# Patient Record
Sex: MALE | Race: BLACK OR AFRICAN AMERICAN | NOT HISPANIC OR LATINO | URBAN - METROPOLITAN AREA
[De-identification: names, ages, dates, MRNs, and addresses within clinical notes are randomized per-mention and may not be internally consistent; named-entity substitution may affect disease eponyms.]

---

## 2023-02-12 ENCOUNTER — EMERGENCY (EMERGENCY)
Facility: HOSPITAL | Age: 43
LOS: 1 days | Discharge: HOME | End: 2023-02-12
Admitting: EMERGENCY MEDICINE
Payer: MEDICAID

## 2023-02-12 VITALS
DIASTOLIC BLOOD PRESSURE: 82 MMHG | HEART RATE: 92 BPM | OXYGEN SATURATION: 95 % | RESPIRATION RATE: 18 BRPM | SYSTOLIC BLOOD PRESSURE: 136 MMHG | TEMPERATURE: 98 F

## 2023-02-12 PROCEDURE — 99284 EMERGENCY DEPT VISIT MOD MDM: CPT

## 2023-02-12 RX ORDER — HALOPERIDOL DECANOATE 100 MG/ML
5 INJECTION INTRAMUSCULAR ONCE
Refills: 0 | Status: COMPLETED | OUTPATIENT
Start: 2023-02-12 | End: 2023-02-12

## 2023-02-12 RX ADMIN — HALOPERIDOL DECANOATE 5 MILLIGRAM(S): 100 INJECTION INTRAMUSCULAR at 18:02

## 2023-02-12 RX ADMIN — Medication 2 MILLIGRAM(S): at 18:02

## 2023-02-12 NOTE — ED PROVIDER NOTE - OBJECTIVE STATEMENT
44 y/o F hx  Schizophrenia BIBA  secondary to hearing voices. Admits that the voices are louder over the past few days.   Appears  internally occupied.  Denies SI/HI/VH.  Denies falling, punching or kicking any objects. Denies pain, SOB, fever, chills, chest/abdominal discomfort.  Denies use of alcohol or illicit drugs. No evidence of physical injuries, broken skin or deformities.

## 2023-02-12 NOTE — ED BEHAVIORAL HEALTH NOTE - BEHAVIORAL HEALTH NOTE
As per request of provider gisella contacted pt residence [phone] and spoke with Radha who stated that came down and mentioned his voices are getting worse and she asked what is going on and the pt stated his voices were  getting worst and getting more serious and staff asked if he wanted to take a moment to sit down and he said not its getting worst. Radha then asked pt is he wanted to go to the hospital and pt stated yes he did want to go to the hospital. Radha stated that EMS showed up 20 mins later and took him to the hospital. Radha stated that pt is med compliant. Radha mentioned that she has limited information due to her being the  and she can only report on when she sees pt. Radha provided on call phone number [phone]. Gisella contacted on call phone number and call when to  stating that staff will return Monday.

## 2023-02-12 NOTE — ED PROVIDER NOTE - PROGRESS NOTE DETAILS
Handy NP- Restraints removed . Patient calm,  engaged in activity on floor. SW consulted. Handy NP-  Patient not responsive to directions refusing to change and repeatedly threatens  to run through doors.  Restraint applied for safety.  Constant observation initiated. Will continue to monitor.

## 2023-02-12 NOTE — ED ADULT NURSE NOTE - CHIEF COMPLAINT QUOTE
Pt from [facility], with history of schizophrenia, compliant with medications, reports voices telling him to hurt himself and others. States he always hears them but they are getting stronger. Denies current SI plan, reports an attempt in the past.

## 2023-02-12 NOTE — ED BEHAVIORAL HEALTH NOTE - BEHAVIORAL HEALTH NOTE
The provider informed the  that the patient is ready for discharge and requires a taxi home. The  confirmed with the patient the address on the chart: [place] at [facility]. The  confirmed with the patient the address on the chart: [place]ha at [facility]. SW confirmed that pt mode of transportation was taxi and the pt travel independent .  The  contacted [facility] at [phone], and they mentioned that Pt was not eligible for transportation. The trip is arranged with [employer] [employer], [employer] [address], at [phone], to  the patient. The  notifies the provider who agreed to the plan. No further SW intervention is required at this time.

## 2023-02-12 NOTE — ED ADULT NURSE NOTE - OBJECTIVE STATEMENT
Pt from [facility], with history of schizophrenia, compliant with medications, reports voices telling him to hurt himself and others. States he always hears them but they are getting stronger.   Patient to behavior health area. Patient uncooperative and refusing to change and comply with staff, threatened to run to door, agitated and combative. Patient medicated as ordered and placed for evaluation. Will continue to monitor closely.   KIN White

## 2023-02-12 NOTE — ED PROVIDER NOTE - CLINICAL SUMMARY MEDICAL DECISION MAKING FREE TEXT BOX
44 y/o F hx  Schizophrenia   Medical evaluation performed. There is no clinical evidence of intoxication or any acute medical problem requiring immediate intervention. Patient is awaiting psychiatric consultation. Final disposition will be determined by psychiatrist. 44 y/o F hx  Schizophrenia   Medical evaluation performed. There is no clinical evidence of intoxication or any acute medical problem requiring immediate intervention. SW consulted .

## 2023-02-12 NOTE — ED PROVIDER NOTE - PATIENT PORTAL LINK FT
You can access the FollowMyHealth Patient Portal offered by [facility] by registering at the following website: [email or web address]. By joining [employer]’s FollowMyHealth portal, you will also be able to view your health information using other applications (apps) compatible with our system.

## 2023-03-28 ENCOUNTER — EMERGENCY (EMERGENCY)
Facility: HOSPITAL | Age: 43
LOS: 1 days | Discharge: HOME | End: 2023-03-28
Attending: EMERGENCY MEDICINE | Admitting: STUDENT IN AN ORGANIZED HEALTH CARE EDUCATION/TRAINING PROGRAM
Payer: MEDICAID

## 2023-03-28 VITALS
HEART RATE: 83 BPM | RESPIRATION RATE: 16 BRPM | OXYGEN SATURATION: 99 % | TEMPERATURE: 98 F | SYSTOLIC BLOOD PRESSURE: 112 MMHG | DIASTOLIC BLOOD PRESSURE: 67 MMHG

## 2023-03-28 PROBLEM — F20.9 SCHIZOPHRENIA, UNSPECIFIED: Chronic | Status: ACTIVE | Noted: 2023-02-12

## 2023-03-28 PROCEDURE — 99284 EMERGENCY DEPT VISIT MOD MDM: CPT

## 2023-03-28 NOTE — ED BEHAVIORAL HEALTH NOTE - BEHAVIORAL HEALTH NOTE
Per provider, MD Verma, patient is cleared and is able to return to their previous residence, [address] [place] [address].  has spoken to anette  at [facility],  confirmed that patients mode of transportation is [employer]AXI and that patient travels INDEPENDENTLY. Clinical provider is in agreement with TAXI back to group home. Verbal huddle regarding coordination of care completed with interdisciplinary team.   Transportation coordinated via [facility] TRANSPORTATION DOCUMENTATION. Per provider, MD Verma, patient is cleared and is able to return to their previous residence, [address] [place] [address].  has spoken to anette  at [facility],  confirmed that patients mode of transportation is TAXI and that patient travels INDEPENDENTLY. Clinical provider is in agreement with TAXI back to group home. Verbal huddle regarding coordination of care completed with interdisciplinary team.   Transportation coordinated via [facility] Inv# [id number] [employer] [phone]. Ride scheduled for 5pm.

## 2023-03-28 NOTE — ED PROVIDER NOTE - PATIENT PORTAL LINK FT
You can access the FollowMyHealth Patient Portal offered by [facility] by registering at the following website: [email or web address]. By joining [employer]’s FollowMyHealth portal, you will also be able to view your health information using other applications (apps) compatible with our system.

## 2023-03-28 NOTE — ED ADULT TRIAGE NOTE - CHIEF COMPLAINT QUOTE
Pt presents from [facility], h/o schizophrenia, compliant with medications, endorses visual and auditory hallucinations that started today. Pt denies SI/HI. Calm and cooperative.

## 2023-03-28 NOTE — ED PROVIDER NOTE - NSFOLLOWUPINSTRUCTIONS_ED_ALL_ED_FT
- Follow up with your psychiatrist within 1-3 days for reassessment     - Return to the ED for any new, worsening, or concerning symptoms to you    - Continue all prescribed medications    - Rest and keep yourself hydrated with fluids

## 2023-03-28 NOTE — ED BEHAVIORAL HEALTH NOTE - BEHAVIORAL HEALTH NOTE
Writer called patient’s residence [address] [place] ([phone]) for collateral information. All information is as per  covering casey Chavez:     Patient has been medication compliant. Patient reported to his [facility] worker that he was hearing voices to hurt someone. Pt was not violent or aggressive. Patient is linked to [facility]. 73- Dr. Metz ([phone]) she states that she is the patient’s covering  and his  is on vacation. Pt has a DX of schizophrenia. She is unsure how long the patient has been hearing voices. She states that she is unsure which worker came today to see patient and she will call back. Writer called patient’s residence [address] [place] ([phone]) for collateral information. All information is as per  covering casey Byrne:     Patient has been medication compliant. Patient reported to his [facility] worker that he was hearing voices to hurt someone. Pt was not violent or aggressive. Patient is linked to [facility]. 73- Dr. Metz ([phone]) she states that she is the patient’s covering  and his  is on vacation. Pt has a DX of schizophrenia. She is unsure how long the patient has been hearing voices. She states that she is unsure which worker came today to see patient and she will call back.     Worker called Pt’s therapist [facility] ([phone]). She states that she did not send the patient to the ed. She states that the patient chronically hears voices. She states that the patient is compliant with treatment and received his injection last Friday.  Sw will follow.

## 2023-03-28 NOTE — ED ADULT NURSE NOTE - OBJECTIVE STATEMENT
pt. seen, evaluated and discharged by MD Palm prior to writer assessing patient. No nursing interventions needed at this time. NAD noted. respirations even and unlabored on RA. Denies SI/HI, ETOH/Drug Use, Endorses Chronic AH/VH.

## 2023-03-28 NOTE — ED PROVIDER NOTE - CLINICAL SUMMARY MEDICAL DECISION MAKING FREE TEXT BOX
44 y/o male h/o schizophrenia from [facility] with worsening of ah/vh.  Compliant with meds.  Otherwise no si/hi, illegal drug or etoh use.  Spoke with SANGEETA Hutchins, will obtain collateral.

## 2023-03-28 NOTE — ED PROVIDER NOTE - OBJECTIVE STATEMENT
42 y/o male h/o schizophrenia from [facility] with c/o worsening of a/v halluc.  Denies si/hi.  Denies illegal drug or etoh use.  States he is compliant with his psych meds.  Per ems, pt calm cooperative en route.

## 2023-05-17 ENCOUNTER — EMERGENCY (EMERGENCY)
Facility: HOSPITAL | Age: 43
LOS: 1 days | Discharge: HOME | End: 2023-05-17
Attending: STUDENT IN AN ORGANIZED HEALTH CARE EDUCATION/TRAINING PROGRAM | Admitting: STUDENT IN AN ORGANIZED HEALTH CARE EDUCATION/TRAINING PROGRAM
Payer: MEDICAID

## 2023-05-17 VITALS
OXYGEN SATURATION: 100 % | TEMPERATURE: 98 F | DIASTOLIC BLOOD PRESSURE: 78 MMHG | HEART RATE: 98 BPM | SYSTOLIC BLOOD PRESSURE: 125 MMHG | RESPIRATION RATE: 17 BRPM

## 2023-05-17 DIAGNOSIS — F20.9 SCHIZOPHRENIA, UNSPECIFIED: ICD-10-CM

## 2023-05-17 PROCEDURE — 99283 EMERGENCY DEPT VISIT LOW MDM: CPT

## 2023-05-17 NOTE — ED BEHAVIORAL HEALTH ASSESSMENT NOTE - DESCRIPTION
none known lives in [facility] housing in single room calm and cooperative, no agitation.     Vital Signs Last 24 Hrs  T(C): 36.7 (17 May 2023 18:15), Max: 36.7 (17 May 2023 18:15)  T(F): 98 (17 May 2023 18:15), Max: 98 (17 May 2023 18:15)  HR: 98 (17 May 2023 18:15) (98 - 98)  BP: 125/78 (17 May 2023 18:15) (125/78 - 125/78)  BP(mean): --  RR: 17 (17 May 2023 18:15) (17 - 17)  SpO2: 100% (17 May 2023 18:15) (100% - 100%)    Parameters below as of 17 May 2023 18:15  Patient On (Oxygen Delivery Method): room air

## 2023-05-17 NOTE — ED PROVIDER NOTE - PHYSICAL EXAMINATION
Gen: Well appearing in NAD  Head: NC/AT  Neck: trachea midline  Resp:  No distress  Ext: no deformities  Neuro:  A&O appears non focal  Skin:  Warm and dry as visualized  Psych:  calm and cooperative, internally preoccupied

## 2023-05-17 NOTE — ED ADULT NURSE NOTE - CHIEF COMPLAINT QUOTE
Pt from [facility] 68 c/o hearing voices. Pt states voices are "antagonizing him, telling him to hurt himself." Pt states he has no plan to harm himself, Pt denies hi/vh. Compliant with psych medication, Phx schizophrenia

## 2023-05-17 NOTE — ED BEHAVIORAL HEALTH ASSESSMENT NOTE - NSBHATTESTCOMMENTATTENDFT_PSY_A_CORE
43-year-old male with a history of schizophrenia, no active medical issues, lives on [facility], h/o multiple prior presentations to ED for AH, who presents brought in by EMS from his residence for worsening auditory hallucinations.    Mr. Bishop presents with active psychosis including AH and some bizarre thought content that appear to be at his chronic baseline. He reports intermittent CAH to hurt himself and others that he is able to ignore, and that is also a chronic presentation of his schizophrenia without acute worsening. He has no SI/HI, denies history of self injury (though per chart review it is stated that he has a past SA). He expresses a desire for hospitalization initially, but after a mutual discussion of treatment options, he is agreeable to discharge home with continued outpatient follow up.

## 2023-05-17 NOTE — ED BEHAVIORAL HEALTH NOTE - BEHAVIORAL HEALTH NOTE
Writer called patient’s residence [facility] ([phone]) for collateral information. All information is as per  Jm:    Patient resides at [facility] and is linked to [facility].73- Dr. Metz [phone]. Patient has been medication compliant. Patient has DX of schizophrenia. Patient went to see his  today and then returned back to the residence and reported hearing voices telling him to injure himself. Patient did not act on this. Patient at times curses out loud to the voices. Patient did not report plan or intent to hurt himself. Patient does not like the voices. Patient hears the voices twice or more in a month. This is not the first episode where patient had heard voices. Patient tends to be violent towards females. No violence or aggression reported. Patient is not using any drugs or alcohol. Patient has been ordering food and eating well. No HI. Patient can return via taxi if discharge. Writer called patient’s residence [facility] ([phone]) for collateral information. All information is as per  [place]:    Patient resides at [facility] and is linked to [facility].73- Dr. Metz [phone]. Patient has been medication compliant. Patient has DX of schizophrenia. Patient went to see his  today and then returned back to the residence and reported hearing voices telling him to injure himself. Patient did not act on this. Patient at times curses out loud to the voices. Patient did not report plan or intent to hurt himself. Patient does not like the voices. Patient hears the voices twice or more in a month. This is not the first episode where patient had heard voices. Patient tends to be violent towards females. No violence or aggression reported. Patient is not using any drugs or alcohol. Patient has been ordering food and eating well. No HI. Patient can return via taxi if discharge.    Per provider, MD Scott, patient is cleared and is able to return to their previous residence, [facility] [address].  has spoken to anette  at  [facility],  confirmed that patients mode of transportation is TAXI and that patient travels INDEPENDENTLY. Clinical provider is in agreement with TAXI back to group home. Verbal huddle regarding coordination of care completed with interdisciplinary team.   Transportation coordinated via mas Writer called patient’s residence [facility] ([phone]) for collateral information. All information is as per  [place]:    Patient resides at [facility] and is linked to [facility].73- Dr. Metz [phone]. Patient has been medication compliant. Patient has DX of schizophrenia. Patient went to see his  today and then returned back to the residence and reported hearing voices telling him to injure himself. Patient did not act on this. Patient at times curses out loud to the voices. Patient did not report plan or intent to hurt himself. Patient does not like the voices. Patient hears the voices twice or more in a month. This is not the first episode where patient had heard voices. Patient tends to be violent towards females. No violence or aggression reported. Patient is not using any drugs or alcohol. Patient has been ordering food and eating well. No HI. Patient can return via taxi if discharge.    Per provider, MD Scott, patient is cleared and is able to return to their previous residence, [facility] [address].  has spoken to anette  at  [facility],  confirmed that patients mode of transportation is TAXI and that patient travels INDEPENDENTLY. Clinical provider is in agreement with TAXI back to group home. Verbal huddle regarding coordination of care completed with interdisciplinary team.

## 2023-05-17 NOTE — ED PROVIDER NOTE - CLINICAL SUMMARY MEDICAL DECISION MAKING FREE TEXT BOX
43-year-old male with a history of schizophrenia lives on [employer] grounds, presents for worsening auditory hallucinations.  Patient states he always hallucinations but lately has been worse and more persistent and states he feels like he is being harassed by the hallucinations.  Patient states the voices are telling him to hurt himself and punch himself.  He has not yet acted on these but feels like he cannot handle laces anymore.  Patient states he is adherent to his medications he is on Depakote Haldol Abilify and Cogentin.  Denies any medical complaints at this time.  Patient is well-appearing in no acute distress currently creatinine by agitated prior to any medications.  Patient has been seen for similar in the past we will get psych clearance consider dispo Home

## 2023-05-17 NOTE — ED BEHAVIORAL HEALTH ASSESSMENT NOTE - DETAILS
at baseline, able to not act on them as per HPI patient declined nope staff at [facility] notified

## 2023-05-17 NOTE — ED BEHAVIORAL HEALTH ASSESSMENT NOTE - HPI (INCLUDE ILLNESS QUALITY, SEVERITY, DURATION, TIMING, CONTEXT, MODIFYING FACTORS, ASSOCIATED SIGNS AND SYMPTOMS)
43-year-old male with a history of schizophrenia, no active medical issues, lives on [facility], h/o multiple prior presentations to ED for AH, who presents brought in by EMS from his residence for worsening auditory hallucinations.    Mr Bishop is calm and cooperative in interview. At times he laughs to himself and glances around the room, needing the question he was asked to be repeated. He reports that he has been hearing voices for 21 years, and that today the voices have been "harassing me" and "following me." He reports that this is a chronic occurrence for him, but that today he felt that this experience became unbearable, prompting him to present to the ED. He reports that the voices tell him to harm others and to harm himself, and states that this is also a chronic occurrence that he is and has always been able to ignore. He says that he wishes the voices would not be able to read his mind and his thoughts on such a deep level. He is unable to further elaborate on describing the nature and character of the voices. He denies paranoid ideation and denies IOR. He denies SIIP/HIIP. He reports that his mood has been "" when asked open ended. When asked if has been depressed, he endorses having been depressed as well at times due to his experiences with the voices. He reports 9 hours of sleep per night recently. He reports good appetite. He says that he has a goal to lose weight and to lift weights to get stronger. He is unable to name his current medication regimen, but reports that he is happy with his current medications. He reports receiving regular psychiatric follow up at [facility].    See  note for collateral from patient's residence. In short, worker from patient's residence informs us that hearing voices is a baseline for Mr. Bishop, but due to Mr. Bishop's concern for worsening AH, that they decided to active EMS to bring him into the ED for evaluation. They are comfortable taking him back if we find that discharge is appropriate.

## 2023-05-17 NOTE — ED ADULT TRIAGE NOTE - CHIEF COMPLAINT QUOTE
Pt from [facility] 68 c/o hearing voices. Pt states voices are "antagonizing him, telling him to hurt himself." Pt states he has no plan to harm himself, Pt denies hi/vh. Compliant with psych medication, Phx schizophrenia

## 2023-05-17 NOTE — ED BEHAVIORAL HEALTH ASSESSMENT NOTE - RISK ASSESSMENT
Modifiable RF: active sx of psychosis including CAH  Unmodifiable RF: h/o schizophrenia, reported h/o a past suicide attempt   PF: no SI/HI, able to not act on CAH, sx of psychosis at baseline, no substance use, help seeking, engaged in outpatient care, maintaining good self care, future oriented.  Given the above, the patient has some elevated CHRONIC risk of harm to self/others, but acute risk is currently low, with modifiable risk factors mitigated by current outpatient care. As such, he is appropriate for outpatient level of care and for discharge from the ED.

## 2023-05-17 NOTE — ED BEHAVIORAL HEALTH NOTE - BEHAVIORAL HEALTH NOTE
worker arranged for taxi through [facility] site with [employer] Time: call Inv# [id number] [phone] [facility].

## 2023-05-17 NOTE — ED PROVIDER NOTE - PATIENT PORTAL LINK FT
You can access the FollowMyHealth Patient Portal offered by [facility] by registering at the following website: [email or web address]. By joining [employer]’s FollowMyHealth portal, you will also be able to view your health information using other applications (apps) compatible with our system.

## 2023-05-17 NOTE — ED BEHAVIORAL HEALTH ASSESSMENT NOTE - NSSUICPROTFACT_PSY_ALL_CORE
Identifies reasons for living/Supportive social network of family or friends/Positive therapeutic relationships/Ability to cope with stress/Frustration tolerance

## 2023-05-17 NOTE — ED ADULT NURSE NOTE - NSFALLUNIVINTERV_ED_ALL_ED
Bed/Stretcher in lowest position, wheels locked, appropriate side rails in place/Call bell, personal items and telephone in reach/Instruct patient to call for assistance before getting out of bed/chair/stretcher/Non-slip footwear applied when patient is off stretcher/Hill to call system/Physically safe environment - no spills, clutter or unnecessary equipment/Purposeful proactive rounding/Room/bathroom lighting operational, light cord in reach

## 2023-05-17 NOTE — ED PROVIDER NOTE - OBJECTIVE STATEMENT
43-year-old male with a history of schizophrenia lives on [employer] grounds, presents for worsening auditory hallucinations.  Patient states he always hallucinations but lately has been worse and more persistent and states he feels like he is being harassed by the hallucinations.  Patient states the voices are telling him to hurt himself and punch himself.  He has not yet acted on these but feels like he cannot handle laces anymore.  Patient states he is adherent to his medications he is on Depakote Haldol Abilify and Cogentin.  Denies any medical complaints at this time.  Patient is well-appearing in no acute distress currently creatinine by agitated prior to any medications.  Patient has been seen for similar in the past we will get psych clearance consider dispo Home

## 2023-05-17 NOTE — ED ADULT NURSE NOTE - OBJECTIVE STATEMENT
Pt from [facility] 68 c/o hearing voices. Pt states voices are "antagonizing him, telling him to hurt himself." Pt states he has no plan to harm himself, Pt denies hi/vh. Compliant with psych medication, Phx schizophrenia

## 2023-05-17 NOTE — ED PROVIDER NOTE - NSFOLLOWUPINSTRUCTIONS_ED_ALL_ED_FT
Follow up with your doctor and therapist       HALLUCINATIONS - General Information    Hallucinations    WHAT YOU NEED TO KNOW:    What are hallucinations? Hallucinations are things you see, hear, feel, taste, or smell that seem real but are not. Some hallucinations are temporary. Hallucinations that continue, interfere with daily activities, or worsen may be a sign of a serious medical or mental condition that needs treatment.    What are the types of hallucinations?    Auditory means you hear things, such as music, buzzing, or ringing. You may hear voices even though no one else is in the room. The voices may say negative things about you or tell you to harm yourself or others. You may hear the voice of a loved one who recently passed away.    Visual means you see things, such as a person or object that is not real. Flashes of light or shapes are other examples. Another example is an object that is real but looks different to you than it does to others.    Tactile means you feel things, such as an object that is not real. You may feel like something is touching you or is crawling on or in your skin. You may also feel that your body is being cut or torn. You may feel like something is in a body part, such as your stomach, even though tests show nothing is there.    Olfactory means you smell something that is not real. The smell may make you gag or choke if it is not pleasant. You may smell something good, such as food or flowers. Olfactory hallucinations may be a sign of a serious medical condition that needs treatment, such as a brain tumor.    Gustatory means you taste things that are not real. You may taste something even when your mouth is empty. Your food may taste rotten or sour even though others eating the same food think it tastes fine.  What increases the risk for hallucinations?    A mental condition, such as dementia or schizophrenia    Drug or alcohol abuse or withdrawal, or a reaction to a medication    A fever, infection, or heatstroke    A medical condition, such as thyroid problems or a brain tumor    A neurological condition, such as migraines or seizures    An eye condition, such as glaucoma or macular degeneration    An inner ear condition or infection    Low blood sugar or sodium levels    Emotional problems, such as from the recent loss of a loved one, PTSD, or abuse    Not enough sleep, or being between asleep and awake but still dreaming  How is the cause of hallucinations diagnosed? Your healthcare provider will ask when the hallucinations started. Tell your provider about any recent stress in your life, such as the death of a loved one. Include any trouble sleeping or recent illness. Your provider will also ask about medicines you take and if you drink alcohol or use drugs.    Blood or urine tests may be used to check for infection, or for alcohol or drugs. The tests may also be used to check thyroid or liver function.    A CT or MRI may be used to check for an injury, tumor, or infection.  How are hallucinations treated?    Medicines may be given to stop the hallucinations, reduce anxiety, or relax your muscles.    A behavior therapist may help you recognize and manage hallucinations. The therapist may teach methods such as the talk-through method. You will learn to tell yourself that the hallucination is not real and what to do when it ends.  Call 911 if you or someone else notices any of the following:    You want to harm yourself or someone else.    You hear voices telling you to harm yourself or someone else.    You have a seizure.    You are confused, do not know where you are, or are not making sense when you speak.  When should I seek immediate care?    Your hallucinations worsen or return after treatment.    You vomit several times in a row.    Your heartbeat or breathing is faster or slower than usual.    You have trouble breathing or shortness of breath.  When should I contact my healthcare provider?    You have new hallucinations.    You have questions or concerns about your condition or care.  CARE AGREEMENT:    You have the right to help plan your care. Learn about your health condition and how it may be treated. Discuss treatment options with your healthcare providers to decide what care you want to receive. You always have the right to refuse treatment.    © [employer] 1973, 2023    	  back to top            © [employer] 1973, 2023

## 2023-07-17 ENCOUNTER — EMERGENCY (EMERGENCY)
Facility: HOSPITAL | Age: 43
LOS: 1 days | Discharge: HOME | End: 2023-07-17
Attending: EMERGENCY MEDICINE | Admitting: EMERGENCY MEDICINE
Payer: MEDICAID

## 2023-07-17 VITALS
RESPIRATION RATE: 18 BRPM | DIASTOLIC BLOOD PRESSURE: 68 MMHG | TEMPERATURE: 98 F | SYSTOLIC BLOOD PRESSURE: 101 MMHG | HEART RATE: 77 BPM | OXYGEN SATURATION: 100 %

## 2023-07-17 LAB
ALBUMIN SERPL ELPH-MCNC: 3.9 G/DL — SIGNIFICANT CHANGE UP (ref 3.3–5)
ALP SERPL-CCNC: 47 U/L — SIGNIFICANT CHANGE UP (ref 40–120)
ALT FLD-CCNC: 15 U/L — SIGNIFICANT CHANGE UP (ref 4–41)
ANION GAP SERPL CALC-SCNC: 13 MMOL/L — SIGNIFICANT CHANGE UP (ref 7–14)
APAP SERPL-MCNC: <10 UG/ML — LOW (ref 15–25)
AST SERPL-CCNC: 23 U/L — SIGNIFICANT CHANGE UP (ref 4–40)
BASOPHILS # BLD AUTO: 0.03 K/UL — SIGNIFICANT CHANGE UP (ref 0–0.2)
BASOPHILS NFR BLD AUTO: 0.6 % — SIGNIFICANT CHANGE UP (ref 0–2)
BILIRUB SERPL-MCNC: 0.2 MG/DL — SIGNIFICANT CHANGE UP (ref 0.2–1.2)
BUN SERPL-MCNC: 9 MG/DL — SIGNIFICANT CHANGE UP (ref 7–23)
CALCIUM SERPL-MCNC: 9.4 MG/DL — SIGNIFICANT CHANGE UP (ref 8.4–10.5)
CHLORIDE SERPL-SCNC: 101 MMOL/L — SIGNIFICANT CHANGE UP (ref 98–107)
CO2 SERPL-SCNC: 28 MMOL/L — SIGNIFICANT CHANGE UP (ref 22–31)
CREAT SERPL-MCNC: 1.14 MG/DL — SIGNIFICANT CHANGE UP (ref 0.5–1.3)
EGFR: 83 ML/MIN/1.73M2 — SIGNIFICANT CHANGE UP
EOSINOPHIL # BLD AUTO: 0.3 K/UL — SIGNIFICANT CHANGE UP (ref 0–0.5)
EOSINOPHIL NFR BLD AUTO: 5.7 % — SIGNIFICANT CHANGE UP (ref 0–6)
ETHANOL SERPL-MCNC: <10 MG/DL — SIGNIFICANT CHANGE UP
GLUCOSE SERPL-MCNC: 112 MG/DL — HIGH (ref 70–99)
HCT VFR BLD CALC: 44.4 % — SIGNIFICANT CHANGE UP (ref 39–50)
HGB BLD-MCNC: 14.4 G/DL — SIGNIFICANT CHANGE UP (ref 13–17)
IANC: 2.16 K/UL — SIGNIFICANT CHANGE UP (ref 1.8–7.4)
IMM GRANULOCYTES NFR BLD AUTO: 0.2 % — SIGNIFICANT CHANGE UP (ref 0–0.9)
LYMPHOCYTES # BLD AUTO: 2.43 K/UL — SIGNIFICANT CHANGE UP (ref 1–3.3)
LYMPHOCYTES # BLD AUTO: 46.3 % — HIGH (ref 13–44)
MCHC RBC-ENTMCNC: 29.4 PG — SIGNIFICANT CHANGE UP (ref 27–34)
MCHC RBC-ENTMCNC: 32.4 GM/DL — SIGNIFICANT CHANGE UP (ref 32–36)
MCV RBC AUTO: 90.8 FL — SIGNIFICANT CHANGE UP (ref 80–100)
MONOCYTES # BLD AUTO: 0.32 K/UL — SIGNIFICANT CHANGE UP (ref 0–0.9)
MONOCYTES NFR BLD AUTO: 6.1 % — SIGNIFICANT CHANGE UP (ref 2–14)
NEUTROPHILS # BLD AUTO: 2.16 K/UL — SIGNIFICANT CHANGE UP (ref 1.8–7.4)
NEUTROPHILS NFR BLD AUTO: 41.1 % — LOW (ref 43–77)
NRBC # BLD: 0 /100 WBCS — SIGNIFICANT CHANGE UP (ref 0–0)
NRBC # FLD: 0 K/UL — SIGNIFICANT CHANGE UP (ref 0–0)
PLATELET # BLD AUTO: 221 K/UL — SIGNIFICANT CHANGE UP (ref 150–400)
POTASSIUM SERPL-MCNC: 4.2 MMOL/L — SIGNIFICANT CHANGE UP (ref 3.5–5.3)
POTASSIUM SERPL-SCNC: 4.2 MMOL/L — SIGNIFICANT CHANGE UP (ref 3.5–5.3)
PROT SERPL-MCNC: 7 G/DL — SIGNIFICANT CHANGE UP (ref 6–8.3)
RBC # BLD: 4.89 M/UL — SIGNIFICANT CHANGE UP (ref 4.2–5.8)
RBC # FLD: 13.1 % — SIGNIFICANT CHANGE UP (ref 10.3–14.5)
SALICYLATES SERPL-MCNC: <0.3 MG/DL — LOW (ref 15–30)
SODIUM SERPL-SCNC: 142 MMOL/L — SIGNIFICANT CHANGE UP (ref 135–145)
TOXICOLOGY SCREEN, DRUGS OF ABUSE, SERUM RESULT: SIGNIFICANT CHANGE UP
TSH SERPL-MCNC: 5.04 UIU/ML — HIGH (ref 0.27–4.2)
WBC # BLD: 5.25 K/UL — SIGNIFICANT CHANGE UP (ref 3.8–10.5)
WBC # FLD AUTO: 5.25 K/UL — SIGNIFICANT CHANGE UP (ref 3.8–10.5)

## 2023-07-17 PROCEDURE — 90792 PSYCH DIAG EVAL W/MED SRVCS: CPT

## 2023-07-17 PROCEDURE — 99285 EMERGENCY DEPT VISIT HI MDM: CPT

## 2023-07-17 NOTE — ED BEHAVIORAL HEALTH ASSESSMENT NOTE - HPI (INCLUDE ILLNESS QUALITY, SEVERITY, DURATION, TIMING, CONTEXT, MODIFYING FACTORS, ASSOCIATED SIGNS AND SYMPTOMS)
43-year-old male with a history of schizophrenia, one prior suicide attempt per chart review but pt denies, no history of SIB, no known history of violence, no active medical issues, [place], h/o multiple prior presentations to ED for AH, recently seen at [facility] ED in May 2023 for similar concerns, who presents brought in by EMS from his residence for worsening auditory hallucinations.    Patient states that he has been hearing voices for "21 years" but today the voices have become "more powerful" and that they were "harassing" him. They voices have been saying "hurt others and hurt yourself" but does not feel the need to listen to them and has never listened to CAH. He states that he always hears voices and never listens to them and when they are more powerful, they "subside" with time. Reports that currently, the voices "have already subsided." He reports that he would never harm himself or others because of his "morals." denies feeling the need to listen to the voices recently or currently. He does feel fearful and paranoid of the voices and wants them to get better. He denies feeling depressed, reports regular and restful sleep, denies any change in energy levels, denies appetite,  denies loss of interest in all daily activites, denies problems with memory or concentrating, denies panic, denies feelings of hopelessness and guilt, denies suicidal or homicidal ideation, intent, or plan, denies history of suicidal behavior or self harm, denies history of violent behavior and denies access to weapons. Denies elevated or irritable mood, denies racing thoughts, denies visual hallucinations, and denies somatic symptoms. He states mood is "worried". 43-year-old male with a history of schizophrenia, one prior suicide attempt per chart review but pt denies, no history of SIB, no known history of violence, no active medical issues, [place], h/o multiple prior presentations to ED for AH, recently seen at [facility] ED in May 2023 for similar concerns, who presents brought in by EMS from his residence for worsening auditory hallucinations.    Patient states that he has been hearing voices for "21 years" but today the voices have become "more powerful" and that they were "harassing" him. They voices have been saying "hurt others and hurt yourself" but does not feel the need to listen to them and has never listened to CAH. He states that he always hears voices and never listens to them and when they are more powerful, they "subside" with time. Reports that currently, the voices "have already subsided." He reports that he would never harm himself or others because of his "morals." denies feeling the need to listen to the voices recently or currently. He does feel fearful and paranoid of the voices and wants them to get better. He denies feeling depressed, reports regular and restful sleep, denies any change in energy levels, denies appetite,  denies loss of interest in all daily activites, denies problems with memory or concentrating, denies panic, denies feelings of hopelessness and guilt, denies suicidal or homicidal ideation, intent, or plan, denies history of suicidal behavior or self harm, denies history of violent behavior and denies access to weapons. Denies elevated or irritable mood, denies racing thoughts, denies visual hallucinations, and denies somatic symptoms. He states mood is "worried".    Attempted to call pt's psychiatrist, Dr. Metz, left  requesting callback.    See  note for collateral from pt's  43-year-old male with a history of schizophrenia, one prior suicide attempt per chart review but pt denies, no history of SIB, no known history of violence, no active medical issues, [place], h/o multiple prior presentations to ED for AH, recently seen at [facility] ED in May 2023 for similar concerns, who presents brought in by EMS from his residence for worsening auditory hallucinations.    Patient states that he has been hearing voices for "21 years" but today the voices have become "more powerful" and that they were "harassing" him. They voices have been saying "hurt others and hurt yourself" but does not feel the need to listen to them and has never listened to CAH. He states that he always hears voices and never listens to them and when they are more powerful, they "subside" with time. Reports that currently, the voices "have already subsided." He reports that he would never harm himself or others because of his "morals." denies feeling the need to listen to the voices recently or currently. He does feel fearful and paranoid of the voices and wants them to get better. He denies feeling depressed, reports regular and restful sleep, denies any change in energy levels, denies appetite,  denies loss of interest in all daily activites, denies problems with memory or concentrating, denies panic, denies feelings of hopelessness and guilt, denies suicidal or homicidal ideation, intent, or plan, denies history of suicidal behavior or self harm, denies history of violent behavior and denies access to weapons. Denies elevated or irritable mood, denies racing thoughts, denies visual hallucinations, and denies somatic symptoms. He states mood is "worried".    Attempted to call pt's psychiatrist, Dr. Metz, left  requesting callback - see update in assessment below     See  note for collateral from pt's

## 2023-07-17 NOTE — ED BEHAVIORAL HEALTH ASSESSMENT NOTE - NS ED BHA PLAN TR BH CONTACTED FT
called Dr. Metz (number in collateral note), left VM requesting call back called Dr. Metz (number in collateral note), left  requesting call back - no concerns about pt returning home

## 2023-07-17 NOTE — ED ADULT NURSE NOTE - NSFALLUNIVINTERV_ED_ALL_ED
Bed/Stretcher in lowest position, wheels locked, appropriate side rails in place/Call bell, personal items and telephone in reach/Instruct patient to call for assistance before getting out of bed/chair/stretcher/Non-slip footwear applied when patient is off stretcher/[place] to call system/Physically safe environment - no spills, clutter or unnecessary equipment/Purposeful proactive rounding/Room/bathroom lighting operational, light cord in reach

## 2023-07-17 NOTE — ED PROVIDER NOTE - OBJECTIVE STATEMENT
43-year-old male presenting to the emergency department with auditory hallucinations, history of schizophrenia reporting adherence to medications.  Patient reporting at baseline he does have auditory hallucinations however today they have been more frequent and invasive, telling him to hurt himself and others.  He reports he has no desire to hurt anyone else or himself.  He has no SI or HI.  Does not have any medical complaints at this time.  No recent drug or alcohol use.  Reports adherence to his med list which includes Depakote, Abilify, Seroquel, benztropine, and Haldol injections.Comes from the [place].

## 2023-07-17 NOTE — ED PROVIDER NOTE - PHYSICAL EXAMINATION
GEN - NAD; well appearing; A+O x3   HEAD - NC/AT   EYES- PERRL, EOMI  ENT: Airway patent, mmm, Oral cavity and pharynx normal.   NECK: Neck supple  PULMONARY - CTA b/l, symmetric breath sounds.   CARDIAC -s1s2, RRR, no M,G,R  ABDOMEN - +BS, ND, NT, soft, no guarding, no rebound, no masses   BACK - no CVA tenderness, Normal  spine   EXTREMITIES - FROM, no deformity  SKIN - no rash or bruising   NEUROLOGIC - alert, speech clear, no focal deficits  PSYCH -calm, cooperative, linear, endorsing AH, denies si/hi

## 2023-07-17 NOTE — ED BEHAVIORAL HEALTH ASSESSMENT NOTE - SUMMARY
43-year-old male with a history of schizophrenia, one prior suicide attempt per chart review but pt denies, no history of SIB, no known history of violence, no active medical issues, lives [place] grounds, h/o multiple prior presentations to ED for AH, recently seen at [facility] ED in May 2023 for similar concerns, who presents brought in by EMS from his residence for worsening auditory hallucinations.      Mr. Bishop presents with active psychosis including AH and some bizarre thought content that appear to be at his chronic baseline. He reports intermittent CAH to hurt himself and others that he is able to ignore, and that is also a chronic presentation of his schizophrenia without acute worsening. He has no SI/HI, denies history of self injury (though per chart review it is stated that he has a past SA). Discussed various options to patients including medication changes, voluntary hospitalization and following up with his psychiatrist. Pt is agreeable to discharge home with continued outpatient follow up, especially since the CAH have already subsided. I offered him the opportunity to safety plan and review coping strategies, but he declined, stating that he would never harm himself or harm others because of his morals. While pt has chronically elevated risk given his CAH, given lack of suicidality, homicidality, ability to care for himself, lack of need to listen to voices,  and ability seek help; pt is appropriate for discharge to his outpatient providers.  He confirms that he feels safe going back to his residence, and confirms an understanding that he can return to the ED if his condition worsens or he begins to feel unsafe.    RECOMMENDATIONS:  - discharge home to his psychiatric residence  - follow up with outpatient team  - continue home meds 43-year-old male with a history of schizophrenia, one prior suicide attempt per chart review but pt denies, no history of SIB, no known history of violence, no active medical issues, [place], h/o multiple prior presentations to ED for AH, recently seen at [facility] ED in May 2023 for similar concerns, who presents brought in by EMS from his residence for worsening auditory hallucinations.      Mr. Bishop presents with active psychosis including AH and some bizarre thought content that appear to be at his chronic baseline. He reports intermittent CAH to hurt himself and others that he is able to ignore, and that is also a chronic presentation of his schizophrenia without acute worsening. He has no SI/HI, denies history of self injury (though per chart review it is stated that he has a past SA). Discussed various options to patients including medication changes, voluntary hospitalization and following up with his psychiatrist. Pt is agreeable to discharge home with continued outpatient follow up, especially since the CAH have already subsided. I offered him the opportunity to safety plan and review coping strategies, but he declined, stating that he would never harm himself or harm others because of his morals. While pt has chronically elevated risk given his CAH, given lack of suicidality, homicidality, ability to care for himself, lack of need to listen to voices,  and ability seek help; pt is appropriate for discharge to his outpatient providers.  He confirms that he feels safe going back to his residence, and confirms an understanding that he can return to the ED if his condition worsens or he begins to feel unsafe.    RECOMMENDATIONS:  - discharge home to his psychiatric residence  - follow up with outpatient team  - continue home meds      Update: pt's psychiatrist returned phone call. States that pt's AH were less bothersome last week and he's in the process of adjusting pt's doses. Did not have any concerns about pt being discharged back to his residence.

## 2023-07-17 NOTE — ED ADULT TRIAGE NOTE - CHIEF COMPLAINT QUOTE
from [place] bld #66 c/o  voices become more powerful and giving commands to hurt self and  others, pt is calm and cooperative in  triage, hx of Schizophrenia

## 2023-07-17 NOTE — ED BEHAVIORAL HEALTH NOTE - BEHAVIORAL HEALTH NOTE
As per provider, Worker called patient’s residence [place] ([phone]) and spoke to Ming  for collateral information.  All information is as per Ming:  Ming states that today the patient reported that he has been hearing voices – stating killing, death, suicide, and rape. Patient chronically hears voices. Patient did say he was hurting himself and other but did not elaborate who.  states that usually the voices come and go. Patient is not presenting with any violence or aggression. Patient is medication compliant. Patient has a dx of schizophrenia and hypertension. Patient is compliant with Dr. Metz ([phone]). Patient recently received his injection of Haldol 150 mg every four weeks on 7/13. Patient is prescribed medications –   abilify 20 mg once a day in the am  benztropine 1 mg take one in evening  Depakote 500 mg take two tab in the pm  Haldol 20 mg take once in the pm   Depakote 500 mg take once in the am  Quetiapine 100 mg in the night  Isoniazid  300 mg once in the am  Patient has been Functioning at his baseline and has been sleeping and  eating.  Patient  has a history of every now and again hearing voices.   No documentation of intellectual disability  Per provider, patient is cleared and is able to return to their previous residence, [place].  has spoken to  Ming ,  confirmed that patients mode of transportation is [employer]AX[employer] and that patient travels INDEPENDENTLY. Clinical provider is in agreement with [facility]AXI back to group home. Verbal huddle regarding coordination of care completed with interdisciplinary team. Worker arranged for taxi through [facility] site with chad vogt back to [place] [email or web address]. call Inv# [phone] p/u 1:30pm

## 2023-07-17 NOTE — ED BEHAVIORAL HEALTH ASSESSMENT NOTE - SAFETY PLAN ADDT'L DETAILS
Education provided regarding environmental safety / lethal means restriction/Provision of National Suicide Prevention Lifeline 2-052-632-TALK (7098)

## 2023-07-17 NOTE — ED PROVIDER NOTE - CLINICAL SUMMARY MEDICAL DECISION MAKING FREE TEXT BOX
43-year-old male presenting to the emergency department with auditory hallucinations which are worse than usual and more pervasive which started today, adherent to his regular medications that he takes for schizophrenia, coming from [facility] with EMS.  Patient currently in good behavioral control, calm cooperative, denying any active SI HI despite what the voices are telling him, has no medical complaints, no evidence of intoxication.  Discussed case with psychiatrist who will consult and evaluate, labs to be sent to evaluate for electrolyte disturbance, organ dysfunction, will also check Depakote level.  Patient informed and agreeable.

## 2023-07-17 NOTE — ED BEHAVIORAL HEALTH ASSESSMENT NOTE - DESCRIPTION
Vital Signs Last 24 Hrs  T(C): 36.8 (17 Jul 2023 11:08), Max: 36.8 (17 Jul 2023 11:08)  T(F): 98.3 (17 Jul 2023 11:08), Max: 98.3 (17 Jul 2023 11:08)  HR: 77 (17 Jul 2023 11:08) (77 - 77)  BP: 101/68 (17 Jul 2023 11:08) (101/68 - 101/68)  BP(mean): --  RR: 18 (17 Jul 2023 11:08) (18 - 18)  SpO2: 100% (17 Jul 2023 11:08) (100% - 100%)    Parameters below as of 17 Jul 2023 11:08  Patient On (Oxygen Delivery Method): room air    Pt is calm, cooperative, occasionally glancing at other patients who pass by his room. lives in [facility] housing in single room none known latent TB

## 2023-07-17 NOTE — ED PROVIDER NOTE - PATIENT PORTAL LINK FT
You can access the FollowMyHealth Patient Portal offered by [facility] by registering at the following website: [email or web address]. By joining [employer]’s FollowMyHealth portal, you will also be able to view your health information using other applications (apps) compatible with our system.

## 2023-07-17 NOTE — ED PROVIDER NOTE - NSFOLLOWUPINSTRUCTIONS_ED_ALL_ED_FT
Follow up with your primary care physician and psychiatrist in 48-72 hours.  You may also see the psychiatrist at [facility]:    [address] [place] [address]  Phone: [phone]    [facility] on [facility] [place] Information:    -Walk-in hours: Monday to Friday, 9am to 3pm   -Almost all walk-in patients will be able to see a psych prescriber the same day   -Scheduled, non-urgent, evening remote/virtual appointments are available on a limited basis. Call our  to inquire about these: [phone]. A crisis center clinician screens these requests in the late afternoon and if appropriate it takes a few days to set-up.   -Visits take about 2 to 4 hours total   -Mornings are the best time for patients to arrive    For Telehealth options try:  [employer]: [email or web address] [email or web address] (to access psychiatrist or therapist)  [facility]: [facility] [facility] (to access psychiatrist or therapist)  Better Help: [email or web address] (Largest online therapy group)      SEEK IMMEDIATE MEDICAL CARE IF YOU HAVE ANY OF THE FOLLOWING SYMPTOMS: thoughts about hurting or killing yourself, thoughts about hurting or killing somebody else, hallucinations or any other worsening or persistent symptoms OR ANY NEW OR CONCERNING SYMPTOMS.

## 2023-07-17 NOTE — ED BEHAVIORAL HEALTH ASSESSMENT NOTE - CURRENT MEDICATION
per ED provider - Haldol, Abilify, Cogentin, Depakote (pt was unable to name medications) abilify 20 mg once a day in the am  benztropine 1 mg take one in evening  Depakote 500 mg take two tab in the pm  Haldol 20 mg take once in the pm   Depakote 500 mg take once in the am  Quetiapine 100 mg in the night  Isoniazid  300 mg once in the am

## 2023-07-17 NOTE — ED BEHAVIORAL HEALTH ASSESSMENT NOTE - DETAILS
subsided from earlier, able to not act on them as per HPI nope not clinically indicated discussed w/ pt's

## 2023-07-17 NOTE — ED BEHAVIORAL HEALTH ASSESSMENT NOTE - RISK ASSESSMENT
Modifiable RF: active sx of psychosis including CAH  Unmodifiable RF: h/o schizophrenia, reported h/o a past suicide attempt   PF: no SI/HI, able to not act on CAH, sx of psychosis at baseline, no substance use, help seeking, engaged in outpatient care, maintaining good self care, future oriented.  Given the above, the patient has some elevated CHRONIC risk of harm to self/others, but acute risk is currently low, with modifiable risk factors mitigated by current outpatient care. As such, he is appropriate for outpatient level of care and for discharge from the ED

## 2023-07-17 NOTE — ED ADULT NURSE NOTE - OBJECTIVE STATEMENT
Pt presents to , alert&orientedx4, ambulatory, hx Schizophrenia (compliant with Abilify) coming to ED for auditory hallucinations. As per pt, "I have had it for 21 years". Pt is very calm and cooperative, denies s/i, h/i, a/h and v/h at this time. Safety measures maintained. Fairly groomed, speech is normal. Awaiting further plan of care

## 2023-07-17 NOTE — ED PROVIDER NOTE - NS ED ATTENDING STATEMENT MOD
This was a shared visit with the CHAN. I reviewed and verified the documentation and independently performed the documented:

## 2023-08-07 NOTE — ED PROVIDER NOTE - EYES, MLM
Pt was called in regards to [facility] no longer being in Network for [address] as of 8/1/23. Pt can call [address] [facility] at [phone] to see if  they qualify for [employer] transition so he can transition to a new Managed Medicaid plan or he can call the number on the back of his card to get it approved through his insurance. Clear bilaterally, pupils equal, round and reactive to light.

## 2023-09-15 ENCOUNTER — EMERGENCY (EMERGENCY)
Facility: HOSPITAL | Age: 43
LOS: 1 days | Discharge: HOME | End: 2023-09-15
Attending: STUDENT IN AN ORGANIZED HEALTH CARE EDUCATION/TRAINING PROGRAM | Admitting: STUDENT IN AN ORGANIZED HEALTH CARE EDUCATION/TRAINING PROGRAM
Payer: MEDICAID

## 2023-09-15 VITALS
HEART RATE: 100 BPM | SYSTOLIC BLOOD PRESSURE: 109 MMHG | RESPIRATION RATE: 18 BRPM | OXYGEN SATURATION: 99 % | TEMPERATURE: 99 F | DIASTOLIC BLOOD PRESSURE: 80 MMHG

## 2023-09-15 VITALS
OXYGEN SATURATION: 98 % | TEMPERATURE: 98 F | HEART RATE: 93 BPM | RESPIRATION RATE: 17 BRPM | SYSTOLIC BLOOD PRESSURE: 118 MMHG | DIASTOLIC BLOOD PRESSURE: 75 MMHG

## 2023-09-15 PROCEDURE — 99284 EMERGENCY DEPT VISIT MOD MDM: CPT

## 2023-09-15 RX ORDER — ACETAMINOPHEN 500 MG
650 TABLET ORAL ONCE
Refills: 0 | Status: COMPLETED | OUTPATIENT
Start: 2023-09-15 | End: 2023-09-15

## 2023-09-15 RX ORDER — IBUPROFEN 200 MG
400 TABLET ORAL ONCE
Refills: 0 | Status: COMPLETED | OUTPATIENT
Start: 2023-09-15 | End: 2023-09-15

## 2023-09-15 RX ADMIN — Medication 400 MILLIGRAM(S): at 15:18

## 2023-09-15 RX ADMIN — Medication 1 TABLET(S): at 15:18

## 2023-09-15 RX ADMIN — Medication 650 MILLIGRAM(S): at 15:18

## 2023-09-15 NOTE — ED PROVIDER NOTE - PROGRESS NOTE DETAILS
NI Briseno: Patient signed out to me pending social work for transportation home as well as pharmacy information.  Spoke with social work they will arrange taxi and antibiotic to be sent to [facility] pharmacy.  Patient does have an appointment with his dentist in a few days and will return to the ER with any worsening or concerning symptoms.

## 2023-09-15 NOTE — ED PROVIDER NOTE - PATIENT PORTAL LINK FT
You can access the FollowMyHealth Patient Portal offered by [facility] by registering at the following website: [email or web address]. By joining [facility]’s FollowMyHealth portal, you will also be able to view your health information using other applications (apps) compatible with our system.

## 2023-09-15 NOTE — ED ADULT NURSE NOTE - NSFALLUNIVINTERV_ED_ALL_ED
Bed/Stretcher in lowest position, wheels locked, appropriate side rails in place/Call bell, personal items and telephone in reach/Instruct patient to call for assistance before getting out of bed/chair/stretcher/Non-slip footwear applied when patient is off stretcher/[place] to call system/Physically safe environment - no spills, clutter or unnecessary equipment/Purposeful proactive rounding/Room/bathroom lighting operational, light cord in reach

## 2023-09-15 NOTE — ED ADULT NURSE NOTE - CHIEF COMPLAINT QUOTE
c/o left sided facial swelling and tooth pain onset 2 days ago sent in form [facility] bld# [id number] for further eval. phx schizophrenia

## 2023-09-15 NOTE — ED PROVIDER NOTE - NSFOLLOWUPINSTRUCTIONS_ED_ALL_ED_FT
Mouth Care    WHAT YOU NEED TO KNOW:    Mouth care prevents infection, plaque, bleeding gums, mouth sores, and cavities. It also freshens breath and improves appetite. Do mouth care in the morning, after each meal, and before bed each night. You may need more frequent mouth care if your mouth is in poor condition.    DISCHARGE INSTRUCTIONS:    Items used for mouth care:    An electric or manual toothbrush    Toothpaste, dental sticks, and floss    A cup of water for rinsing    Mouthwash    Water-based lip balm or moisturizer  Brush your teeth:    Wet the toothbrush and place a small amount of toothpaste on it.    Gently place the brush on each tooth, and move it in a [place]. Do not press too hard. This may injure your gums.    Clean the inner, outer, and top surfaces of your teeth. Brush your gums and the top of your tongue.    Swish the water in your mouth and spit it out. Repeat this step with mouthwash.    Dry around your mouth. Apply water-based lip balm or moisturizer to your lips to prevent cracking and dryness.  Floss your teeth: Thread the floss between each tooth, but do not push down on the gums too hard. This can cause gum damage. Make sure to floss on each side of every tooth. You may need to use waxed floss for easier movement between your teeth.  Floss every day    Clean your dentures: Remove the dentures from your mouth before you clean them. Moist dentures come out more easily. Drink a sip of water before you remove your dentures. Gently rock the dentures from side to side to loosen them. Then pull them out.    Brush the dentures with clean water and denture  or toothpaste.    Brush the dentures on all surfaces with cool water. Do not use hot water. Hot water could damage the dentures. Be careful not to bend any clasps on the dentures as you brush them. Rinse them. Place a thin layer of denture adhesive on the dentures and put them back in your mouth. Ask your healthcare provider which adhesive to use.    Soak the dentures in a denture solution each night after you brush them. Rinse them in cold water before you place them back in your mouth.  Follow up with your healthcare provider or dentist every 6 months or as directed: Write down your questions so you remember to ask them during your visits.    Contact your healthcare provider or dentist if:    You develop mouth sores.    Your dentures do not fit well.    You have pain with brushing.    You have questions or concerns about your condition or care. Take Augmentin 875mg ( 1 tablet) twice a day for 7 days  Follow up with your dentist within 2-3 days  Return to the ER with any worsening or concerning symptoms, facial swelling, fever/chills, difficulty breathing or any other concerns.     Mouth Care    WHAT YOU NEED TO KNOW:    Mouth care prevents infection, plaque, bleeding gums, mouth sores, and cavities. It also freshens breath and improves appetite. Do mouth care in the morning, after each meal, and before bed each night. You may need more frequent mouth care if your mouth is in poor condition.    DISCHARGE INSTRUCTIONS:    Items used for mouth care:    An electric or manual toothbrush    Toothpaste, dental sticks, and floss    A cup of water for rinsing    Mouthwash    Water-based lip balm or moisturizer  Brush your teeth:    Wet the toothbrush and place a small amount of toothpaste on it.    Gently place the brush on each tooth, and move it in a [place]. Do not press too hard. This may injure your gums.    Clean the inner, outer, and top surfaces of your teeth. Brush your gums and the top of your tongue.    Swish the water in your mouth and spit it out. Repeat this step with mouthwash.    Dry around your mouth. Apply water-based lip balm or moisturizer to your lips to prevent cracking and dryness.  Floss your teeth: Thread the floss between each tooth, but do not push down on the gums too hard. This can cause gum damage. Make sure to floss on each side of every tooth. You may need to use waxed floss for easier movement between your teeth.  Floss every day    Clean your dentures: Remove the dentures from your mouth before you clean them. Moist dentures come out more easily. Drink a sip of water before you remove your dentures. Gently rock the dentures from side to side to loosen them. Then pull them out.    Brush the dentures with clean water and denture  or toothpaste.    Brush the dentures on all surfaces with cool water. Do not use hot water. Hot water could damage the dentures. Be careful not to bend any clasps on the dentures as you brush them. Rinse them. Place a thin layer of denture adhesive on the dentures and put them back in your mouth. Ask your healthcare provider which adhesive to use.    Soak the dentures in a denture solution each night after you brush them. Rinse them in cold water before you place them back in your mouth.  Follow up with your healthcare provider or dentist every 6 months or as directed: Write down your questions so you remember to ask them during your visits.    Contact your healthcare provider or dentist if:    You develop mouth sores.    Your dentures do not fit well.    You have pain with brushing.    You have questions or concerns about your condition or care. Take Augmentin 875mg ( 1 tablet) twice a day for 10 days  Follow up with your dentist within 2-3 days  Return to the ER with any worsening or concerning symptoms, facial swelling, fever/chills, difficulty breathing or any other concerns.     Mouth Care    WHAT YOU NEED TO KNOW:    Mouth care prevents infection, plaque, bleeding gums, mouth sores, and cavities. It also freshens breath and improves appetite. Do mouth care in the morning, after each meal, and before bed each night. You may need more frequent mouth care if your mouth is in poor condition.    DISCHARGE INSTRUCTIONS:    Items used for mouth care:    An electric or manual toothbrush    Toothpaste, dental sticks, and floss    A cup of water for rinsing    Mouthwash    Water-based lip balm or moisturizer  Brush your teeth:    Wet the toothbrush and place a small amount of toothpaste on it.    Gently place the brush on each tooth, and move it in a [place]. Do not press too hard. This may injure your gums.    Clean the inner, outer, and top surfaces of your teeth. Brush your gums and the top of your tongue.    Swish the water in your mouth and spit it out. Repeat this step with mouthwash.    Dry around your mouth. Apply water-based lip balm or moisturizer to your lips to prevent cracking and dryness.  Floss your teeth: Thread the floss between each tooth, but do not push down on the gums too hard. This can cause gum damage. Make sure to floss on each side of every tooth. You may need to use waxed floss for easier movement between your teeth.  Floss every day    Clean your dentures: Remove the dentures from your mouth before you clean them. Moist dentures come out more easily. Drink a sip of water before you remove your dentures. Gently rock the dentures from side to side to loosen them. Then pull them out.    Brush the dentures with clean water and denture  or toothpaste.    Brush the dentures on all surfaces with cool water. Do not use hot water. Hot water could damage the dentures. Be careful not to bend any clasps on the dentures as you brush them. Rinse them. Place a thin layer of denture adhesive on the dentures and put them back in your mouth. Ask your healthcare provider which adhesive to use.    Soak the dentures in a denture solution each night after you brush them. Rinse them in cold water before you place them back in your mouth.  Follow up with your healthcare provider or dentist every 6 months or as directed: Write down your questions so you remember to ask them during your visits.    Contact your healthcare provider or dentist if:    You develop mouth sores.    Your dentures do not fit well.    You have pain with brushing.    You have questions or concerns about your condition or care.

## 2023-09-15 NOTE — CHART NOTE - NSCHARTNOTEFT_GEN_A_CORE
Per provider, patient is cleared and is able to return to his previous residence, [facility] [address]. Provider also asked that  obtain patient’s pharmacy information.   spoke to Danielle MASON ([phone]), who stated they can accept patient back today. She provided pharmacy information: [id number] [facility][phone]). Danielle confirmed that patient travels independently via taxi. Information provided to clinical provider who is in agreement with taxi back to group home. Verbal huddle regarding coordination of care completed with interdisciplinary team.       scheduled taxi through [facility] online portal and the assigned vendor is [employer] ([phone]); pick-up at 4:15pm and invoice number [id number]. No further social work intervention needed.

## 2023-09-15 NOTE — ED PROVIDER NOTE - WET READ LAUNCH FT
Detail Level: Generalized Detail Level: Zone There are no Wet Read(s) to document. Detail Level: Detailed

## 2023-09-15 NOTE — ED ADULT TRIAGE NOTE - CHIEF COMPLAINT QUOTE
c/o left sided facial swelling and tooth pain onset 2 days ago sent in form [facility] bld# 66 for further eval. phx schizophrenia

## 2023-09-15 NOTE — CONSULT NOTE ADULT - SUBJECTIVE AND OBJECTIVE BOX
Patient is a 43y old  Male who presents with a chief complaint of pain and swelling on lower left. Dental consulted to determine if odontogenic in origin.    HPI: Patient presents with pain and swelling extending from left cheek to left submandibular area.       PAST MEDICAL & SURGICAL HISTORY:  Schizophrenia      No significant past surgical history      Allergies    No Known Allergies        FAMILY HISTORY:  No pertinent family history in first degree relatives        Vital Signs Last 24 Hrs  T(C): 36.9 (15 Sep 2023 15:49), Max: 37.4 (15 Sep 2023 14:03)  T(F): 98.4 (15 Sep 2023 15:49), Max: 99.3 (15 Sep 2023 14:03)  HR: 93 (15 Sep 2023 15:49) (93 - 100)  BP: 118/75 (15 Sep 2023 15:49) (109/80 - 118/75)  BP(mean): --  RR: 17 (15 Sep 2023 15:49) (17 - 18)  SpO2: 98% (15 Sep 2023 15:49) (98% - 99%)    Parameters below as of 15 Sep 2023 15:49  Patient On (Oxygen Delivery Method): room air        EOE:      Mandible FROM             Facial bones and MOM grossly intact             (-) trismus             (-) LAD             (+) swelling left cheek, left submandibular              (+) asymmetry noted left facial and submandibular area             (+) palpation left cheek and left submandibular area             (-) SOB             (-) dysphagia             (-) LOC    IOE:  permanent dentition: grossly intact, multiple carious teeth           tongue/FOM WNL           buccal mucosa swelling on lower left proximal to tooth #19        Radiographs: Patient refused having radiographs taken at this time      ASSESSMENT: 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 Patient informed that I and D is indicated. Patient stated that he rather see his outside dentist. Mental capacity was confirmed with ED team and patient is able to make decisions for himself. Patient informed of the risks of not completing the I and D procedure which include: increased swelling, spread of infection, possible future difficulties with breathing, swallowing or speaking. Patient states that he understand and would like to not have procedure preformed at this time. Patient informed to return to [facility] ED if he experiences fever, increased swelling, trismus, diffulty breathing, swallowing or talking. Patient states that he understands and will come back to [facility] ED if any of the aformentioned symtoms arise.       PROCEDURE:  Verbal consent given to perform limited bedside exam. Area of swelling examined clinically. Could not determine tooth associated with odontogenic infection due to patients refusal of radiographs. I and D procedure discussed with patient. Patient refused to have any treatment done and would rather go to outside dentist.    RECOMMENDATIONS:   1) Dental F/U with outpatient dentist for comprehensive dental care.   2) If any difficulty swallowing/breathing, fever occur, return to ED.      Deann Kitchen DDS #[id number] and James Kwan DDS #[id number]

## 2023-09-15 NOTE — ED ADULT NURSE NOTE - OBJECTIVE STATEMENT
pt aox4, ambulatory comes in for right side dental pain with mild swelling. airway patent and able to swallow secretions. denies fevers. pt medicated per provider orders. dental evaluated patient. SW assisting with taxi for d/c.

## 2023-09-15 NOTE — ED PROVIDER NOTE - CLINICAL SUMMARY MEDICAL DECISION MAKING FREE TEXT BOX
43-year-old past medical history of schizophrenia coming in with 2 days of left facial pain.  Has a carry in tooth 32 with poor dentition generally, possible area of fluctuance near that tooth.  Dental was consulted and patient refused all procedures.  Patient has capacity to refuse his procedurePatient is not septic well-appearing, airway intact gave ibuprofen Tylenol.  Social work contacted to coordinate care and guarantee patient has antibiotics.  Patient signed out to oncoming attending pending social work

## 2023-09-15 NOTE — ED PROVIDER NOTE - NS_EDPROVIDERDISPOUSERTYPE_ED_A_ED
Attending Attestation (For Attendings USE Only)... OR DELBERT Horn ([id number])/ OR DELBERT Horn ([id number])/ RECEIVED FROM CHANEL BRITO RN

## 2023-10-12 NOTE — ED ADULT NURSE NOTE - NS_BH TRG QUESTION8_ED_ALL_ED
Patient alert and oriented. VSS. RA. NSS infusing at 125ml/hr. On Clear Liquid diet. Independent in oom, calls appropriately. Denies pain, N/V. Enteric precautions maintained.   No

## 2024-02-02 ENCOUNTER — EMERGENCY (EMERGENCY)
Facility: HOSPITAL | Age: 44
LOS: 1 days | Discharge: HOME | End: 2024-02-02
Admitting: EMERGENCY MEDICINE
Payer: MEDICAID

## 2024-02-02 VITALS
DIASTOLIC BLOOD PRESSURE: 61 MMHG | TEMPERATURE: 99 F | HEART RATE: 88 BPM | RESPIRATION RATE: 16 BRPM | OXYGEN SATURATION: 96 % | SYSTOLIC BLOOD PRESSURE: 121 MMHG

## 2024-02-02 PROCEDURE — 99284 EMERGENCY DEPT VISIT MOD MDM: CPT

## 2024-02-02 RX ORDER — HALOPERIDOL DECANOATE 100 MG/ML
5 INJECTION INTRAMUSCULAR ONCE
Refills: 0 | Status: COMPLETED | OUTPATIENT
Start: 2024-02-02 | End: 2024-02-02

## 2024-02-02 RX ADMIN — Medication 2 MILLIGRAM(S): at 22:33

## 2024-02-02 RX ADMIN — HALOPERIDOL DECANOATE 5 MILLIGRAM(S): 100 INJECTION INTRAMUSCULAR at 22:33

## 2024-02-02 NOTE — ED ADULT TRIAGE NOTE - BP NONINVASIVE SYSTOLIC (MM HG)
QI outreach to schedule an office visit for:Diabetes Management  A1C control and a1C testing. Patient Left message to return call to clinic. and Letter sent.   Melissa Avila [facility]        
[phone]

## 2024-02-02 NOTE — ED ADULT TRIAGE NOTE - CHIEF COMPLAINT QUOTE
pt c/o worsening auditory hallucinations affecting ability to complete ADLs. denies S/I, H/I, alcohol or drug use. endorses generalized body pain x a few days. Phx schizoaffective d/o, HTN, compliant with meds.

## 2024-02-02 NOTE — ED BEHAVIORAL HEALTH NOTE - BEHAVIORAL HEALTH NOTE
As per NP Handy, pt pending discharge. Pt arrives from [facility] (Bldg [id number]) at [phone]. Writer contacted pt's residence and spoke with , José Antonio, who was informed of pt's discharge. He reports additional staff arrives in 1 hour and that he will inform them of pt's pending return. Pt to travel via AMBULANCE for safety. Nursing to arrange. Verbal huddle occurred with interdisciplinary team.

## 2024-02-02 NOTE — ED ADULT NURSE NOTE - NS_BH TRG QUESTION2_ED_ALL_ED
[place] EMERGENCY DEPARTMENT ([facility])  April 19, 2023     History     Chief Complaint   Patient presents with     Leg Swelling     HPI  Sakshi Jaeger is a 79 year old female with a past medical history which includes s/p right AKA, right femoral endarterectomy, and right common iliac artery stent on 1/13/2023, PVD, rheumatoid arthritis, protein-calorie malnutrition, CAD who presents to the Emergency Department for evaluation of DVT in [facility]. The patient was seen in clinic today, where an US of her left leg revealed a nonocclusive deep venous thrombus in the left common femoral vein. She presents with swelling to her left ankle. States she is not currently on anticoagulation.       US Lower Extremity Venous Duplex Left 4/19/23 5:34 PM  IMPRESSION:  Nonocclusive deep venous thrombus in the left common femoral vein.    Past Medical History  Past Medical History:   Diagnosis Date     Anemia 8/24/2022     BENIGN HYPERTENSION 3/1/2005     CAD (coronary artery disease) 1/26/2011     Coronary artery disease involving native coronary artery of native heart without angina pectoris 9/27/2016     Employs prosthetic leg 12/26/2012     Essential hypertension with goal blood pressure less than 140/90 8/25/2016     Hyperlipidemia LDL goal <100 11/12/2010     Hypertension goal BP (blood pressure) < 130/80 1/26/2011     Infection of right below knee amputation (H) 10/1/2019     IRON DEFIC ANEMIA NOS 9/15/2005     Lower limb amputation, below knee 12/26/2012     MI (myocardial infarction) (H)     3/2010     Non-healing surgical wound, subsequent encounter 9/24/2020    Added automatically from request for surgery [id number]     Osteoporosis 2/16/2005     OSTEOPOROSIS NOS 2/16/2005     Protein-calorie malnutrition (H) 5/18/2022     PVD (peripheral vascular disease) (H) 5/18/2022     Rheumatoid arthritis (H) 2/16/2005          Rheumatoid arthritis(714.0) 2/16/2005     Status post below-knee amputation (H) 12/26/2012           Past Surgical History:   Procedure Laterality Date     ---------INCISION AND DRAINAGE  03/05/2014     AMPUTATE LEG ABOVE KNEE Right 1/13/2023    Procedure: AMPUTATION, ABOVE KNEE RIGHT;  Surgeon: Emma Oconnor MD;  [facility]: [facility] OR     AMPUTATE LEG BELOW KNEE Right 9/28/2022    Procedure: Right through knee amputation;  Surgeon: Doron Mott MD;  Location: [facility] OR     AMPUTATION BELOW KNEE RT/LT  01/01/2005    right lowwer leg.      ANGIOGRAM Right 1/13/2023    Procedure: right iliac arteriogram aned right common iliac artery stent;  Surgeon: Emma Oconnor MD;  [facility] OR     APPENDECTOMY       ARTHROPLASTY KNEE  04/27/2011    Procedure:ARTHROPLASTY KNEE; Surgeon:JESSE HAWKINS; Location:[facility] OR     COMPLEX WOUND CLOSURE (UPDATE) Right 12/08/2021    Procedure: Right stump wound debridment and closure;  Surgeon: RAISA Perea MD;  Location: [facility] OR     CORONARY STENT PLACEMENT       ENDARTERECTOMY FEMORAL Right 1/13/2023    Procedure: ENDARTERECTOMY, FEMORAL RIGHT;  Surgeon: Emma Oconnor MD;  [facility] OR     INJECT NERVE BLOCK SPHENOPALATINE GANGLION N/A 9/29/2022    Procedure: Out of OR encounter for block to be done by ;  Surgeon: GENERIC ANESTHESIA PROVIDER;  Location: [facility] OR     IR OR ANGIOGRAM  1/13/2023     IRRIGATION AND DEBRIDEMENT LOWER EXTREMITY, COMBINED Right 10/09/2019    Procedure: Irrigation and debridement Right leg;  Surgeon: Doron Mott MD;  [facility] OR     NERVE BLOCK PERIPHERAL N/A 9/27/2022    Procedure: BLOCK, NERVE;  Surgeon: GENERIC ANESTHESIA PROVIDER;  [facility]: [facility] OR     OTHER SURGICAL HISTORY  03/05/2014    I AND D      OTHER SURGICAL HISTORY Right 10/09/2019    IRRIGATION AND DEBRIDEMENT LOWER EXTREMITY, COMBINED     PHACOEMULSIFICATION CLEAR CORNEA WITH STANDARD INTRAOCULAR LENS IMPLANT Left 9/8/2022    Procedure: PHACOEMULSIFICATION, LEFT CATARACT, WITH INTRAOCULAR LENS IMPLANT;  Surgeon: Flip Izaguirre MD;   Location: [facility] OR     PHACOEMULSIFICATION CLEAR CORNEA WITH STANDARD INTRAOCULAR LENS IMPLANT Right 10/13/2022    Procedure: PHACOEMULSIFICATION, RIGHT CATARACT, WITH INTRAOCULAR LENS IMPLANT;  Surgeon: Flip Izaguirre MD;  Location: [facility] OR     REVISE SCAR EXTREMITY Right 2020    Procedure: Right stump scar revision;  Surgeon: RAISA Perea MD;  Location: [facility] OR     Apixaban Starter Pack (ELIQUIS DVT/PE STARTER PACK) 5 MG TBPK  acetaminophen (TYLENOL) 325 MG tablet  amLODIPine (NORVASC) 5 MG tablet  aspirin (ASA) 81 MG tablet  calcium carbonate 600 mg-vitamin D 400 units (CALCIUM 600 + D) 600-400 MG-UNIT per tablet  clopidogrel (PLAVIX) 75 MG tablet  folic acid (FOLVITE) 1 MG tablet  gabapentin (NEURONTIN) 300 MG capsule  leflunomide (ARAVA) 20 MG tablet  lisinopril (ZESTRIL) 20 MG tablet  methotrexate 2.5 MG tablet  metoprolol succinate ER (TOPROL XL) 50 MG 24 hr tablet  multivitamin w/minerals (THERA-VIT-M) tablet  predniSONE (DELTASONE) 5 MG tablet  simvastatin (ZOCOR) 20 MG tablet      No Known Allergies  Family History  Family History   Problem Relation Age of Onset     Cardiovascular Mother      Cancer Brother      Diabetes No family hx of      Hypertension No family hx of      Cerebrovascular Disease No family hx of      Alzheimer Disease No family hx of      Thyroid Disease No family hx of      Respiratory No family hx of      Other - See Comments Mother         CARDIOVASCULAR     Cancer Brother      Social History   Social History     Tobacco Use     Smoking status: Former     Packs/day: 0.50     Years: 45.00     Pack years: 22.50     Types: Cigarettes     Quit date: 2010     Years since quittin.1     Smokeless tobacco: Never   Vaping Use     Vaping status: Never Used   Substance Use Topics     Alcohol use: Yes     Comment: occsionally-3 -4 drinks a week     Drug use: No      Past medical history, past surgical history, medications, allergies, family history, and social  history were reviewed with the patient. No additional pertinent items.      A medically appropriate review of systems was performed with pertinent positives and negatives noted in the HPI, and all other systems negative.    Physical Exam   BP: 118/55  Pulse: 69  Temp: 98.3  F (36.8  C)  Resp: 16  SpO2: 98 %  Physical Exam  Vitals and nursing note reviewed.   Constitutional:       General: She is not in acute distress.  Cardiovascular:      Rate and Rhythm: Normal rate and regular rhythm.   Pulmonary:      Effort: Pulmonary effort is normal.      Breath sounds: Normal breath sounds.   Musculoskeletal:        Legs:    Skin:     General: Skin is warm.   Neurological:      General: No focal deficit present.      Mental Status: She is alert and oriented to person, place, and time.   Psychiatric:         Mood and Affect: Mood normal.         ED Course, Procedures, & Data      Procedures                      Results for orders placed or performed during the hospital encounter of 04/19/23   Basic metabolic panel     Status: Abnormal   Result Value Ref Range    Sodium 131 (L) 136 - 145 mmol/L    Potassium 5.0 3.4 - 5.3 mmol/L    Chloride 99 98 - 107 mmol/L    Carbon Dioxide (CO2) 18 (L) 22 - 29 mmol/L    Anion Gap 14 7 - 15 mmol/L    Urea Nitrogen 11.8 8.0 - 23.0 mg/dL    Creatinine 0.54 0.51 - 0.95 mg/dL    Calcium 9.7 8.8 - 10.2 mg/dL    Glucose 98 70 - 99 mg/dL    GFR Estimate >90 >60 mL/min/1.73m2   CBC with platelets and differential     Status: Abnormal   Result Value Ref Range    WBC Count 3.7 (L) 4.0 - 11.0 10e3/uL    RBC Count 3.23 (L) 3.80 - 5.20 10e6/uL    Hemoglobin 11.0 (L) 11.7 - 15.7 g/dL    Hematocrit 34.6 (L) 35.0 - 47.0 %     (H) 78 - 100 fL    MCH 34.1 (H) 26.5 - 33.0 pg    MCHC 31.8 31.5 - 36.5 g/dL    RDW 18.8 (H) 10.0 - 15.0 %    Platelet Count 222 150 - 450 10e3/uL    % Neutrophils 86 %    % Lymphocytes 9 %    % Monocytes 3 %    % Eosinophils 0 %    % Basophils 1 %    % Immature Granulocytes 1 %     NRBCs per 100 WBC 0 <1 /100    Absolute Neutrophils 3.2 1.6 - 8.3 10e3/uL    Absolute Lymphocytes 0.3 (L) 0.8 - 5.3 10e3/uL    Absolute Monocytes 0.1 0.0 - 1.3 10e3/uL    Absolute Eosinophils 0.0 0.0 - 0.7 10e3/uL    Absolute Basophils 0.0 0.0 - 0.2 10e3/uL    Absolute Immature Granulocytes 0.0 <=0.4 10e3/uL    Absolute NRBCs 0.0 10e3/uL   CBC with platelets differential     Status: Abnormal    Narrative    The following orders were created for panel order CBC with platelets differential.  Procedure                               Abnormality         Status                     ---------                               -----------         ------                     CBC with platelets and d...[[id number]]  Abnormal            Final result                 Please view results for these tests on the individual orders.   Results for orders placed or performed in visit on 04/19/23   US Lower Extremity Venous Duplex Left     Status: Abnormal   Result Value Ref Range    Radiologist flags DVT (Urgent)     Narrative    EXAMINATION: US LOWER EXTREMITY VENOUS DUPLEX LEFT  4/19/2023 5:34 PM       CLINICAL HISTORY: Abnormal coagulation profile, assess for DVT    COMPARISON: None available        PROCEDURE COMMENTS: Ultrasound was performed of the deep venous system  of the left lower extremity using grayscale, color, and spectral  Doppler.    FINDINGS:  Nonocclusive, partially compressible thrombus in the left common  femoral vein. The left greater saphenous origin, femoral, popliteal,  and deep calf veins are visualized and are patent. Venous waveforms  are normal. There is normal response to compression.    The right common femoral vein is patent, fully compressible, and  demonstrates a normal venous waveform.      Impression    IMPRESSION:  Nonocclusive deep venous thrombus in the left common femoral vein.    [Urgent Result: DVT]    Finding was identified on 4/19/2023 5:35 PM.     Dr. Jeff Walker was contacted by Dr. Saucedo  Justin at 4/19/2023 5:42  PM and verbalized understanding of the urgent finding. Dr. Walker  requested that the patient go to the Emergency Department for further  evaluation.    I have personally reviewed the examination and initial interpretation  and I agree with the findings.    FRANKLIN FIELDS MD         SYSTEM ID:  [id number]     Medications - No data to display  Labs Ordered and Resulted from Time of ED Arrival to Time of ED Departure   BASIC METABOLIC PANEL - Abnormal       Result Value    Sodium 131 (*)     Potassium 5.0      Chloride 99      Carbon Dioxide (CO2) 18 (*)     Anion Gap 14      Urea Nitrogen 11.8      Creatinine 0.54      Calcium 9.7      Glucose 98      GFR Estimate >90     CBC WITH PLATELETS AND DIFFERENTIAL - Abnormal    WBC Count 3.7 (*)     RBC Count 3.23 (*)     Hemoglobin 11.0 (*)     Hematocrit 34.6 (*)      (*)     MCH 34.1 (*)     MCHC 31.8      RDW 18.8 (*)     Platelet Count 222      % Neutrophils 86      % Lymphocytes 9      % Monocytes 3      % Eosinophils 0      % Basophils 1      % Immature Granulocytes 1      NRBCs per 100 WBC 0      Absolute Neutrophils 3.2      Absolute Lymphocytes 0.3 (*)     Absolute Monocytes 0.1      Absolute Eosinophils 0.0      Absolute Basophils 0.0      Absolute Immature Granulocytes 0.0      Absolute NRBCs 0.0       No orders to display          Critical care was not performed.     Medical Decision Making  The patient's presentation was of moderate complexity (an acute illness with systemic symptoms).    The patient's evaluation involved:  ordering and/or review of 2 test(s) in this encounter (CBC, basic metabolic for renal function)    The patient's management necessitated moderate risk (prescription drug management including medications given in the ED).      Assessment & Plan    Patient with diagnosis earlier today of lower extremity DVT and referred to emergency department for decisions regarding anticoagulation and initiating  treatment.  I will start her on apixaban.  We had a discussion about risks of include increased bleeding and particularly about any head trauma.  I also informed her that she should contact her primary care provider regarding ongoing management of this including managing her medications and deciding when to obtain a repeat ultrasound and I went to discontinue anticoagulation.  This seems to be an unprovoked DVT.  The ultrasound was ordered by her rheumatologist but will unlikely provide ongoing management of this.  She has no symptoms of pulmonary embolism and for this reason this diagnosis was not pursued.    I have reviewed the nursing notes. I have reviewed the findings, diagnosis, plan and need for follow up with the patient.    Discharge Medication List as of 4/19/2023  8:54 PM      START taking these medications    Details   Apixaban Starter Pack (ELIQUIS DVT/PE STARTER PACK) 5 MG TBPK Take 10 mg by mouth 2 times daily for 7 days, THEN 5 mg 2 times daily for 23 days., Disp-74 each, R-0, E-Prescribe             Final diagnoses:   Acute deep vein thrombosis (DVT) of distal end of left lower extremity (H)       Rishabh Vyas MD  [facility] EMERGENCY DEPARTMENT  4/19/2023     Rishabh Vysa MD  04/25/23 [id number]     Cyclophosphamide Pregnancy And Lactation Text: This medication is Pregnancy Category D and it isn't considered safe during pregnancy. This medication is excreted in breast milk. No

## 2024-02-02 NOTE — ED ADULT TRIAGE NOTE - NS_BH TRG QUESTION5_ED_ALL_ED
Past 24 hrs [place]pe[place] Pregnancy And Lactation Text: This medication is Pregnancy Category X and contraindicated in pregnancy and in women who may become pregnant. It is unknown if this medication is excreted in breast milk.

## 2024-02-02 NOTE — ED PROVIDER NOTE - CLINICAL SUMMARY MEDICAL DECISION MAKING FREE TEXT BOX
Medical evaluation performed. There is no clinical evidence of intoxication or any acute medical problem requiring immediate intervention.  SW consulted.  D/C to [place] via ems 43 y/o M hx Schizophrenia   Medical evaluation performed. There is no clinical evidence of intoxication or any acute medical problem requiring immediate intervention.  SW consulted.  D/C to [place] via ems

## 2024-02-02 NOTE — ED ADULT NURSE NOTE - OBJECTIVE STATEMENT
Pt received to  accompanied by EMS from [place] [address]. Pt presents pleasant, clam and cooperative. Pt states he has been "hearing voices for over 20 years" but that tonight the voices intensified telling him that "people are watching him and are out to get him" so he came to the hospital. Pt endorses voices are non CAH but rather "just talking and warning him about others". Pt denies SI and HI and denies drug or alcohol use. Pts belongings secured for safety; pt presently being seen by NP.

## 2024-02-02 NOTE — ED PROVIDER NOTE - OBJECTIVE STATEMENT
43 y/o M hx Schizophrenia presents to ER secondary to hearing voices. Appears paranoid and internally preoccupied.  Denies SI/HI/VH.  Denies pain, SOB, fever, chills, chest/abdominal discomfort. Denies falling, punching or kicking any objects. No evidence of physical injuries, broken skin or deformities.   Denies use of alochol or illicit drugs. Admits to medication compliance.

## 2024-02-02 NOTE — ED PROVIDER NOTE - PATIENT PORTAL LINK FT
You can access the FollowMyHealth Patient Portal offered by [facility] by registering at the following website: [email or web address]. By joining [employer]’s FollowMyHealth portal, you will also be able to view your health information using other applications (apps) compatible with our system.

## 2024-02-02 NOTE — ED PROVIDER NOTE - CPE EDP CARDIAC NORM
----- Message from Florentino Gibson sent at 3/7/2017 12:06 PM CST -----  Contact: Pt at [phone]  Pt wants to know if her script oxycodone-acetaminophen (PERCOCET)  mg per tablet is ready for pickup. Please advise.    normal...

## 2024-04-17 ENCOUNTER — EMERGENCY (EMERGENCY)
Facility: HOSPITAL | Age: 44
LOS: 1 days | Discharge: HOME | End: 2024-04-17
Admitting: STUDENT IN AN ORGANIZED HEALTH CARE EDUCATION/TRAINING PROGRAM
Payer: MEDICAID

## 2024-04-17 VITALS
DIASTOLIC BLOOD PRESSURE: 75 MMHG | TEMPERATURE: 98 F | SYSTOLIC BLOOD PRESSURE: 114 MMHG | HEART RATE: 88 BPM | RESPIRATION RATE: 18 BRPM | OXYGEN SATURATION: 98 %

## 2024-04-17 VITALS
DIASTOLIC BLOOD PRESSURE: 87 MMHG | TEMPERATURE: 98 F | HEART RATE: 90 BPM | SYSTOLIC BLOOD PRESSURE: 124 MMHG | OXYGEN SATURATION: 97 % | RESPIRATION RATE: 17 BRPM

## 2024-04-17 PROCEDURE — 99284 EMERGENCY DEPT VISIT MOD MDM: CPT

## 2024-04-17 PROCEDURE — 90792 PSYCH DIAG EVAL W/MED SRVCS: CPT

## 2024-04-17 NOTE — ED ADULT NURSE NOTE - NSFALLUNIVINTERV_ED_ALL_ED
Bed/Stretcher in lowest position, wheels locked, appropriate side rails in place/Call bell, personal items and telephone in reach/Instruct patient to call for assistance before getting out of bed/chair/stretcher/Non-slip footwear applied when patient is off stretcher/[place] to call system/Physically safe environment - no spills, clutter or unnecessary equipment/Purposeful proactive rounding/Room/bathroom lighting operational, light cord in reach intact

## 2024-04-17 NOTE — ED BEHAVIORAL HEALTH ASSESSMENT NOTE - CURRENT MEDICATION
Abilify 20 mg once a day in the am   benztropine 1 mg take one in evening   Depakote 500 mg take two tab in the pm   Haldol 20 mg take once in the pm    Depakote 500 mg take once in the am   Quetiapine 100 mg in the night   Isoniazid  300 mg once in the am.

## 2024-04-17 NOTE — ED ADULT NURSE NOTE - CHIEF COMPLAINT QUOTE
pt with hx schizophrenia sent from [facility] for eval, hearing voices, pt c/o of SI and HI,  no plan, pt appears calm and cooperative 2 present

## 2024-04-17 NOTE — ED PROVIDER NOTE - PHYSICAL EXAMINATION
alert, oriented  internally occupied  otherwise well appearing  nsr no murmurs  lungs CTA   moving all exmiteies

## 2024-04-17 NOTE — ED PROVIDER NOTE - NSFOLLOWUPINSTRUCTIONS_ED_ALL_ED_FT
You were seen and evaluated by the [facility] Emergency Department staff; There is no clinical evidence of intoxication, or any acute medical problem requiring immediate intervention. At present time patient is not a harm to self or others and can be safely discharged.   Continue your home medications  Follow up with your psychiatrist or the [facility] psychiatric Crisis center walk in clinic between the hours of 9AM - 3PM or may make an appointment online. See attached paperwork.  Return to ED for for any new or worsening symptoms

## 2024-04-17 NOTE — ED BEHAVIORAL HEALTH NOTE - BEHAVIORAL HEALTH NOTE
As per KARISHMA Perry  pt pending discharge. Pt arrives from [facility] [place] ([address]) at [phone]. Writer contacted pt's residence and spoke with , José Antonio, who was informed of pt's discharge. He reports additional staff arrives in 1 hour and that he will inform them of pt's pending return. Pt to travel via AMBULANCE for safety. Nursing to arrange. Verbal huddle occurred with interdisciplinary team.    Per provider, NI Perry, patient is cleared and is able to return to their previous residence, [facility] [place] [address] ([phone]).  has spoken to  staff Social Anthony cheema confirmed that patients mode of transportation is [employer] and that patient travels INDEPENDENTLY . Clinical provider is in agreement with [employer] back to group home. Verbal huddle regarding coordination of care completed with interdisciplinary team.   Transportation coordinated via STANDARD TRANSPORTATION DOCUMENTATION. As per KARISHMA Perry  pt pending discharge. Pt arrives from [facility] [place] ([address]) at [phone]. Writer contacted pt's residence and spoke with , José Antonio, who was informed of pt's discharge. He reports additional staff arrives in 1 hour and that he will inform them of pt's pending return. Pt to travel via AMBULANCE for safety. Nursing to arrange. Verbal huddle occurred with interdisciplinary team.    Per provider, NI Perry, patient is cleared and is able to return to their previous residence, [facility] [place] [address] ([phone]).  has spoken to  staff anette  confirmed that patients mode of transportation is [employer] and that patient travels INDEPENDENTLY . Clinical provider is in agreement with [facility] back to group home. Verbal huddle regarding coordination of care completed with interdisciplinary team.   Transportation coordinated via mas Inv# [id number] chris/ anupam taxi [phone] Per provider, NI Perry, patient is cleared and is able to return to their previous residence, [facility] [place] [address] ([phone]).  has spoken to  staff Social anette Worker confirmed that patients mode of transportation is [employer]AX[employer] and that patient travels INDEPENDENTLY . Clinical provider is in agreement with [employer] back to group home. Verbal huddle regarding coordination of care completed with interdisciplinary team.   Transportation coordinated via mas Inv# [id number] chris/ anupam taxi [phone]

## 2024-04-17 NOTE — ED PROVIDER NOTE - OBJECTIVE STATEMENT
45 y/o M hx Schizophrenia presents to ER secondary to hearing voices. BIBEMS after stating that he is hearing voices that are telling him to kill himself. pt has a list of people that want to kill him. Pt states these people/voices tell him to kill himself. Pt admits to SI and HI. no plan. pt does appear internally occupied, delayed answers to questions at times, pauses.

## 2024-04-17 NOTE — ED ADULT TRIAGE NOTE - CHIEF COMPLAINT QUOTE
pt with hx schizophrenia sent from [facility] for eval, hearing voices, pt c/o of SI and HI,  no plan, pt appears calm and cooperative 2 present

## 2024-04-17 NOTE — ED PROVIDER NOTE - CLINICAL SUMMARY MEDICAL DECISION MAKING FREE TEXT BOX
43 y/o M hx Schizophrenia presents to ER secondary to hearing voices. BIBEMS after stating that he is hearing voices that are telling him to kill himself. pt has a list of people that want to kill him. Pt states these people/voices tell him to kill himself. Pt admits to SI and HI. no plan. pt does appear internally occupied, delayed answers to questions at times, pauses.   gina obtain collateral  reassess  pt calm at this time

## 2024-04-17 NOTE — ED BEHAVIORAL HEALTH ASSESSMENT NOTE - HPI (INCLUDE ILLNESS QUALITY, SEVERITY, DURATION, TIMING, CONTEXT, MODIFYING FACTORS, ASSOCIATED SIGNS AND SYMPTOMS)
44-year-old male with a history of schizophrenia, one prior suicide attempt per chart review but pt denies, no history of SIB, no known history of violence, no active medical issues, lives on [facility] [place], h/o multiple prior presentations to ED for AH, recently seen at [facility] ED in July 2023 for similar concerns, who presents brought in by EMS activated by self from his residence for worsening auditory hallucinations.    Pts evaluation is very similar to his presentation in 07/2023. Patient states that he has been hearing voices for "2 decades" but today the voices have become "more powerful" and that they were "pushing" him. The voices have been saying "hurt others and hurt yourself" but does not feel the need to listen to them and has never listened to CAH and has never hurt anyone in the community or in his residence. He states that he always hears voices and never listens to them and when they are more powerful, they "subside" with time. Reports that currently, the voices "have already subsided" and he feels much better. He attributes the improvement in his symptoms to talking with staff in the ED as well as EMS workers.  When asked who the voice wants him to hurt he reports "Kp Phillips, Mayito Rich and Juan Alberto" but patient adamantly denies having access to these people, does not know where to find them, has no intent to look for them and denies any access to any weapons.      He denies feeling depressed, reports regular and restful sleep, denies any change in energy levels, denies appetite,  denies loss of interest in all daily activities (enjoys going to different restaurants via [employer] bus), denies problems with memory or concentrating, denies panic, denies feelings of hopelessness and guilt,  denies history of suicidal behavior or self harm, denies history of violent behavior. Denies elevated or irritable mood, denies racing thoughts, denies visual hallucinations, and denies somatic symptoms.

## 2024-04-17 NOTE — ED PROVIDER NOTE - PATIENT PORTAL LINK FT
You can access the FollowMyHealth Patient Portal offered by [facility] by registering at the following website: [email or web address]. By joining [employer]’s FollowMyHealth portal, you will also be able to view your health information using other applications (apps) compatible with our system.

## 2024-04-17 NOTE — ED BEHAVIORAL HEALTH NOTE - BEHAVIORAL HEALTH NOTE
As per provider, Worker called patient’s residence [facility] ([phone]) and spoke to  for collateral information.  All information is as per Reyna Knott and reported that today the patient reported that he has been hearing voices (unknown)– which caused the patient to call EMS.  reports that patient is not presenting with any violence or aggression. Patient is medication compliant. Patient has a dx of schizophrenia and hypertension.  denied SI/HI, and when at baseline is verbal with others.  also reports that the patient doesn’t consume any drugs or alcohol. Patient is compliant with Dr. Metz ([phone]).     Medication:     Abilify 20 mg once a day in the am     benztropine 1 mg take one in evening     Depakote 500 mg take two tab in the pm     Haldol 20 mg take once in the pm      Depakote 500 mg take once in the am     Quetiapine 100 mg in the night     Isoniazid  300 mg once in the am

## 2024-04-17 NOTE — ED BEHAVIORAL HEALTH ASSESSMENT NOTE - SUMMARY
44-year-old male with a history of schizophrenia, one prior suicide attempt per chart review but pt denies, no history of SIB, no known history of violence, no active medical issues, [place], h/o multiple prior presentations to ED for AH, recently seen at [facility] ED in July 2023 for similar concerns, who presents brought in by EMS activated by self from his residence for worsening auditory hallucinations.    Mr. Bishop presents with active psychosis including AH and some bizarre thought content that appear to be at his chronic baseline. He reports intermittent CAH to hurt himself and others that he is able to ignore, and that is also a chronic presentation of his schizophrenia without acute worsening. He has no SI/HI, denies history of self injury (though per chart review it is stated that he has a past SA). Pt declining voluntary psych hospitalization and is requesting to be discharged with f/u with Dr. Metz. He was able to safety plan and review coping strategies (including reading sports magazines and taking the bus to different restaurants). While pt has chronically elevated risk given his CAH, given lack of suicidality, homicidality/violence towards others, ability to care for himself, lack of need to listen to voices,  and ability seek help; pt is appropriate for discharge to his outpatient providers.  He confirms that he feels safe going back to his residence, and confirms an understanding that he can return to the ED if his condition worsens or he begins to feel unsafe.    RECOMMENDATIONS:  - discharge home to his psychiatric residence  - follow up with outpatient team  - continue home meds

## 2024-04-17 NOTE — ED BEHAVIORAL HEALTH ASSESSMENT NOTE - RISK ASSESSMENT
Modifiable RF: hx of sx of psychosis including CAH  Unmodifiable RF: h/o schizophrenia, reported h/o a past suicide attempt   PF: no SI/HI, able to not act on CAH, sx of psychosis at baseline, no substance use, help seeking, engaged in outpatient care, maintaining good self care, future oriented.  Given the above, the patient has some elevated CHRONIC risk of harm to self/others, but acute risk is currently low, with modifiable risk factors mitigated by current outpatient care. As such, he is appropriate for outpatient level of care and for discharge from the ED

## 2024-04-17 NOTE — ED ADULT TRIAGE NOTE - BP NONINVASIVE DIASTOLIC (MM HG)
Pt seen and examined. No acute events overnigh. Remains sedated and on vent; oral cavity still packed with kerlix. Attempted to remove packing this morning, but replaced after tumor began to bleed.    AVSS  NAD, awake and alert  on mech vent, sedated  6LPC trach in place with sutures, cuff inflated  NC: mass, non bleeding  oc/op: kerlix in place, no pooling or active bleeding  Neck soft and flat, stoma intact     A/P: s/p awake tracheotomy 11/19 for airway protection. Patient needs to remain on vent and sedated while oral packing is in place. Oral packing cannot be removed, if patient continues to bleed inside his oral cavity. Family still wants everything to be done.   -NPO, PEG dependent  -pain control   -trach care   -appreciate palliative pain recs: important to revisit goc discussion in case of rebleed which is highly likely   -will remove oral packing tomorrow  -fu labs   -resume home meds through PEG   -ambulate ad denton   -dvt ppx: sqh  -appreciate nutrition consult for feeds   -will discuss with attending and update with recs 75

## 2024-04-17 NOTE — ED ADULT NURSE NOTE - OBJECTIVE STATEMENT
Pt arrives by ems from [facility], he told staff he is hearing voices and has both SI/HI with no plan. Pt states he hear voices of famous celebrities and athletes and worte down a list of there names. Pt is calm on arrival, denies any etoh, drug use.

## 2024-04-17 NOTE — ED BEHAVIORAL HEALTH ASSESSMENT NOTE - SAFETY PLAN ADDT'L DETAILS
Education provided regarding environmental safety / lethal means restriction/Provision of National Suicide Prevention Lifeline 9-938-613-TALK (8735)

## 2024-04-17 NOTE — ED BEHAVIORAL HEALTH ASSESSMENT NOTE - DESCRIPTION
lives in [facility] housing in single room latent TB calm, pleasant, follows directions, asks for food, no prns were needed  Vital Signs Last 24 Hrs  T(C): 36.9 (17 Apr 2024 16:49), Max: 36.9 (17 Apr 2024 16:49)  T(F): 98.4 (17 Apr 2024 16:49), Max: 98.4 (17 Apr 2024 16:49)  HR: 90 (17 Apr 2024 16:49) (88 - 90)  BP: 124/87 (17 Apr 2024 16:49) (114/75 - 124/87)  BP(mean): --  RR: 17 (17 Apr 2024 16:49) (17 - 18)  SpO2: 97% (17 Apr 2024 16:49) (97% - 98%)    Parameters below as of 17 Apr 2024 16:49  Patient On (Oxygen Delivery Method): room air

## 2024-04-18 ENCOUNTER — INPATIENT (INPATIENT)
Facility: HOSPITAL | Age: 44
LOS: 7 days | Discharge: HOME | End: 2024-04-26
Attending: PSYCHIATRY & NEUROLOGY | Admitting: PSYCHIATRY & NEUROLOGY
Payer: MEDICAID

## 2024-04-18 VITALS
RESPIRATION RATE: 18 BRPM | OXYGEN SATURATION: 95 % | DIASTOLIC BLOOD PRESSURE: 74 MMHG | SYSTOLIC BLOOD PRESSURE: 111 MMHG | TEMPERATURE: 98 F | HEART RATE: 89 BPM

## 2024-04-18 DIAGNOSIS — F33.9 MAJOR DEPRESSIVE DISORDER, RECURRENT, UNSPECIFIED: ICD-10-CM

## 2024-04-18 LAB
ADD ON TEST-SPECIMEN IN LAB: SIGNIFICANT CHANGE UP
ALBUMIN SERPL ELPH-MCNC: 3.6 G/DL — SIGNIFICANT CHANGE UP (ref 3.3–5)
ALP SERPL-CCNC: 51 U/L — SIGNIFICANT CHANGE UP (ref 40–120)
ALT FLD-CCNC: 15 U/L — SIGNIFICANT CHANGE UP (ref 4–41)
AMPHET UR-MCNC: NEGATIVE — SIGNIFICANT CHANGE UP
ANION GAP SERPL CALC-SCNC: 10 MMOL/L — SIGNIFICANT CHANGE UP (ref 7–14)
APAP SERPL-MCNC: <10 UG/ML — LOW (ref 15–25)
APPEARANCE UR: CLEAR — SIGNIFICANT CHANGE UP
AST SERPL-CCNC: 28 U/L — SIGNIFICANT CHANGE UP (ref 4–40)
BARBITURATES UR SCN-MCNC: NEGATIVE — SIGNIFICANT CHANGE UP
BASOPHILS # BLD AUTO: 0.03 K/UL — SIGNIFICANT CHANGE UP (ref 0–0.2)
BASOPHILS NFR BLD AUTO: 0.5 % — SIGNIFICANT CHANGE UP (ref 0–2)
BENZODIAZ UR-MCNC: NEGATIVE — SIGNIFICANT CHANGE UP
BILIRUB SERPL-MCNC: <0.2 MG/DL — SIGNIFICANT CHANGE UP (ref 0.2–1.2)
BILIRUB UR-MCNC: NEGATIVE — SIGNIFICANT CHANGE UP
BUN SERPL-MCNC: 9 MG/DL — SIGNIFICANT CHANGE UP (ref 7–23)
CALCIUM SERPL-MCNC: 8.5 MG/DL — SIGNIFICANT CHANGE UP (ref 8.4–10.5)
CHLORIDE SERPL-SCNC: 103 MMOL/L — SIGNIFICANT CHANGE UP (ref 98–107)
CO2 SERPL-SCNC: 23 MMOL/L — SIGNIFICANT CHANGE UP (ref 22–31)
COCAINE METAB.OTHER UR-MCNC: NEGATIVE — SIGNIFICANT CHANGE UP
COLOR SPEC: YELLOW — SIGNIFICANT CHANGE UP
CREAT SERPL-MCNC: 0.95 MG/DL — SIGNIFICANT CHANGE UP (ref 0.5–1.3)
CREATININE URINE RESULT, DAU: 40 MG/DL — SIGNIFICANT CHANGE UP
DIFF PNL FLD: NEGATIVE — SIGNIFICANT CHANGE UP
EGFR: 102 ML/MIN/1.73M2 — SIGNIFICANT CHANGE UP
EOSINOPHIL # BLD AUTO: 0.21 K/UL — SIGNIFICANT CHANGE UP (ref 0–0.5)
EOSINOPHIL NFR BLD AUTO: 3.6 % — SIGNIFICANT CHANGE UP (ref 0–6)
ETHANOL SERPL-MCNC: <10 MG/DL — SIGNIFICANT CHANGE UP
FLUAV AG NPH QL: SIGNIFICANT CHANGE UP
FLUBV AG NPH QL: SIGNIFICANT CHANGE UP
GLUCOSE SERPL-MCNC: 95 MG/DL — SIGNIFICANT CHANGE UP (ref 70–99)
GLUCOSE UR QL: NEGATIVE MG/DL — SIGNIFICANT CHANGE UP
HCT VFR BLD CALC: 40.3 % — SIGNIFICANT CHANGE UP (ref 39–50)
HGB BLD-MCNC: 13.3 G/DL — SIGNIFICANT CHANGE UP (ref 13–17)
IANC: 2.46 K/UL — SIGNIFICANT CHANGE UP (ref 1.8–7.4)
IMM GRANULOCYTES NFR BLD AUTO: 0 % — SIGNIFICANT CHANGE UP (ref 0–0.9)
KETONES UR-MCNC: NEGATIVE MG/DL — SIGNIFICANT CHANGE UP
LEUKOCYTE ESTERASE UR-ACNC: NEGATIVE — SIGNIFICANT CHANGE UP
LYMPHOCYTES # BLD AUTO: 2.63 K/UL — SIGNIFICANT CHANGE UP (ref 1–3.3)
LYMPHOCYTES # BLD AUTO: 45.4 % — HIGH (ref 13–44)
MCHC RBC-ENTMCNC: 29.2 PG — SIGNIFICANT CHANGE UP (ref 27–34)
MCHC RBC-ENTMCNC: 33 GM/DL — SIGNIFICANT CHANGE UP (ref 32–36)
MCV RBC AUTO: 88.6 FL — SIGNIFICANT CHANGE UP (ref 80–100)
METHADONE UR-MCNC: NEGATIVE — SIGNIFICANT CHANGE UP
MONOCYTES # BLD AUTO: 0.46 K/UL — SIGNIFICANT CHANGE UP (ref 0–0.9)
MONOCYTES NFR BLD AUTO: 7.9 % — SIGNIFICANT CHANGE UP (ref 2–14)
NEUTROPHILS # BLD AUTO: 2.46 K/UL — SIGNIFICANT CHANGE UP (ref 1.8–7.4)
NEUTROPHILS NFR BLD AUTO: 42.6 % — LOW (ref 43–77)
NITRITE UR-MCNC: NEGATIVE — SIGNIFICANT CHANGE UP
NRBC # BLD: 0 /100 WBCS — SIGNIFICANT CHANGE UP (ref 0–0)
NRBC # FLD: 0 K/UL — SIGNIFICANT CHANGE UP (ref 0–0)
OPIATES UR-MCNC: NEGATIVE — SIGNIFICANT CHANGE UP
OXYCODONE UR-MCNC: NEGATIVE — SIGNIFICANT CHANGE UP
PCP SPEC-MCNC: SIGNIFICANT CHANGE UP
PCP UR-MCNC: NEGATIVE — SIGNIFICANT CHANGE UP
PH UR: 7 — SIGNIFICANT CHANGE UP (ref 5–8)
PLATELET # BLD AUTO: 205 K/UL — SIGNIFICANT CHANGE UP (ref 150–400)
POTASSIUM SERPL-MCNC: 3.9 MMOL/L — SIGNIFICANT CHANGE UP (ref 3.5–5.3)
POTASSIUM SERPL-SCNC: 3.9 MMOL/L — SIGNIFICANT CHANGE UP (ref 3.5–5.3)
PROT SERPL-MCNC: 6 G/DL — SIGNIFICANT CHANGE UP (ref 6–8.3)
PROT UR-MCNC: NEGATIVE MG/DL — SIGNIFICANT CHANGE UP
RBC # BLD: 4.55 M/UL — SIGNIFICANT CHANGE UP (ref 4.2–5.8)
RBC # FLD: 13.1 % — SIGNIFICANT CHANGE UP (ref 10.3–14.5)
RSV RNA NPH QL NAA+NON-PROBE: SIGNIFICANT CHANGE UP
SALICYLATES SERPL-MCNC: <0.3 MG/DL — LOW (ref 15–30)
SARS-COV-2 RNA SPEC QL NAA+PROBE: SIGNIFICANT CHANGE UP
SODIUM SERPL-SCNC: 136 MMOL/L — SIGNIFICANT CHANGE UP (ref 135–145)
SP GR SPEC: 1.01 — SIGNIFICANT CHANGE UP (ref 1–1.03)
THC UR QL: NEGATIVE — SIGNIFICANT CHANGE UP
TOXICOLOGY SCREEN, DRUGS OF ABUSE, SERUM RESULT: SIGNIFICANT CHANGE UP
TSH SERPL-MCNC: 1.84 UIU/ML — SIGNIFICANT CHANGE UP (ref 0.27–4.2)
UROBILINOGEN FLD QL: 1 MG/DL — SIGNIFICANT CHANGE UP (ref 0.2–1)
WBC # BLD: 5.79 K/UL — SIGNIFICANT CHANGE UP (ref 3.8–10.5)
WBC # FLD AUTO: 5.79 K/UL — SIGNIFICANT CHANGE UP (ref 3.8–10.5)

## 2024-04-18 PROCEDURE — 99285 EMERGENCY DEPT VISIT HI MDM: CPT

## 2024-04-18 RX ORDER — DIVALPROEX SODIUM 500 MG/1
1000 TABLET, DELAYED RELEASE ORAL AT BEDTIME
Refills: 0 | Status: DISCONTINUED | OUTPATIENT
Start: 2024-04-18 | End: 2024-04-19

## 2024-04-18 RX ORDER — DIVALPROEX SODIUM 500 MG/1
500 TABLET, DELAYED RELEASE ORAL DAILY
Refills: 0 | Status: DISCONTINUED | OUTPATIENT
Start: 2024-04-18 | End: 2024-04-19

## 2024-04-18 RX ORDER — DIPHENHYDRAMINE HCL 50 MG
50 CAPSULE ORAL EVERY 6 HOURS
Refills: 0 | Status: DISCONTINUED | OUTPATIENT
Start: 2024-04-18 | End: 2024-04-26

## 2024-04-18 RX ORDER — QUETIAPINE FUMARATE 200 MG/1
100 TABLET, FILM COATED ORAL AT BEDTIME
Refills: 0 | Status: DISCONTINUED | OUTPATIENT
Start: 2024-04-18 | End: 2024-04-21

## 2024-04-18 RX ORDER — DIPHENHYDRAMINE HCL 50 MG
50 CAPSULE ORAL ONCE
Refills: 0 | Status: DISCONTINUED | OUTPATIENT
Start: 2024-04-18 | End: 2024-04-26

## 2024-04-18 RX ORDER — BENZTROPINE MESYLATE 1 MG
1 TABLET ORAL AT BEDTIME
Refills: 0 | Status: DISCONTINUED | OUTPATIENT
Start: 2024-04-18 | End: 2024-04-24

## 2024-04-18 RX ORDER — HALOPERIDOL DECANOATE 100 MG/ML
5 INJECTION INTRAMUSCULAR EVERY 6 HOURS
Refills: 0 | Status: DISCONTINUED | OUTPATIENT
Start: 2024-04-18 | End: 2024-04-26

## 2024-04-18 RX ORDER — ARIPIPRAZOLE 15 MG/1
20 TABLET ORAL DAILY
Refills: 0 | Status: DISCONTINUED | OUTPATIENT
Start: 2024-04-18 | End: 2024-04-19

## 2024-04-18 RX ORDER — HEXAVITAMINS
300 TABLET ORAL DAILY
Refills: 0 | Status: DISCONTINUED | OUTPATIENT
Start: 2024-04-18 | End: 2024-04-19

## 2024-04-18 RX ORDER — DOXAZOSIN MESYLATE 4 MG
1 TABLET ORAL AT BEDTIME
Refills: 0 | Status: DISCONTINUED | OUTPATIENT
Start: 2024-04-18 | End: 2024-04-24

## 2024-04-18 RX ORDER — HALOPERIDOL DECANOATE 100 MG/ML
20 INJECTION INTRAMUSCULAR AT BEDTIME
Refills: 0 | Status: DISCONTINUED | OUTPATIENT
Start: 2024-04-18 | End: 2024-04-26

## 2024-04-18 RX ORDER — HALOPERIDOL DECANOATE 100 MG/ML
5 INJECTION INTRAMUSCULAR ONCE
Refills: 0 | Status: DISCONTINUED | OUTPATIENT
Start: 2024-04-18 | End: 2024-04-26

## 2024-04-18 RX ADMIN — Medication 1 MILLIGRAM(S): at 21:59

## 2024-04-18 RX ADMIN — DIVALPROEX SODIUM 1000 MILLIGRAM(S): 500 TABLET, DELAYED RELEASE ORAL at 21:43

## 2024-04-18 RX ADMIN — Medication 1 MILLIGRAM(S): at 20:14

## 2024-04-18 RX ADMIN — QUETIAPINE FUMARATE 100 MILLIGRAM(S): 200 TABLET, FILM COATED ORAL at 20:15

## 2024-04-18 RX ADMIN — HALOPERIDOL DECANOATE 20 MILLIGRAM(S): 100 INJECTION INTRAMUSCULAR at 20:15

## 2024-04-18 NOTE — ED ADULT NURSE NOTE - CHIEF COMPLAINT QUOTE
Patient brought in by EMS from [place] for hearing voices telling him to hurt himself and others starting this morning. Patient does not report a plan but also is not answering all questions, calm and cooperative but with tangential speech. Phx schizophrenia, HTN

## 2024-04-18 NOTE — ED BEHAVIORAL HEALTH ASSESSMENT NOTE - CURRENT MEDICATION
Abilify 20 mg QAM, Cogentin 1mg QHS, Depakote 1000mg QHS, Haldol 20mg QHS, Seroquel 100mg QHS, Isoniazid  300 mg QAM Haldol Deconate 150mg IM Q4 weeks, Abilify 20 mg QAM, Cogentin 1mg QHS, Depakote ER 1000mg, Depakote 500mg QAM, , Haldol 20mg QHS, Seroquel 100mg QHS, Isoniazid  300 mg QAM, Vitamin B-6 100mg daily, Doxasin Mesylate 1mg QHS

## 2024-04-18 NOTE — BH PATIENT PROFILE - FUNCTIONAL ASSESSMENT - BASIC MOBILITY 6.
----- Message from Fiona Sam sent at 8/5/2022 12:07 PM CDT -----  Contact: Mom - [phone]  Caller: Mom - [phone]    Reason:  checking on school modification form to see if it can be faxed over to school / school: [employer]  fax: [phone]    Call mom to confirm fax has been sent please       
----- Message from Minerva Maynard sent at 8/5/2022  9:32 AM CDT -----  Contact: [facility] [phone]  Would like to receive medical advice.    Would they like a call back or a response via [facility]:  call back    Additional information:  Calling to speak with the nurse regarding if pt meal modification form will be available for pickup today.             
Spoke with mom, informed that paperwork has been faxed over to number provided. Mom verbalized understanding.   
Spoke with mom, informed that paperwork is ready for .   
4 = No assist / stand by assistance

## 2024-04-18 NOTE — ED PROVIDER NOTE - CLINICAL SUMMARY MEDICAL DECISION MAKING FREE TEXT BOX
45 y/o male with PMHx of HTN and Schizophrenia who presented to the ED for auditory hallucinations.   Concern for Schizophrenia  Psych eval

## 2024-04-18 NOTE — ED BEHAVIORAL HEALTH ASSESSMENT NOTE - RISK ASSESSMENT
Modifiable RF: worsening of CAH impacting ability to function   Unmodifiable RF: h/o schizophrenia, reported h/o a past suicide attempt   PF: no SI/HI, no substance use, help seeking, engaged in outpatient care, maintaining good self care, future oriented.  Given the above, the patient has some elevated CHRONIC risk of harm to self/others, but acute risk is currently low, with modifiable risk factors mitigated by current outpatient care. As such, he is appropriate for outpatient level of care and for discharge from the ED

## 2024-04-18 NOTE — ED BEHAVIORAL HEALTH ASSESSMENT NOTE - NSBHATTESTCOMMENTATTENDFT_PSY_A_CORE
44/M with hx of SCZ, with multiple past psych admissions, has hx of one prior suicide attempt and denied any illicit substance use.  today, presented to the ED BIB EMS activated by self due to worsening psychosis    at this time, endorses worsening AH with voices telling him to kill himself.  whilst cAH has been chronic, current AH episode has been increasingly distressing to Pt.  Pt is depressed and anxious.. affective component congruent with ongoing psychotic symptoms.  Pt is not actively suicidal or homicidal. is not acutely manic.  he is not delirious.  ongoing symptoms causing severe functional impairment.  Pt is help seeking and requests for a 9.13 admission.  he will benefit from psych admission aimed at stabilization and ensuring safety     RECOMMENDATIONS:  1. CONTINUE ALL MEDS PRESCRIBED  2. PRNs: Haldol 5mg PO/IM + Ativan 2mg PO/IM + Benadryl 50mg PO/IM q6hrs for agitation   3. Consider Clozaril vs ECT for residual symptoms

## 2024-04-18 NOTE — ED ADULT NURSE NOTE - OBJECTIVE STATEMENT
Patient brought to  area after c/o hearing voices. Patient was here yesterday for same and discharged. Patient evaluated by medical provider and is being evaluated by psychiatry. Patient is calm and cooperative at this time. Waiting for further orders and disposition.  KIN White

## 2024-04-18 NOTE — ED ADULT TRIAGE NOTE - CHIEF COMPLAINT QUOTE
Patient brought in by EMS from [place] for hearing voices telling him to hurt himself and others starting this morning. Patient does not report a plan but also is not answering all questions, calm and cooperative but with tangential speech. Phx schizophrenia, HTN

## 2024-04-18 NOTE — ED PROVIDER NOTE - NSFOLLOWUPINSTRUCTIONS_ED_ALL_ED_FT
Follow up with your PMD within 48-72 hrs. Show copies of your reports given to you.   Worsening, continued or new concerning symptoms return to the emergency department.    You have been given information necessary to follow up with the  [facility] ([facility]) Crisis center & other outpatient  psychiatric clinics within your community    • [facility] walk in Crisis centre  [address] [place] [address]  [phone] [email or web address]  Hours of operation:  Day	                                        Hours  Sunday                                  Closed  Monday                                9am - 3pm  Tuesday                                9am - 3pm  Wednesday                          9am - 3pm  Thursday                               9am - 3pm  Friday                                    9am - 3pm  Saturday                                Closed

## 2024-04-18 NOTE — ED BEHAVIORAL HEALTH ASSESSMENT NOTE - NSBHMSEMUSCLE_PSY_A_CORE
25 y/o female whose real name is Theresa Tsang. brought from [facility] for evaluation. Patient was apparently in her normal state of health at a [employer] event and shortly after eating rice/beans and meat started c/o abdominal pain and cramping and then vomited. Friends who witnessed it said they called EMS due to patient being very agitated. She has a hx of "kidney problems" and used to be on medications for it but self discontinued " a while ago."Labs with Hbg of 8.6 and Cr. of 3.95 with reported baseline of 2.5. No reported mental health history. Patient now at mental baseline however hypotension in setting of possible AI? Sinus bradycardia and awaiting MR abdomen for renal lesions due to poor outpatient follow up.     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 Pain related?   -CTH neg  -Utox positive for benzo likely given en route to ER  -Back to baseline now   -Monitor     Anemia  -Likely on basis of CKD  -Low iron stores, s/p Venofor  -FOBT negative     COVID-19/Leukocytosis (resolved)  -No hypoxia  -Supportive Care  -DC zosyn, s/p 3 days  -Bcx negative to date  -Increased wbc likely due to steroids     Hypocalcemia  Hypokalemia  -Replete and monitor      DVT Prophylaxis -- Venodyne  Dispo: Home once stable.  Pending improvement in renal function and MR abdomen   27 y/o female whose real name is Theresa Tsang. brought from [facility] for evaluation. Patient was apparently in her normal state of health at a [employer] event and shortly after eating rice/beans and meat started c/o abdominal pain and cramping and then vomited. Friends who witnessed it said they called EMS due to patient being very agitated. She has a hx of "kidney problems" and used to be on medications for it but self discontinued " a while ago."Labs with Hbg of 8.6 and Cr. of 3.95 with reported baseline of 2.5. No reported mental health history. Patient now at mental baseline however hypotension in setting of possible AI? Sinus bradycardia and awaiting MR abdomen for renal lesions due to poor outpatient follow up.     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 Pain related?   -CTH neg  -Utox positive for benzo likely given en route to ER  -Back to baseline now   -Monitor     Anemia  -Likely on basis of CKD  -Low iron stores, s/p Venofor  -FOBT negative     COVID-19/Leukocytosis (resolved)  -No hypoxia  -Supportive Care  -DC zosyn, s/p 3 days  -Bcx negative to date  -Increased wbc likely due to steroids     Hypocalcemia  Hypokalemia  -Replete and monitor      DVT Prophylaxis -- Venodyne  Dispo: Home once stable.  Pending improvement in renal function and MR abdomen   Normal muscle tone/strength

## 2024-04-18 NOTE — ED BEHAVIORAL HEALTH ASSESSMENT NOTE - NSBHATTESTAPPAMEND_PSY_A_CORE
I have personally seen and examined this patient. I fully participated in the care of this patient. I have made amendments to the documentation where appropriate and otherwise agree with the history, physical exam, and plan as documented by the CHAN

## 2024-04-18 NOTE — ED PROVIDER NOTE - CONSTITUTIONAL NEGATIVE STATEMENT, MLM
----- Message from Ginette Schroeder sent at 4/16/2018  9:26 AM CDT -----  Contact: Pt  Please give pt a call at .[phone] (home) [phone] (work)regarding some back pains he's having and wants to see if something can be called in.   no fever and no chills.

## 2024-04-18 NOTE — ED BEHAVIORAL HEALTH ASSESSMENT NOTE - SUMMARY
44-year-old male with a history of schizophrenia, one prior suicide attempt per chart review but pt denies, no history of SIB, no known history of violence, no active medical issues, lives [place], h/o multiple prior presentations to ED for AH, recently seen at [facility] ED in July 2023 for similar concerns, who presents brought in by EMS activated by self from his residence for worsening auditory hallucinations    Mr. Bishop presents with active psychosis including CAH to kill himself. He has chronic CAH to harm self and others and although this appears to be a chronic presentation he has represented to ED 2x in less than 24 hours.  He reports the voices are more "ferocious" and bothersome. Collateral verify patient symptoms have been worse over the last 2 days.  Patient is not at baseline and symptoms are impacting his ability to function. He is requesting inpatient admission for medication stabilization.     Plan:  1. Admit 9.13  2.   3. , Isoniazid  300 mg QAM  4. PRNs Haldol 5mg PO/IM PRN, Ativan 2mg PO/IM PRN, Benadryl 50mg PO/IM PRN- For agitation please attempt verbal de-escalation and behavioral intervention first. If patient does not respond to above, may give recommended PRNs if no contraindications. If patient is refusing PO, remains an imminent danger to self or others and If Patient's  qtc <500, can escalate to IM formulation. If IM antipsychotic is administered, please perform follow-up ECG for QTc monitoring.  4. Consider Clozaril or ECT for residual symptoms 44-year-old male with a history of schizophrenia, one prior suicide attempt per chart review but pt denies, no history of SIB, no known history of violence, no active medical issues, lives [place], h/o multiple prior presentations to ED for AH, recently seen at [facility] ED in July 2023 for similar concerns, who presents brought in by EMS activated by self from his residence for worsening auditory hallucinations    Mr. Bishop presents with active psychosis including CAH to kill himself. He has chronic CAH to harm self and others and although this appears to be a chronic presentation he has represented to ED 2x in less than 24 hours.  He reports the voices are more "ferocious" and bothersome. Collateral verify patient symptoms have been worse over the last 2 days.  Patient is not at baseline and symptoms are impacting his ability to function. He is requesting inpatient admission for medication stabilization.     Plan:  1. Admit 9.13  2. Abilify 20 mg QAM, Cogentin 1mg QHS, Depakote ER 1000mg, Depakote 500mg QAM, , Haldol 20mg QHS, Seroquel 100mg QHS  3. , Isoniazid  300 mg QAM, Vitamin B-6 10mg QAM, Doxazosin Mesylate 1mg QHS  4. PRNs Haldol 5mg PO/IM PRN, Ativan 2mg PO/IM PRN, Benadryl 50mg PO/IM PRN- For agitation please attempt verbal de-escalation and behavioral intervention first. If patient does not respond to above, may give recommended PRNs if no contraindications. If patient is refusing PO, remains an imminent danger to self or others and If Patient's  qtc <500, can escalate to IM formulation. If IM antipsychotic is administered, please perform follow-up ECG for QTc monitoring.  4. Consider Clozaril or ECT for residual symptoms 44-year-old male with a history of schizophrenia, one prior suicide attempt per chart review but pt denies, no history of SIB, no known history of violence, no active medical issues, lives [place], h/o multiple prior presentations to ED for AH, recently seen at [facility] ED in July 2023; 4.18.2024 for similar concerns, who presents brought in by EMS activated by self from his residence for worsening auditory hallucinations    Mr. Bishop presents with active psychosis including CAH to kill himself. He has chronic CAH to harm self and others and although this appears to be a chronic presentation he has represented to ED 2x in less than 24 hours.  He reports the voices are more "ferocious" and bothersome. Collateral verify patient symptoms have been worse over the last 2 days.  Patient is not at baseline and symptoms are impacting his ability to function. He is requesting inpatient admission for medication stabilization.     Plan:  1. Admit 9.13  2. Abilify 20 mg QAM, Cogentin 1mg QHS, Depakote ER 1000mg, Depakote 500mg QAM, , Haldol 20mg QHS, Seroquel 100mg QHS  3. , Isoniazid  300 mg QAM, Vitamin B-6 10mg QAM, Doxazosin Mesylate 1mg QHS  4. PRNs Haldol 5mg PO/IM PRN, Ativan 2mg PO/IM PRN, Benadryl 50mg PO/IM PRN- For agitation please attempt verbal de-escalation and behavioral intervention first. If patient does not respond to above, may give recommended PRNs if no contraindications. If patient is refusing PO, remains an imminent danger to self or others and If Patient's  qtc <500, can escalate to IM formulation. If IM antipsychotic is administered, please perform follow-up ECG for QTc monitoring.  4. Consider Clozaril or ECT for residual symptoms

## 2024-04-18 NOTE — BH PATIENT PROFILE - HOME MEDICATIONS
doxazosin 1 mg oral tablet , 1 tab(s) orally once a day (at bedtime)  isoniazid 300 mg oral tablet , 1 tab(s) orally once a day  Seroquel 100 mg oral tablet , 1 tab(s) orally once a day (at bedtime)  Depakote 500 mg oral delayed release tablet , 1 tab(s) orally once a day  Haldol 20 mg oral tablet , 1 tab(s) orally once a day (at bedtime)  Depakote  mg oral tablet, extended release , 2 tab(s) orally once a day (at bedtime)  Cogentin 1 mg oral tablet , 1 tab(s) orally once a day  Abilify 20 mg oral tablet , 1 tab(s) orally once a day

## 2024-04-18 NOTE — ED PROVIDER NOTE - OBJECTIVE STATEMENT
Anesthesia Pre Eval Note    Anesthesia ROS/Med Hx        Anesthetic Complication History:  Patient does not have a history of anesthetic complications      Pulmonary Review:    The patient is a current smoker.     Neuro/Psych Review:    Positive for psychiatric history - Depression    Cardiovascular Review:    Positive for hypertension  Positive for hyperlipidemia    GI/HEPATIC/RENAL Review:    Positive for GERD    End/Other Review:    Positive for anemia  Additional Results:     ALLERGIES:  No Known Allergies       Last Labs        Component                Value               Date/Time                  WBC                      6.9                 01/23/2023 1229            RBC                      4.77                01/23/2023 1229            HGB                      13.5                01/23/2023 1229            HCT                      42.0                01/23/2023 1229            MCV                      88.1                01/23/2023 1229            MCH                      28.3                01/23/2023 1229            MCHC                     32.1                01/23/2023 1229            RDW-CV                   14.1                01/23/2023 1229            Sodium                   138                 01/23/2023 1229            Potassium                4.3                 01/23/2023 1229            Chloride                 102                 01/23/2023 1229            Carbon Dioxide           26                  01/23/2023 1229            Glucose                  100 (H)             01/23/2023 1229            BUN                      23 (H)              01/23/2023 1229            Creatinine               0.83                01/23/2023 1229            Glomerular Filtrati*     81                  01/23/2023 1229            Calcium                  9.0                 01/23/2023 1229            PLT                      270                 01/23/2023 1229        Past Medical History:  No date: Anemia  No  date: GERD (gastroesophageal reflux disease)  No date: HTN (hypertension)  No date: Hyperlipidemia  2018: IUD (intrauterine device) in place      Comment:  Mirena  No date: Osteoarthritis, knee  No date: Urinary tract infection    Past Surgical History:  03/23/2006: Knee arthroscopy w/ synovectomy  No date: Tonsillectomy and adenoidectomy       Prior to Admission medications :  Medication VITAMIN E PO, Sig , Start Date , End Date , Taking? Yes, Authorizing Provider Outside Provider    Medication metoPROLOL succinate (TOPROL-XL) 50 MG 24 hr tablet, Sig Take 1 tablet by mouth daily., Start Date 9/29/22, End Date , Taking? Yes, Authorizing Provider Carson Sharma, DO    Medication venlafaxine (EFFEXOR) 37.5 MG tablet, Sig Take 1 tablet by mouth daily (with breakfast)., Start Date 9/29/22, End Date , Taking? Yes, Authorizing Provider Carson Sharma, DO    Medication pantoprazole (PROTONIX) 40 MG tablet, Sig TAKE 1 TABLET BY MOUTH DAILY, Start Date 9/14/22, End Date , Taking? Yes, Authorizing Provider Carson Sharma, DO    Medication atorvastatin (LIPITOR) 20 MG tablet, Sig TAKE 1 TABLET BY MOUTH DAILY, Start Date 9/14/22, End Date , Taking? Yes, Authorizing Provider Carson Sharma, DO    Medication aspirin (ECOTRIN) 81 MG EC tablet, Sig Take 81 mg by mouth daily., Start Date , End Date , Taking? Yes, Authorizing Provider Outside Provider    Medication POTASSIUM PO, Sig Take by mouth daily., Start Date , End Date , Taking? Yes, Authorizing Provider Outside Provider    Medication magnesium chloride 64 MG Tablet Enteric Coated deleayed release tablet, Sig Take 64 mg by mouth daily., Start Date , End Date , Taking? Yes, Authorizing Provider Outside Provider    Medication Turmeric 500 MG Tab, Sig Take 1 tablet by mouth daily., Start Date , End Date , Taking? Yes, Authorizing Provider Outside Provider    Medication cholecalciferol (VITAMIN D3) 1000 UNITS tablet, Sig Take 1,000 Units by mouth daily., Start Date , End Date , Taking? Yes,  Authorizing Provider Outside Provider    Medication Misc Natural Products (OSTEO BI-FLEX ADV JOINT SHIELD) Tab, Sig Take 1 tablet by mouth daily., Start Date , End Date , Taking? Yes, Authorizing Provider Outside Provider    Medication Ferrous Sulfate (IRON) 28 MG TABS, Sig Take  by mouth., Start Date , End Date , Taking? Yes, Authorizing Provider Outside Provider    Medication PEG 3350-KCl-NaBcb-NaCl-NaSulf (PEG-3350 and electrolytes) 236 g powder for oral solution, Sig , Start Date 10/26/22, End Date , Taking? , Authorizing Provider Outside Provider    Medication ondansetron (ZOFRAN) 4 MG tablet, Sig Take 4 mg by mouth 1 time., Start Date 10/26/22, End Date , Taking? , Authorizing Provider Outside Provider         Patient Vitals in the past 24 hrs:      Relevant Problems   No relevant active problems       Physical Exam     Airway   Mallampati: II  TM Distance: >3 FB  Neck ROM: Full    Cardiovascular  Cardiovascular exam normal    Head Assessment  Head assessment: Normocephalic    General Assessment  General Assessment: Alert and oriented and No acute distress    Dental Exam  Dental exam normal    Pulmonary Exam  Pulmonary exam normal    Abdominal Exam  Abdominal exam normal      Anesthesia Plan:    ASA Status: 2  Anesthesia Type: General    Induction: Intravenous  Preferred Airway Type: Mask  Maintenance: TIVA  Premedication: None    Checklist  Reviewed: Lab Results, Problem list, Care Everywhere, Medications, Allergies, NPO Status, Anesthesia Record and Past Med History  Consent/Risks Discussed Statement:  The proposed anesthetic plan, including its risks and benefits, have been discussed with the Patient along with the risks and benefits of alternatives. Questions were encouraged and answered and the patient and/or representative understands and agrees to proceed.    I have discussed elements of the patient's history or examination, as noted above and/or as follows, that place the patient at higher risk of  complications; BMI> 30 (obesity).    I discussed with the patient (and/or patient's legal representative) the risks and benefits of the proposed anesthesia plan, General, which may include services performed by other anesthesia providers.    Alternative anesthesia plans, if available, were reviewed with the patient (and/or patient's legal representative). Discussion has been held with the patient (and/or patient's legal representative) regarding risks of anesthesia, which include intra-operative awareness and emergent situations that may require change in anesthesia plan.    The patient (and/or patient's legal representative) has indicated understanding, his/her questions have been answered, and he/she wishes to proceed with the planned anesthetic.    Blood Products: Not Anticipated     45 y/o male with PMHx of HTN and Schizophrenia who presented to the ED for auditory hallucinations. Pt states having auditory hallucinations of a Will Rich telling him he is worthless and to kill himself. Pt denies any headache, neck pain, back pain, fever, chills, nausea, vomiting, SOB, chest pain, or abd pain. Pt was seen here yesterday for similar but saying the voices are more frequent.

## 2024-04-18 NOTE — ED BEHAVIORAL HEALTH ASSESSMENT NOTE - DESCRIPTION
latent TB lives in [facility] housing in single room During course of ED visit patient was calm and cooperative. Patient was not aggressive or violent and did not require PRN medications.      ICU Vital Signs Last 24 Hrs  T(C): 36.6 (18 Apr 2024 09:46), Max: 36.9 (17 Apr 2024 16:49)  T(F): 97.9 (18 Apr 2024 09:46), Max: 98.4 (17 Apr 2024 16:49)  HR: 89 (18 Apr 2024 09:46) (88 - 90)  BP: 111/74 (18 Apr 2024 09:46) (111/74 - 124/87)  BP(mean): --  ABP: --  ABP(mean): --  RR: 18 (18 Apr 2024 09:46) (17 - 18)  SpO2: 95% (18 Apr 2024 09:46) (95% - 98%)    O2 Parameters below as of 18 Apr 2024 09:46  Patient On (Oxygen Delivery Method): room air

## 2024-04-18 NOTE — ED BEHAVIORAL HEALTH ASSESSMENT NOTE - HPI (INCLUDE ILLNESS QUALITY, SEVERITY, DURATION, TIMING, CONTEXT, MODIFYING FACTORS, ASSOCIATED SIGNS AND SYMPTOMS)
44-year-old male with a history of schizophrenia, one prior suicide attempt per chart review but pt denies, no history of SIB, no known history of violence, no active medical issues, lives [place], h/o multiple prior presentations to ED for AH, recently seen at [facility] ED in July 2023 for similar concerns, who presents brought in by EMS activated by self from his residence for worsening auditory hallucinations.    Pts evaluation is very similar to his presentation yesterday 4.17.24. Patient states that he has been hearing voices for "22 years 6 months and 28 days" but the last few days the voices are "more ferocious" and that they are bothering him. The voices have been saying "hurt others and hurt yourself" but does not feel the need to listen to them and has never listened to CAH and has never hurt anyone in the community or in his residence. He stated the CAH are telling him to jump off a building. This AM he heard Will Everminds voice telling him this and to get out of bed. He stated that the voices are more "perverted" and he believes they are "lying, cheating and stealing." He could not elaborate how the voices are doing this.      He denies feeling depressed, reports he had trouble sleeping last night related to CAH and stated "I can't think." He denies any change in energy levels, denies appetite,  denies loss of interest in all daily activities (enjoys going to different restaurants via [employer] bus),  denies panic, denies feelings of hopelessness and guilt,  denies history of suicidal behavior or self harm, denies history of violent behavior. Denies elevated or irritable mood, denies visual hallucinations, and denies somatic symptoms.    See  note for collateral information. 44-year-old male with a history of schizophrenia, one prior suicide attempt per chart review but pt denies, no history of SIB, no known history of violence, no active medical issues, lives [place] grounds, h/o multiple prior presentations to ED for AH, recently seen at [facility] ED in July 2023;April 2024- for similar concerns, who presents brought in by EMS activated by self from his residence for worsening auditory hallucinations.    Pts evaluation is very similar to his presentation yesterday 4.17.24. Patient states that he has been hearing voices for "22 years 6 months and 28 days" but the last few days the voices are "more ferocious" and that they are bothering him. The voices have been saying "hurt others and hurt yourself" but does not feel the need to listen to them and has never listened to CAH and has never hurt anyone in the community or in his residence. He stated the CAH are telling him to jump off a building. This AM he heard Will Smiths voice telling him this and to get out of bed. He stated that the voices are more "perverted" and he believes they are "lying, cheating and stealing." He could not elaborate how the voices are doing this.      He denies feeling depressed, reports he had trouble sleeping last night related to CAH and stated "I can't think." He denies any change in energy levels, denies appetite,  denies loss of interest in all daily activities (enjoys going to different restaurants via [employer] bus),  denies panic, denies feelings of hopelessness and guilt,  denies history of suicidal behavior or self harm, denies history of violent behavior. Denies elevated or irritable mood, denies visual hallucinations, and denies somatic symptoms.    See  note for collateral information.

## 2024-04-18 NOTE — ED BEHAVIORAL HEALTH NOTE - BEHAVIORAL HEALTH NOTE
WRITER CALLED: Ms. Perez  [phone] states patient was hearing voices to kill himself.  She states it's ongoing symptom for patient, he always hears voices.  She states it has become unbearable for him the past two days.  Patient is taking medication.  Patient is sleeping at night, states this is ongoing and never stops.  Patient receives treatment at [facility] on grounds of [place].  She states patient's psychiatrist spoke to her and told her this will not stop, but he will have episodes where symptoms worsen.  She states his psychiatrist has changed medications and started something for patient to sleep which she states helps.  Patient is not known to use drugs or alcohol.  Writer informed her patient will be admitted to [facility].

## 2024-04-19 LAB
CHOLEST SERPL-MCNC: 193 MG/DL — SIGNIFICANT CHANGE UP
HDLC SERPL-MCNC: 43 MG/DL — SIGNIFICANT CHANGE UP
LIPID PNL WITH DIRECT LDL SERPL: 127 MG/DL — HIGH
NON HDL CHOLESTEROL: 150 MG/DL — HIGH
TRIGL SERPL-MCNC: 116 MG/DL — SIGNIFICANT CHANGE UP
VALPROATE SERPL-MCNC: 99.6 UG/ML — SIGNIFICANT CHANGE UP (ref 50–100)

## 2024-04-19 PROCEDURE — 99222 1ST HOSP IP/OBS MODERATE 55: CPT

## 2024-04-19 RX ORDER — ARIPIPRAZOLE 15 MG/1
20 TABLET ORAL AT BEDTIME
Refills: 0 | Status: DISCONTINUED | OUTPATIENT
Start: 2024-04-20 | End: 2024-04-22

## 2024-04-19 RX ORDER — DIVALPROEX SODIUM 500 MG/1
1500 TABLET, DELAYED RELEASE ORAL AT BEDTIME
Refills: 0 | Status: DISCONTINUED | OUTPATIENT
Start: 2024-04-19 | End: 2024-04-26

## 2024-04-19 RX ORDER — HEXAVITAMINS
300 TABLET ORAL AT BEDTIME
Refills: 0 | Status: DISCONTINUED | OUTPATIENT
Start: 2024-04-20 | End: 2024-04-24

## 2024-04-19 RX ADMIN — Medication 1 MILLIGRAM(S): at 20:25

## 2024-04-19 RX ADMIN — Medication 1 MILLIGRAM(S): at 20:26

## 2024-04-19 RX ADMIN — ARIPIPRAZOLE 20 MILLIGRAM(S): 15 TABLET ORAL at 08:29

## 2024-04-19 RX ADMIN — DIVALPROEX SODIUM 1500 MILLIGRAM(S): 500 TABLET, DELAYED RELEASE ORAL at 20:26

## 2024-04-19 RX ADMIN — Medication 300 MILLIGRAM(S): at 09:14

## 2024-04-19 RX ADMIN — DIVALPROEX SODIUM 500 MILLIGRAM(S): 500 TABLET, DELAYED RELEASE ORAL at 08:29

## 2024-04-19 RX ADMIN — HALOPERIDOL DECANOATE 20 MILLIGRAM(S): 100 INJECTION INTRAMUSCULAR at 20:26

## 2024-04-19 RX ADMIN — QUETIAPINE FUMARATE 100 MILLIGRAM(S): 200 TABLET, FILM COATED ORAL at 20:26

## 2024-04-19 NOTE — BH INPATIENT PSYCHIATRY ASSESSMENT NOTE - NSBHMSERELATED_PSY_A_CORE
Fair

## 2024-04-19 NOTE — BH INPATIENT PSYCHIATRY ASSESSMENT NOTE - NSBHMETABOLIC_PSY_ALL_CORE_FT
BMI:   HbA1c:   Glucose:   BP: 111/74 (04-18-24 @ 09:46) (111/74 - 111/74)Vital Signs Last 24 Hrs  T(C): 37 (04-19-24 @ 07:59), Max: 37 (04-19-24 @ 07:59)  T(F): 98.6 (04-19-24 @ 07:59), Max: 98.6 (04-19-24 @ 07:59)  HR: --  BP: --  BP(mean): --  RR: --  SpO2: --    Orthostatic VS  04-19-24 @ 07:59  Lying BP: --/-- HR: --  Sitting BP: 117/68 HR: 94  Standing BP: 136/80 HR: 114  Site: --  Mode: --  Orthostatic VS  04-18-24 @ 20:15  Lying BP: --/-- HR: --  Sitting BP: 132/78 HR: 76  Standing BP: --/-- HR: --  Site: --  Mode: --  Orthostatic VS  04-18-24 @ 17:50  Lying BP: --/-- HR: --  Sitting BP: 130/75 HR: 77  Standing BP: 139/83 HR: 85  Site: --  Mode: --    Lipid Panel: Date/Time: 04-19-24 @ 08:58  Cholesterol, Serum: 193  LDL Cholesterol Calculated: 127  HDL Cholesterol, Serum: 43  Total Cholesterol/HDL Ration Measurement: --  Triglycerides, Serum: 116

## 2024-04-19 NOTE — BH SOCIAL WORK INITIAL PSYCHOSOCIAL EVALUATION - NSBHTREATHXREASONFT_PSY_ALL_CORE
ongoing AH  Hydroquinone Counseling:  Patient advised that medication may result in skin irritation, lightening (hypopigmentation), dryness, and burning.  In the event of skin irritation, the patient was advised to reduce the amount of the drug applied or use it less frequently.  Rarely, spots that are treated with hydroquinone can become darker (pseudoochronosis).  Should this occur, patient instructed to stop medication and call the office. The patient verbalized understanding of the proper use and possible adverse effects of hydroquinone.  All of the patient's questions and concerns were addressed.

## 2024-04-19 NOTE — BH INPATIENT PSYCHIATRY ASSESSMENT NOTE - NSBHCHARTREVIEWVS_PSY_A_CORE FT
Vital Signs Last 24 Hrs  T(C): 37 (04-19-24 @ 07:59), Max: 37 (04-19-24 @ 07:59)  T(F): 98.6 (04-19-24 @ 07:59), Max: 98.6 (04-19-24 @ 07:59)  HR: --  BP: --  BP(mean): --  RR: --  SpO2: --    Orthostatic VS  04-19-24 @ 07:59  Lying BP: --/-- HR: --  Sitting BP: 117/68 HR: 94  Standing BP: 136/80 HR: 114  Site: --  Mode: --  Orthostatic VS  04-18-24 @ 20:15  Lying BP: --/-- HR: --  Sitting BP: 132/78 HR: 76  Standing BP: --/-- HR: --  Site: --  Mode: --  Orthostatic VS  04-18-24 @ 17:50  Lying BP: --/-- HR: --  Sitting BP: 130/75 HR: 77  Standing BP: 139/83 HR: 85  Site: --  Mode: --

## 2024-04-19 NOTE — BH INPATIENT PSYCHIATRY ASSESSMENT NOTE - HPI (INCLUDE ILLNESS QUALITY, SEVERITY, DURATION, TIMING, CONTEXT, MODIFYING FACTORS, ASSOCIATED SIGNS AND SYMPTOMS)
44-year-old male with a history of schizophrenia, one prior suicide attempt per chart review but pt denies, no history of SIB, no known history of violence, no active medical issues, lives [place] grounds, h/o multiple prior presentations to ED for AH, recently seen at [facility] ED in July 2023;April 2024- for similar concerns, who presents brought in by EMS activated by self from his residence for worsening auditory hallucinations.    Pts evaluation is very similar to his presentation yesterday 4.17.24. Patient states that he has been hearing voices for "22 years 6 months and 28 days" but the last few days the voices are "more ferocious" and that they are bothering him. The voices have been saying "hurt others and hurt yourself" but does not feel the need to listen to them and has never listened to CAH and has never hurt anyone in the community or in his residence. He stated the CAH are telling him to jump off a building. This AM he heard Will Smiths voice telling him this and to get out of bed. He stated that the voices are more "perverted" and he believes they are "lying, cheating and stealing." He could not elaborate how the voices are doing this.      He denies feeling depressed, reports he had trouble sleeping last night related to CAH and stated "I can't think." He denies any change in energy levels, denies appetite,  denies loss of interest in all daily activities (enjoys going to different restaurants via [employer] bus),  denies panic, denies feelings of hopelessness and guilt,  denies history of suicidal behavior or self harm, denies history of violent behavior. Denies elevated or irritable mood, denies visual hallucinations, and denies somatic symptoms. See  note for collateral information.    ON ML6, corroborated much of above ED note, duly noted and appreciated. Pt seen lying in bed, appears childlike, concrete, with paucity of ideas and affect, but engaged with MD. Denies wish to harm self on unit. No CO warranted at present. Will consider ELIZONDO options.

## 2024-04-19 NOTE — BH INPATIENT PSYCHIATRY ASSESSMENT NOTE - NSBHASSESSSUMMFT_PSY_ALL_CORE
44-year-old male with a history of schizophrenia, one prior suicide attempt per chart review but pt denies, no history of SIB, no known history of violence, no active medical issues, lives [place] [facility] grounds, h/o multiple prior presentations to ED for AH, recently seen at [facility] ED in July 2023;April 2024- for similar concerns, who presents brought in by EMS activated by self from his residence for worsening auditory hallucinations. Pts evaluation is very similar to his presentation yesterday 4.17.24. Patient states that he has been hearing voices for "22 years 6 months and 28 days" but the last few days the voices are "more ferocious" and that they are bothering him. The voices have been saying "hurt others and hurt yourself" but does not feel the need to listen to them and has never listened to CAH and has never hurt anyone in the community or in his residence. He stated the CAH are telling him to jump off a building. This AM he heard Will Hilarios voice telling him this and to get out of bed. He stated that the voices are more "perverted" and he believes they are "lying, cheating and stealing." He could not elaborate how the voices are doing this.     PLAN  9.13 admit but 2PC if needed  Q15 adequate  Psych meds:  abilify  Haldol  Seroquel  Monitor and simplify regimen in [place]  G&M therapy  Psych collateral   Supportive therapy  DISPO tbd, consider DTP

## 2024-04-19 NOTE — BH SOCIAL WORK INITIAL PSYCHOSOCIAL EVALUATION - OTHER PAST PSYCHIATRIC HISTORY (INCLUDE DETAILS REGARDING ONSET, COURSE OF ILLNESS, INPATIENT/OUTPATIENT TREATMENT)
As per EMR: Pt is "44-year-old male with a history of schizophrenia, one prior suicide attempt per chart review but pt denies, no history of SIB, no known history of violence, no active medical issues, lives on [place] grounds, h/o multiple prior presentations to ED for AH, recently seen at [facility] ED in July 2023;April 2024- for similar concerns, who presents brought in by EMS activated by self from his residence for worsening auditory hallucinations. Pts evaluation is very similar to his presentation yesterday 4.17.24. Patient states that he has been hearing voices for "22 years 6 months and 28 days" but the last few days the voices are "more ferocious" and that they are bothering him. The voices have been saying "hurt others and hurt yourself" but does not feel the need to listen to them and has never listened to CAH and has never hurt anyone in the community or in his residence. He stated the CAH are telling him to jump off a building. This AM he heard Mayito Wolfe voice telling him this and to get out of bed. He stated that the voices are more "perverted" and he believes they are "lying, cheating and stealing." He could not elaborate how the voices are doing this.He denies feeling depressed, reports he had trouble sleeping last night related to CAH and stated "I can't think." He denies any change in energy levels, denies appetite,  denies loss of interest in all daily activities (enjoys going to different restaurants via [employer] bus),  denies panic, denies feelings of hopelessness and guilt,  denies history of suicidal behavior or self harm, denies history of violent behavior. Denies elevated or irritable mood, denies visual hallucinations, and denies somatic symptoms."    Upon assessment, pt reports he has been hearing harassing voices telling him to hurt himself. Pt states he does not want to harm himself and that this has been happening for over 2 decades. Pt reports VH "sometimes", when asked who he sees, he stated Mayito Bates Reports AH constantly, VH not as often. Pt reports living on [place] grounds. Since being hospitalized, he feels the voices have lowered. He is currently not working, states he does not receive SSI/SSD. Pt reports his support person is "Aunt Uzma" and provided consent to speak with her. States he is receiving outpatient care on [facility]. As per EMR, pt has a , MsEmile Ana ([phone]), and pt has no hx of substance use. Pt unable to provide further information, wanted to go to sleep.

## 2024-04-19 NOTE — BH INPATIENT PSYCHIATRY ASSESSMENT NOTE - CURRENT MEDICATION
MEDICATIONS  (STANDING):  benztropine 1 milliGRAM(s) Oral at bedtime  divalproex ER 1500 milliGRAM(s) Oral at bedtime  doxazosin 1 milliGRAM(s) Oral at bedtime  haloperidol     Tablet 20 milliGRAM(s) Oral at bedtime  QUEtiapine 100 milliGRAM(s) Oral at bedtime    MEDICATIONS  (PRN):  diphenhydrAMINE 50 milliGRAM(s) Oral every 6 hours PRN eps  diphenhydrAMINE Injectable 50 milliGRAM(s) IntraMuscular once PRN extreme eps  haloperidol     Tablet 5 milliGRAM(s) Oral every 6 hours PRN agitation secondary to psychosis  haloperidol    Injectable 5 milliGRAM(s) IntraMuscular Once PRN extreme agitation secondary to psychosis  LORazepam     Tablet 2 milliGRAM(s) Oral every 6 hours PRN Anxiety  LORazepam   Injectable 2 milliGRAM(s) IntraMuscular once PRN extreme anxiety

## 2024-04-19 NOTE — PSYCHIATRIC REHAB INITIAL EVALUATION - NSBHPRRECOMMEND_PSY_ALL_CORE
Writer attempted to met with patient in order to introduce patient to Psychiatric Rehabilitation Staff, department functions, orient patient to unit programming and to establish a Psychiatric Rehabilitation goal, however note will be done based on chart. Pt was admitted to [facility] in the context of worsening symptoms of schizophrenia. Patient has maintained partial behavioral control since arrival on the unit (no physical aggression). Patient came [facility] Ed because they were hearing voices for the past 22 years. Patient has been to the ED for auditory hallucinations numerous amounts of times. Per chart the voices are telling the patient to harm himself, others and to jump of off a building, however the patient does not act on them. Patient denied depressive symptoms. Patient is also having trouble sleeping due to the AH. Patient does not have violence or legal history. Per chart patient denies all symptoms including SI/HI/VH, Patient was unable to identify a Psychiatric Rehabilitation goal. Therefore, a goal will be assigned for him and reassessed later if needed. Psychiatric Rehabilitation Staff will continue to engage patient daily in order to develop rapport, provide support and to assist patient in demonstrating progress towards Psychiatric Rehabilitation.

## 2024-04-20 PROCEDURE — 99231 SBSQ HOSP IP/OBS SF/LOW 25: CPT

## 2024-04-20 RX ADMIN — DIVALPROEX SODIUM 1500 MILLIGRAM(S): 500 TABLET, DELAYED RELEASE ORAL at 20:35

## 2024-04-20 RX ADMIN — Medication 300 MILLIGRAM(S): at 20:35

## 2024-04-20 RX ADMIN — Medication 1 MILLIGRAM(S): at 20:36

## 2024-04-20 RX ADMIN — Medication 1 MILLIGRAM(S): at 20:35

## 2024-04-20 RX ADMIN — ARIPIPRAZOLE 20 MILLIGRAM(S): 15 TABLET ORAL at 20:36

## 2024-04-20 RX ADMIN — HALOPERIDOL DECANOATE 20 MILLIGRAM(S): 100 INJECTION INTRAMUSCULAR at 20:35

## 2024-04-20 RX ADMIN — QUETIAPINE FUMARATE 100 MILLIGRAM(S): 200 TABLET, FILM COATED ORAL at 20:35

## 2024-04-21 PROCEDURE — 99231 SBSQ HOSP IP/OBS SF/LOW 25: CPT

## 2024-04-21 RX ORDER — QUETIAPINE FUMARATE 200 MG/1
150 TABLET, FILM COATED ORAL AT BEDTIME
Refills: 0 | Status: DISCONTINUED | OUTPATIENT
Start: 2024-04-21 | End: 2024-04-24

## 2024-04-21 RX ADMIN — Medication 300 MILLIGRAM(S): at 20:49

## 2024-04-21 RX ADMIN — Medication 1 MILLIGRAM(S): at 20:49

## 2024-04-21 RX ADMIN — QUETIAPINE FUMARATE 150 MILLIGRAM(S): 200 TABLET, FILM COATED ORAL at 20:49

## 2024-04-21 RX ADMIN — HALOPERIDOL DECANOATE 20 MILLIGRAM(S): 100 INJECTION INTRAMUSCULAR at 21:37

## 2024-04-21 RX ADMIN — DIVALPROEX SODIUM 1500 MILLIGRAM(S): 500 TABLET, DELAYED RELEASE ORAL at 20:49

## 2024-04-21 RX ADMIN — ARIPIPRAZOLE 20 MILLIGRAM(S): 15 TABLET ORAL at 20:50

## 2024-04-21 RX ADMIN — Medication 1 MILLIGRAM(S): at 20:50

## 2024-04-22 RX ORDER — ARIPIPRAZOLE 15 MG/1
25 TABLET ORAL AT BEDTIME
Refills: 0 | Status: DISCONTINUED | OUTPATIENT
Start: 2024-04-22 | End: 2024-04-24

## 2024-04-22 RX ADMIN — DIVALPROEX SODIUM 1500 MILLIGRAM(S): 500 TABLET, DELAYED RELEASE ORAL at 20:24

## 2024-04-22 RX ADMIN — Medication 1 MILLIGRAM(S): at 20:23

## 2024-04-22 RX ADMIN — QUETIAPINE FUMARATE 150 MILLIGRAM(S): 200 TABLET, FILM COATED ORAL at 20:24

## 2024-04-22 RX ADMIN — Medication 300 MILLIGRAM(S): at 20:24

## 2024-04-22 RX ADMIN — HALOPERIDOL DECANOATE 5 MILLIGRAM(S): 100 INJECTION INTRAMUSCULAR at 13:34

## 2024-04-22 RX ADMIN — ARIPIPRAZOLE 25 MILLIGRAM(S): 15 TABLET ORAL at 20:23

## 2024-04-22 RX ADMIN — Medication 2 MILLIGRAM(S): at 13:34

## 2024-04-22 RX ADMIN — HALOPERIDOL DECANOATE 20 MILLIGRAM(S): 100 INJECTION INTRAMUSCULAR at 20:49

## 2024-04-22 RX ADMIN — Medication 1 MILLIGRAM(S): at 20:24

## 2024-04-22 NOTE — BH TREATMENT PLAN - NSTXSLFCREINTERMD_PSY_ALL_CORE
Pscyhopharm with optimization of medication and possible ELIZONDO vs dual ELIZONDO regimen with supportive therapy and aftercare/return to providers

## 2024-04-22 NOTE — BH TREATMENT PLAN - NSTXNEGATINTERMD_PSY_ALL_CORE
Pscyhopharm with optimization of medication and possible ELIZONOD vs dual ELIZONDO regimen with supportive therapy and aftercare/return to providers

## 2024-04-22 NOTE — BH TREATMENT PLAN - NSTXMEDICINTERPR_PSY_ALL_CORE
Psychiatric Rehabilitation staff met with patient to help them establish a goal. Patient’s goal is to be able to describe the benefit of medication/treatment.

## 2024-04-22 NOTE — BH INPATIENT PSYCHIATRY PROGRESS NOTE - OTHER
concrete with paucity of ideas childlike, docile distracted at times endorses, not re self harming pt narrative

## 2024-04-22 NOTE — BH TREATMENT PLAN - NSTXDCOTHRINTERSW_PSY_ALL_CORE
will provide support, insight, psychoeducation, discharge planning, and maintain contact with identified supports.

## 2024-04-22 NOTE — BH TREATMENT PLAN - NSTXPLANTHERAPYSESSIONSFT_PSY_ALL_CORE
04-20-24  Type of therapy: Medication management, Psychoeducation, Relapse prevention  --  --  --  --  --    04-21-24  Type of therapy: Medication management, Psychoeducation, Relapse prevention  --  --  --  --  --  
normal (ped)...

## 2024-04-23 RX ADMIN — ARIPIPRAZOLE 25 MILLIGRAM(S): 15 TABLET ORAL at 20:18

## 2024-04-23 RX ADMIN — Medication 1 MILLIGRAM(S): at 20:18

## 2024-04-23 RX ADMIN — Medication 300 MILLIGRAM(S): at 20:18

## 2024-04-23 RX ADMIN — QUETIAPINE FUMARATE 150 MILLIGRAM(S): 200 TABLET, FILM COATED ORAL at 20:18

## 2024-04-23 RX ADMIN — DIVALPROEX SODIUM 1500 MILLIGRAM(S): 500 TABLET, DELAYED RELEASE ORAL at 20:18

## 2024-04-23 NOTE — BH INPATIENT PSYCHIATRY PROGRESS NOTE - OTHER
AH present but attenuating, self deprecating voices but no instructions to harm self distracted childlike, docile pt narrative concrete with paucity of ideas

## 2024-04-24 RX ORDER — TUBERCULIN PURIFIED PROTEIN DERIVATIVE 5 [IU]/.1ML
5 INJECTION, SOLUTION INTRADERMAL ONCE
Refills: 0 | Status: COMPLETED | OUTPATIENT
Start: 2024-04-24 | End: 2024-04-24

## 2024-04-24 RX ORDER — ARIPIPRAZOLE 15 MG/1
25 TABLET ORAL AT BEDTIME
Refills: 0 | Status: DISCONTINUED | OUTPATIENT
Start: 2024-04-24 | End: 2024-04-25

## 2024-04-24 RX ORDER — HEXAVITAMINS
300 TABLET ORAL AT BEDTIME
Refills: 0 | Status: DISCONTINUED | OUTPATIENT
Start: 2024-04-24 | End: 2024-04-26

## 2024-04-24 RX ORDER — QUETIAPINE FUMARATE 200 MG/1
200 TABLET, FILM COATED ORAL AT BEDTIME
Refills: 0 | Status: DISCONTINUED | OUTPATIENT
Start: 2024-04-24 | End: 2024-04-26

## 2024-04-24 RX ADMIN — ARIPIPRAZOLE 25 MILLIGRAM(S): 15 TABLET ORAL at 20:11

## 2024-04-24 RX ADMIN — Medication 300 MILLIGRAM(S): at 20:11

## 2024-04-24 RX ADMIN — QUETIAPINE FUMARATE 200 MILLIGRAM(S): 200 TABLET, FILM COATED ORAL at 20:11

## 2024-04-24 RX ADMIN — HALOPERIDOL DECANOATE 20 MILLIGRAM(S): 100 INJECTION INTRAMUSCULAR at 20:11

## 2024-04-24 RX ADMIN — TUBERCULIN PURIFIED PROTEIN DERIVATIVE 5 UNIT(S): 5 INJECTION, SOLUTION INTRADERMAL at 12:12

## 2024-04-24 RX ADMIN — DIVALPROEX SODIUM 1500 MILLIGRAM(S): 500 TABLET, DELAYED RELEASE ORAL at 20:13

## 2024-04-24 NOTE — BH INPATIENT PSYCHIATRY PROGRESS NOTE - OTHER
AH present but attenuating, self deprecating voices but no instructions to harm self distracted pt narrative concrete with paucity of ideas rob pendleton, agrees to ppd placment, compliant with meds and tx plan

## 2024-04-25 PROCEDURE — 99232 SBSQ HOSP IP/OBS MODERATE 35: CPT

## 2024-04-25 RX ORDER — ARIPIPRAZOLE 15 MG/1
882 TABLET ORAL ONCE
Refills: 0 | Status: COMPLETED | OUTPATIENT
Start: 2024-04-25 | End: 2024-04-25

## 2024-04-25 RX ORDER — ARIPIPRAZOLE 15 MG/1
675 TABLET ORAL ONCE
Refills: 0 | Status: COMPLETED | OUTPATIENT
Start: 2024-04-25 | End: 2024-04-25

## 2024-04-25 RX ORDER — ARIPIPRAZOLE 15 MG/1
30 TABLET ORAL ONCE
Refills: 0 | Status: COMPLETED | OUTPATIENT
Start: 2024-04-25 | End: 2024-04-25

## 2024-04-25 RX ADMIN — ARIPIPRAZOLE 30 MILLIGRAM(S): 15 TABLET ORAL at 17:12

## 2024-04-25 RX ADMIN — QUETIAPINE FUMARATE 200 MILLIGRAM(S): 200 TABLET, FILM COATED ORAL at 20:41

## 2024-04-25 RX ADMIN — DIVALPROEX SODIUM 1500 MILLIGRAM(S): 500 TABLET, DELAYED RELEASE ORAL at 20:41

## 2024-04-25 RX ADMIN — ARIPIPRAZOLE 882 MILLIGRAM(S): 15 TABLET ORAL at 18:31

## 2024-04-25 RX ADMIN — HALOPERIDOL DECANOATE 20 MILLIGRAM(S): 100 INJECTION INTRAMUSCULAR at 20:41

## 2024-04-25 RX ADMIN — Medication 300 MILLIGRAM(S): at 20:42

## 2024-04-25 RX ADMIN — ARIPIPRAZOLE 675 MILLIGRAM(S): 15 TABLET ORAL at 15:19

## 2024-04-25 NOTE — BH INPATIENT PSYCHIATRY PROGRESS NOTE - NSTXDCOTHRGOAL_PSY_ALL_CORE
Pt continues to have worsening symptoms despite continuing his medications and counseling.

## 2024-04-25 NOTE — BH INPATIENT PSYCHIATRY PROGRESS NOTE - NSTXSUPORTGOAL_PSY_ALL_CORE
Patient will engage in unit milieu

## 2024-04-25 NOTE — BH INPATIENT PSYCHIATRY PROGRESS NOTE - NSBHMSEKNOWHOW_PSY_ALL_CORE
Current Events/Educational attainment/Vocabulary/Other...
Current Events
Current Events
Current Events/Educational attainment/Vocabulary/Other...

## 2024-04-25 NOTE — BH INPATIENT PSYCHIATRY PROGRESS NOTE - ADDITIONAL DETAILS / COMMENTS
both fair on some topics, compliant with meds

## 2024-04-25 NOTE — BH INPATIENT PSYCHIATRY PROGRESS NOTE - NSBHFUPINTERVALCCFT_PSY_A_CORE
Patient seen for follow up for SCZ+AH
Patient seen for follow up for SCZ+AH
Patient seen for follow up for psychosis.
Patient seen for follow up for SCZ+AH
Patient seen for follow up for psychosis.
Patient seen for follow up for psychosis.

## 2024-04-25 NOTE — BH INPATIENT PSYCHIATRY PROGRESS NOTE - NSBHATTESTBILLING_PSY_A_CORE
99745-Ikjqwkbkhm OBS or IP - moderate complexity OR 35-49 mins
51123-Xbbiopgnjh OBS or IP - moderate complexity OR 35-49 mins
23193-Yhmhgqssog OBS or IP - low complexity OR 25-34 mins
48974-Wzqospncay OBS or IP - low complexity OR 25-34 mins
52966-Psazelrrdy OBS or IP - moderate complexity OR 35-49 mins
64690-Pkpqgjcuiq OBS or IP - moderate complexity OR 35-49 mins

## 2024-04-25 NOTE — BH INPATIENT PSYCHIATRY PROGRESS NOTE - NSBHATTESTTYPEVISIT_PSY_A_CORE
CHAN without on-site Attending supervision
CHAN without on-site Attending supervision
Attending Only

## 2024-04-25 NOTE — BH INPATIENT PSYCHIATRY PROGRESS NOTE - NSBHMETABOLIC_PSY_ALL_CORE_FT
BMI:   HbA1c:   Glucose:   BP: 111/74 (04-18-24 @ 09:46) (111/74 - 111/74)Vital Signs Last 24 Hrs  T(C): --  T(F): --  HR: --  BP: --  BP(mean): --  RR: --  SpO2: --    Orthostatic VS  04-20-24 @ 20:47  Lying BP: --/-- HR: --  Sitting BP: 121/77 HR: 87  Standing BP: 121/69 HR: 101  Site: upper right arm  Mode: --  Orthostatic VS  04-19-24 @ 07:59  Lying BP: --/-- HR: --  Sitting BP: 117/68 HR: 94  Standing BP: 136/80 HR: 114  Site: --  Mode: --    Lipid Panel: Date/Time: 04-19-24 @ 08:58  Cholesterol, Serum: 193  LDL Cholesterol Calculated: 127  HDL Cholesterol, Serum: 43  Total Cholesterol/HDL Ration Measurement: --  Triglycerides, Serum: 116  
BMI:   HbA1c:   Glucose:   BP: 103/65 (04-23-24 @ 08:02) (103/65 - 103/65)Vital Signs Last 24 Hrs  T(C): 36.4 (04-23-24 @ 08:02), Max: 36.4 (04-23-24 @ 08:02)  T(F): 97.6 (04-23-24 @ 08:02), Max: 97.6 (04-23-24 @ 08:02)  HR: 78 (04-23-24 @ 08:02) (78 - 78)  BP: 103/65 (04-23-24 @ 08:02) (103/65 - 103/65)  BP(mean): --  RR: --  SpO2: --    Orthostatic VS  04-22-24 @ 20:34  Lying BP: --/-- HR: --  Sitting BP: 123/83 HR: 81  Standing BP: 125/68 HR: 96  Site: --  Mode: --  Orthostatic VS  04-22-24 @ 08:02  Lying BP: --/-- HR: --  Sitting BP: 106/76 HR: 83  Standing BP: 110/77 HR: 81  Site: --  Mode: --  Orthostatic VS  04-22-24 @ 00:08  Lying BP: --/-- HR: --  Sitting BP: 136/90 HR: 85  Standing BP: 132/87 HR: 87  Site: --  Mode: --    Lipid Panel: Date/Time: 04-19-24 @ 08:58  Cholesterol, Serum: 193  LDL Cholesterol Calculated: 127  HDL Cholesterol, Serum: 43  Total Cholesterol/HDL Ration Measurement: --  Triglycerides, Serum: 116  
BMI:   HbA1c:   Glucose:   BP: --Vital Signs Last 24 Hrs  T(C): 36.4 (04-21-24 @ 07:52), Max: 36.4 (04-21-24 @ 07:52)  T(F): 97.6 (04-21-24 @ 07:52), Max: 97.6 (04-21-24 @ 07:52)  HR: --  BP: --  BP(mean): --  RR: --  SpO2: --    Orthostatic VS  04-21-24 @ 07:52  Lying BP: --/-- HR: --  Sitting BP: 116/75 HR: 92  Standing BP: 102/65 HR: 99  Site: --  Mode: --  Orthostatic VS  04-20-24 @ 20:47  Lying BP: --/-- HR: --  Sitting BP: 121/77 HR: 87  Standing BP: 121/69 HR: 101  Site: upper right arm  Mode: --    Lipid Panel: Date/Time: 04-19-24 @ 08:58  Cholesterol, Serum: 193  LDL Cholesterol Calculated: 127  HDL Cholesterol, Serum: 43  Total Cholesterol/HDL Ration Measurement: --  Triglycerides, Serum: 116  
BMI:   HbA1c:   Glucose:   BP: 103/65 (04-23-24 @ 08:02) (103/65 - 103/65)Vital Signs Last 24 Hrs  T(C): 36.6 (04-25-24 @ 07:54), Max: 36.6 (04-25-24 @ 07:54)  T(F): 97.9 (04-25-24 @ 07:54), Max: 97.9 (04-25-24 @ 07:54)  HR: --  BP: --  BP(mean): --  RR: --  SpO2: --    Orthostatic VS  04-25-24 @ 07:54  Lying BP: --/-- HR: --  Sitting BP: 129/70 HR: 87  Standing BP: 120/73 HR: 97  Site: --  Mode: --  Orthostatic VS  04-24-24 @ 07:41  Lying BP: --/-- HR: --  Sitting BP: 120/73 HR: 86  Standing BP: 113/76 HR: 99  Site: upper left arm  Mode: electronic    Lipid Panel: Date/Time: 04-19-24 @ 08:58  Cholesterol, Serum: 193  LDL Cholesterol Calculated: 127  HDL Cholesterol, Serum: 43  Total Cholesterol/HDL Ration Measurement: --  Triglycerides, Serum: 116  
BMI:   HbA1c:   Glucose:   BP: 103/65 (04-23-24 @ 08:02) (103/65 - 103/65)Vital Signs Last 24 Hrs  T(C): 36.6 (04-24-24 @ 07:41), Max: 36.6 (04-24-24 @ 07:41)  T(F): 97.9 (04-24-24 @ 07:41), Max: 97.9 (04-24-24 @ 07:41)  HR: --  BP: --  BP(mean): --  RR: --  SpO2: --    Orthostatic VS  04-24-24 @ 07:41  Lying BP: --/-- HR: --  Sitting BP: 120/73 HR: 86  Standing BP: 113/76 HR: 99  Site: upper left arm  Mode: electronic  Orthostatic VS  04-22-24 @ 20:34  Lying BP: --/-- HR: --  Sitting BP: 123/83 HR: 81  Standing BP: 125/68 HR: 96  Site: --  Mode: --    Lipid Panel: Date/Time: 04-19-24 @ 08:58  Cholesterol, Serum: 193  LDL Cholesterol Calculated: 127  HDL Cholesterol, Serum: 43  Total Cholesterol/HDL Ration Measurement: --  Triglycerides, Serum: 116  
BMI:   HbA1c:   Glucose:   BP: 103/65 (04-23-24 @ 08:02) (103/65 - 103/65)Vital Signs Last 24 Hrs  T(C): 36.6 (04-25-24 @ 07:54), Max: 36.6 (04-25-24 @ 07:54)  T(F): 97.9 (04-25-24 @ 07:54), Max: 97.9 (04-25-24 @ 07:54)  HR: --  BP: --  BP(mean): --  RR: --  SpO2: --    Orthostatic VS  04-25-24 @ 07:54  Lying BP: --/-- HR: --  Sitting BP: 129/70 HR: 87  Standing BP: 120/73 HR: 97  Site: --  Mode: --  Orthostatic VS  04-24-24 @ 07:41  Lying BP: --/-- HR: --  Sitting BP: 120/73 HR: 86  Standing BP: 113/76 HR: 99  Site: upper left arm  Mode: electronic    Lipid Panel: Date/Time: 04-19-24 @ 08:58  Cholesterol, Serum: 193  LDL Cholesterol Calculated: 127  HDL Cholesterol, Serum: 43  Total Cholesterol/HDL Ration Measurement: --  Triglycerides, Serum: 116

## 2024-04-25 NOTE — BH INPATIENT PSYCHIATRY PROGRESS NOTE - NSTXMEDICGOAL_PSY_ALL_CORE
Be able to describe the benefit of medication/treatment

## 2024-04-25 NOTE — BH INPATIENT PSYCHIATRY PROGRESS NOTE - NSTXNEGATGOAL_PSY_ALL_CORE
Will be able to identify and utilize affirmations to create positive self-talk

## 2024-04-25 NOTE — BH INPATIENT PSYCHIATRY PROGRESS NOTE - NSBHCHARTREVIEWVS_PSY_A_CORE FT
Vital Signs Last 24 Hrs  T(C): 36.6 (04-25-24 @ 07:54), Max: 36.6 (04-25-24 @ 07:54)  T(F): 97.9 (04-25-24 @ 07:54), Max: 97.9 (04-25-24 @ 07:54)  HR: --  BP: --  BP(mean): --  RR: --  SpO2: --    Orthostatic VS  04-25-24 @ 07:54  Lying BP: --/-- HR: --  Sitting BP: 129/70 HR: 87  Standing BP: 120/73 HR: 97  Site: --  Mode: --  Orthostatic VS  04-24-24 @ 07:41  Lying BP: --/-- HR: --  Sitting BP: 120/73 HR: 86  Standing BP: 113/76 HR: 99  Site: upper left arm  Mode: electronic  
Vital Signs Last 24 Hrs  T(C): --  T(F): --  HR: --  BP: --  BP(mean): --  RR: --  SpO2: --    Orthostatic VS  04-20-24 @ 20:47  Lying BP: --/-- HR: --  Sitting BP: 121/77 HR: 87  Standing BP: 121/69 HR: 101  Site: upper right arm  Mode: --  Orthostatic VS  04-19-24 @ 07:59  Lying BP: --/-- HR: --  Sitting BP: 117/68 HR: 94  Standing BP: 136/80 HR: 114  Site: --  Mode: --  
Vital Signs Last 24 Hrs  T(C): 36.4 (04-21-24 @ 07:52), Max: 36.4 (04-21-24 @ 07:52)  T(F): 97.6 (04-21-24 @ 07:52), Max: 97.6 (04-21-24 @ 07:52)  HR: --  BP: --  BP(mean): --  RR: --  SpO2: --    Orthostatic VS  04-21-24 @ 07:52  Lying BP: --/-- HR: --  Sitting BP: 116/75 HR: 92  Standing BP: 102/65 HR: 99  Site: --  Mode: --  Orthostatic VS  04-20-24 @ 20:47  Lying BP: --/-- HR: --  Sitting BP: 121/77 HR: 87  Standing BP: 121/69 HR: 101  Site: upper right arm  Mode: --  
Vital Signs Last 24 Hrs  T(C): 36.6 (04-24-24 @ 07:41), Max: 36.6 (04-24-24 @ 07:41)  T(F): 97.9 (04-24-24 @ 07:41), Max: 97.9 (04-24-24 @ 07:41)  HR: --  BP: --  BP(mean): --  RR: --  SpO2: --    Orthostatic VS  04-24-24 @ 07:41  Lying BP: --/-- HR: --  Sitting BP: 120/73 HR: 86  Standing BP: 113/76 HR: 99  Site: upper left arm  Mode: electronic  Orthostatic VS  04-22-24 @ 20:34  Lying BP: --/-- HR: --  Sitting BP: 123/83 HR: 81  Standing BP: 125/68 HR: 96  Site: --  Mode: --  
Vital Signs Last 24 Hrs  T(C): 36.6 (04-25-24 @ 07:54), Max: 36.6 (04-25-24 @ 07:54)  T(F): 97.9 (04-25-24 @ 07:54), Max: 97.9 (04-25-24 @ 07:54)  HR: --  BP: --  BP(mean): --  RR: --  SpO2: --    Orthostatic VS  04-25-24 @ 07:54  Lying BP: --/-- HR: --  Sitting BP: 129/70 HR: 87  Standing BP: 120/73 HR: 97  Site: --  Mode: --  Orthostatic VS  04-24-24 @ 07:41  Lying BP: --/-- HR: --  Sitting BP: 120/73 HR: 86  Standing BP: 113/76 HR: 99  Site: upper left arm  Mode: electronic  
Vital Signs Last 24 Hrs  T(C): 36.4 (04-23-24 @ 08:02), Max: 36.4 (04-23-24 @ 08:02)  T(F): 97.6 (04-23-24 @ 08:02), Max: 97.6 (04-23-24 @ 08:02)  HR: 78 (04-23-24 @ 08:02) (78 - 78)  BP: 103/65 (04-23-24 @ 08:02) (103/65 - 103/65)  BP(mean): --  RR: --  SpO2: --    Orthostatic VS  04-22-24 @ 20:34  Lying BP: --/-- HR: --  Sitting BP: 123/83 HR: 81  Standing BP: 125/68 HR: 96  Site: --  Mode: --  Orthostatic VS  04-22-24 @ 08:02  Lying BP: --/-- HR: --  Sitting BP: 106/76 HR: 83  Standing BP: 110/77 HR: 81  Site: --  Mode: --  Orthostatic VS  04-22-24 @ 00:08  Lying BP: --/-- HR: --  Sitting BP: 136/90 HR: 85  Standing BP: 132/87 HR: 87  Site: --  Mode: --

## 2024-04-25 NOTE — BH INPATIENT PSYCHIATRY PROGRESS NOTE - NSBHFUPINTERVALHXFT_PSY_A_CORE
4/25--patient seen, chart reviewed, case discussed with trevon.  Patient seen for follow up of avt hallucinations which he now reports have attenuated.  He slept overnight, no inappropriate behaviors, no distress, denies si/hi itnents or plans.  He is noted to be less visible today.  He was compliant with medications no prns.  Dr. Metz with no info re INH.  However, requested that patient be given aristada and initio as patient on abilify PO (also on quetiaoine, haldol, depakote).  I wonder about this polypharmacy but patient is to be discharged tomorrow and outpatinet provider asking for paul version of med he already receives so will give.  Also compliant with INH.  His pcp sent him home from an apopintment with INH script, PPD placed, to be read tomorrow.  We discuss [employer] draft which is tonight and he talks coherently.
Chart reviewed and case discussed with treatment team. No events reported overnight. Sleep and appetite is fair. Patient keeping to himself on the unit and continues to endorse +CAH to harm himself but now much attenuated. Denies any SI. Patient in agreement to increase Seroquel further to help with sleep and psychosis and so continued. More patient, not insisting on discharge, engaged in further discussion re INH but does not remember reason or when started. We made plan to increase abilify for symptoms and to monitor further, as pt was not sure if abilfy was a good medicine for him, and feels it did help reduce AH a bit. Appears calmer today, does not endorse smoking hx. Remains childlike with IQ impairments and acute on chronic SCZ but adequate behavioral control. Patient is compliant with medications, no adverse effects reported or observed and possible goal of DC by [facility].  F/u with o/p psychiatrist re INH rx.  
Chart reviewed and case discussed with treatment team. No events reported overnight. Sleep and appetite is fair. Patient keeping to himself on the unit and continues to endorse +CAH to harm himself. Denies any SI. Patient in agreement to increase Seroquel to 150mg to help with sleep and psychosis.  Patient is compliant with medications, no adverse effects reported.  
Chart reviewed and case discussed with treatment team. No events reported overnight. Sleep and appetite is fair. Patient continues to endorse +CAH telling him to hurt himself, denies any SI/HI.  Patient is compliant with medications, no adverse effects reported.  
Chart reviewed and case discussed with treatment team. No events reported overnight. Sleep and appetite is fair. Patient keeping to himself on the unit and continues to endorse +CAH to harm himself. Denies any SI. Patient in agreement to increase Seroquel to 150mg to help with sleep and psychosis and so continued. In AM stated he did not like unit, and wished to leave. In PM, engaged in further discussion re INH but does not remember reason or when started. We made plan to increase abilify for symptoms and to monitor further, as pt was not sure if abilfy was a good medicine for him. Appeared calmer in PM. Remains childlike with IQ impairments and acute on chronic SCZ but adequate behavioral control. Patient is compliant with medications, no adverse effects reported or observed.  
Chart reviewed and case discussed with treatment team. No events reported overnight.  Sleep and appetite is fair. Patient keeping to himself on the unit and continues to endorse +CAH to harm himself but now much attenuated. Denies any SI. Patient in agreement to increase Seroquel further to help with sleep and psychosis and shows improvements. More patient, not insisting on discharge, engaged in further discussion re INH but does not remember reason or when started, psych provider making inquiries. Agrees to PPD placement to further clarify. We made plan to increase abilify for symptoms and to monitor further, as pt was not sure if abilify was a good medicine for him, and feels it did help reduce AH further. Appears calmer today, does not endorse smoking hx. Remains childlike, concrete, with IQ impairments and acute on chronic SCZ but goood behavioral control. Patient is compliant with medications, no adverse effects reported or observed and possible goal of DC by [facility].  F/u with o/p psychiatrist re INH rx.

## 2024-04-25 NOTE — BH INPATIENT PSYCHIATRY PROGRESS NOTE - OTHER
distracted pt narrative blutned, childlike, bright aprosody, slow rate concrete with paucity of ideas "good" childlike, docile reprots ah attenuating

## 2024-04-25 NOTE — BH INPATIENT PSYCHIATRY PROGRESS NOTE - NSBHMSEAFFRANGE_PSY_A_CORE
Constricted
Blunted/Constricted
Blunted/Constricted
Constricted
Blunted/Constricted
Blunted/Constricted

## 2024-04-25 NOTE — BH INPATIENT PSYCHIATRY PROGRESS NOTE - NSBHMSEPERCEPT_PSY_A_CORE
Auditory hallucinations/Other
Auditory hallucinations/Other
Auditory hallucinations
Auditory hallucinations/Other

## 2024-04-25 NOTE — BH INPATIENT PSYCHIATRY PROGRESS NOTE - NSBHMSESPEECH_PSY_A_CORE
Normal volume, rate, productivity, spontaneity and articulation
Abnormal as indicated, otherwise normal...
Normal volume, rate, productivity, spontaneity and articulation

## 2024-04-25 NOTE — BH INPATIENT PSYCHIATRY PROGRESS NOTE - NSICDXBHPRIMARYDX_PSY_ALL_CORE
Acute exacerbation of chronic schizophrenia   F20.9  

## 2024-04-25 NOTE — BH INPATIENT PSYCHIATRY PROGRESS NOTE - PRN MEDS
MEDICATIONS  (PRN):  diphenhydrAMINE 50 milliGRAM(s) Oral every 6 hours PRN eps  diphenhydrAMINE Injectable 50 milliGRAM(s) IntraMuscular once PRN extreme eps  haloperidol     Tablet 5 milliGRAM(s) Oral every 6 hours PRN agitation secondary to psychosis  haloperidol    Injectable 5 milliGRAM(s) IntraMuscular Once PRN extreme agitation secondary to psychosis  LORazepam     Tablet 2 milliGRAM(s) Oral every 6 hours PRN Anxiety  LORazepam   Injectable 2 milliGRAM(s) IntraMuscular once PRN extreme anxiety  

## 2024-04-25 NOTE — BH INPATIENT PSYCHIATRY PROGRESS NOTE - NSDCCRITERIA_PSY_ALL_CORE
cgi<=2  Goal of ELIZONDO or dual ELIZONDO regimen

## 2024-04-25 NOTE — BH INPATIENT PSYCHIATRY PROGRESS NOTE - NSBHMSETHTPROC_PSY_A_CORE
concrete/Linear
concrete/Linear/Other
concrete/Linear/Other
concrete/Linear
concrete/Linear/Other
concrete/Linear/Other

## 2024-04-25 NOTE — BH INPATIENT PSYCHIATRY PROGRESS NOTE - NSTXSLFCREGOAL_PSY_ALL_CORE
Be able to demonstrate the ability to care for self in 2 areas such as feeding oneself and hygiene

## 2024-04-25 NOTE — BH INPATIENT PSYCHIATRY PROGRESS NOTE - NSBHASSESSSUMMFT_PSY_ALL_CORE
44-year-old male with a history of schizophrenia, one prior suicide attempt per chart review but pt denies, no history of SIB, no known history of violence, no active medical issues, [place], h/o multiple prior presentations to ED for AH, recently seen at [facility] ED in July 2023;April 2024- for similar concerns, who presents brought in by EMS activated by self from his residence for worsening auditory hallucinations. Pts evaluation is very similar to his presentation yesterday 4.17.24. Patient states that he has been hearing voices for "22 years 6 months and 28 days" but the last few days the voices are "more ferocious" and that they are bothering him. The voices have been saying "hurt others and hurt yourself" but does not feel the need to listen to them and has never listened to CAH and has never hurt anyone in the community or in his residence. He stated the CAH are telling him to jump off a building. This AM he heard Will Hilarios voice telling him this and to get out of bed. He stated that the voices are more "perverted" and he believes they are "lying, cheating and stealing." He could not elaborate how the voices are doing this.   4/25--patient did well overnight, compliant with meds, reports voices improving, no longer bothering.  Per outpatient (and primary inpatient provider) to give aristada and aristad initio so gave both today, continue haldol and quetiapine as directed.  Also on depakote.  COntinues INH.  PPD to be read tomorrow.       PLAN  9.13 admit  Q15 adequate  Psych meds:  abilifyaristada and initio as above  Haldol-- continue at 20 mg qhs  Seroquel 150 qhs  Monitor and simplify regimen in [place] now as QHS only  G&M therapy  Psych collateral   Supportive therapy  DISPO tbd, consider DTP  
44-year-old male with a history of schizophrenia, one prior suicide attempt per chart review but pt denies, no history of SIB, no known history of violence, no active medical issues, [place] [facility] [place], h/o multiple prior presentations to ED for AH, recently seen at [facility] ED in July 2023;April 2024- for similar concerns, who presents brought in by EMS activated by self from his residence for worsening auditory hallucinations. Pts evaluation is very similar to his presentation yesterday 4.17.24. Patient states that he has been hearing voices for "22 years 6 months and 28 days" but the last few days the voices are "more ferocious" and that they are bothering him. The voices have been saying "hurt others and hurt yourself" but does not feel the need to listen to them and has never listened to CAH and has never hurt anyone in the community or in his residence. He stated the CAH are telling him to jump off a building. This AM he heard Will Hilarios voice telling him this and to get out of bed. He stated that the voices are more "perverted" and he believes they are "lying, cheating and stealing." He could not elaborate how the voices are doing this.     PLAN  9.13 admit but 2PC if needed  Q15 adequate  Psych meds:  abilify  Haldol  Seroquel  Monitor and simplify regimen in [place]  G&M therapy  Psych collateral   Supportive therapy  DISPO tbd, consider DTP  
44-year-old male with a history of schizophrenia, one prior suicide attempt per chart review but pt denies, no history of SIB, no known history of violence, no active medical issues, [place] [facility] [place], h/o multiple prior presentations to ED for AH, recently seen at [facility] ED in July 2023;April 2024- for similar concerns, who presents brought in by EMS activated by self from his residence for worsening auditory hallucinations. Pts evaluation is very similar to his presentation yesterday 4.17.24. Patient states that he has been hearing voices for "22 years 6 months and 28 days" but the last few days the voices are "more ferocious" and that they are bothering him. The voices have been saying "hurt others and hurt yourself" but does not feel the need to listen to them and has never listened to CAH and has never hurt anyone in the community or in his residence. He stated the CAH are telling him to jump off a building. This AM he heard Will Smiths voice telling him this and to get out of bed. He stated that the voices are more "perverted" and he believes they are "lying, cheating and stealing." He could not elaborate how the voices are doing this.     PLAN  9.13 admit but 2PC if needed  Q15 adequate  Psych meds:  abilify-- increase 20 to 25 mg qhs  Haldol-- continue at 20 mg qhs  Seroquel  Monitor and simplify regimen in [place]  G&M therapy  Psych collateral   Supportive therapy  DISPO tbd, consider DTP  
44-year-old male with a history of schizophrenia, one prior suicide attempt per chart review but pt denies, no history of SIB, no known history of violence, no active medical issues, [place] [facility] [place], h/o multiple prior presentations to ED for AH, recently seen at [facility] ED in July 2023;April 2024- for similar concerns, who presents brought in by EMS activated by self from his residence for worsening auditory hallucinations. Pts evaluation is very similar to his presentation yesterday 4.17.24. Patient states that he has been hearing voices for "22 years 6 months and 28 days" but the last few days the voices are "more ferocious" and that they are bothering him. The voices have been saying "hurt others and hurt yourself" but does not feel the need to listen to them and has never listened to CAH and has never hurt anyone in the community or in his residence. He stated the CAH are telling him to jump off a building. This AM he heard Will Hilarios voice telling him this and to get out of bed. He stated that the voices are more "perverted" and he believes they are "lying, cheating and stealing." He could not elaborate how the voices are doing this.     PLAN  9.13 admit but 2PC if needed  Q15 adequate  Psych meds:  abilify  Haldol  Seroquel  Monitor and simplify regimen in [place]  G&M therapy  Psych collateral   Supportive therapy  DISPO tbd, consider DTP  
44-year-old male with a history of schizophrenia, one prior suicide attempt per chart review but pt denies, no history of SIB, no known history of violence, no active medical issues, [place] [employer] [place], h/o multiple prior presentations to ED for AH, recently seen at [facility] ED in July 2023;April 2024- for similar concerns, who presents brought in by EMS activated by self from his residence for worsening auditory hallucinations. Pts evaluation is very similar to his presentation yesterday 4.17.24. Patient states that he has been hearing voices for "22 years 6 months and 28 days" but the last few days the voices are "more ferocious" and that they are bothering him. The voices have been saying "hurt others and hurt yourself" but does not feel the need to listen to them and has never listened to CAH and has never hurt anyone in the community or in his residence. He stated the CAH are telling him to jump off a building. This AM he heard Will Hilarios voice telling him this and to get out of bed. He stated that the voices are more "perverted" and he believes they are "lying, cheating and stealing." He could not elaborate how the voices are doing this.     PLAN  9.13 admit  Q15 adequate  Psych meds:  abilify-- increase 20 to 25 mg qhs  Haldol-- continue at 20 mg qhs  Seroquel 150 qhs  Monitor and simplify regimen in [place] now as QHS only  G&M therapy  Psych collateral   Supportive therapy  DISPO tbd, consider DTP  
44-year-old male with a history of schizophrenia, one prior suicide attempt per chart review but pt denies, no history of SIB, no known history of violence, no active medical issues, [place], h/o multiple prior presentations to ED for AH, recently seen at [facility] ED in July 2023;April 2024- for similar concerns, who presents brought in by EMS activated by self from his residence for worsening auditory hallucinations. Pts evaluation is very similar to his presentation yesterday 4.17.24. Patient states that he has been hearing voices for "22 years 6 months and 28 days" but the last few days the voices are "more ferocious" and that they are bothering him. The voices have been saying "hurt others and hurt yourself" but does not feel the need to listen to them and has never listened to CAH and has never hurt anyone in the community or in his residence. He stated the CAH are telling him to jump off a building. This AM he heard Will Hilarios voice telling him this and to get out of bed. He stated that the voices are more "perverted" and he believes they are "lying, cheating and stealing." He could not elaborate how the voices are doing this.     PLAN  9.13 admit  Q15 adequate  Psych meds:  abilify-- increase 20 to 25 mg qhs  Consider ELIZONDO options for safety and compliance  Haldol-- continue at 20 mg qhs  Seroquel 150 qhs increased to 200 qhs  PRNs  Monitor and simplify regimen in [place] now as QHS only  G&M therapy  Psych collateral   Supportive therapy  DISPO return to providers, consider DTP

## 2024-04-25 NOTE — BH INPATIENT PSYCHIATRY PROGRESS NOTE - CURRENT MEDICATION
MEDICATIONS  (STANDING):  ARIPiprazole 25 milliGRAM(s) Oral at bedtime  divalproex ER 1500 milliGRAM(s) Oral at bedtime  haloperidol     Tablet 20 milliGRAM(s) Oral at bedtime  isoniazid 300 milliGRAM(s) Oral at bedtime  QUEtiapine 200 milliGRAM(s) Oral at bedtime    MEDICATIONS  (PRN):  diphenhydrAMINE 50 milliGRAM(s) Oral every 6 hours PRN eps  diphenhydrAMINE Injectable 50 milliGRAM(s) IntraMuscular once PRN extreme eps  haloperidol     Tablet 5 milliGRAM(s) Oral every 6 hours PRN agitation secondary to psychosis  haloperidol    Injectable 5 milliGRAM(s) IntraMuscular Once PRN extreme agitation secondary to psychosis  LORazepam     Tablet 2 milliGRAM(s) Oral every 6 hours PRN Anxiety  LORazepam   Injectable 2 milliGRAM(s) IntraMuscular once PRN extreme anxiety  
MEDICATIONS  (STANDING):  divalproex ER 1500 milliGRAM(s) Oral at bedtime  haloperidol     Tablet 20 milliGRAM(s) Oral at bedtime  isoniazid 300 milliGRAM(s) Oral at bedtime  QUEtiapine 200 milliGRAM(s) Oral at bedtime    MEDICATIONS  (PRN):  diphenhydrAMINE 50 milliGRAM(s) Oral every 6 hours PRN eps  diphenhydrAMINE Injectable 50 milliGRAM(s) IntraMuscular once PRN extreme eps  haloperidol     Tablet 5 milliGRAM(s) Oral every 6 hours PRN agitation secondary to psychosis  haloperidol    Injectable 5 milliGRAM(s) IntraMuscular Once PRN extreme agitation secondary to psychosis  LORazepam     Tablet 2 milliGRAM(s) Oral every 6 hours PRN Anxiety  LORazepam   Injectable 2 milliGRAM(s) IntraMuscular once PRN extreme anxiety  
MEDICATIONS  (STANDING):  ARIPiprazole 20 milliGRAM(s) Oral at bedtime  benztropine 1 milliGRAM(s) Oral at bedtime  divalproex ER 1500 milliGRAM(s) Oral at bedtime  doxazosin 1 milliGRAM(s) Oral at bedtime  haloperidol     Tablet 20 milliGRAM(s) Oral at bedtime  isoniazid 300 milliGRAM(s) Oral at bedtime  QUEtiapine 100 milliGRAM(s) Oral at bedtime    MEDICATIONS  (PRN):  diphenhydrAMINE 50 milliGRAM(s) Oral every 6 hours PRN eps  diphenhydrAMINE Injectable 50 milliGRAM(s) IntraMuscular once PRN extreme eps  haloperidol     Tablet 5 milliGRAM(s) Oral every 6 hours PRN agitation secondary to psychosis  haloperidol    Injectable 5 milliGRAM(s) IntraMuscular Once PRN extreme agitation secondary to psychosis  LORazepam     Tablet 2 milliGRAM(s) Oral every 6 hours PRN Anxiety  LORazepam   Injectable 2 milliGRAM(s) IntraMuscular once PRN extreme anxiety  
MEDICATIONS  (STANDING):  ARIPiprazole 20 milliGRAM(s) Oral at bedtime  benztropine 1 milliGRAM(s) Oral at bedtime  divalproex ER 1500 milliGRAM(s) Oral at bedtime  doxazosin 1 milliGRAM(s) Oral at bedtime  haloperidol     Tablet 20 milliGRAM(s) Oral at bedtime  isoniazid 300 milliGRAM(s) Oral at bedtime  QUEtiapine 150 milliGRAM(s) Oral at bedtime    MEDICATIONS  (PRN):  diphenhydrAMINE 50 milliGRAM(s) Oral every 6 hours PRN eps  diphenhydrAMINE Injectable 50 milliGRAM(s) IntraMuscular once PRN extreme eps  haloperidol     Tablet 5 milliGRAM(s) Oral every 6 hours PRN agitation secondary to psychosis  haloperidol    Injectable 5 milliGRAM(s) IntraMuscular Once PRN extreme agitation secondary to psychosis  LORazepam     Tablet 2 milliGRAM(s) Oral every 6 hours PRN Anxiety  LORazepam   Injectable 2 milliGRAM(s) IntraMuscular once PRN extreme anxiety  
MEDICATIONS  (STANDING):  ARIPiprazole 25 milliGRAM(s) Oral at bedtime  benztropine 1 milliGRAM(s) Oral at bedtime  divalproex ER 1500 milliGRAM(s) Oral at bedtime  doxazosin 1 milliGRAM(s) Oral at bedtime  haloperidol     Tablet 20 milliGRAM(s) Oral at bedtime  isoniazid 300 milliGRAM(s) Oral at bedtime  QUEtiapine 150 milliGRAM(s) Oral at bedtime    MEDICATIONS  (PRN):  diphenhydrAMINE 50 milliGRAM(s) Oral every 6 hours PRN eps  diphenhydrAMINE Injectable 50 milliGRAM(s) IntraMuscular once PRN extreme eps  haloperidol     Tablet 5 milliGRAM(s) Oral every 6 hours PRN agitation secondary to psychosis  haloperidol    Injectable 5 milliGRAM(s) IntraMuscular Once PRN extreme agitation secondary to psychosis  LORazepam     Tablet 2 milliGRAM(s) Oral every 6 hours PRN Anxiety  LORazepam   Injectable 2 milliGRAM(s) IntraMuscular once PRN extreme anxiety  
MEDICATIONS  (STANDING):  ARIPiprazole 30 milliGRAM(s) Oral once  ARIPiprazole lauroxil Injectable, Long Acting (ARISTADA) 882 milliGRAM(s) IntraMuscular once  divalproex ER 1500 milliGRAM(s) Oral at bedtime  haloperidol     Tablet 20 milliGRAM(s) Oral at bedtime  isoniazid 300 milliGRAM(s) Oral at bedtime  QUEtiapine 200 milliGRAM(s) Oral at bedtime    MEDICATIONS  (PRN):  diphenhydrAMINE 50 milliGRAM(s) Oral every 6 hours PRN eps  diphenhydrAMINE Injectable 50 milliGRAM(s) IntraMuscular once PRN extreme eps  haloperidol     Tablet 5 milliGRAM(s) Oral every 6 hours PRN agitation secondary to psychosis  haloperidol    Injectable 5 milliGRAM(s) IntraMuscular Once PRN extreme agitation secondary to psychosis  LORazepam     Tablet 2 milliGRAM(s) Oral every 6 hours PRN Anxiety  LORazepam   Injectable 2 milliGRAM(s) IntraMuscular once PRN extreme anxiety

## 2024-04-26 VITALS — TEMPERATURE: 97 F

## 2024-04-26 PROCEDURE — 90853 GROUP PSYCHOTHERAPY: CPT

## 2024-04-26 PROCEDURE — 99239 HOSP IP/OBS DSCHRG MGMT >30: CPT

## 2024-04-26 RX ORDER — HEXAVITAMINS
1 TABLET ORAL
Qty: 30 | Refills: 0
Start: 2024-04-26 | End: 2024-05-25

## 2024-04-26 RX ORDER — ARIPIPRAZOLE 15 MG/1
882 TABLET ORAL
Qty: 0 | Refills: 0 | DISCHARGE

## 2024-04-26 RX ORDER — HALOPERIDOL DECANOATE 100 MG/ML
1 INJECTION INTRAMUSCULAR
Qty: 30 | Refills: 0
Start: 2024-04-26 | End: 2024-05-25

## 2024-04-26 RX ORDER — BENZTROPINE MESYLATE 1 MG
1 TABLET ORAL
Refills: 0 | DISCHARGE

## 2024-04-26 RX ORDER — DIVALPROEX SODIUM 500 MG/1
3 TABLET, DELAYED RELEASE ORAL
Qty: 90 | Refills: 0
Start: 2024-04-26 | End: 2024-05-25

## 2024-04-26 RX ORDER — HALOPERIDOL DECANOATE 100 MG/ML
1 INJECTION INTRAMUSCULAR
Refills: 0 | DISCHARGE

## 2024-04-26 RX ORDER — DIVALPROEX SODIUM 500 MG/1
2 TABLET, DELAYED RELEASE ORAL
Refills: 0 | DISCHARGE

## 2024-04-26 RX ORDER — HEXAVITAMINS
1 TABLET ORAL
Refills: 0 | DISCHARGE

## 2024-04-26 RX ORDER — ARIPIPRAZOLE 15 MG/1
1 TABLET ORAL
Refills: 0 | DISCHARGE

## 2024-04-26 RX ORDER — QUETIAPINE FUMARATE 200 MG/1
1 TABLET, FILM COATED ORAL
Refills: 0 | DISCHARGE

## 2024-04-26 RX ORDER — QUETIAPINE FUMARATE 200 MG/1
1 TABLET, FILM COATED ORAL
Qty: 30 | Refills: 0
Start: 2024-04-26 | End: 2024-05-25

## 2024-04-26 RX ORDER — DOXAZOSIN MESYLATE 4 MG
1 TABLET ORAL
Refills: 0 | DISCHARGE

## 2024-04-26 RX ORDER — DIVALPROEX SODIUM 500 MG/1
1 TABLET, DELAYED RELEASE ORAL
Refills: 0 | DISCHARGE

## 2024-04-26 NOTE — BH INPATIENT PSYCHIATRY DISCHARGE NOTE - OTHER
reprots ah attenuating pt narrative "good" aprosody, slow rate childlike, docile concrete with paucity of ideas blutned, childlike, bright AH attenuated, no longer derogatory or distressing, no longer command nature

## 2024-04-26 NOTE — BH INPATIENT PSYCHIATRY DISCHARGE NOTE - HPI (INCLUDE ILLNESS QUALITY, SEVERITY, DURATION, TIMING, CONTEXT, MODIFYING FACTORS, ASSOCIATED SIGNS AND SYMPTOMS)
44-year-old male with a history of schizophrenia, one prior suicide attempt per chart review but pt denies, no history of SIB, no known history of violence, no active medical issues, [place], h/o multiple prior presentations to ED for AH, recently seen at [facility] ED in July 2023;April 2024- for similar concerns, who presents brought in by EMS activated by self from his residence for worsening auditory hallucinations.    Pts evaluation is very similar to his presentation yesterday 4.17.24. Patient states that he has been hearing voices for "22 years 6 months and 28 days" but the last few days the voices are "more ferocious" and that they are bothering him. The voices have been saying "hurt others and hurt yourself" but does not feel the need to listen to them and has never listened to CAH and has never hurt anyone in the community or in his residence. He stated the CAH are telling him to jump off a building. This AM he heard Will Smiths voice telling him this and to get out of bed. He stated that the voices are more "perverted" and he believes they are "lying, cheating and stealing." He could not elaborate how the voices are doing this.      He denies feeling depressed, reports he had trouble sleeping last night related to CAH and stated "I can't think." He denies any change in energy levels, denies appetite,  denies loss of interest in all daily activities (enjoys going to different restaurants via [employer] bus),  denies panic, denies feelings of hopelessness and guilt,  denies history of suicidal behavior or self harm, denies history of violent behavior. Denies elevated or irritable mood, denies visual hallucinations, and denies somatic symptoms. See  note for collateral information.    ON ML6, corroborated much of above ED note, duly noted and appreciated. Pt seen lying in bed, appears childlike, concrete, with paucity of ideas and affect, but engaged with MD. Denies wish to harm self on unit. No CO warranted at present. Will consider ELIZONDO options.

## 2024-04-26 NOTE — BH INPATIENT PSYCHIATRY DISCHARGE NOTE - NSBHASSESSSUMMFT_PSY_ALL_CORE
44-year-old male with a history of schizophrenia, one prior suicide attempt per chart review but pt denies, no history of SIB, no known history of violence, no active medical issues, [place] [employer] [place], h/o multiple prior presentations to ED for AH, recently seen at [facility] ED in July 2023;April 2024- for similar concerns, who presents brought in by EMS activated by self from his residence for worsening auditory hallucinations. Pts evaluation is very similar to his presentation yesterday 4.17.24. Patient states that he has been hearing voices for "22 years 6 months and 28 days" but the last few days the voices are "more ferocious" and that they are bothering him. The voices have been saying "hurt others and hurt yourself" but does not feel the need to listen to them and has never listened to CAH and has never hurt anyone in the community or in his residence. He stated the CAH are telling him to jump off a building. This AM he heard Will Hilarios voice telling him this and to get out of bed. He stated that the voices are more "perverted" and he believes they are "lying, cheating and stealing." He could not elaborate how the voices are doing this.   4/25--patient did well overnight, compliant with meds, reports voices improving, no longer bothering.  Per outpatient (and primary inpatient provider) to give aristada and aristad initio so gave both today, continue haldol and quetiapine as directed.  Also on depakote.  COntinues INH.  PPD to be read tomorrow.       PLAN  9.13 admit  Q15 adequate  Psych meds:  abilifyaristada and initio as above  Haldol-- continue at 20 mg qhs  Seroquel 150 qhs  Monitor and simplify regimen in [place] now as QHS only  G&M therapy  Psych collateral   Supportive therapy  DISPO tbd, consider DTP   44-year-old male with a history of schizophrenia, one prior suicide attempt per chart review but pt denies, no history of SIB, no known history of violence, no active medical issues, lives [place], h/o multiple prior presentations to ED for AH, recently seen at [facility] ED in July 2023;April 2024- for similar concerns, who presents brought in by EMS activated by self from his residence for worsening auditory hallucinations. Pts evaluation is very similar to his presentation yesterday 4.17.24. Patient states that he has been hearing voices for "22 years 6 months and 28 days" but the last few days the voices are "more ferocious" and that they are bothering him. The voices have been saying "hurt others and hurt yourself" but does not feel the need to listen to them and has never listened to CAH and has never hurt anyone in the community or in his residence. He stated the CAH are telling him to jump off a building. This AM he heard Will Smiths voice telling him this and to get out of bed. He stated that the voices are more "perverted" and he believes they are "lying, cheating and stealing." He could not elaborate how the voices are doing this.     His symptoms improved, his halluciantions attenuated and he reported feelign less distressed (see mental status above).  He was calm and in control throughout the hosptialization.  He is sleeping, there is no psychic anxiety, no command AH, no distressing AH, no delusions, no si/hi intents or plans, no psychic anxiety, and he is pleasant and future oriented.     He toelrated invega initio 675 and aristada 882 IM yesterday.  I spoke with Dr. Nidia Vazquez ( [phone]) who reports patient on INH for prophylaxis, had positive PPD but negative CXR)).  He is no longer an acute danger to himself or others and can care for himself with the help of his support sytem.  He is being discharged with a thirty day supply of INH 300mg daily; quetiapine 200mg daily at bedtime, haldol 20mg at bedtime depakote er 1500mg at bedtime  and aristada 882mg IM last dose 4/26 according to the plan of his inpatient primary team and his outpatient psychiatrist Dr. Metz.

## 2024-04-26 NOTE — BH INPATIENT PSYCHIATRY DISCHARGE NOTE - NSBHDCMEDICALFT_PSY_A_CORE
tb ppd inh continued inh 300mg at bedtime for tb  prophylaxis (see above for contact for PCP dr. beard)

## 2024-04-26 NOTE — BH INPATIENT PSYCHIATRY DISCHARGE NOTE - NSBHMETABOLIC_PSY_ALL_CORE_FT
BMI:   HbA1c:   Glucose:   BP: --Vital Signs Last 24 Hrs  T(C): 35.9 (04-26-24 @ 08:16), Max: 35.9 (04-26-24 @ 08:16)  T(F): 96.7 (04-26-24 @ 08:16), Max: 96.7 (04-26-24 @ 08:16)  HR: --  BP: --  BP(mean): --  RR: --  SpO2: --    Orthostatic VS  04-26-24 @ 08:16  Lying BP: --/-- HR: --  Sitting BP: 119/77 HR: 83  Standing BP: --/-- HR: --  Site: --  Mode: --  Orthostatic VS  04-25-24 @ 07:54  Lying BP: --/-- HR: --  Sitting BP: 129/70 HR: 87  Standing BP: 120/73 HR: 97  Site: --  Mode: --    Lipid Panel: Date/Time: 04-19-24 @ 08:58  Cholesterol, Serum: 193  LDL Cholesterol Calculated: 127  HDL Cholesterol, Serum: 43  Total Cholesterol/HDL Ration Measurement: --  Triglycerides, Serum: 116

## 2024-04-26 NOTE — BH PSYCHOLOGY - GROUP THERAPY NOTE - NSBHPSYCHOLADHERE_PSY_A_CORE
Subjective:       Patient ID: Casper Osborne is a 66 y.o. male.    Chief Complaint: Cough (Flu like sx)    HPI  Pt with 3 days of HA, coryza, nasal congestion, min prod cough, malaise, chills.  Exposed to the flu prior to onset of illness.  Got his fluvax.  Review of Systems   All other systems reviewed and are negative.      Objective:      Physical Exam   Constitutional: He appears well-developed.   HENT:   Head: Normocephalic.   Mouth/Throat: Oropharynx is clear and moist.   Sinuses nontender  TMs OK   Eyes: EOM are normal.   Neck: Normal range of motion.   Cardiovascular: Normal rate, regular rhythm, normal heart sounds and intact distal pulses.   Pulmonary/Chest: Effort normal and breath sounds normal.   Abdominal: Bowel sounds are normal. He exhibits no distension.   Musculoskeletal: Normal range of motion.   Neurological: He is alert.   Skin: Skin is warm and dry.   Psychiatric: He has a normal mood and affect.   Vitals reviewed.      Assessment:       1. Upper respiratory tract infection, unspecified type        Plan:       Casper was seen today for cough.    Diagnoses and all orders for this visit:    Upper respiratory tract infection, unspecified type  -     methylPREDNISolone (MEDROL DOSEPACK) 4 mg tablet; use as directed  -     promethazine-codeine 6.25-10 mg/5 ml (PHENERGAN WITH CODEINE) 6.25-10 mg/5 mL syrup; Take 5 mLs by mouth every 4 (four) hours as needed for Cough.      Follow up if symptoms worsen or fail to improve.      
Fair
None known

## 2024-04-26 NOTE — BH INPATIENT PSYCHIATRY DISCHARGE NOTE - NSBHFUPINTERVALHXFT_PSY_A_CORE
4/25--patient seen, chart reviewed, case discussed with trevon.  Patient seen for follow up of avt hallucinations which he now reports have attenuated.  He slept overnight, no inappropriate behaviors, no distress, denies si/hi itnents or plans.  He is noted to be less visible today.  He was compliant with medications no prns.  Dr. Metz with no info re INH.  However, requested that patient be given aristada and initio as patient on abilify PO (also on quetiaoine, haldol, depakote).  I wonder about this polypharmacy but patient is to be discharged tomorrow and outpatinet provider asking for paul version of med he already receives so will give.  Also compliant with INH.  His pcp sent him home from an apopintment with INH script, PPD placed, to be read tomorrow.  We discuss NFL draft which is tonight and he talks coherently. He slept from around midnight until about 0500.  He was compliant with medications.  No prns.  He is described as quiet but pleasant with staff.  He is pleasant with me today, reports that his AH are greatly improved and are no longer derogatory or disterssing.  He toelrated invega initio 675 and aristada 882 IM yesterday.  I spoke with Dr. Nidia Vazquez ( [phone]) who reports patient on INH for prophylaxis, had positive PPD but negative CXR)).  He is no longer an acute danger to himself or others and can care for himself with the help of his support sytem.  He is being discharged with a thirty day supply of INH 300mg daily; quetiapine 200mg daily at bedtime, haldol 20mg at bedtime depakote er 1500mg at bedtime  and aristada 882mg IM last dose 4/26 according to the plan of his inpatient primary team and his outpatient psychiatrist Dr. Metz.

## 2024-04-26 NOTE — BH DISCHARGE NOTE NURSING/SOCIAL WORK/PSYCH REHAB - PATIENT PORTAL LINK FT
You can access the FollowMyHealth Patient Portal offered by [facility] by registering at the following website: [email or web address]. By joining [email or web address]’s FollowMyHealth portal, you will also be able to view your health information using other applications (apps) compatible with our system.

## 2024-04-26 NOTE — BH PSYCHOLOGY - GROUP THERAPY NOTE - NSPSYCHOLGRPCOGPT_PSY_A_CORE FT
Patient attended recovery oriented/acceptance & commitment therapy group. Group started with check in prompt - what would you say to a friend having a hard time. Pts shared supportive and encouraging words as well as actions they would to help a friend. Group then summarized concept of acceptance and transitioned to showing self-compassion in the practice of acceptance. Members completed “Treat Yourself Like a Friend” worksheet and compared/contrasted the way they would treat a friend v themselves. Members reflected on the harsher judgement and more critical tone they would apply to themselves. We discussed barriers to using self-compassion (worthiness, past traumas) and members showed openness to practicing compassion in subsequent group sessions.  facilitated discussion of concepts, encouraged active participation, and supported members providing feedback to peers.  The group concluded with selection of the value of the day as "self-compassion".

## 2024-04-26 NOTE — BH DISCHARGE NOTE NURSING/SOCIAL WORK/PSYCH REHAB - NSCDUDCCRISIS_PSY_A_CORE
.  [facility]  [phone] [place] [id number] [phone]   Hours:  Monday through Friday from 9 AM to 3 PM

## 2024-04-26 NOTE — BH INPATIENT PSYCHIATRY DISCHARGE NOTE - NSBHDCHANDOFFFT_PSY_ALL_CORE
I emailed Dr. Santizo and Dr. Metz to discuss med regimen and solciit questions.  Also phoned dr. beard at  [phone]

## 2024-04-26 NOTE — BH INPATIENT PSYCHIATRY DISCHARGE NOTE - NSDCMRMEDTOKEN_GEN_ALL_CORE_FT
Aristada 882 mg/3.2 mL intramuscular suspension, extended release: 882 milligram(s) intramuscularly received initio and aristada 882mg IM on 4/25  divalproex sodium 500 mg oral tablet, extended release: 3 tab(s) orally once a day (at bedtime)  haloperidol 20 mg oral tablet: 1 tab(s) orally once a day (at bedtime)  isoniazid 300 mg oral tablet: 1 tab(s) orally once a day (at bedtime)  QUEtiapine 200 mg oral tablet: 1 tab(s) orally once a day (at bedtime)

## 2024-04-26 NOTE — BH DISCHARGE NOTE NURSING/SOCIAL WORK/PSYCH REHAB - NSDCPRGOAL_PSY_ALL_CORE
Writer met with patient to discuss final progress towards their goal. Patient was calm and attentive throughout the meeting. Patient was admitted to 6 for worsening symptoms of Auditory Hallucinations. Patient reported he is doing a lot better than when he first arrived on the unit. Patient reported he is no longer is hearing voices. Patient reported his appetite and sleep improved over time. Patient reported he was complaint with his medications and mentioned how the medications helped with his AH. Patient ADLs need more improvements. Patient was mainly isolative on the unit, however towards the end of stay he became more visible on the unit. Patient also improved with his attendance for Psychiatric Rehabilitation Groups. Patient needs more assistance with describing benefits of medications.  Patient denies SI/HI/VH/AH. Patient was giving safety plan before discharge.

## 2024-04-26 NOTE — BH INPATIENT PSYCHIATRY DISCHARGE NOTE - HOSPITAL COURSE
to be done 44-year-old male with a history of schizophrenia, one prior suicide attempt per chart review but pt denies, no history of SIB, no known history of violence, no active medical issues, lives on [place] grounds, h/o multiple prior presentations to ED for AH, recently seen at [facility] ED in July 2023;April 2024- for similar concerns, who presents brought in by EMS activated by self from his residence for worsening auditory hallucinations. Pts evaluation is very similar to his presentation yesterday 4.17.24. Patient states that he has been hearing voices for "22 years 6 months and 28 days" but the last few days the voices are "more ferocious" and that they are bothering him. The voices have been saying "hurt others and hurt yourself" but does not feel the need to listen to them and has never listened to CAH and has never hurt anyone in the community or in his residence. He stated the CAH are telling him to jump off a building. This AM he heard [place] voice telling him this and to get out of bed. He stated that the voices are more "perverted" and he believes they are "lying, cheating and stealing." He could not elaborate how the voices are doing this.     His symptoms improved, his halluciantions attenuated and he reported feelign less distressed (see mental status above).  He was calm and in control throughout the hosptialization.  He is sleeping, there is no psychic anxiety, no command AH, no distressing AH, no delusions, no si/hi intents or plans, no psychic anxiety, and he is pleasant and future oriented.     He toelrated invega initio 675 and aristada 882 IM yesterday.  I spoke with Dr. Nidia Vazquez ( [phone]) who reports patient on INH for prophylaxis, had positive PPD but negative CXR)).  He is no longer an acute danger to himself or others and can care for himself with the help of his support sytem.  He is being discharged with a thirty day supply of INH 300mg daily; quetiapine 200mg daily at bedtime, haldol 20mg at bedtime depakote er 1500mg at bedtime  and aristada 882mg IM last dose 4/26 according to the plan of his inpatient primary team and his outpatient psychiatrist Dr. Metz.

## 2024-04-26 NOTE — BH INPATIENT PSYCHIATRY DISCHARGE NOTE - NSBHFUPINTERVALCCFT_PSY_A_CORE
Patient seen for follow up for SCZ+AH Patient seen, chart reviewed, case discussed with team.  Patient seen for discharge daymanagement of shcizophrenia and auditory hallucinations.

## 2024-04-26 NOTE — BH DISCHARGE NOTE NURSING/SOCIAL WORK/PSYCH REHAB - MODE OF TRANSPORTATION
[employer]i #[phone] [facility], Pt asked writer to arrange transportation on his behalf./Taxi

## 2024-04-26 NOTE — BH PSYCHOLOGY - GROUP THERAPY NOTE - NSBHPSYCHOLPARTICIPCOMMENT_PSY_A_CORE FT
Pt participated to the best of his ability. He reported he would tell a friend in pain that they should "keep praying." Pt read from self-compassion handout when prompted and was observed writing responses on the handout.

## 2024-04-26 NOTE — BH DISCHARGE NOTE NURSING/SOCIAL WORK/PSYCH REHAB - DISCHARGE INSTRUCTIONS AFTERCARE APPOINTMENTS
In order to check the location, date, or time of your aftercare appointment, please refer to your Discharge Instructions Document given to you upon leaving the hospital.  If you have lost the instructions please call [phone]

## 2024-06-30 ENCOUNTER — EMERGENCY (EMERGENCY)
Facility: HOSPITAL | Age: 44
LOS: 1 days | Discharge: HOME | End: 2024-06-30
Admitting: EMERGENCY MEDICINE
Payer: MEDICAID

## 2024-06-30 VITALS
RESPIRATION RATE: 17 BRPM | HEART RATE: 90 BPM | OXYGEN SATURATION: 100 % | DIASTOLIC BLOOD PRESSURE: 88 MMHG | TEMPERATURE: 98 F | SYSTOLIC BLOOD PRESSURE: 140 MMHG

## 2024-06-30 PROCEDURE — 99284 EMERGENCY DEPT VISIT MOD MDM: CPT

## 2024-07-15 ENCOUNTER — EMERGENCY (EMERGENCY)
Facility: HOSPITAL | Age: 44
LOS: 1 days | End: 2024-07-15
Attending: EMERGENCY MEDICINE
Payer: MEDICAID

## 2024-07-15 VITALS
RESPIRATION RATE: 18 BRPM | TEMPERATURE: 98 F | WEIGHT: 250 LBS | DIASTOLIC BLOOD PRESSURE: 77 MMHG | HEART RATE: 93 BPM | HEIGHT: 73 IN | SYSTOLIC BLOOD PRESSURE: 108 MMHG | OXYGEN SATURATION: 95 %

## 2024-07-15 LAB
AMPHET UR-MCNC: NEGATIVE — SIGNIFICANT CHANGE UP
ANION GAP SERPL CALC-SCNC: 13 MMOL/L — SIGNIFICANT CHANGE UP (ref 5–17)
APAP SERPL-MCNC: <15 UG/ML — SIGNIFICANT CHANGE UP (ref 10–30)
APPEARANCE UR: CLEAR — SIGNIFICANT CHANGE UP
BARBITURATES UR SCN-MCNC: NEGATIVE — SIGNIFICANT CHANGE UP
BASOPHILS # BLD AUTO: 0.04 K/UL — SIGNIFICANT CHANGE UP (ref 0–0.2)
BASOPHILS NFR BLD AUTO: 0.7 % — SIGNIFICANT CHANGE UP (ref 0–2)
BENZODIAZ UR-MCNC: NEGATIVE — SIGNIFICANT CHANGE UP
BILIRUB UR-MCNC: NEGATIVE — SIGNIFICANT CHANGE UP
BUN SERPL-MCNC: 8 MG/DL — SIGNIFICANT CHANGE UP (ref 7–23)
CALCIUM SERPL-MCNC: 9.7 MG/DL — SIGNIFICANT CHANGE UP (ref 8.4–10.5)
CHLORIDE SERPL-SCNC: 105 MMOL/L — SIGNIFICANT CHANGE UP (ref 96–108)
CO2 SERPL-SCNC: 22 MMOL/L — SIGNIFICANT CHANGE UP (ref 22–31)
COCAINE METAB.OTHER UR-MCNC: NEGATIVE — SIGNIFICANT CHANGE UP
COLOR SPEC: YELLOW — SIGNIFICANT CHANGE UP
CREAT SERPL-MCNC: 1.17 MG/DL — SIGNIFICANT CHANGE UP (ref 0.5–1.3)
DIFF PNL FLD: NEGATIVE — SIGNIFICANT CHANGE UP
EGFR: 80 ML/MIN/1.73M2 — SIGNIFICANT CHANGE UP
EOSINOPHIL # BLD AUTO: 0.3 K/UL — SIGNIFICANT CHANGE UP (ref 0–0.5)
EOSINOPHIL NFR BLD AUTO: 5.3 % — SIGNIFICANT CHANGE UP (ref 0–6)
ETHANOL SERPL-MCNC: <10 MG/DL — SIGNIFICANT CHANGE UP (ref 0–10)
GLUCOSE SERPL-MCNC: 89 MG/DL — SIGNIFICANT CHANGE UP (ref 70–99)
GLUCOSE UR QL: NEGATIVE MG/DL — SIGNIFICANT CHANGE UP
HCT VFR BLD CALC: 44.7 % — SIGNIFICANT CHANGE UP (ref 39–50)
HGB BLD-MCNC: 14.5 G/DL — SIGNIFICANT CHANGE UP (ref 13–17)
IMM GRANULOCYTES NFR BLD AUTO: 0.2 % — SIGNIFICANT CHANGE UP (ref 0–0.9)
KETONES UR-MCNC: ABNORMAL MG/DL
LEUKOCYTE ESTERASE UR-ACNC: NEGATIVE — SIGNIFICANT CHANGE UP
LYMPHOCYTES # BLD AUTO: 2.67 K/UL — SIGNIFICANT CHANGE UP (ref 1–3.3)
LYMPHOCYTES # BLD AUTO: 47 % — HIGH (ref 13–44)
MCHC RBC-ENTMCNC: 29.6 PG — SIGNIFICANT CHANGE UP (ref 27–34)
MCHC RBC-ENTMCNC: 32.4 GM/DL — SIGNIFICANT CHANGE UP (ref 32–36)
MCV RBC AUTO: 91.2 FL — SIGNIFICANT CHANGE UP (ref 80–100)
METHADONE UR-MCNC: NEGATIVE — SIGNIFICANT CHANGE UP
MONOCYTES # BLD AUTO: 0.46 K/UL — SIGNIFICANT CHANGE UP (ref 0–0.9)
MONOCYTES NFR BLD AUTO: 8.1 % — SIGNIFICANT CHANGE UP (ref 2–14)
NEUTROPHILS # BLD AUTO: 2.2 K/UL — SIGNIFICANT CHANGE UP (ref 1.8–7.4)
NEUTROPHILS NFR BLD AUTO: 38.7 % — LOW (ref 43–77)
NITRITE UR-MCNC: NEGATIVE — SIGNIFICANT CHANGE UP
NRBC # BLD: 0 /100 WBCS — SIGNIFICANT CHANGE UP (ref 0–0)
OPIATES UR-MCNC: NEGATIVE — SIGNIFICANT CHANGE UP
OXYCODONE UR-MCNC: NEGATIVE — SIGNIFICANT CHANGE UP
PCP SPEC-MCNC: SIGNIFICANT CHANGE UP
PCP UR-MCNC: NEGATIVE — SIGNIFICANT CHANGE UP
PH UR: 5.5 — SIGNIFICANT CHANGE UP (ref 5–8)
PLATELET # BLD AUTO: 217 K/UL — SIGNIFICANT CHANGE UP (ref 150–400)
POTASSIUM SERPL-MCNC: 4.3 MMOL/L — SIGNIFICANT CHANGE UP (ref 3.5–5.3)
POTASSIUM SERPL-SCNC: 4.3 MMOL/L — SIGNIFICANT CHANGE UP (ref 3.5–5.3)
PROT UR-MCNC: NEGATIVE MG/DL — SIGNIFICANT CHANGE UP
RBC # BLD: 4.9 M/UL — SIGNIFICANT CHANGE UP (ref 4.2–5.8)
RBC # FLD: 13.2 % — SIGNIFICANT CHANGE UP (ref 10.3–14.5)
SALICYLATES SERPL-MCNC: <2 MG/DL — LOW (ref 15–30)
SODIUM SERPL-SCNC: 140 MMOL/L — SIGNIFICANT CHANGE UP (ref 135–145)
SP GR SPEC: 1.02 — SIGNIFICANT CHANGE UP (ref 1–1.03)
THC UR QL: NEGATIVE — SIGNIFICANT CHANGE UP
UROBILINOGEN FLD QL: 1 MG/DL — SIGNIFICANT CHANGE UP (ref 0.2–1)
VALPROATE SERPL-MCNC: 62 UG/ML — SIGNIFICANT CHANGE UP (ref 50–100)
WBC # BLD: 5.68 K/UL — SIGNIFICANT CHANGE UP (ref 3.8–10.5)
WBC # FLD AUTO: 5.68 K/UL — SIGNIFICANT CHANGE UP (ref 3.8–10.5)

## 2024-07-15 PROCEDURE — 80307 DRUG TEST PRSMV CHEM ANLYZR: CPT

## 2024-07-15 PROCEDURE — 80164 ASSAY DIPROPYLACETIC ACD TOT: CPT

## 2024-07-15 PROCEDURE — 99285 EMERGENCY DEPT VISIT HI MDM: CPT

## 2024-07-15 PROCEDURE — 93005 ELECTROCARDIOGRAM TRACING: CPT

## 2024-07-15 PROCEDURE — 87635 SARS-COV-2 COVID-19 AMP PRB: CPT

## 2024-07-15 PROCEDURE — 81003 URINALYSIS AUTO W/O SCOPE: CPT

## 2024-07-15 PROCEDURE — 85025 COMPLETE CBC W/AUTO DIFF WBC: CPT

## 2024-07-15 PROCEDURE — 99285 EMERGENCY DEPT VISIT HI MDM: CPT | Mod: 25

## 2024-07-15 PROCEDURE — 80048 BASIC METABOLIC PNL TOTAL CA: CPT

## 2024-07-16 VITALS
SYSTOLIC BLOOD PRESSURE: 113 MMHG | OXYGEN SATURATION: 94 % | RESPIRATION RATE: 20 BRPM | TEMPERATURE: 98 F | DIASTOLIC BLOOD PRESSURE: 76 MMHG | HEART RATE: 79 BPM

## 2024-07-16 DIAGNOSIS — F20.9 SCHIZOPHRENIA, UNSPECIFIED: ICD-10-CM

## 2024-07-16 LAB — SARS-COV-2 RNA SPEC QL NAA+PROBE: SIGNIFICANT CHANGE UP

## 2024-08-11 NOTE — ED PROVIDER NOTE - ENMT, MLM
DISCHARGE SUMMARY from Nurse    PATIENT INSTRUCTIONS:    After general anesthesia or intravenous sedation, for 24 hours or while taking prescription Narcotics:  Limit your activities  Do not drive and operate hazardous machinery  Do not make important personal or business decisions  Do  not drink alcoholic beverages  If you have not urinated within 8 hours after discharge, please contact your surgeon on call.    Report the following to your surgeon:  Excessive pain, swelling, redness or odor of or around the surgical area  Temperature over 100.5  Nausea and vomiting lasting longer than 4 hours or if unable to take medications  Any signs of decreased circulation or nerve impairment to extremity: change in color, persistent  numbness, tingling, coldness or increase pain  Any questions    What to do at Home:  Recommended activity: activity as tolerated     If you experience any of the following symptoms chest pain, shortness of breath, pain/swelling, redness at surgical site,  please follow up with your primary care provider, surgeon and/or emergency room.    *  Please give a list of your current medications to your Primary Care Provider.    *  Please update this list whenever your medications are discontinued, doses are      changed, or new medications (including over-the-counter products) are added.    *  Please carry medication information at all times in case of emergency situations.    These are general instructions for a healthy lifestyle:    No smoking/ No tobacco products/ Avoid exposure to second hand smoke  Surgeon General's Warning:  Quitting smoking now greatly reduces serious risk to your health.    Obesity, smoking, and sedentary lifestyle greatly increases your risk for illness    A healthy diet, regular physical exercise & weight monitoring are important for maintaining a healthy lifestyle    You may be retaining fluid if you have a history of heart failure or if you experience any of the following  Airway patent, Nasal mucosa clear. Mouth with normal mucosa. Throat has no vesicles, no oropharyngeal exudates and uvula is midline.

## 2024-08-26 ENCOUNTER — INPATIENT (INPATIENT)
Facility: HOSPITAL | Age: 44
LOS: 28 days | Discharge: HOME | End: 2024-09-24
Attending: STUDENT IN AN ORGANIZED HEALTH CARE EDUCATION/TRAINING PROGRAM | Admitting: PSYCHIATRY & NEUROLOGY
Payer: MEDICAID

## 2024-08-26 VITALS
OXYGEN SATURATION: 97 % | RESPIRATION RATE: 18 BRPM | TEMPERATURE: 97 F | WEIGHT: 250 LBS | SYSTOLIC BLOOD PRESSURE: 126 MMHG | HEIGHT: 73 IN | HEART RATE: 92 BPM | DIASTOLIC BLOOD PRESSURE: 78 MMHG

## 2024-08-26 DIAGNOSIS — F29 UNSPECIFIED PSYCHOSIS NOT DUE TO A SUBSTANCE OR KNOWN PHYSIOLOGICAL CONDITION: ICD-10-CM

## 2024-08-26 LAB
ADD ON TEST-SPECIMEN IN LAB: SIGNIFICANT CHANGE UP
ALBUMIN SERPL ELPH-MCNC: 3.9 G/DL — SIGNIFICANT CHANGE UP (ref 3.3–5)
ALP SERPL-CCNC: 50 U/L — SIGNIFICANT CHANGE UP (ref 40–120)
ALT FLD-CCNC: 21 U/L — SIGNIFICANT CHANGE UP (ref 4–41)
AMPHET UR-MCNC: NEGATIVE — SIGNIFICANT CHANGE UP
ANION GAP SERPL CALC-SCNC: 12 MMOL/L — SIGNIFICANT CHANGE UP (ref 7–14)
APAP SERPL-MCNC: <10 UG/ML — LOW (ref 15–25)
APPEARANCE UR: CLEAR — SIGNIFICANT CHANGE UP
AST SERPL-CCNC: 37 U/L — SIGNIFICANT CHANGE UP (ref 4–40)
BACTERIA # UR AUTO: NEGATIVE /HPF — SIGNIFICANT CHANGE UP
BARBITURATES UR SCN-MCNC: NEGATIVE — SIGNIFICANT CHANGE UP
BASOPHILS # BLD AUTO: 0.05 K/UL — SIGNIFICANT CHANGE UP (ref 0–0.2)
BASOPHILS NFR BLD AUTO: 0.7 % — SIGNIFICANT CHANGE UP (ref 0–2)
BENZODIAZ UR-MCNC: NEGATIVE — SIGNIFICANT CHANGE UP
BILIRUB SERPL-MCNC: 0.3 MG/DL — SIGNIFICANT CHANGE UP (ref 0.2–1.2)
BILIRUB UR-MCNC: NEGATIVE — SIGNIFICANT CHANGE UP
BUN SERPL-MCNC: 11 MG/DL — SIGNIFICANT CHANGE UP (ref 7–23)
CALCIUM SERPL-MCNC: 9.5 MG/DL — SIGNIFICANT CHANGE UP (ref 8.4–10.5)
CAST: 0 /LPF — SIGNIFICANT CHANGE UP (ref 0–4)
CHLORIDE SERPL-SCNC: 102 MMOL/L — SIGNIFICANT CHANGE UP (ref 98–107)
CO2 SERPL-SCNC: 23 MMOL/L — SIGNIFICANT CHANGE UP (ref 22–31)
COCAINE METAB.OTHER UR-MCNC: NEGATIVE — SIGNIFICANT CHANGE UP
COLOR SPEC: SIGNIFICANT CHANGE UP
CREAT SERPL-MCNC: 1.05 MG/DL — SIGNIFICANT CHANGE UP (ref 0.5–1.3)
CREATININE URINE RESULT, DAU: 493 MG/DL — SIGNIFICANT CHANGE UP
DIFF PNL FLD: NEGATIVE — SIGNIFICANT CHANGE UP
EGFR: 91 ML/MIN/1.73M2 — SIGNIFICANT CHANGE UP
EOSINOPHIL # BLD AUTO: 0.23 K/UL — SIGNIFICANT CHANGE UP (ref 0–0.5)
EOSINOPHIL NFR BLD AUTO: 3.1 % — SIGNIFICANT CHANGE UP (ref 0–6)
ETHANOL SERPL-MCNC: <10 MG/DL — SIGNIFICANT CHANGE UP
FENTANYL UR QL SCN: NEGATIVE — SIGNIFICANT CHANGE UP
GLUCOSE SERPL-MCNC: 85 MG/DL — SIGNIFICANT CHANGE UP (ref 70–99)
GLUCOSE UR QL: NEGATIVE MG/DL — SIGNIFICANT CHANGE UP
HCT VFR BLD CALC: 42.9 % — SIGNIFICANT CHANGE UP (ref 39–50)
HGB BLD-MCNC: 14.4 G/DL — SIGNIFICANT CHANGE UP (ref 13–17)
IANC: 3.72 K/UL — SIGNIFICANT CHANGE UP (ref 1.8–7.4)
IMM GRANULOCYTES NFR BLD AUTO: 0.1 % — SIGNIFICANT CHANGE UP (ref 0–0.9)
KETONES UR-MCNC: ABNORMAL MG/DL
LEUKOCYTE ESTERASE UR-ACNC: NEGATIVE — SIGNIFICANT CHANGE UP
LYMPHOCYTES # BLD AUTO: 2.62 K/UL — SIGNIFICANT CHANGE UP (ref 1–3.3)
LYMPHOCYTES # BLD AUTO: 35.3 % — SIGNIFICANT CHANGE UP (ref 13–44)
MCHC RBC-ENTMCNC: 29.8 PG — SIGNIFICANT CHANGE UP (ref 27–34)
MCHC RBC-ENTMCNC: 33.6 GM/DL — SIGNIFICANT CHANGE UP (ref 32–36)
MCV RBC AUTO: 88.6 FL — SIGNIFICANT CHANGE UP (ref 80–100)
METHADONE UR-MCNC: NEGATIVE — SIGNIFICANT CHANGE UP
MONOCYTES # BLD AUTO: 0.79 K/UL — SIGNIFICANT CHANGE UP (ref 0–0.9)
MONOCYTES NFR BLD AUTO: 10.6 % — SIGNIFICANT CHANGE UP (ref 2–14)
NEUTROPHILS # BLD AUTO: 3.72 K/UL — SIGNIFICANT CHANGE UP (ref 1.8–7.4)
NEUTROPHILS NFR BLD AUTO: 50.2 % — SIGNIFICANT CHANGE UP (ref 43–77)
NITRITE UR-MCNC: NEGATIVE — SIGNIFICANT CHANGE UP
NRBC # BLD: 0 /100 WBCS — SIGNIFICANT CHANGE UP (ref 0–0)
NRBC # FLD: 0 K/UL — SIGNIFICANT CHANGE UP (ref 0–0)
OPIATES UR-MCNC: NEGATIVE — SIGNIFICANT CHANGE UP
OXYCODONE UR-MCNC: NEGATIVE — SIGNIFICANT CHANGE UP
PCP SPEC-MCNC: SIGNIFICANT CHANGE UP
PCP UR-MCNC: NEGATIVE — SIGNIFICANT CHANGE UP
PH UR: 5.5 — SIGNIFICANT CHANGE UP (ref 5–8)
PLATELET # BLD AUTO: 287 K/UL — SIGNIFICANT CHANGE UP (ref 150–400)
POTASSIUM SERPL-MCNC: 4.4 MMOL/L — SIGNIFICANT CHANGE UP (ref 3.5–5.3)
POTASSIUM SERPL-SCNC: 4.4 MMOL/L — SIGNIFICANT CHANGE UP (ref 3.5–5.3)
PROT SERPL-MCNC: 6.8 G/DL — SIGNIFICANT CHANGE UP (ref 6–8.3)
PROT UR-MCNC: 30 MG/DL
RBC # BLD: 4.84 M/UL — SIGNIFICANT CHANGE UP (ref 4.2–5.8)
RBC # FLD: 12.7 % — SIGNIFICANT CHANGE UP (ref 10.3–14.5)
RBC CASTS # UR COMP ASSIST: 1 /HPF — SIGNIFICANT CHANGE UP (ref 0–4)
SALICYLATES SERPL-MCNC: <0.3 MG/DL — LOW (ref 15–30)
SARS-COV-2 RNA SPEC QL NAA+PROBE: SIGNIFICANT CHANGE UP
SODIUM SERPL-SCNC: 137 MMOL/L — SIGNIFICANT CHANGE UP (ref 135–145)
SP GR SPEC: 1.03 — HIGH (ref 1–1.03)
SQUAMOUS # UR AUTO: 1 /HPF — SIGNIFICANT CHANGE UP (ref 0–5)
THC UR QL: NEGATIVE — SIGNIFICANT CHANGE UP
TOXICOLOGY SCREEN, DRUGS OF ABUSE, SERUM RESULT: SIGNIFICANT CHANGE UP
TSH SERPL-MCNC: 2.42 UIU/ML — SIGNIFICANT CHANGE UP (ref 0.27–4.2)
UROBILINOGEN FLD QL: 1 MG/DL — SIGNIFICANT CHANGE UP (ref 0.2–1)
WBC # BLD: 7.42 K/UL — SIGNIFICANT CHANGE UP (ref 3.8–10.5)
WBC # FLD AUTO: 7.42 K/UL — SIGNIFICANT CHANGE UP (ref 3.8–10.5)
WBC UR QL: 1 /HPF — SIGNIFICANT CHANGE UP (ref 0–5)

## 2024-08-26 PROCEDURE — 99285 EMERGENCY DEPT VISIT HI MDM: CPT

## 2024-08-26 RX ORDER — HALOPERIDOL LACTATE 2 MG/ML
15 CONCENTRATE, ORAL ORAL ONCE
Refills: 0 | Status: COMPLETED | OUTPATIENT
Start: 2024-08-26 | End: 2024-08-26

## 2024-08-26 RX ORDER — HALOPERIDOL LACTATE 2 MG/ML
20 CONCENTRATE, ORAL ORAL AT BEDTIME
Refills: 0 | Status: DISCONTINUED | OUTPATIENT
Start: 2024-08-26 | End: 2024-09-16

## 2024-08-26 RX ORDER — QUETIAPINE FUMARATE 50 MG/1
200 TABLET, FILM COATED ORAL AT BEDTIME
Refills: 0 | Status: DISCONTINUED | OUTPATIENT
Start: 2024-08-26 | End: 2024-09-05

## 2024-08-26 RX ORDER — HALOPERIDOL LACTATE 2 MG/ML
5 CONCENTRATE, ORAL ORAL ONCE
Refills: 0 | Status: COMPLETED | OUTPATIENT
Start: 2024-08-26 | End: 2024-08-26

## 2024-08-26 RX ORDER — ASTEMIZOLE 10 MG
1500 TABLET ORAL AT BEDTIME
Refills: 0 | Status: DISCONTINUED | OUTPATIENT
Start: 2024-08-26 | End: 2024-09-09

## 2024-08-26 RX ORDER — HALOPERIDOL LACTATE 2 MG/ML
5 CONCENTRATE, ORAL ORAL ONCE
Refills: 0 | Status: DISCONTINUED | OUTPATIENT
Start: 2024-08-26 | End: 2024-09-24

## 2024-08-26 RX ORDER — HALOPERIDOL LACTATE 2 MG/ML
5 CONCENTRATE, ORAL ORAL EVERY 6 HOURS
Refills: 0 | Status: DISCONTINUED | OUTPATIENT
Start: 2024-08-26 | End: 2024-09-24

## 2024-08-26 RX ADMIN — Medication 5 MILLIGRAM(S): at 21:08

## 2024-08-26 RX ADMIN — Medication 2 MILLIGRAM(S): at 21:08

## 2024-08-26 NOTE — ED BEHAVIORAL HEALTH ASSESSMENT NOTE - NSBHMSEINTELL_PSY_A_CORE
Shannen Barber is a 14year old female who was brought in for this visit. History was provided by the mom. HPI:   Patient presents with:  Urinary Symptoms (urologic)      Patient with recent dysuria and frequency. Last UTI was in the last year.   Had n masses        ASSESSMENT/PLAN:   Dysuria  (primary encounter diagnosis)    nitrofurantoin pending urine cx results.   Last urine cx grew a resistant e. coli necessitating the prescribed abx    Patient/parent questions answered and states understanding of in Unable to assess

## 2024-08-26 NOTE — ED ADULT NURSE NOTE - CHIEF COMPLAINT QUOTE
pt coming from [facility].  pt had episode where he paniked in his room because he felt clautrophobic.  pt has been non-complaint with meds.  Hx: bipolar, schizophrenia

## 2024-08-26 NOTE — ED BEHAVIORAL HEALTH ASSESSMENT NOTE - DESCRIPTION
per macario HTN and anemia but not on treatment resides at [facility] cooperative but appears suspicious, responding to internal stimuli.  Vital Signs Last 24 Hrs  T(C): 36.8 (26 Aug 2024 12:20), Max: 36.8 (26 Aug 2024 12:20)  T(F): 98.2 (26 Aug 2024 12:20), Max: 98.2 (26 Aug 2024 12:20)  HR: 99 (26 Aug 2024 12:20) (92 - 99)  BP: 127/84 (26 Aug 2024 12:20) (126/78 - 127/84)  BP(mean): --  RR: 16 (26 Aug 2024 12:20) (16 - 18)  SpO2: 99% (26 Aug 2024 12:20) (97% - 99%)    Parameters below as of 26 Aug 2024 12:20  Patient On (Oxygen Delivery Method): room air

## 2024-08-26 NOTE — ED BEHAVIORAL HEALTH NOTE - BEHAVIORAL HEALTH NOTE
Writer was asked by provider to contact Azul Paiz Rena Lara - [phone] ext. [phone] for collateral information.  spoke to Ms Perez .  Pt resides in transitional housing, is responsible for his own meal plan, is responsible for his own medications.  She states he is able to microwave food for himself.      She reports patient hears voices daily.  Sometimes it gets too much.  Yesterday voices told him to throw clothes and medication outside.  Pt threw his clothes and medications in the garbage outside.  States patient is only left with the clothes on his back.    Patient is on  Depakote ER 1500mg HS, Haldol 20mg QHS, Seroquel 200mg QHS, Aristada IM 441mg QMonthly, Dr. Metz at [facility]. Next appt Sept 14. Patient has HTN not on meds.  Denies patient is violent.  Denies any suicidal or homicidal threats.  Denies patient uses drugs or K2.  States patient is relatively calm.  Denies any disturbance in sleep or appetite.     Writer called [facility]  and left a voicemail for Dr. Metz requesting a callback to social work phone.      Patient travels independently without supervision and can return via taxi.

## 2024-08-26 NOTE — ED BEHAVIORAL HEALTH ASSESSMENT NOTE - SUMMARY
Pt is a 45 yo M, single, non-caregiver, domiciled at [facility], psych hx of  Schizophrenia vs Schizoaffective Disorder, multiple prior admissions including state (last inpt  [facility] April 2024, [facility] 2009 and [id number]), per chart cannabis use disorder (residence staff denies recent use), no known hx suicide attempt or violence, follows at [facility] on [employer] grounds, no known legal issues, PMH HTN (not on meds) and anemia per psyckes, brought in by EMS called by residence staff after pt reportedly threw out all his clothes yesterday in response to CAH. Patient is actively responding to internal stimuli, reporting CAH and thought manipulation delusions. Staff report yesterday pt threw out all his clothes and medications in response to CAH. Patient is psychotic and unable to care for self needing inpatient stabilization. Admit 939.

## 2024-08-26 NOTE — ED BEHAVIORAL HEALTH ASSESSMENT NOTE - HPI (INCLUDE ILLNESS QUALITY, SEVERITY, DURATION, TIMING, CONTEXT, MODIFYING FACTORS, ASSOCIATED SIGNS AND SYMPTOMS)
Pt is a 43 yo M, single, non-caregiver, domiciled at [facility], psych hx of  Schizophrenia vs Schizoaffective Disorder, [facility] and [id number]), per chart cannabis use disorder (residence staff denies recent use), no known hx suicide attempt or violence, follows at [facility] on [place] grounds, no known legal issues, PMH HTN (not on meds) and anemia per psyckes, brought in by EMS called by residence staff after pt reportedly threw out all his clothes yesterday in response to CAH.   Patient initially appeared suspicious of staff, did not want to speak knowing staff was standing outside his room, then went to sit in the hallway and asked to be interviewed there. Patient appeared thought blocked and unable to answer most questions. Stated he felt he "couldn't breathe" and threw out his clothes. Patient then stopped answering questions, was offered a PRN for distress but declined. Writer briefly terminated interview to allow pt to have a meal.  Upon reassessment, pt was a bit more engaged, but observed to be actively responding to internal stimuli. Patient states he cannot answer questions because his "thoughts are blocked".  He states the other people in his residence take his thoughts because they want him to die. He states he is scared. He states he doesn't 'punch' to defend himself but couldn't elaborate. He states he has poor hearing in one ear, unable to explain further. He endorses CAH to kill himself, unable to state specific plan. He states "I don't want to pass away but I want to be happy with God". Unable to elicit further history due to active psychosis.    See  note for collateral.    Message left for Dr. Metz at [facility].    [facility] lists a "nonspecific reaction to tuberculin skin test" in May 2022, and a script for Isoniazid last picked up May 2024 - confirmed with [place] this medication was discontinued in May 2024.

## 2024-08-26 NOTE — ED BEHAVIORAL HEALTH ASSESSMENT NOTE - OTHER PAST PSYCHIATRIC HISTORY (INCLUDE DETAILS REGARDING ONSET, COURSE OF ILLNESS, INPATIENT/OUTPATIENT TREATMENT)
Schizophrenia vs Schizoaffective Disorder, multiple prior admissions including state (last inpt  [facility] April 2024, [facility] 2009 and [id number]), per chart cannabis use disorder (residence staff denies recent use), no known hx suicide attempt or violence, [facility] [place] [facility] with [facility]. Metz

## 2024-08-26 NOTE — ED PROVIDER NOTE - CLINICAL SUMMARY MEDICAL DECISION MAKING FREE TEXT BOX
Imp: Noncompliance with meds, possibly hearing voices per staff  Will consult psychiatry and follow recommendations

## 2024-08-26 NOTE — ED BEHAVIORAL HEALTH ASSESSMENT NOTE - CURRENT MEDICATION
Depakote ER 1500mg HS, Haldol 20mg QHS, Seroquel 200mg QHS, Aristada IM 441mg QMonthly (unable to verify last injection)

## 2024-08-26 NOTE — ED BEHAVIORAL HEALTH ASSESSMENT NOTE - PSYCHIATRIC ISSUES AND PLAN (INCLUDE STANDING AND PRN MEDICATION)
clarify when Aristada due, consider increasing. clarify when Aristada due, consider increasing. Continue Haldol 20mg QHS, Depakote ER 1500mg QHS, Seroquel 200mg QHS. PRN Haldol 5 Ativan 2 PO/IM agitation

## 2024-08-26 NOTE — ED ADULT NURSE NOTE - HPI (INCLUDE ILLNESS QUALITY, SEVERITY, DURATION, TIMING, CONTEXT, MODIFYING FACTORS, ASSOCIATED SIGNS AND SYMPTOMS)
Received patient to [facility] via EMS. Patient is here because the staff at [facility] states that he threw out his medications and paperwork and he stated that it was because he was having panic attacks and feeling claustrophobic. Patient is calm and cooperative at this time. Patient denies suicidal and homicidal ideations. Offered and accepted breakfast. Waiting for further evaluation by medical providers.  KIN White

## 2024-08-26 NOTE — ED PROVIDER NOTE - OBJECTIVE STATEMENT
44-year-old male, lives at [facility], called by staff due to patient "having anxiety, panic attack".  Per staff patient threw his medications away a few days ago.  Patient has a history of mood/psychotic disorders including schizophrenia.  Today patient states could not breathe or live.  Started throwing away items from his room.  This prompted staff to call EMS.  Here in the emergency room patient is calm, cooperative.  Denies any medical complaints.

## 2024-08-26 NOTE — ED BEHAVIORAL HEALTH ASSESSMENT NOTE - RISK ASSESSMENT
acute risks include CAH, poor adherence, delusions. chronic risks include serious and persistent psychotic illness, prior admissions including state, hx substance use. Protective factors include supportive housing, no hx suicide attempt, no hx violence, no access to weapons, no active substance use. LOW imminent risk for suicide/violence but overall risk elevated due to active psychosis warranting involuntary admission.

## 2024-08-26 NOTE — ED ADULT TRIAGE NOTE - CHIEF COMPLAINT QUOTE
pt coming from [facility].  pt had episode where he paniked in his room because he felt clautrophobic.  pt has been non-complaint with meds.  Hx: bipolar, schizophrenia

## 2024-08-26 NOTE — ED PROVIDER NOTE - PHYSICAL EXAMINATION
Physical exam  Well-appearing male in no respiratory distress  Vital signs stable  Clear to auscultation bilaterally  S1-S2 no murmurs rubs or gallops  Abdomen soft nontender nondistended  Extremities no edema  Psych denies HI SI, AH /CAH

## 2024-08-27 PROCEDURE — 99223 1ST HOSP IP/OBS HIGH 75: CPT

## 2024-08-27 RX ADMIN — Medication 1500 MILLIGRAM(S): at 20:31

## 2024-08-27 RX ADMIN — Medication 15 MILLIGRAM(S): at 00:01

## 2024-08-27 RX ADMIN — Medication 1500 MILLIGRAM(S): at 00:01

## 2024-08-27 RX ADMIN — QUETIAPINE FUMARATE 200 MILLIGRAM(S): 50 TABLET, FILM COATED ORAL at 20:32

## 2024-08-27 RX ADMIN — Medication 20 MILLIGRAM(S): at 20:31

## 2024-08-27 RX ADMIN — Medication 5 MILLIGRAM(S): at 21:29

## 2024-08-27 RX ADMIN — QUETIAPINE FUMARATE 200 MILLIGRAM(S): 50 TABLET, FILM COATED ORAL at 00:00

## 2024-08-27 RX ADMIN — Medication 2 MILLIGRAM(S): at 21:30

## 2024-08-27 NOTE — PSYCHIATRIC REHAB INITIAL EVALUATION - NSBHPRRECOMMEND_PSY_ALL_CORE
Patient is a 44 year old male, domiciled at [facility] on [place] grounds, with a long hx of inpatient hospitalizations including [facility] hospital, with no known hx of violence or legal issues, no known suicide attempts, with a hx of cannabis use disorder, with an admitting dx of Schizophrenia VS SAD. Patient was brought to the hospital by hospital staff due to worsening psychosis and CAH. Patient was reported to be thought blocked and internally preoccupied.   Writer attempted to meet with patient, however the patient was asleep. Patient and writer were unable to establish as collaborative rehabilitation goal, therefore an appropriate goal will be selected for the patient. Psych rehab staff will continue to provide ongoing support and encouragement.

## 2024-08-27 NOTE — BH INPATIENT PSYCHIATRY ASSESSMENT NOTE - OTHER PAST PSYCHIATRIC HISTORY (INCLUDE DETAILS REGARDING ONSET, COURSE OF ILLNESS, INPATIENT/OUTPATIENT TREATMENT)
Schizophrenia vs Schizoaffective Disorder, multiple prior admissions including state (last inpt  [facility] April 2024, [facility] 2009 and [id number]), per chart cannabis use disorder (residence staff denies recent use), no known hx suicide attempt or violence, [facility] [place] [facility] with [facility]. Metz

## 2024-08-27 NOTE — BH PATIENT PROFILE - HOME MEDICATIONS
Aristada 441 mg/1.6 mL intramuscular suspension, extended release , 441 milligram(s) intramuscularly every 4 weeks  QUEtiapine 200 mg oral tablet , 1 tab(s) orally once a day (at bedtime)  haloperidol 20 mg oral tablet , 1 tab(s) orally once a day (at bedtime)  divalproex sodium 500 mg oral tablet, extended release , 3 tab(s) orally once a day (at bedtime)

## 2024-08-27 NOTE — BH INPATIENT PSYCHIATRY ASSESSMENT NOTE - HPI (INCLUDE ILLNESS QUALITY, SEVERITY, DURATION, TIMING, CONTEXT, MODIFYING FACTORS, ASSOCIATED SIGNS AND SYMPTOMS)
He arrived on the unit uneventfully.  Apparently did not require IM prns in the ED.  Compliant with meds here, slept.  Today, not very cooperative, remained in bed, did not desire to talk.  Note VPA<3 suggesting noncompliance, note utox negative for cannabis in ed.  Phoned Dr. Metz at [phone] to figure out when last received aristasa 441 (reportedly next appoint 9/14 or 9/16; unclear when previous appointment was, if he received aristada).    Note patient has history of indeterminate rxn to ppd, clear cxr, had been given INH for prophylaxis but was not compliant.    AS PER THE [facility] ED BHA DATED 8/26/2024  Pt is a 43 yo M, single, non-caregiver, domiciled at [facility], psych hx of  Schizophrenia vs Schizoaffective Disorder, multiple prior admissions including state (last inpt  [facility] April 2024, [facility] 2009 and [phone]), per chart cannabis use disorder (residence staff denies recent use), no known hx suicide attempt or violence, follows at [facility] on [place] grounds, no known legal issues, PMH HTN (not on meds) and anemia per psyckes, brought in by EMS called by residence staff after pt reportedly threw out all his clothes yesterday in response to CAH.   Patient initially appeared suspicious of staff, did not want to speak knowing staff was standing outside his room, then went to sit in the hallway and asked to be interviewed there. Patient appeared thought blocked and unable to answer most questions. Stated he felt he "couldn't breathe" and threw out his clothes. Patient then stopped answering questions, was offered a PRN for distress but declined. Writer briefly terminated interview to allow pt to have a meal.  Upon reassessment, pt was a bit more engaged, but observed to be actively responding to internal stimuli. Patient states he cannot answer questions because his "thoughts are blocked".  He states the other people in his residence take his thoughts because they want him to die. He states he is scared. He states he doesn't 'punch' to defend himself but couldn't elaborate. He states he has poor hearing in one ear, unable to explain further. He endorses CAH to kill himself, unable to state specific plan. He states "I don't want to pass away but I want to be happy with God". Unable to elicit further history due to active psychosis.    See  note for collateral.    Message left for Dr. Metz at [facility].    PSYCKES lists a "nonspecific reaction to tuberculin skin test" in May 2022, and a script for Isoniazid last picked up May 2024 - confirmed with Demetria this medication was discontinued in May 2024.

## 2024-08-27 NOTE — BH PATIENT PROFILE - STATED REASON FOR ADMISSION
44 y.o. M Schizophrenia/Bipolar. Resides at [facility] having C/A/H to throw things out, paranoid, walked out of interview, thought blocked,guarded, suspicious; No S/A. A/H telling himself to kill himself. No plan. HTN, Anemia. Depakote is less then 3. NKA.

## 2024-08-27 NOTE — BH INPATIENT PSYCHIATRY ASSESSMENT NOTE - GENERAL APPEARANCE
Green (Altered Mental Status/Behavior)/Purple DH (Discharge Huddle; Vulnerable Patient) No deformities present

## 2024-08-27 NOTE — BH SOCIAL WORK INITIAL PSYCHOSOCIAL EVALUATION - OTHER PAST PSYCHIATRIC HISTORY (INCLUDE DETAILS REGARDING ONSET, COURSE OF ILLNESS, INPATIENT/OUTPATIENT TREATMENT)
Patient is a 44 year old male, single, non-caregiver, domiciled at [facility]. Patient has a psych history of  Schizophrenia vs Schizoaffective Disorder, with multiple prior admissions including state (last inpt at [facility] in April 2024, [facility] 2009 and [id number]). Per chart remote cannabis use disorder (residence staff denies recent use, current Utox Negative). There is no known history of suicide attempts or violence. He is followed at [facility] on [place] grounds, no known legal issues, PMH HTN (not on meds) and anemia per psyckes, brought in by EMS called by residence staff after pt reportedly threw out all his clothes yesterday in response to CAH.   Patient initially appeared suspicious of staff, did not want to speak knowing staff was standing outside his room, then went to sit in the hallway and asked to be interviewed there. Patient appeared thought blocked and unable to answer most questions. Stated he felt he "couldn't breathe" and threw out his clothes. Patient is a 44 year old male, single, non-caregiver, domiciled at [facility], can return. Patient has a psych history of  Schizophrenia vs Schizoaffective Disorder, with multiple prior admissions including state (last inpt at [facility] in April 2024, [facility] 2009 and [phone]). Per chart remote cannabis use disorder (residence staff denies recent use, current Utox Negative). There is no known history of suicide attempts or violence. He is followed at [facility] by Dr. Metz. There are no known legal issues. Patient has medical history of HTN (not on meds) and anemia per psyckes. He was brought in by EMS, called by residence staff after patient reportedly threw out all his clothes yesterday in response to CAH.

## 2024-08-27 NOTE — BH INPATIENT PSYCHIATRY ASSESSMENT NOTE - NSBHCHARTREVIEWVS_PSY_A_CORE FT
Vital Signs Last 24 Hrs  T(C): 36.4 (08-26-24 @ 23:58), Max: 36.7 (08-26-24 @ 23:08)  T(F): 97.6 (08-26-24 @ 23:58), Max: 98 (08-26-24 @ 23:08)  HR: 91 (08-26-24 @ 23:08) (91 - 91)  BP: 113/76 (08-26-24 @ 23:08) (113/76 - 113/76)  BP(mean): --  RR: 17 (08-26-24 @ 23:08) (17 - 17)  SpO2: 98% (08-26-24 @ 23:08) (98% - 98%)    Orthostatic VS  08-26-24 @ 23:58  Lying BP: --/-- HR: --  Sitting BP: 122/76 HR: 95  Standing BP: 133/77 HR: 105  Site: --  Mode: --

## 2024-08-27 NOTE — BH INPATIENT PSYCHIATRY ASSESSMENT NOTE - NSBHASSESSSUMMFT_PSY_ALL_CORE
Pt is a 45 yo M, single, non-caregiver, domiciled at [facility], psych hx of  Schizophrenia vs Schizoaffective Disorder, multiple prior admissions including state (last inpt  [facility] April 2024, [facility] 2009 and [phone]), per chart cannabis use disorder (residence staff denies recent use and utox negative for cannabis in ed), no known hx suicide attempt or violence, follows at [facility] on [place] grounds, no known legal issues, PMH HTN (not on meds) and anemia per psyckes, brought in by EMS called by residence staff after pt reportedly threw out all his clothes yesterday in response to CAH, reportedly CAH to harm himself.      Admitted on a 939 legal status, calm here but hallucinations, disorganized, not cooperative.  reported to be chronically psychotic, unclear if/why recent worsending.  1. Contineu 939 hospitalization for stabilization and safety.  2. haldol ativan prns, routine checks appropriate, reports he will tell staff if he feels unsafe, marcial active si/hi intents or plans  3. continue depakote 1500 at bedtime, quetiapine 200 at bedtime, haldol 20 at bedtime.  trying to confirm aristada with Dr. Metz (next outpatient apointment 9/16)  4. previous ppd indeterminate, cxr negative  5. reportedly with htn but refuses vitals, no antihypertensives ordered at this time.

## 2024-08-27 NOTE — BH SOCIAL WORK INITIAL PSYCHOSOCIAL EVALUATION - INSURANCE HELP
Pharmacy requesting haley on Vitamin D3 for the Dosage the instructions states 2,000 mg but the dosage is 1000 so they want to know if she is to take to 2 pills  
no

## 2024-08-27 NOTE — BH PATIENT PROFILE - FALL HARM RISK - UNIVERSAL INTERVENTIONS
Bed in lowest position, wheels locked, appropriate side rails in place/Call bell, personal items and telephone in reach/Instruct patient to call for assistance before getting out of bed or chair/Non-slip footwear when patient is out of bed/[place] to call system/Physically safe environment - no spills, clutter or unnecessary equipment/Purposeful Proactive Rounding/Room/bathroom lighting operational, light cord in reach

## 2024-08-27 NOTE — BH INPATIENT PSYCHIATRY ASSESSMENT NOTE - CURRENT MEDICATION
MEDICATIONS  (STANDING):  divalproex ER 1500 milliGRAM(s) Oral at bedtime  haloperidol     Tablet 20 milliGRAM(s) Oral at bedtime  QUEtiapine 200 milliGRAM(s) Oral at bedtime    MEDICATIONS  (PRN):  haloperidol     Tablet 5 milliGRAM(s) Oral every 6 hours PRN combativeness due to schizophrenia  haloperidol    Injectable 5 milliGRAM(s) IntraMuscular once PRN Agitation  LORazepam     Tablet 2 milliGRAM(s) Oral every 6 hours PRN anxiety due to schizophrenia  LORazepam   Injectable 2 milliGRAM(s) IntraMuscular once PRN anxiety due to schizophrenia

## 2024-08-28 PROCEDURE — 99232 SBSQ HOSP IP/OBS MODERATE 35: CPT

## 2024-08-28 RX ADMIN — Medication 20 MILLIGRAM(S): at 20:37

## 2024-08-28 RX ADMIN — Medication 1 MILLIGRAM(S): at 20:37

## 2024-08-28 RX ADMIN — Medication 5 MILLIGRAM(S): at 16:56

## 2024-08-28 RX ADMIN — Medication 1500 MILLIGRAM(S): at 20:37

## 2024-08-28 RX ADMIN — Medication 2 MILLIGRAM(S): at 16:53

## 2024-08-28 RX ADMIN — QUETIAPINE FUMARATE 200 MILLIGRAM(S): 50 TABLET, FILM COATED ORAL at 20:37

## 2024-08-28 NOTE — BH INPATIENT PSYCHIATRY PROGRESS NOTE - NSBHCHARTREVIEWVS_PSY_A_CORE FT
Vital Signs Last 24 Hrs  T(C): 36.7 (08-28-24 @ 07:50), Max: 36.7 (08-28-24 @ 07:50)  T(F): 98.1 (08-28-24 @ 07:50), Max: 98.1 (08-28-24 @ 07:50)      Orthostatic VS  08-28-24 @ 07:50  Lying BP: --/-- HR: --  Sitting BP: 124/78 HR: 112  Standing BP: 113/69 HR: 120  Site: --  Mode: --  Orthostatic VS  08-26-24 @ 23:58  Lying BP: --/-- HR: --  Sitting BP: 122/76 HR: 95  Standing BP: 133/77 HR: 105  Site: --  Mode: --

## 2024-08-28 NOTE — BH INPATIENT PSYCHIATRY PROGRESS NOTE - CURRENT MEDICATION
MEDICATIONS  (STANDING):  divalproex ER 1500 milliGRAM(s) Oral at bedtime  haloperidol     Tablet 20 milliGRAM(s) Oral at bedtime  LORazepam     Tablet 1 milliGRAM(s) Oral three times a day  QUEtiapine 200 milliGRAM(s) Oral at bedtime    MEDICATIONS  (PRN):  haloperidol     Tablet 5 milliGRAM(s) Oral every 6 hours PRN combativeness due to schizophrenia  haloperidol    Injectable 5 milliGRAM(s) IntraMuscular once PRN Agitation  LORazepam     Tablet 2 milliGRAM(s) Oral every 6 hours PRN anxiety due to schizophrenia  LORazepam   Injectable 2 milliGRAM(s) IntraMuscular once PRN anxiety due to schizophrenia

## 2024-08-28 NOTE — BH INPATIENT PSYCHIATRY PROGRESS NOTE - NSBHFUPINTERVALHXFT_PSY_A_CORE
Patient with some bizarre disruptive behaviors last night--running water fountain and allowign water to flood floor.  Going to garbage can and re-arranging garbage in it.  Some yelling and screaming, "This is how I have fun!".  Some HR 110s-120s, refused interview with me today, less disruptive, will add ativan one mg three times daily for possible withdrawal effects (per housing noknown substance use but per chart history of substance use.  Also possible agitated catatonia given water sign and arranging garbage in garbage cans.  Compliant with meds, po prns at 2130 for above mentioned behaviors.

## 2024-08-28 NOTE — BH INPATIENT PSYCHIATRY PROGRESS NOTE - NSBHASSESSSUMMFT_PSY_ALL_CORE
Pt is a 43 yo M, single, non-caregiver, domiciled at [facility], psych hx of  Schizophrenia vs Schizoaffective Disorder, multiple prior admissions including state (last inpt  [facility] April 2024, [facility] 2009 and [phone]), per chart cannabis use disorder (residence staff denies recent use and utox negative for cannabis in ed), no known hx suicide attempt or violence, follows at [facility] on [place] grounds, no known legal issues, PMH HTN (not on meds) and anemia per psyckes, brought in by EMS called by residence staff after pt reportedly threw out all his clothes yesterday in response to CAH, reportedly CAH to harm himself.   Admitted on a 939 legal status, calm here but hallucinations, disorganized, not cooperative.  reported to be chronically psychotic, unclear if/why recent worsening.    8/28--bizarre disruptive behaviors last night, calmer today but uncooperative  1. Contineu 939 hospitalization for stabilization and safety.  2. haldol ativan prns, routine checks appropriate, reports he will tell staff if he feels unsafe, marcial active si/hi intents or plans  3. added ativan one mg three times daily for anxiety/withdrawal/agitated catatonia? continue depakote 1500 at bedtime, quetiapine 200 at bedtime, haldol 20 at bedtime.  trying to confirm aristada with Dr. Metz (next outpatient apointment 9/16)  4. previous ppd indeterminate, cxr negative  5. reportedly with htn but refuses vitals, no antihypertensives ordered at this time.

## 2024-08-28 NOTE — BH INPATIENT PSYCHIATRY PROGRESS NOTE - NSBHMETABOLIC_PSY_ALL_CORE_FT
BMI: BMI (kg/m2): 33 (08-26-24 @ 23:58)  HbA1c:   Glucose:   BP: 113/76 (08-26-24 @ 23:08) (113/76 - 128/80)Vital Signs Last 24 Hrs  T(C): 36.7 (08-28-24 @ 07:50), Max: 36.7 (08-28-24 @ 07:50)  T(F): 98.1 (08-28-24 @ 07:50), Max: 98.1 (08-28-24 @ 07:50)  HR: --  BP: --  BP(mean): --  RR: --  SpO2: --    Orthostatic VS  08-28-24 @ 07:50  Lying BP: --/-- HR: --  Sitting BP: 124/78 HR: 112  Standing BP: 113/69 HR: 120  Site: --  Mode: --  Orthostatic VS  08-26-24 @ 23:58  Lying BP: --/-- HR: --  Sitting BP: 122/76 HR: 95  Standing BP: 133/77 HR: 105  Site: --  Mode: --    Lipid Panel: Date/Time: 04-19-24 @ 08:58  Cholesterol, Serum: 193  LDL Cholesterol Calculated: 127  HDL Cholesterol, Serum: 43  Total Cholesterol/HDL Ration Measurement: --  Triglycerides, Serum: 116

## 2024-08-28 NOTE — BH INPATIENT PSYCHIATRY PROGRESS NOTE - PRN MEDS
MEDICATIONS  (PRN):  haloperidol     Tablet 5 milliGRAM(s) Oral every 6 hours PRN combativeness due to schizophrenia  haloperidol    Injectable 5 milliGRAM(s) IntraMuscular once PRN Agitation  LORazepam     Tablet 2 milliGRAM(s) Oral every 6 hours PRN anxiety due to schizophrenia  LORazepam   Injectable 2 milliGRAM(s) IntraMuscular once PRN anxiety due to schizophrenia

## 2024-08-29 PROCEDURE — 99232 SBSQ HOSP IP/OBS MODERATE 35: CPT

## 2024-08-29 RX ADMIN — Medication 5 MILLIGRAM(S): at 08:11

## 2024-08-29 RX ADMIN — QUETIAPINE FUMARATE 200 MILLIGRAM(S): 50 TABLET, FILM COATED ORAL at 20:50

## 2024-08-29 RX ADMIN — Medication 2 MILLIGRAM(S): at 20:50

## 2024-08-29 RX ADMIN — Medication 1500 MILLIGRAM(S): at 20:50

## 2024-08-29 RX ADMIN — Medication 2 MILLIGRAM(S): at 13:19

## 2024-08-29 RX ADMIN — Medication 20 MILLIGRAM(S): at 20:50

## 2024-08-29 RX ADMIN — Medication 1 MILLIGRAM(S): at 08:11

## 2024-08-29 NOTE — BH INPATIENT PSYCHIATRY PROGRESS NOTE - NSBHASSESSSUMMFT_PSY_ALL_CORE
Pt is a 43 yo M, single, non-caregiver, domiciled at [facility], psych hx of  Schizophrenia vs Schizoaffective Disorder, multiple prior admissions including state (last inpt  [facility] April 2024, [facility] 2009 and [id number]), per chart cannabis use disorder (residence staff denies recent use and utox negative for cannabis in ed), no known hx suicide attempt or violence, follows at [facility] on [place] grounds, no known legal issues, PMH HTN (not on meds) and anemia per psyckes, brought in by EMS called by residence staff after pt reportedly threw out all his clothes yesterday in response to CAH, reportedly CAH to harm himself.   Admitted on a 939 legal status, calm here but hallucinations, disorganized, not cooperative.  reported to be chronically psychotic, unclear if/why recent worsening.    8/29--lesss bizarrre but irritable, reports cah when upset.  poor adls.  increasing ativan to two mg three tiems daily.  will retry contact with Dr. Metz.  1. Contineu 939 hospitalization for stabilization and safety.  2. haldol ativan prns, routine checks appropriate, reports he will tell staff if he feels unsafe, marcial active si/hi intents or plans  3. increased ativan to two mg three times daily for anxiety/withdrawal/agitated catatonia? continue depakote 1500 at bedtime, quetiapine 200 at bedtime, haldol 20 at bedtime.  added mouth checks 8/28.  trying to confirm aristada with Dr. Metz (next outpatient apointment 9/16)  4. previous ppd indeterminate, cxr negative  5. reportedly with htn but refuses vitals, no antihypertensives ordered at this time.

## 2024-08-29 NOTE — BH INPATIENT PSYCHIATRY PROGRESS NOTE - NSBHCHARTREVIEWVS_PSY_A_CORE FT
Vital Signs Last 24 Hrs  T(C): 36.9 (08-29-24 @ 08:07), Max: 36.9 (08-29-24 @ 08:07)  T(F): 98.4 (08-29-24 @ 08:07), Max: 98.4 (08-29-24 @ 08:07)  HR: --  BP: --  BP(mean): --  RR: --  SpO2: --    Orthostatic VS  08-29-24 @ 08:07  Lying BP: --/-- HR: --  Sitting BP: 110/62 HR: 93  Standing BP: 130/64 HR: 115  Site: --  Mode: --  Orthostatic VS  08-28-24 @ 07:50  Lying BP: --/-- HR: --  Sitting BP: 124/78 HR: 112  Standing BP: 113/69 HR: 120  Site: --  Mode: --

## 2024-08-29 NOTE — BH INPATIENT PSYCHIATRY PROGRESS NOTE - CURRENT MEDICATION
MEDICATIONS  (STANDING):  divalproex ER 1500 milliGRAM(s) Oral at bedtime  haloperidol     Tablet 20 milliGRAM(s) Oral at bedtime  LORazepam     Tablet 2 milliGRAM(s) Oral three times a day  QUEtiapine 200 milliGRAM(s) Oral at bedtime    MEDICATIONS  (PRN):  haloperidol     Tablet 5 milliGRAM(s) Oral every 6 hours PRN combativeness due to schizophrenia  haloperidol    Injectable 5 milliGRAM(s) IntraMuscular once PRN Agitation  LORazepam     Tablet 2 milliGRAM(s) Oral every 6 hours PRN anxiety due to schizophrenia  LORazepam   Injectable 2 milliGRAM(s) IntraMuscular once PRN anxiety due to schizophrenia

## 2024-08-29 NOTE — BH INPATIENT PSYCHIATRY PROGRESS NOTE - NSBHFUPINTERVALHXFT_PSY_A_CORE
PSlept.  PO prns last night at 1652 for yelling and screamin in room.  PO prns this am 0811 irritability around poor snoring.  He is irritable and yells at times easily angered and when he is angry reports hearing voices to kill people (such as peer who is snoring).  Placing him on CO will only serve to exacerbate his paranoia and make him a greater threat.  He seems to make these statements out of anger/internal monologue although we do believe he experiences hallucinations).  We increased his ativan standing to 2mg three times daily and are moving him to a single room.  We are also encouraging adls as he is malodorous with poor hygiene.  He reports he does not shower because the water is too cold.  Addressing with nursing staff.

## 2024-08-29 NOTE — BH INPATIENT PSYCHIATRY PROGRESS NOTE - NSBHMETABOLIC_PSY_ALL_CORE_FT
BMI: BMI (kg/m2): 33 (08-26-24 @ 23:58)  HbA1c:   Glucose:   BP: 113/76 (08-26-24 @ 23:08) (113/76 - 113/76)Vital Signs Last 24 Hrs  T(C): 36.9 (08-29-24 @ 08:07), Max: 36.9 (08-29-24 @ 08:07)  T(F): 98.4 (08-29-24 @ 08:07), Max: 98.4 (08-29-24 @ 08:07)  HR: --  BP: --  BP(mean): --  RR: --  SpO2: --    Orthostatic VS  08-29-24 @ 08:07  Lying BP: --/-- HR: --  Sitting BP: 110/62 HR: 93  Standing BP: 130/64 HR: 115  Site: --  Mode: --  Orthostatic VS  08-28-24 @ 07:50  Lying BP: --/-- HR: --  Sitting BP: 124/78 HR: 112  Standing BP: 113/69 HR: 120  Site: --  Mode: --    Lipid Panel: Date/Time: 04-19-24 @ 08:58  Cholesterol, Serum: 193  LDL Cholesterol Calculated: 127  HDL Cholesterol, Serum: 43  Total Cholesterol/HDL Ration Measurement: --  Triglycerides, Serum: 116

## 2024-08-30 PROCEDURE — 99232 SBSQ HOSP IP/OBS MODERATE 35: CPT

## 2024-08-30 RX ADMIN — Medication 2 MILLIGRAM(S): at 08:47

## 2024-08-30 RX ADMIN — Medication 2 MILLIGRAM(S): at 13:24

## 2024-08-30 RX ADMIN — Medication 2 MILLIGRAM(S): at 20:00

## 2024-08-30 RX ADMIN — QUETIAPINE FUMARATE 200 MILLIGRAM(S): 50 TABLET, FILM COATED ORAL at 20:00

## 2024-08-30 RX ADMIN — Medication 1500 MILLIGRAM(S): at 20:00

## 2024-08-30 RX ADMIN — Medication 20 MILLIGRAM(S): at 20:00

## 2024-08-30 NOTE — BH INPATIENT PSYCHIATRY PROGRESS NOTE - NSBHCHARTREVIEWVS_PSY_A_CORE FT
Vital Signs Last 24 Hrs  T(C): --  T(F): --  HR: --  BP: --  BP(mean): --  RR: --  SpO2: --    Orthostatic VS  08-29-24 @ 08:07  Lying BP: --/-- HR: --  Sitting BP: 110/62 HR: 93  Standing BP: 130/64 HR: 115  Site: --  Mode: --

## 2024-08-30 NOTE — BH INPATIENT PSYCHIATRY PROGRESS NOTE - NSBHASSESSSUMMFT_PSY_ALL_CORE
Pt is a 45 yo M, single, non-caregiver, domiciled at [facility], psych hx of  Schizophrenia vs Schizoaffective Disorder, multiple prior admissions including state (last inpt  [facility] April 2024, [facility] 2009 and [id number]), per chart cannabis use disorder (residence staff denies recent use and utox negative for cannabis in ed), no known hx suicide attempt or violence, follows at [facility] on [place] grounds, no known legal issues, PMH HTN (not on meds) and anemia per psyckes, brought in by EMS called by residence staff after pt reportedly threw out all his clothes yesterday in response to CAH, reportedly CAH to harm himself.   Admitted on a 939 legal status, calm here but hallucinations, disorganized, not cooperative.  reported to be chronically psychotic, unclear if/why recent worsening.    8/30--denies CAH, denies active si but is internally preoccupied, disorganized.  1. Contineu 939 hospitalization for stabilization and safety.  2. haldol ativan prns, routine checks appropriate, reports he will tell staff if he feels unsafe, marcial active si/hi intents or plans  3. increased ativan to two mg three times daily for anxiety/withdrawal/agitated catatonia? continue depakote 1500 at bedtime, quetiapine 200 at bedtime, haldol 20 at bedtime.  added mouth checks 8/28.  trying to confirm aristada with Dr. Metz (next outpatient apointment 9/16)  4. previous ppd indeterminate, cxr negative  5. reportedly with htn but refuses vitals, no antihypertensives ordered at this time.

## 2024-08-30 NOTE — BH INPATIENT PSYCHIATRY PROGRESS NOTE - NSBHMETABOLIC_PSY_ALL_CORE_FT
BMI: BMI (kg/m2): 33 (08-26-24 @ 23:58)  HbA1c:   Glucose:   BP: --Vital Signs Last 24 Hrs  T(C): --  T(F): --  HR: --  BP: --  BP(mean): --  RR: --  SpO2: --    Orthostatic VS  08-29-24 @ 08:07  Lying BP: --/-- HR: --  Sitting BP: 110/62 HR: 93  Standing BP: 130/64 HR: 115  Site: --  Mode: --    Lipid Panel: Date/Time: 04-19-24 @ 08:58  Cholesterol, Serum: 193  LDL Cholesterol Calculated: 127  HDL Cholesterol, Serum: 43  Total Cholesterol/HDL Ration Measurement: --  Triglycerides, Serum: 116

## 2024-08-30 NOTE — BH INPATIENT PSYCHIATRY PROGRESS NOTE - NSBHFUPINTERVALHXFT_PSY_A_CORE
He slept, compliant no prns.  He is somewhat unpleasant but less irritable, although he reports "Not today," when we initially approach for interview.  Nursing relates some passive SI but no active intents or plans.  He deneis cah and reports voices making "heinous" statements.

## 2024-08-31 PROCEDURE — 99232 SBSQ HOSP IP/OBS MODERATE 35: CPT

## 2024-08-31 RX ADMIN — Medication 20 MILLIGRAM(S): at 20:16

## 2024-08-31 RX ADMIN — Medication 2 MILLIGRAM(S): at 08:02

## 2024-08-31 RX ADMIN — QUETIAPINE FUMARATE 200 MILLIGRAM(S): 50 TABLET, FILM COATED ORAL at 20:16

## 2024-08-31 RX ADMIN — Medication 2 MILLIGRAM(S): at 12:15

## 2024-08-31 RX ADMIN — Medication 2 MILLIGRAM(S): at 20:16

## 2024-08-31 RX ADMIN — Medication 1500 MILLIGRAM(S): at 20:16

## 2024-08-31 NOTE — BH INPATIENT PSYCHIATRY PROGRESS NOTE - NSBHMETABOLIC_PSY_ALL_CORE_FT
BMI: BMI (kg/m2): 33 (08-26-24 @ 23:58)  HbA1c:   Glucose:   BP: --Vital Signs Last 24 Hrs  T(C): 36.6 (08-31-24 @ 08:22), Max: 36.6 (08-31-24 @ 08:22)  T(F): 97.8 (08-31-24 @ 08:22), Max: 97.8 (08-31-24 @ 08:22)  HR: --  BP: --  BP(mean): --  RR: --  SpO2: --    Orthostatic VS  08-31-24 @ 08:22  Lying BP: --/-- HR: --  Sitting BP: 130/78 HR: 79  Standing BP: 134/80 HR: 88  Site: upper left arm  Mode: electronic    Lipid Panel: Date/Time: 04-19-24 @ 08:58  Cholesterol, Serum: 193  LDL Cholesterol Calculated: 127  HDL Cholesterol, Serum: 43  Total Cholesterol/HDL Ration Measurement: --  Triglycerides, Serum: 116

## 2024-08-31 NOTE — BH INPATIENT PSYCHIATRY PROGRESS NOTE - NSBHMSESPABN_PSY_A_CORE
Soft volume/Slowed rate/Decreased productivity/Increased latency
Soft volume/Slowed rate/Decreased productivity
Soft volume/Slowed rate/Decreased productivity/Increased latency
Soft volume/Slowed rate/Decreased productivity/Increased latency

## 2024-08-31 NOTE — BH INPATIENT PSYCHIATRY PROGRESS NOTE - NSBHFUPINTERVALHXFT_PSY_A_CORE
f/up SAD, vitals stable. Patient reported fair sleep and appetite. Was illogical and disorganized in his thoughts, which made it difficult to carry out a conversation. taking meds, no SE reported.

## 2024-08-31 NOTE — BH INPATIENT PSYCHIATRY PROGRESS NOTE - NSBHMSETHTPROC_PSY_A_CORE
concrete/Disorganized/Thought blocking/Other

## 2024-08-31 NOTE — BH INPATIENT PSYCHIATRY PROGRESS NOTE - NSBHCHARTREVIEWVS_PSY_A_CORE FT
Vital Signs Last 24 Hrs  T(C): 36.6 (08-31-24 @ 08:22), Max: 36.6 (08-31-24 @ 08:22)  T(F): 97.8 (08-31-24 @ 08:22), Max: 97.8 (08-31-24 @ 08:22)  HR: --  BP: --  BP(mean): --  RR: --  SpO2: --    Orthostatic VS  08-31-24 @ 08:22  Lying BP: --/-- HR: --  Sitting BP: 130/78 HR: 79  Standing BP: 134/80 HR: 88  Site: upper left arm  Mode: electronic

## 2024-08-31 NOTE — BH INPATIENT PSYCHIATRY PROGRESS NOTE - NSBHASSESSSUMMFT_PSY_ALL_CORE
Pt is a 45 yo M, single, non-caregiver, domiciled at [facility], psych hx of  Schizophrenia vs Schizoaffective Disorder, multiple prior admissions including state (last inpt  [facility] April 2024, [facility] 2009 and [id number]), per chart cannabis use disorder (residence staff denies recent use and utox negative for cannabis in ed), no known hx suicide attempt or violence, follows at [facility] on [place] grounds, no known legal issues, PMH HTN (not on meds) and anemia per psyckes, brought in by EMS called by residence staff after pt reportedly threw out all his clothes yesterday in response to CAH, reportedly CAH to harm himself.   Admitted on a 939 legal status, calm here but hallucinations, disorganized, not cooperative.  reported to be chronically psychotic, unclear if/why recent worsening.    on assessment, remains disorganized, with illogical, concrete tp. taking meds.     1. Contineu 939 hospitalization for stabilization and safety.  2. haldol ativan prns, routine checks appropriate, reports he will tell staff if he feels unsafe, marcial active si/hi intents or plans  3. increased ativan to two mg three times daily for anxiety/withdrawal/agitated catatonia? continue depakote 1500 at bedtime, quetiapine 200 at bedtime, haldol 20 at bedtime.  added mouth checks 8/28.  trying to confirm aristada with Dr. Metz (next outpatient apointment 9/16)  4. previous ppd indeterminate, cxr negative  5. reportedly with htn but refuses vitals, no antihypertensives ordered at this time.

## 2024-09-01 RX ADMIN — Medication 2 MILLIGRAM(S): at 08:05

## 2024-09-01 RX ADMIN — Medication 1500 MILLIGRAM(S): at 20:32

## 2024-09-01 RX ADMIN — Medication 2 MILLIGRAM(S): at 12:34

## 2024-09-01 RX ADMIN — Medication 2 MILLIGRAM(S): at 20:31

## 2024-09-01 RX ADMIN — QUETIAPINE FUMARATE 200 MILLIGRAM(S): 50 TABLET, FILM COATED ORAL at 20:32

## 2024-09-01 RX ADMIN — Medication 20 MILLIGRAM(S): at 20:31

## 2024-09-02 RX ADMIN — Medication 2 MILLIGRAM(S): at 08:06

## 2024-09-02 RX ADMIN — Medication 1500 MILLIGRAM(S): at 20:21

## 2024-09-02 RX ADMIN — QUETIAPINE FUMARATE 200 MILLIGRAM(S): 50 TABLET, FILM COATED ORAL at 20:22

## 2024-09-02 RX ADMIN — Medication 20 MILLIGRAM(S): at 20:22

## 2024-09-02 RX ADMIN — Medication 2 MILLIGRAM(S): at 12:49

## 2024-09-02 RX ADMIN — Medication 2 MILLIGRAM(S): at 20:22

## 2024-09-03 RX ADMIN — Medication 2 MILLIGRAM(S): at 20:25

## 2024-09-03 RX ADMIN — Medication 20 MILLIGRAM(S): at 20:25

## 2024-09-03 RX ADMIN — Medication 2 MILLIGRAM(S): at 12:55

## 2024-09-03 RX ADMIN — Medication 2 MILLIGRAM(S): at 09:41

## 2024-09-03 RX ADMIN — Medication 1500 MILLIGRAM(S): at 20:25

## 2024-09-03 RX ADMIN — QUETIAPINE FUMARATE 200 MILLIGRAM(S): 50 TABLET, FILM COATED ORAL at 20:26

## 2024-09-04 RX ADMIN — Medication 2 MILLIGRAM(S): at 12:11

## 2024-09-04 RX ADMIN — Medication 2 MILLIGRAM(S): at 08:02

## 2024-09-04 RX ADMIN — QUETIAPINE FUMARATE 200 MILLIGRAM(S): 50 TABLET, FILM COATED ORAL at 20:31

## 2024-09-04 RX ADMIN — Medication 1500 MILLIGRAM(S): at 20:31

## 2024-09-04 RX ADMIN — Medication 2 MILLIGRAM(S): at 20:31

## 2024-09-04 RX ADMIN — Medication 20 MILLIGRAM(S): at 20:31

## 2024-09-04 NOTE — BH INPATIENT PSYCHIATRY PROGRESS NOTE - NSBHCHARTREVIEWVS_PSY_A_CORE FT
Vital Signs Last 24 Hrs  T(C): 36.3 (09-04-24 @ 08:21), Max: 36.3 (09-04-24 @ 08:21)  T(F): 97.4 (09-04-24 @ 08:21), Max: 97.4 (09-04-24 @ 08:21)  HR: --  BP: --  BP(mean): --  RR: 18 (09-04-24 @ 08:21) (18 - 18)  SpO2: --    Orthostatic VS  09-04-24 @ 08:21  Lying BP: --/-- HR: --  Sitting BP: 96/66 HR: 89  Standing BP: 99/66 HR: 99  Site: --  Mode: --  Orthostatic VS  09-03-24 @ 08:24  Lying BP: --/-- HR: --  Sitting BP: 114/60 HR: 92  Standing BP: 113/59 HR: 101  Site: --  Mode: --   Vital Signs Last 24 Hrs  T(C): 36.9 (09-05-24 @ 07:59), Max: 36.9 (09-05-24 @ 07:59)  T(F): 98.4 (09-05-24 @ 07:59), Max: 98.4 (09-05-24 @ 07:59)  HR: --  BP: --  BP(mean): --  RR: --  SpO2: --    Orthostatic VS  09-05-24 @ 07:59  Lying BP: --/-- HR: --  Sitting BP: --/-- HR: --  Standing BP: 106/81 HR: 97  Site: --  Mode: --  Orthostatic VS  09-04-24 @ 08:21  Lying BP: --/-- HR: --  Sitting BP: 96/66 HR: 89  Standing BP: 99/66 HR: 99  Site: --  Mode: --

## 2024-09-04 NOTE — BH INPATIENT PSYCHIATRY PROGRESS NOTE - NSBHMETABOLIC_PSY_ALL_CORE_FT
BMI: BMI (kg/m2): 33 (08-26-24 @ 23:58)  HbA1c: pending  Glucose:   BP: --Vital Signs Last 24 Hrs  T(C): 36.3 (09-04-24 @ 08:21), Max: 36.3 (09-04-24 @ 08:21)  T(F): 97.4 (09-04-24 @ 08:21), Max: 97.4 (09-04-24 @ 08:21)  HR: --  BP: --  BP(mean): --  RR: 18 (09-04-24 @ 08:21) (18 - 18)  SpO2: --    Orthostatic VS  09-04-24 @ 08:21  Lying BP: --/-- HR: --  Sitting BP: 96/66 HR: 89  Standing BP: 99/66 HR: 99  Site: --  Mode: --  Orthostatic VS  09-03-24 @ 08:24  Lying BP: --/-- HR: --  Sitting BP: 114/60 HR: 92  Standing BP: 113/59 HR: 101  Site: --  Mode: --    Lipid Panel: Date/Time: 04-19-24 @ 08:58  Cholesterol, Serum: 193  LDL Cholesterol Calculated: 127  HDL Cholesterol, Serum: 43  Total Cholesterol/HDL Ration Measurement: --  Triglycerides, Serum: 116   BMI: BMI (kg/m2): 33 (08-26-24 @ 23:58)  HbA1c:   Glucose:   BP: --Vital Signs Last 24 Hrs  T(C): 36.9 (09-05-24 @ 07:59), Max: 36.9 (09-05-24 @ 07:59)  T(F): 98.4 (09-05-24 @ 07:59), Max: 98.4 (09-05-24 @ 07:59)  HR: --  BP: --  BP(mean): --  RR: --  SpO2: --    Orthostatic VS  09-05-24 @ 07:59  Lying BP: --/-- HR: --  Sitting BP: --/-- HR: --  Standing BP: 106/81 HR: 97  Site: --  Mode: --  Orthostatic VS  09-04-24 @ 08:21  Lying BP: --/-- HR: --  Sitting BP: 96/66 HR: 89  Standing BP: 99/66 HR: 99  Site: --  Mode: --    Lipid Panel: Date/Time: 04-19-24 @ 08:58  Cholesterol, Serum: 193  LDL Cholesterol Calculated: 127  HDL Cholesterol, Serum: 43  Total Cholesterol/HDL Ration Measurement: --  Triglycerides, Serum: 116

## 2024-09-04 NOTE — BH INPATIENT PSYCHIATRY PROGRESS NOTE - NSBHASSESSSUMMFT_PSY_ALL_CORE
Pt is a 43 yo M, single, non-caregiver, domiciled at [facility], psych hx of  Schizophrenia vs Schizoaffective Disorder, multiple prior admissions including state (last inpt  [facility] April 2024, [facility] 2009 and [id number]), per chart cannabis use disorder (residence staff denies recent use and utox negative for cannabis in ed), no known hx suicide attempt or violence, follows at [facility] on [place] grounds, no known legal issues, PMH HTN (not on meds) and anemia per psyckes, brought in by EMS called by residence staff after pt reportedly threw out all his clothes yesterday in response to CAH, reportedly CAH to harm himself.   Admitted on a 939 legal status, calm here but hallucinations, disorganized, not cooperative.  reported to be chronically psychotic, unclear if/why recent worsening.    on assessment, continues to display disorganization and illogical and concrete thought process. calm and cooperative, no longer endorsing CAH, continues to be adherent to medications.     1. Continue 939 hospitalization for stabilization and safety.  2. haldol ativan prns, routine checks appropriate, reports he will tell staff if he feels unsafe, marcial active si/hi intents or plans  3. continue ativan 2 mg TID for anxiety/withdrawal/agitated catatonia? continue depakote 1500 at bedtime, quetiapine 200 at bedtime, haldol 20 at bedtime.  added mouth checks 8/28.  trying to confirm aristada with Dr. Metz (next outpatient apointment 9/16)  4. previous ppd indeterminate, cxr negative  5. reportedly with htn but refuses vitals, no antihypertensives ordered at this time.

## 2024-09-04 NOTE — BH INPATIENT PSYCHIATRY PROGRESS NOTE - NSBHATTESTCOMMENTATTENDFT_PSY_A_CORE
Pt has been in better behavioral control thought remains with frequent AH and very poor self care. Continuing with Depakote, Haldol, Seroquel, and Ativan for now. Will work with SW on dispo.

## 2024-09-04 NOTE — BH INPATIENT PSYCHIATRY PROGRESS NOTE - NSBHFUPINTERVALHXFT_PSY_A_CORE
24 hour events: patient was attempting to trade [id number] bars for sex from another patient. When addressed, patient apologized for this behavior and stopped.    On assessment today, patient presents as calm and cooperative, though still disorganized, concrete, and with thought blocking. Patient states he is feeling "okay," is sleeping well, and has a good appetite. He states he misses his family and that his family motivates him to keep going on.     Patient continues to hear voices, as recently as 10 minutes before interviewer entered room. These voices say things about "negativity" and "pride degradement," but are not commanding him to do anything. He denies VH and SI/HI.

## 2024-09-04 NOTE — BH INPATIENT PSYCHIATRY PROGRESS NOTE - PRN MEDS
MEDICATIONS  (PRN):  haloperidol     Tablet 5 milliGRAM(s) Oral every 6 hours PRN combativeness due to schizophrenia  haloperidol    Injectable 5 milliGRAM(s) IntraMuscular once PRN Agitation

## 2024-09-04 NOTE — BH INPATIENT PSYCHIATRY PROGRESS NOTE - CURRENT MEDICATION
MEDICATIONS  (STANDING):  divalproex ER 1500 milliGRAM(s) Oral at bedtime  haloperidol     Tablet 20 milliGRAM(s) Oral at bedtime  LORazepam     Tablet 2 milliGRAM(s) Oral three times a day  QUEtiapine 200 milliGRAM(s) Oral at bedtime    MEDICATIONS  (PRN):  haloperidol     Tablet 5 milliGRAM(s) Oral every 6 hours PRN combativeness due to schizophrenia  haloperidol    Injectable 5 milliGRAM(s) IntraMuscular once PRN Agitation

## 2024-09-04 NOTE — BH INPATIENT PSYCHIATRY PROGRESS NOTE - MSE UNSTRUCTURED FT
The patient appears stated age, poor hygiene, malodorous, unkempt.  The patient was calm, cooperative with the interview and maintained appropriate eye contact.  No psychomotor agitation or retardation noted.  Steady gait observed.  The patient's speech was fluent, normal in tone, rate, and volume.  The patient's mood is "okay."  Affect is blunted, stable and appropriate.  The patient's thoughts are somewhat goal directed, though mostly concrete. Thought process is circumferential and thought blocking noted.  Denies any delusions and visual hallucinations, though endorses auditory hallucinations of "negativity", denies any CAH. Denies any suicidal or homicidal ideation, intent, or plan.  Insight is poor to fair.  Judgment is poor to fair.  Impulse control has been fair on the unit. Thalidomide Counseling: I discussed with the patient the risks of thalidomide including but not limited to birth defects, anxiety, weakness, chest pain, dizziness, cough and severe allergy.

## 2024-09-05 PROCEDURE — 99232 SBSQ HOSP IP/OBS MODERATE 35: CPT | Mod: GC

## 2024-09-05 RX ORDER — ACETAMINOPHEN 325 MG
650 TABLET ORAL ONCE
Refills: 0 | Status: COMPLETED | OUTPATIENT
Start: 2024-09-05 | End: 2024-09-05

## 2024-09-05 RX ORDER — QUETIAPINE FUMARATE 50 MG/1
100 TABLET, FILM COATED ORAL AT BEDTIME
Refills: 0 | Status: DISCONTINUED | OUTPATIENT
Start: 2024-09-05 | End: 2024-09-06

## 2024-09-05 RX ORDER — ACETAMINOPHEN 325 MG
650 TABLET ORAL EVERY 6 HOURS
Refills: 0 | Status: DISCONTINUED | OUTPATIENT
Start: 2024-09-05 | End: 2024-09-24

## 2024-09-05 RX ADMIN — Medication 20 MILLIGRAM(S): at 20:06

## 2024-09-05 RX ADMIN — Medication 1500 MILLIGRAM(S): at 20:06

## 2024-09-05 RX ADMIN — Medication 2 MILLIGRAM(S): at 20:06

## 2024-09-05 RX ADMIN — Medication 650 MILLIGRAM(S): at 14:57

## 2024-09-05 RX ADMIN — Medication 2 MILLIGRAM(S): at 08:51

## 2024-09-05 RX ADMIN — QUETIAPINE FUMARATE 100 MILLIGRAM(S): 50 TABLET, FILM COATED ORAL at 20:06

## 2024-09-05 NOTE — BH INPATIENT PSYCHIATRY PROGRESS NOTE - CURRENT MEDICATION
MEDICATIONS  (STANDING):  divalproex ER 1500 milliGRAM(s) Oral at bedtime  haloperidol     Tablet 20 milliGRAM(s) Oral at bedtime  LORazepam     Tablet 2 milliGRAM(s) Oral two times a day  QUEtiapine 100 milliGRAM(s) Oral at bedtime    MEDICATIONS  (PRN):  acetaminophen     Tablet .. 650 milliGRAM(s) Oral every 6 hours PRN Mild Pain (1 - 3), Moderate Pain (4 - 6)  haloperidol     Tablet 5 milliGRAM(s) Oral every 6 hours PRN combativeness due to schizophrenia  haloperidol    Injectable 5 milliGRAM(s) IntraMuscular once PRN Agitation   MEDICATIONS  (STANDING):  divalproex ER 1500 milliGRAM(s) Oral at bedtime  haloperidol     Tablet 20 milliGRAM(s) Oral at bedtime  LORazepam     Tablet 2 milliGRAM(s) Oral two times a day  QUEtiapine 100 milliGRAM(s) Oral at bedtime    MEDICATIONS  (PRN):  acetaminophen     Tablet .. 650 milliGRAM(s) Oral every 6 hours PRN Mild Pain (1 - 3), Moderate Pain (4 - 6)  haloperidol     Tablet 5 milliGRAM(s) Oral every 6 hours PRN combativeness due to schizophrenia  haloperidol    Injectable 5 milliGRAM(s) IntraMuscular once PRN Agitation  LORazepam     Tablet 2 milliGRAM(s) Oral every 6 hours PRN agitation  LORazepam   Injectable 2 milliGRAM(s) IntraMuscular once PRN severe agitation due to psychosis

## 2024-09-05 NOTE — BH INPATIENT PSYCHIATRY PROGRESS NOTE - PRN MEDS
MEDICATIONS  (PRN):  acetaminophen     Tablet .. 650 milliGRAM(s) Oral every 6 hours PRN Mild Pain (1 - 3), Moderate Pain (4 - 6)  haloperidol     Tablet 5 milliGRAM(s) Oral every 6 hours PRN combativeness due to schizophrenia  haloperidol    Injectable 5 milliGRAM(s) IntraMuscular once PRN Agitation   MEDICATIONS  (PRN):  acetaminophen     Tablet .. 650 milliGRAM(s) Oral every 6 hours PRN Mild Pain (1 - 3), Moderate Pain (4 - 6)  haloperidol     Tablet 5 milliGRAM(s) Oral every 6 hours PRN combativeness due to schizophrenia  haloperidol    Injectable 5 milliGRAM(s) IntraMuscular once PRN Agitation  LORazepam     Tablet 2 milliGRAM(s) Oral every 6 hours PRN agitation  LORazepam   Injectable 2 milliGRAM(s) IntraMuscular once PRN severe agitation due to psychosis

## 2024-09-05 NOTE — BH INPATIENT PSYCHIATRY PROGRESS NOTE - MSE UNSTRUCTURED FT
The patient appears stated age, poor hygiene, malodorous, unkempt.  The patient was calm, cooperative with the interview and maintained appropriate eye contact, with increased response latency.  No psychomotor agitation or retardation noted.  Steady gait observed.  The patient's speech was fluent with slow rate, normal in tone and volume.  The patient's mood is "okay."  Affect is blunted, stable and appropriate.  The patient's thought content is somewhat goal directed, though mostly concrete and perseverative on his tiny apartment and throwing away his possessions, denies SIIP/HIIP. Thought process is more linear today, though still circumstantial with thought blocking.  Denies any delusions and visual hallucinations, though endorses auditory hallucinations of a female voice, denies any CAH. Insight is poor to fair.  Judgment is poor to fair.  Impulse control has been fair on the unit. The patient appears stated age, poor hygiene, malodorous, unkempt.  The patient was calm, cooperative with the interview and maintained appropriate eye contact.  No psychomotor agitation or retardation noted.  Steady gait observed.  The patient's speech was fluent with slow rate, normal in tone and volume, at times with latency.  The patient's mood is "okay."  Affect is blunted, stable and appropriate. Thought process is more linear today, though still circumstantial with thought blocking, perseverative regarding events leading him to the hospital. Thought content with likely underlying persecutory delusions, denies SIIP and HIIP. Peception: reports ongoing AH, denies CAH.  Insight is poor to fair.  Judgment is poor to fair.  Impulse control has been fair on the unit.

## 2024-09-05 NOTE — BH INPATIENT PSYCHIATRY PROGRESS NOTE - NSBHASSESSSUMMFT_PSY_ALL_CORE
Pt is a 43 yo M, single, non-caregiver, domiciled at [facility], psych hx of  Schizophrenia vs Schizoaffective Disorder, multiple prior admissions including state (last inpt  [facility] April 2024, [facility] 2009 and [id number]), per chart cannabis use disorder (residence staff denies recent use and utox negative for cannabis in ed), no known hx suicide attempt or violence, follows at [facility] on [place] grounds, no known legal issues, PMH HTN (not on meds) and anemia per psyckes, brought in by EMS called by residence staff after pt reportedly threw out all his clothes yesterday in response to CAH, reportedly CAH to harm himself.   Admitted on a 939 legal status, calm here but hallucinations, disorganized, not cooperative.  reported to be chronically psychotic, unclear if/why recent worsening.    on assessment, continues to display disorganization and illogical and concrete thought process. calm and cooperative, no longer endorsing CAH, continues to be adherent to medications.     1. Continue 939 hospitalization for stabilization and safety.  2. haldol ativan prns, routine checks appropriate, reports he will tell staff if he feels unsafe, marcial active si/hi intents or plans  3. decrease ativan to 2 mg BID for anxiety/withdrawal, continue depakote 1500 mg at bedtime, decrease quetiapine to 100 mg at bedtime, continue haldol 20 mg at bedtime. added mouth checks 8/28. Last Aristada 441 on 8/15, confirmed with Dr. Metz (next outpatient apointment 9/16)  4. previous ppd indeterminate, cxr negative  5. reportedly with htn but refuses vitals, no antihypertensives ordered at this time.  Pt is a 43yo single man who is domiciled at [facility] w/ PPHx of dx Schizophrenia vs Schizoaffective Disorder, multiple prior admissions including state (last inpt [facility] April 2024, [facility] 2009 and [id number]), no known hx suicide attempt or violence, follows at [facility] on [place] grounds, brought in by EMS called by staff at residence staff after pt threw away all his clothes and belongings in response to CAH with concern for psychotic decompensation in the context of non-adherence with medication and chronic psychotic symptoms that are possibly treatment-resistant.      Pt remains with frequent AH and likely underlying delusions, also ongoing thought disorder. He remains in better behavioral control. 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     Plan:  - C/w Haldol 20mg qhs  - Taper Seroquel to 100mg qhs, goal to discontinue given limited efficacy and goal to minimize polypharmacy  - S/p Aristada 441mg on 8/15, next due 9/12; pt refuses higher dose  - Will encourage trial of Clozapine for treatment-resistant psychosis; will get baseline labs tomorrow AM (ANC, CRP, Troponin)  - C/w Depakote ER 1500mg qhs (though may be able to discontinue given unclear mood component to pt's illness)  - Taper Ativan to 2mg BID given improved behavioral control and daytime sedation   - Monitor blood pressure given possible hx of HTN

## 2024-09-05 NOTE — BH INPATIENT PSYCHIATRY PROGRESS NOTE - NSBHCHARTREVIEWVS_PSY_A_CORE FT
Vital Signs Last 24 Hrs  T(C): 36.9 (09-05-24 @ 07:59), Max: 36.9 (09-05-24 @ 07:59)  T(F): 98.4 (09-05-24 @ 07:59), Max: 98.4 (09-05-24 @ 07:59)  HR: --  BP: --  BP(mean): --  RR: 16 (09-05-24 @ 14:17) (16 - 16)  SpO2: --    Orthostatic VS  09-05-24 @ 07:59  Lying BP: --/-- HR: --  Sitting BP: --/-- HR: --  Standing BP: 106/81 HR: 97  Site: --  Mode: --  Orthostatic VS  09-04-24 @ 08:21  Lying BP: --/-- HR: --  Sitting BP: 96/66 HR: 89  Standing BP: 99/66 HR: 99  Site: --  Mode: --

## 2024-09-05 NOTE — BH INPATIENT PSYCHIATRY PROGRESS NOTE - NSBHFUPINTERVALHXFT_PSY_A_CORE
No events overnight. On assessment today, patient presents as calm and cooperative, more linear, though still having some thought blocking and endorsing AH. Patient states he is feeling "okay," is sleeping well but is more tired today. He said he slipped and hit his lower back on the bed frame and it now hurts (about 8/10 pain).    Patient recounts what brought him into the hospital and perseverates on his apartment being tiny and not being able to breathe due to cluttered space, which led him to throw out all of his things (medications, clothes, credit card, etc.). He states he had been taking all of his medications on his own and received the Aristada ELIZONDO last month.     Patient continues to hear voices from outside of his body, states that today it is a female voice that he recognizes and is saying "things I like," but does not want to elaborate any further. These voices are not commanding him to do anything. He denies VH and SI/HI.  No events overnight. On assessment today, patient presents as calm and cooperative, more linear, though still having some thought blocking and endorsing AH. Patient states he is feeling "okay," is sleeping well but is more tired today.     Patient recounts what brought him into the hospital and perseverates on his apartment being tiny and not being able to breathe due to cluttered space, which led him to throw out all of his things (medications, clothes, credit card, etc.). He states he had been taking all of his medications on his own and received the Aristada ELIZONDO last month.     Patient continues to hear voices from outside of his body, states that today it is a female voice that he recognizes and is saying "things I like," but does not want to elaborate any further. These voices are not commanding him to do anything. He denies VH and SI/HI.

## 2024-09-05 NOTE — BH INPATIENT PSYCHIATRY PROGRESS NOTE - NSBHMETABOLIC_PSY_ALL_CORE_FT
BMI: BMI (kg/m2): 33 (08-26-24 @ 23:58)  HbA1c:   Glucose:   BP: --Vital Signs Last 24 Hrs  T(C): 36.9 (09-05-24 @ 07:59), Max: 36.9 (09-05-24 @ 07:59)  T(F): 98.4 (09-05-24 @ 07:59), Max: 98.4 (09-05-24 @ 07:59)  HR: --  BP: --  BP(mean): --  RR: 16 (09-05-24 @ 14:17) (16 - 16)  SpO2: --    Orthostatic VS  09-05-24 @ 07:59  Lying BP: --/-- HR: --  Sitting BP: --/-- HR: --  Standing BP: 106/81 HR: 97  Site: --  Mode: --  Orthostatic VS  09-04-24 @ 08:21  Lying BP: --/-- HR: --  Sitting BP: 96/66 HR: 89  Standing BP: 99/66 HR: 99  Site: --  Mode: --    Lipid Panel: Date/Time: 04-19-24 @ 08:58  Cholesterol, Serum: 193  LDL Cholesterol Calculated: 127  HDL Cholesterol, Serum: 43  Total Cholesterol/HDL Ration Measurement: --  Triglycerides, Serum: 116

## 2024-09-05 NOTE — BH INPATIENT PSYCHIATRY PROGRESS NOTE - NSBHATTESTCOMMENTATTENDFT_PSY_A_CORE
Pt remains with frequent AH and likely underlying delusions, also ongoing thought disorder. He remains in better behavioral control. 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 Taper Seroquel and c/w Haldol. May hold off on Aristada due next week.

## 2024-09-06 LAB
A1C WITH ESTIMATED AVERAGE GLUCOSE RESULT: 5.3 % — SIGNIFICANT CHANGE UP (ref 4–5.6)
BASOPHILS # BLD AUTO: 0.03 K/UL — SIGNIFICANT CHANGE UP (ref 0–0.2)
BASOPHILS NFR BLD AUTO: 0.6 % — SIGNIFICANT CHANGE UP (ref 0–2)
CHOLEST SERPL-MCNC: 170 MG/DL — SIGNIFICANT CHANGE UP
CRP SERPL-MCNC: 4.1 MG/L — SIGNIFICANT CHANGE UP
EOSINOPHIL # BLD AUTO: 0.17 K/UL — SIGNIFICANT CHANGE UP (ref 0–0.5)
EOSINOPHIL NFR BLD AUTO: 3.6 % — SIGNIFICANT CHANGE UP (ref 0–6)
ESTIMATED AVERAGE GLUCOSE: 105 — SIGNIFICANT CHANGE UP
HCT VFR BLD CALC: 41.4 % — SIGNIFICANT CHANGE UP (ref 39–50)
HDLC SERPL-MCNC: 43 MG/DL — SIGNIFICANT CHANGE UP
HGB BLD-MCNC: 13.6 G/DL — SIGNIFICANT CHANGE UP (ref 13–17)
IANC: 1.56 K/UL — LOW (ref 1.8–7.4)
IMM GRANULOCYTES NFR BLD AUTO: 0.2 % — SIGNIFICANT CHANGE UP (ref 0–0.9)
LIPID PNL WITH DIRECT LDL SERPL: 108 MG/DL — HIGH
LYMPHOCYTES # BLD AUTO: 2.62 K/UL — SIGNIFICANT CHANGE UP (ref 1–3.3)
LYMPHOCYTES # BLD AUTO: 55.3 % — HIGH (ref 13–44)
MCHC RBC-ENTMCNC: 29.5 PG — SIGNIFICANT CHANGE UP (ref 27–34)
MCHC RBC-ENTMCNC: 32.9 GM/DL — SIGNIFICANT CHANGE UP (ref 32–36)
MCV RBC AUTO: 89.8 FL — SIGNIFICANT CHANGE UP (ref 80–100)
MONOCYTES # BLD AUTO: 0.35 K/UL — SIGNIFICANT CHANGE UP (ref 0–0.9)
MONOCYTES NFR BLD AUTO: 7.4 % — SIGNIFICANT CHANGE UP (ref 2–14)
NEUTROPHILS # BLD AUTO: 1.56 K/UL — LOW (ref 1.8–7.4)
NEUTROPHILS NFR BLD AUTO: 32.9 % — LOW (ref 43–77)
NON HDL CHOLESTEROL: 127 MG/DL — SIGNIFICANT CHANGE UP
NRBC # BLD: 0 /100 WBCS — SIGNIFICANT CHANGE UP (ref 0–0)
NRBC # FLD: 0 K/UL — SIGNIFICANT CHANGE UP (ref 0–0)
PLATELET # BLD AUTO: 227 K/UL — SIGNIFICANT CHANGE UP (ref 150–400)
RBC # BLD: 4.61 M/UL — SIGNIFICANT CHANGE UP (ref 4.2–5.8)
RBC # FLD: 12.6 % — SIGNIFICANT CHANGE UP (ref 10.3–14.5)
TRIGL SERPL-MCNC: 97 MG/DL — SIGNIFICANT CHANGE UP
TROPONIN T, HIGH SENSITIVITY RESULT: 11 NG/L — SIGNIFICANT CHANGE UP
WBC # BLD: 4.74 K/UL — SIGNIFICANT CHANGE UP (ref 3.8–10.5)
WBC # FLD AUTO: 4.74 K/UL — SIGNIFICANT CHANGE UP (ref 3.8–10.5)

## 2024-09-06 PROCEDURE — 99233 SBSQ HOSP IP/OBS HIGH 50: CPT | Mod: GC

## 2024-09-06 RX ORDER — CLOZAPINE 25 MG/1
50 TABLET ORAL AT BEDTIME
Refills: 0 | Status: DISCONTINUED | OUTPATIENT
Start: 2024-09-08 | End: 2024-09-09

## 2024-09-06 RX ORDER — SENNOSIDES 8.6 MG
2 TABLET ORAL AT BEDTIME
Refills: 0 | Status: DISCONTINUED | OUTPATIENT
Start: 2024-09-06 | End: 2024-09-24

## 2024-09-06 RX ORDER — CLOZAPINE 25 MG/1
25 TABLET ORAL AT BEDTIME
Refills: 0 | Status: COMPLETED | OUTPATIENT
Start: 2024-09-06 | End: 2024-09-07

## 2024-09-06 RX ADMIN — Medication 2 MILLIGRAM(S): at 20:09

## 2024-09-06 RX ADMIN — Medication 1500 MILLIGRAM(S): at 20:09

## 2024-09-06 RX ADMIN — Medication 20 MILLIGRAM(S): at 20:09

## 2024-09-06 RX ADMIN — CLOZAPINE 25 MILLIGRAM(S): 25 TABLET ORAL at 20:09

## 2024-09-06 RX ADMIN — Medication 2 MILLIGRAM(S): at 08:51

## 2024-09-06 NOTE — BH INPATIENT PSYCHIATRY PROGRESS NOTE - NSBHMETABOLIC_PSY_ALL_CORE_FT
BMI: BMI (kg/m2): 33 (08-26-24 @ 23:58)  HbA1c: A1C with Estimated Average Glucose Result: 5.3 % (09-06-24 @ 08:00)    Glucose:   BP: --Vital Signs Last 24 Hrs  T(C): 36.4 (09-06-24 @ 08:31), Max: 36.4 (09-06-24 @ 08:31)  T(F): 97.5 (09-06-24 @ 08:31), Max: 97.5 (09-06-24 @ 08:31)  HR: --  BP: --  BP(mean): --  RR: 16 (09-05-24 @ 14:17) (16 - 16)  SpO2: --    Orthostatic VS  09-06-24 @ 10:43  Lying BP: --/-- HR: --  Sitting BP: 118/75 HR: 90  Standing BP: 126/68 HR: 98  Site: --  Mode: --  Orthostatic VS  09-06-24 @ 08:31  Lying BP: --/-- HR: --  Sitting BP: 98/63 HR: 85  Standing BP: 110/85 HR: 90  Site: --  Mode: --  Orthostatic VS  09-05-24 @ 07:59  Lying BP: --/-- HR: --  Sitting BP: --/-- HR: --  Standing BP: 106/81 HR: 97  Site: --  Mode: --    Lipid Panel: Date/Time: 09-06-24 @ 08:00  Cholesterol, Serum: 170  LDL Cholesterol Calculated: 108  HDL Cholesterol, Serum: 43  Total Cholesterol/HDL Ration Measurement: --  Triglycerides, Serum: 97   BMI: BMI (kg/m2): 33 (08-26-24 @ 23:58)  HbA1c: A1C with Estimated Average Glucose Result: 5.3 % (09-06-24 @ 08:00)    Glucose:   BP: --Vital Signs Last 24 Hrs  T(C): 36.7 (09-06-24 @ 18:32), Max: 36.7 (09-06-24 @ 18:32)  T(F): 98 (09-06-24 @ 18:32), Max: 98 (09-06-24 @ 18:32)  HR: --  BP: --  BP(mean): --  RR: --  SpO2: --    Orthostatic VS  09-06-24 @ 18:32  Lying BP: --/-- HR: --  Sitting BP: 113/72 HR: 94  Standing BP: 112/67 HR: 101  Site: --  Mode: --  Orthostatic VS  09-06-24 @ 10:43  Lying BP: --/-- HR: --  Sitting BP: 118/75 HR: 90  Standing BP: 126/68 HR: 98  Site: --  Mode: --  Orthostatic VS  09-06-24 @ 08:31  Lying BP: --/-- HR: --  Sitting BP: 98/63 HR: 85  Standing BP: 110/85 HR: 90  Site: --  Mode: --  Orthostatic VS  09-05-24 @ 07:59  Lying BP: --/-- HR: --  Sitting BP: --/-- HR: --  Standing BP: 106/81 HR: 97  Site: --  Mode: --    Lipid Panel: Date/Time: 09-06-24 @ 08:00  Cholesterol, Serum: 170  LDL Cholesterol Calculated: 108  HDL Cholesterol, Serum: 43  Total Cholesterol/HDL Ration Measurement: --  Triglycerides, Serum: 97

## 2024-09-06 NOTE — BH INPATIENT PSYCHIATRY PROGRESS NOTE - PRN MEDS
MEDICATIONS  (PRN):  acetaminophen     Tablet .. 650 milliGRAM(s) Oral every 6 hours PRN Mild Pain (1 - 3), Moderate Pain (4 - 6)  haloperidol     Tablet 5 milliGRAM(s) Oral every 6 hours PRN combativeness due to schizophrenia  haloperidol    Injectable 5 milliGRAM(s) IntraMuscular once PRN Agitation  LORazepam     Tablet 2 milliGRAM(s) Oral every 6 hours PRN agitation  LORazepam   Injectable 2 milliGRAM(s) IntraMuscular once PRN severe agitation due to psychosis   MEDICATIONS  (PRN):  acetaminophen     Tablet .. 650 milliGRAM(s) Oral every 6 hours PRN Mild Pain (1 - 3), Moderate Pain (4 - 6)  haloperidol     Tablet 5 milliGRAM(s) Oral every 6 hours PRN combativeness due to schizophrenia  haloperidol    Injectable 5 milliGRAM(s) IntraMuscular once PRN Agitation  LORazepam     Tablet 2 milliGRAM(s) Oral every 6 hours PRN agitation  LORazepam   Injectable 2 milliGRAM(s) IntraMuscular once PRN severe agitation due to psychosis  senna 2 Tablet(s) Oral at bedtime PRN constipation

## 2024-09-06 NOTE — BH INPATIENT PSYCHIATRY PROGRESS NOTE - NSBHFUPINTERVALHXFT_PSY_A_CORE
No events overnight. On assessment today, patient presents as calm and cooperative, linear, though at times responding to internal stimuli and endorsing some AH. Patient states he's feeling better and that his breathing is also better today as a result of being away from his cluttered apartment and in a clean room now. He is sleeping well. He reports having heard voices earlier but that they are "under control" at this time. He denies that these voices were ever telling him to do anything. He also denies any SI, thoughts of self harm, or HI.  His back pain has also improved since yesterday.     Patient counseled on the risks and benefits of starting clozapine and discontinuing other antipsychotic medications. We discussed side effects such as constipation, low BP, seizure, myocarditis, and neutropenia and agranulocytosis, and patient verbally confirms understanding of these risks. Also discussed requirement for once weekly blood draws initially and patient understands this. Patient agreeable with the plan to start clozapine.      No events overnight. On assessment today, patient presents as calm and cooperative, linear, though at times responding to internal stimuli and endorsing some AH. Patient states he's feeling better and that his breathing is also better today as a result of being away from his cluttered apartment and in a clean room now. He is sleeping well. He reports having heard voices earlier but that they are "under control" at this time. He denies that these voices were ever telling him to do anything. He also denies any SI, thoughts of self harm, or HI.  His back pain has also improved since yesterday.     Patient counseled on the risks and benefits of starting clozapine and discontinuing other antipsychotic medications. We discussed side effects such as constipation, low BP, seizure, myocarditis, and neutropenia and agranulocytosis, and patient verbally confirms understanding of these risks. Also discussed requirement for once weekly blood draws initially and patient understands this. Patient asked several questions and was engaged in this conversation of risks/benefits. Patient's questions were answered and patient was agreeable with the plan to start clozapine.

## 2024-09-06 NOTE — BH INPATIENT PSYCHIATRY PROGRESS NOTE - NSICDXBHPRIMARYDX_PSY_ALL_CORE
Schizophrenia   F20.9   Nosebleeds Normal Treatment: I explained this is common when taking isotretinoin. I recommended saline mist in each nostril multiple times a day. If this worsens they will contact us.

## 2024-09-06 NOTE — BH INPATIENT PSYCHIATRY PROGRESS NOTE - NSBHATTESTCOMMENTATTENDFT_PSY_A_CORE
Pt remains with AH and delusions, sexually preoccupied at times. He is amenable to trial of Clozapine for treatment-resistant psychosis. Psychoeducation provided regarded side effects. Will start tonight at 25mg and careful monitoring of ANC given baseline is just above 1500. D/c Seroquel. Will c/w Haldol for now. Holding off on further Aristada.

## 2024-09-06 NOTE — BH INPATIENT PSYCHIATRY PROGRESS NOTE - CURRENT MEDICATION
MEDICATIONS  (STANDING):  divalproex ER 1500 milliGRAM(s) Oral at bedtime  haloperidol     Tablet 20 milliGRAM(s) Oral at bedtime  LORazepam     Tablet 2 milliGRAM(s) Oral two times a day  QUEtiapine 100 milliGRAM(s) Oral at bedtime    MEDICATIONS  (PRN):  acetaminophen     Tablet .. 650 milliGRAM(s) Oral every 6 hours PRN Mild Pain (1 - 3), Moderate Pain (4 - 6)  haloperidol     Tablet 5 milliGRAM(s) Oral every 6 hours PRN combativeness due to schizophrenia  haloperidol    Injectable 5 milliGRAM(s) IntraMuscular once PRN Agitation  LORazepam     Tablet 2 milliGRAM(s) Oral every 6 hours PRN agitation  LORazepam   Injectable 2 milliGRAM(s) IntraMuscular once PRN severe agitation due to psychosis   MEDICATIONS  (STANDING):  cloZAPine 25 milliGRAM(s) Oral at bedtime  divalproex ER 1500 milliGRAM(s) Oral at bedtime  haloperidol     Tablet 20 milliGRAM(s) Oral at bedtime  LORazepam     Tablet 2 milliGRAM(s) Oral two times a day  polyethylene glycol 3350 17 Gram(s) Oral daily    MEDICATIONS  (PRN):  acetaminophen     Tablet .. 650 milliGRAM(s) Oral every 6 hours PRN Mild Pain (1 - 3), Moderate Pain (4 - 6)  haloperidol     Tablet 5 milliGRAM(s) Oral every 6 hours PRN combativeness due to schizophrenia  haloperidol    Injectable 5 milliGRAM(s) IntraMuscular once PRN Agitation  LORazepam     Tablet 2 milliGRAM(s) Oral every 6 hours PRN agitation  LORazepam   Injectable 2 milliGRAM(s) IntraMuscular once PRN severe agitation due to psychosis  senna 2 Tablet(s) Oral at bedtime PRN constipation

## 2024-09-06 NOTE — BH INPATIENT PSYCHIATRY PROGRESS NOTE - NSBHASSESSSUMMFT_PSY_ALL_CORE
Pt is a 45yo single man who is domiciled at [facility] w/ PPHx of dx Schizophrenia vs Schizoaffective Disorder, multiple prior admissions including state (last inpt [facility] April 2024, [facility] 2009 and [id number]), no known hx suicide attempt or violence, follows at [facility] on [place] [facility], brought in by EMS called by staff at residence staff after pt threw away all his clothes and belongings in response to CAH with concern for psychotic decompensation in the context of non-adherence with medication and chronic psychotic symptoms that are possibly treatment-resistant.      Pt remains with frequent AH and likely underlying delusions, also ongoing thought disorder. He remains in better behavioral control. 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     Plan:  - C/w Haldol 20mg qhs  - Taper Seroquel to 100mg qhs, goal to discontinue given limited efficacy and goal to minimize polypharmacy  - S/p Aristada 441mg on 8/15, next due 9/12; pt refuses higher dose  - Will encourage trial of Clozapine for treatment-resistant psychosis; will get baseline labs tomorrow AM (ANC, CRP, Troponin)  - C/w Depakote ER 1500mg qhs (though may be able to discontinue given unclear mood component to pt's illness)  - Taper Ativan to 2mg BID given improved behavioral control and daytime sedation   - Monitor blood pressure given possible hx of HTN Pt is a 45yo single man who is domiciled at [facility] w/ PPHx of dx Schizophrenia vs Schizoaffective Disorder, multiple prior admissions including state (last inpt [facility] April 2024, [facility] 2009 and [phone]), no known hx suicide attempt or violence, follows at [facility] on [place] grounds, brought in by EMS called by staff at residence staff after pt threw away all his clothes and belongings in response to CAH with concern for psychotic decompensation in the context of non-adherence with medication and chronic psychotic symptoms that are possibly treatment-resistant.      Pt remains with frequent AH and likely underlying delusions, also ongoing thought disorder. He remains in better behavioral control and is more linear today. Unclear why pt is currently treated with three antipsychotics; patient agreeable with trial of clozapine for treatment-resistant psychosis, will start clozapine today and plan to taper off of other antipsychotic medications. There is some concern for low ANC, as patient is at 1.56 as of 9/6, though this is still above the cutoff of 1.5 for starting clozapine, and patient historically has had low ANCs: usually 2-3 range.    Plan:  - Start clozapine 25 mg QHS today, titrate to 50 mg Sunday 9/8 (already ordered). CRP and troponin wnl. Concern for low ANC (currently at 1.56), recheck CBC next Tuesday 9/10.  - C/w Haldol 20mg qhs  - Discontinue Seroquel today given limited efficacy and goal to minimize polypharmacy  - S/p Aristada 441mg on 8/15, next due 9/12; pt refuses higher dose  - C/w Depakote ER 1500mg qhs (though may be able to discontinue given unclear mood component to pt's illness)  - C/w Ativan to 2mg BID given improved behavioral control and daytime sedation   - Monitor blood pressure given possible hx of HTN

## 2024-09-06 NOTE — BH INPATIENT PSYCHIATRY PROGRESS NOTE - MSE UNSTRUCTURED FT
The patient appears stated age, poor hygiene, malodorous, unkempt.  The patient was calm, cooperative with the interview and maintained appropriate eye contact.  No psychomotor agitation or retardation noted.  Steady gait observed.  The patient's speech was fluent with slow rate, normal in tone and volume, at times with latency.  The patient's mood is "okay."  Affect is blunted, stable and appropriate. Thought process is more linear today, though still circumstantial with thought blocking, perseverative regarding events leading him to the hospital. Thought content with likely underlying persecutory delusions, denies SIIP and HIIP. Peception: reports ongoing AH, denies CAH.  Insight is poor to fair.  Judgment is poor to fair.  Impulse control has been fair on the unit. The patient appears stated age, fair hygiene, unkempt.  The patient was calm, cooperative with the interview and maintained appropriate eye contact, tired throughout interview.  No psychomotor agitation or retardation noted.  Steady gait observed.  The patient's speech was fluent with slow rate, normal in tone and volume, at times with latency.  The patient's mood is "better."  Affect is blunted range, stable and appropriate. Thought process is more linear today. Thought content is concrete with likely persecutory delusions, denies thoughts of self harm or harming others. Perceptual disturbances include ongoing AH, though states they are "under control" today, denies CAH.  Insight is poor to fair.  Judgment is poor to fair.  Impulse control has been fair on the unit.

## 2024-09-06 NOTE — BH INPATIENT PSYCHIATRY PROGRESS NOTE - NSBHCHARTREVIEWVS_PSY_A_CORE FT
Vital Signs Last 24 Hrs  T(C): 36.4 (09-06-24 @ 08:31), Max: 36.4 (09-06-24 @ 08:31)  T(F): 97.5 (09-06-24 @ 08:31), Max: 97.5 (09-06-24 @ 08:31)  HR: --  BP: --  BP(mean): --  RR: 16 (09-05-24 @ 14:17) (16 - 16)  SpO2: --    Orthostatic VS  09-06-24 @ 10:43  Lying BP: --/-- HR: --  Sitting BP: 118/75 HR: 90  Standing BP: 126/68 HR: 98  Site: --  Mode: --  Orthostatic VS  09-06-24 @ 08:31  Lying BP: --/-- HR: --  Sitting BP: 98/63 HR: 85  Standing BP: 110/85 HR: 90  Site: --  Mode: --  Orthostatic VS  09-05-24 @ 07:59  Lying BP: --/-- HR: --  Sitting BP: --/-- HR: --  Standing BP: 106/81 HR: 97  Site: --  Mode: --   Vital Signs Last 24 Hrs  T(C): 36.7 (09-06-24 @ 18:32), Max: 36.7 (09-06-24 @ 18:32)  T(F): 98 (09-06-24 @ 18:32), Max: 98 (09-06-24 @ 18:32)  HR: --  BP: --  BP(mean): --  RR: --  SpO2: --    Orthostatic VS  09-06-24 @ 18:32  Lying BP: --/-- HR: --  Sitting BP: 113/72 HR: 94  Standing BP: 112/67 HR: 101  Site: --  Mode: --  Orthostatic VS  09-06-24 @ 10:43  Lying BP: --/-- HR: --  Sitting BP: 118/75 HR: 90  Standing BP: 126/68 HR: 98  Site: --  Mode: --  Orthostatic VS  09-06-24 @ 08:31  Lying BP: --/-- HR: --  Sitting BP: 98/63 HR: 85  Standing BP: 110/85 HR: 90  Site: --  Mode: --  Orthostatic VS  09-05-24 @ 07:59  Lying BP: --/-- HR: --  Sitting BP: --/-- HR: --  Standing BP: 106/81 HR: 97  Site: --  Mode: --

## 2024-09-07 RX ADMIN — Medication 2 MILLIGRAM(S): at 09:05

## 2024-09-07 RX ADMIN — CLOZAPINE 25 MILLIGRAM(S): 25 TABLET ORAL at 20:22

## 2024-09-07 RX ADMIN — Medication 2 MILLIGRAM(S): at 20:22

## 2024-09-07 RX ADMIN — Medication 1500 MILLIGRAM(S): at 20:22

## 2024-09-07 RX ADMIN — Medication 20 MILLIGRAM(S): at 20:22

## 2024-09-08 RX ADMIN — Medication 2 MILLIGRAM(S): at 20:02

## 2024-09-08 RX ADMIN — Medication 1500 MILLIGRAM(S): at 20:02

## 2024-09-08 RX ADMIN — Medication 20 MILLIGRAM(S): at 20:02

## 2024-09-08 RX ADMIN — Medication 2 MILLIGRAM(S): at 08:01

## 2024-09-08 RX ADMIN — CLOZAPINE 50 MILLIGRAM(S): 25 TABLET ORAL at 20:02

## 2024-09-09 RX ORDER — CLOZAPINE 25 MG/1
75 TABLET ORAL AT BEDTIME
Refills: 0 | Status: DISCONTINUED | OUTPATIENT
Start: 2024-09-10 | End: 2024-09-12

## 2024-09-09 RX ORDER — CLOZAPINE 25 MG/1
50 TABLET ORAL AT BEDTIME
Refills: 0 | Status: COMPLETED | OUTPATIENT
Start: 2024-09-09 | End: 2024-09-09

## 2024-09-09 RX ORDER — ASTEMIZOLE 10 MG
1000 TABLET ORAL AT BEDTIME
Refills: 0 | Status: DISCONTINUED | OUTPATIENT
Start: 2024-09-09 | End: 2024-09-24

## 2024-09-09 RX ADMIN — Medication 2 MILLIGRAM(S): at 20:36

## 2024-09-09 RX ADMIN — Medication 17 GRAM(S): at 08:16

## 2024-09-09 RX ADMIN — Medication 20 MILLIGRAM(S): at 20:36

## 2024-09-09 RX ADMIN — Medication 2 MILLIGRAM(S): at 08:16

## 2024-09-09 RX ADMIN — Medication 1000 MILLIGRAM(S): at 20:36

## 2024-09-09 RX ADMIN — CLOZAPINE 50 MILLIGRAM(S): 25 TABLET ORAL at 20:35

## 2024-09-09 NOTE — BH INPATIENT PSYCHIATRY PROGRESS NOTE - NSBHASSESSSUMMFT_PSY_ALL_CORE
Pt is a 45yo single man who is domiciled at [facility] w/ PPHx of dx Schizophrenia vs Schizoaffective Disorder, multiple prior admissions including state (last inpt [facility] April 2024, [facility] 2009 and [phone]), no known hx suicide attempt or violence, follows at [facility] on [place] grounds, brought in by EMS called by staff at residence staff after pt threw away all his clothes and belongings in response to CAH with concern for psychotic decompensation in the context of non-adherence with medication and chronic psychotic symptoms that are possibly treatment-resistant.      Pt with mild improvements in AH and likely underlying delusions, as well as thought disorder. He remains in good behavioral control and is more linear today, though also more tired and with slurred speech. Unclear why pt was treated with three antipsychotics; will continue clozapine trial, no side effects at this time. There is some concern for low ANC, as patient is at 1.56 as of 9/6, though this is still above the cutoff of 1.5 for starting clozapine, and patient historically has had low ANCs: usually 2-3 range.    Plan:  - C/w clozapine 50 mg, plan to increase to 75 mg tomorrow (9/10). Concern for low ANC (currently at 1.56), plan recheck CBC, CRP, and trop tomorrow 9/10.  - C/w Haldol 20mg qhs  - S/p Aristada 441mg on 8/15, next due 9/12; pt refuses higher dose  - Taper Depakote ER to 1000mg qhs   - Taper Ativan to 2mg QHS given improved behavioral control and daytime sedation   - Monitor vitals given clozapine  - C/w bowel regimen for clozapine (standing miralax and senna PRN) Pt is a 43yo single man who is domiciled at [facility] w/ PPHx of dx Schizophrenia vs Schizoaffective Disorder, multiple prior admissions including state (last inpt [facility] April 2024, [facility] 2009 and [phone]), no known hx suicide attempt or violence, follows at [facility] on [place] grounds, brought in by EMS called by staff at residence staff after pt threw away all his clothes and belongings in response to CAH with concern for psychotic decompensation in the context of non-adherence with medication and chronic psychotic symptoms that are possibly treatment-resistant.      Pt with mild improvements in AH and likely underlying delusions, as well as thought disorder. He remains in good behavioral control and is more linear today, though also more tired and with slurred speech. Unclear why pt was treated with three antipsychotics; will continue clozapine trial, no side effects at this time. There is some concern for low ANC, as patient is at 1.56 as of 9/6, though this is still above the cutoff of 1.5 for starting clozapine, and patient historically has had low ANCs: usually 2-3 range.    Plan:  - C/w clozapine 50 mg, plan to increase to 75 mg tomorrow (9/10)  	- Concern for low ANC (currently at 1.56), plan recheck CBC, CRP, and trop tomorrow 9/10  	- C/w bowel regimen - Miralax daily + Senna 2tabs qhs prn constipation   - C/w Haldol 20mg qhs  - S/p Aristada 441mg on 8/15, next due 9/12; pt refuses higher dose, will likely hold off on continuing during Clozapine titration   - Taper Depakote ER to 1000mg qhs   - Taper Ativan to 2mg QHS given improved behavioral control and daytime sedation

## 2024-09-09 NOTE — BH INPATIENT PSYCHIATRY PROGRESS NOTE - NSBHMETABOLIC_PSY_ALL_CORE_FT
BMI: BMI (kg/m2): 33 (08-26-24 @ 23:58)  HbA1c: A1C with Estimated Average Glucose Result: 5.3 % (09-06-24 @ 08:00)    Glucose:   BP: --Vital Signs Last 24 Hrs  T(C): 36.7 (09-08-24 @ 18:39), Max: 36.7 (09-08-24 @ 18:39)  T(F): 98 (09-08-24 @ 18:39), Max: 98 (09-08-24 @ 18:39)  HR: --  BP: --  BP(mean): --  RR: --  SpO2: --    Orthostatic VS  09-08-24 @ 18:39  Lying BP: --/-- HR: --  Sitting BP: 106/70 HR: 84  Standing BP: 102/66 HR: 98  Site: --  Mode: --  Orthostatic VS  09-08-24 @ 08:04  Lying BP: --/-- HR: --  Sitting BP: 101/70 HR: 80  Standing BP: 97/60 HR: 85  Site: --  Mode: --  Orthostatic VS  09-07-24 @ 20:25  Lying BP: --/-- HR: --  Sitting BP: 120/67 HR: 102  Standing BP: 122/77 HR: 105  Site: --  Mode: --    Lipid Panel: Date/Time: 09-06-24 @ 08:00  Cholesterol, Serum: 170  LDL Cholesterol Calculated: 108  HDL Cholesterol, Serum: 43  Total Cholesterol/HDL Ration Measurement: --  Triglycerides, Serum: 97   BMI: BMI (kg/m2): 33 (08-26-24 @ 23:58)  HbA1c: A1C with Estimated Average Glucose Result: 5.3 % (09-06-24 @ 08:00)    Glucose:   BP: --Vital Signs Last 24 Hrs  T(C): 36.6 (09-10-24 @ 08:16), Max: 36.7 (09-09-24 @ 18:22)  T(F): 97.9 (09-10-24 @ 08:16), Max: 98 (09-09-24 @ 18:22)  HR: --  BP: --  BP(mean): --  RR: --  SpO2: --    Orthostatic VS  09-10-24 @ 08:16  Lying BP: --/-- HR: --  Sitting BP: 108/60 HR: 105  Standing BP: 122/79 HR: 109  Site: upper right arm  Mode: electronic  Orthostatic VS  09-09-24 @ 18:22  Lying BP: --/-- HR: --  Sitting BP: 130/70 HR: 104  Standing BP: 119/74 HR: 95  Site: --  Mode: --  Orthostatic VS  09-08-24 @ 18:39  Lying BP: --/-- HR: --  Sitting BP: 106/70 HR: 84  Standing BP: 102/66 HR: 98  Site: --  Mode: --    Lipid Panel: Date/Time: 09-06-24 @ 08:00  Cholesterol, Serum: 170  LDL Cholesterol Calculated: 108  HDL Cholesterol, Serum: 43  Total Cholesterol/HDL Ration Measurement: --  Triglycerides, Serum: 97

## 2024-09-09 NOTE — BH INPATIENT PSYCHIATRY PROGRESS NOTE - NSBHATTESTCOMMENTATTENDFT_PSY_A_CORE
Pt reports decreased frequency and intensity of AH though provides few details regarding this. He is notably sedated this morning and therefore minimally engaged in interview. He has been in good behavioral control. Appears to have improved self care. Will c/w Clozapine titration and taper Depakote and Ativan. Will also hold off on continuing Aristada for now.

## 2024-09-09 NOTE — BH INPATIENT PSYCHIATRY PROGRESS NOTE - NSBHFUPINTERVALHXFT_PSY_A_CORE
No events overnight or over weekend. On assessment today, patient presents as calm and cooperative, linear, less intense AH, though more tired and with slurred speech. Patient states his mood is "good, perfect" and has been sleeping well. He reports that the voices are lower than before and denies CAH. When asked about thought broadcasting, he states "people can sometimes hear my thoughts deep down," but doesn't elaborate. He denies feeling like someone is controlling his mind. He denies any side effects of clozapine (has been passing regular BMs and no light headedness). He continues to ask about discharge.

## 2024-09-09 NOTE — BH INPATIENT PSYCHIATRY PROGRESS NOTE - CURRENT MEDICATION
MEDICATIONS  (STANDING):  cloZAPine 50 milliGRAM(s) Oral at bedtime  divalproex ER 1000 milliGRAM(s) Oral at bedtime  haloperidol     Tablet 20 milliGRAM(s) Oral at bedtime  LORazepam     Tablet 2 milliGRAM(s) Oral at bedtime  polyethylene glycol 3350 17 Gram(s) Oral daily    MEDICATIONS  (PRN):  acetaminophen     Tablet .. 650 milliGRAM(s) Oral every 6 hours PRN Mild Pain (1 - 3), Moderate Pain (4 - 6)  haloperidol     Tablet 5 milliGRAM(s) Oral every 6 hours PRN combativeness due to schizophrenia  haloperidol    Injectable 5 milliGRAM(s) IntraMuscular once PRN Agitation  LORazepam     Tablet 2 milliGRAM(s) Oral every 6 hours PRN agitation  LORazepam   Injectable 2 milliGRAM(s) IntraMuscular once PRN severe agitation due to psychosis  senna 2 Tablet(s) Oral at bedtime PRN constipation   MEDICATIONS  (STANDING):  cloZAPine 75 milliGRAM(s) Oral at bedtime  divalproex ER 1000 milliGRAM(s) Oral at bedtime  haloperidol     Tablet 20 milliGRAM(s) Oral at bedtime  LORazepam     Tablet 2 milliGRAM(s) Oral at bedtime  polyethylene glycol 3350 17 Gram(s) Oral daily    MEDICATIONS  (PRN):  acetaminophen     Tablet .. 650 milliGRAM(s) Oral every 6 hours PRN Mild Pain (1 - 3), Moderate Pain (4 - 6)  haloperidol     Tablet 5 milliGRAM(s) Oral every 6 hours PRN combativeness due to schizophrenia  haloperidol    Injectable 5 milliGRAM(s) IntraMuscular once PRN Agitation  LORazepam     Tablet 2 milliGRAM(s) Oral every 6 hours PRN agitation  LORazepam   Injectable 2 milliGRAM(s) IntraMuscular once PRN severe agitation due to psychosis  senna 2 Tablet(s) Oral at bedtime PRN constipation

## 2024-09-09 NOTE — BH INPATIENT PSYCHIATRY PROGRESS NOTE - MSE UNSTRUCTURED FT
The patient appears stated age, fair hygiene, unkempt.  The patient was calm, cooperative with the interview and maintained appropriate eye contact, tired throughout interview.  No psychomotor agitation or retardation noted.  Steady gait observed.  The patient's speech was slurred and unintelligible at times, with slow rate, normal in tone and volume, at times with latency.  The patient's mood is "good, perfect."  Affect is restricted range, stable and appropriate. Alert and oriented to person, place, and time (month and year). Thought process is linear. Thought content is concrete with likely persecutory delusions. Perceptual disturbances include ongoing AH, though states they are "low" today, denies CAH.  Insight is poor to fair.  Judgment is fair.  Impulse control has been fair on the unit.

## 2024-09-09 NOTE — BH INPATIENT PSYCHIATRY PROGRESS NOTE - NSBHCHARTREVIEWVS_PSY_A_CORE FT
Vital Signs Last 24 Hrs  T(C): 36.7 (09-08-24 @ 18:39), Max: 36.7 (09-08-24 @ 18:39)  T(F): 98 (09-08-24 @ 18:39), Max: 98 (09-08-24 @ 18:39)  HR: --  BP: --  BP(mean): --  RR: --  SpO2: --    Orthostatic VS  09-08-24 @ 18:39  Lying BP: --/-- HR: --  Sitting BP: 106/70 HR: 84  Standing BP: 102/66 HR: 98  Site: --  Mode: --  Orthostatic VS  09-08-24 @ 08:04  Lying BP: --/-- HR: --  Sitting BP: 101/70 HR: 80  Standing BP: 97/60 HR: 85  Site: --  Mode: --  Orthostatic VS  09-07-24 @ 20:25  Lying BP: --/-- HR: --  Sitting BP: 120/67 HR: 102  Standing BP: 122/77 HR: 105  Site: --  Mode: --   Vital Signs Last 24 Hrs  T(C): 36.6 (09-10-24 @ 08:16), Max: 36.7 (09-09-24 @ 18:22)  T(F): 97.9 (09-10-24 @ 08:16), Max: 98 (09-09-24 @ 18:22)  HR: --  BP: --  BP(mean): --  RR: --  SpO2: --    Orthostatic VS  09-10-24 @ 08:16  Lying BP: --/-- HR: --  Sitting BP: 108/60 HR: 105  Standing BP: 122/79 HR: 109  Site: upper right arm  Mode: electronic  Orthostatic VS  09-09-24 @ 18:22  Lying BP: --/-- HR: --  Sitting BP: 130/70 HR: 104  Standing BP: 119/74 HR: 95  Site: --  Mode: --  Orthostatic VS  09-08-24 @ 18:39  Lying BP: --/-- HR: --  Sitting BP: 106/70 HR: 84  Standing BP: 102/66 HR: 98  Site: --  Mode: --

## 2024-09-09 NOTE — BH INPATIENT PSYCHIATRY PROGRESS NOTE - PRN MEDS
MEDICATIONS  (PRN):  acetaminophen     Tablet .. 650 milliGRAM(s) Oral every 6 hours PRN Mild Pain (1 - 3), Moderate Pain (4 - 6)  haloperidol     Tablet 5 milliGRAM(s) Oral every 6 hours PRN combativeness due to schizophrenia  haloperidol    Injectable 5 milliGRAM(s) IntraMuscular once PRN Agitation  LORazepam     Tablet 2 milliGRAM(s) Oral every 6 hours PRN agitation  LORazepam   Injectable 2 milliGRAM(s) IntraMuscular once PRN severe agitation due to psychosis  senna 2 Tablet(s) Oral at bedtime PRN constipation

## 2024-09-10 LAB
BASOPHILS # BLD AUTO: 0.02 K/UL — SIGNIFICANT CHANGE UP (ref 0–0.2)
BASOPHILS NFR BLD AUTO: 0.4 % — SIGNIFICANT CHANGE UP (ref 0–2)
CRP SERPL HS-MCNC: 5.2 MG/L — HIGH
EOSINOPHIL # BLD AUTO: 0.2 K/UL — SIGNIFICANT CHANGE UP (ref 0–0.5)
EOSINOPHIL NFR BLD AUTO: 3.9 % — SIGNIFICANT CHANGE UP (ref 0–6)
HCT VFR BLD CALC: 43.2 % — SIGNIFICANT CHANGE UP (ref 39–50)
HGB BLD-MCNC: 14.2 G/DL — SIGNIFICANT CHANGE UP (ref 13–17)
IANC: 2.05 K/UL — SIGNIFICANT CHANGE UP (ref 1.8–7.4)
IMM GRANULOCYTES NFR BLD AUTO: 0.2 % — SIGNIFICANT CHANGE UP (ref 0–0.9)
LYMPHOCYTES # BLD AUTO: 2.5 K/UL — SIGNIFICANT CHANGE UP (ref 1–3.3)
LYMPHOCYTES # BLD AUTO: 49 % — HIGH (ref 13–44)
MCHC RBC-ENTMCNC: 29.6 PG — SIGNIFICANT CHANGE UP (ref 27–34)
MCHC RBC-ENTMCNC: 32.9 GM/DL — SIGNIFICANT CHANGE UP (ref 32–36)
MCV RBC AUTO: 90 FL — SIGNIFICANT CHANGE UP (ref 80–100)
MONOCYTES # BLD AUTO: 0.32 K/UL — SIGNIFICANT CHANGE UP (ref 0–0.9)
MONOCYTES NFR BLD AUTO: 6.3 % — SIGNIFICANT CHANGE UP (ref 2–14)
NEUTROPHILS # BLD AUTO: 2.05 K/UL — SIGNIFICANT CHANGE UP (ref 1.8–7.4)
NEUTROPHILS NFR BLD AUTO: 40.2 % — LOW (ref 43–77)
NRBC # BLD: 0 /100 WBCS — SIGNIFICANT CHANGE UP (ref 0–0)
NRBC # FLD: 0 K/UL — SIGNIFICANT CHANGE UP (ref 0–0)
PLATELET # BLD AUTO: 245 K/UL — SIGNIFICANT CHANGE UP (ref 150–400)
RBC # BLD: 4.8 M/UL — SIGNIFICANT CHANGE UP (ref 4.2–5.8)
RBC # FLD: 12.7 % — SIGNIFICANT CHANGE UP (ref 10.3–14.5)
TROPONIN T, HIGH SENSITIVITY RESULT: 10 NG/L — SIGNIFICANT CHANGE UP
WBC # BLD: 5.1 K/UL — SIGNIFICANT CHANGE UP (ref 3.8–10.5)
WBC # FLD AUTO: 5.1 K/UL — SIGNIFICANT CHANGE UP (ref 3.8–10.5)

## 2024-09-10 PROCEDURE — 99232 SBSQ HOSP IP/OBS MODERATE 35: CPT | Mod: GC

## 2024-09-10 RX ADMIN — CLOZAPINE 75 MILLIGRAM(S): 25 TABLET ORAL at 20:41

## 2024-09-10 RX ADMIN — Medication 20 MILLIGRAM(S): at 20:41

## 2024-09-10 RX ADMIN — Medication 2 MILLIGRAM(S): at 20:41

## 2024-09-10 RX ADMIN — Medication 1000 MILLIGRAM(S): at 20:41

## 2024-09-10 RX ADMIN — Medication 650 MILLIGRAM(S): at 02:23

## 2024-09-10 NOTE — BH INPATIENT PSYCHIATRY PROGRESS NOTE - NSBHASSESSSUMMFT_PSY_ALL_CORE
Pt is a 43yo single man who is domiciled at [facility] w/ PPHx of dx Schizophrenia vs Schizoaffective Disorder, multiple prior admissions including state (last inpt [facility] April 2024, [facility] 2009 and [id number]), no known hx suicide attempt or violence, follows at [facility] on [place] grounds, brought in by EMS called by staff at residence staff after pt threw away all his clothes and belongings in response to CAH with concern for psychotic decompensation in the context of non-adherence with medication and chronic psychotic symptoms that are possibly treatment-resistant.      Pt reports some improvements in AH, though today he shares that he has had CAH for several years as well as potential Fregoli delusion vs VH (i.e. seeing Will Rich's face on unit). He remains in good behavioral control, states that he does not obey CAH when they involve hurting himself or others, and is more linear. Unclear why pt was treated with three antipsychotics; will continue clozapine trial, no side effects at this time. There is some concern for low ANC, as patient is at 1.56 as of 9/6 and 2.05 on 9/10, though this is still above the cutoff of 1.5 for starting clozapine, and patient historically has had low ANCs: usually 2-3 range.    Plan:  - Increased clozapine to 75 mg today  	- Concern for low ANC (currently at 2.05), plan recheck CBC, CRP, and trop weekly  	- C/w bowel regimen - Miralax daily + Senna 2tabs qhs prn constipation   - C/w Haldol 20mg qhs  - S/p Aristada 441mg on 8/15, next due 9/12; pt refuses higher dose, will likely hold off on continuing during Clozapine titration   - C/w Depakote ER to 1000mg qhs - contact Dr. Metz to see if Depakote is necessary and if pt has any hx of acute mood episodes. If not, plan to taper off Depakote.   - C/w Ativan to 2mg QHS given improved behavioral control and daytime sedation    Pt is a 43yo single man who is domiciled at [facility] w/ PPHx of dx Schizophrenia vs Schizoaffective Disorder, multiple prior admissions including state (last inpt [facility] April 2024, [facility] 2009 and [id number]), no known hx suicide attempt or violence, follows at [facility] on [place] grounds, brought in by EMS called by staff at residence staff after pt threw away all his clothes and belongings in response to CAH with concern for psychotic decompensation in the context of non-adherence with medication and chronic psychotic symptoms that are possibly treatment-resistant.      Pt reports some improvements in AH, though today he shares that he has had CAH for several years as well as potential Fregoli delusion vs VH (i.e. seeing Will Rich's face on unit). He remains in good behavioral control, states that he does not obey CAH when they involve hurting himself or others, and is more linear. Unclear why pt was treated with three antipsychotics; will continue clozapine trial, no side effects at this time. There is some concern for low ANC, as patient is at 1.56 as of 9/6 and 2.05 on 9/10, though this is still above the cutoff of 1.5 for starting clozapine, and patient historically has had low ANCs: usually 2-3 range.    Plan:  - Increased clozapine to 75 mg today  	- ANC/CRP/Troponin weekly qTuesday (reviewed today and within normal limits)  	- C/w bowel regimen - Miralax daily + Senna 2tabs qhs prn constipation   - C/w Haldol 20mg qhs  - S/p Aristada 441mg on 8/15, next due 9/12; pt refuses higher dose, will likely hold off on continuing during Clozapine titration   - C/w Depakote ER to 1000mg qhs - contact Dr. Metz to see if Depakote is necessary and if pt has any hx of acute mood episodes. If not, plan to taper off Depakote.   - C/w Ativan to 2mg QHS given improved behavioral control and daytime sedation

## 2024-09-10 NOTE — BH INPATIENT PSYCHIATRY PROGRESS NOTE - NSBHCHARTREVIEWVS_PSY_A_CORE FT
Vital Signs Last 24 Hrs  T(C): 36.6 (09-10-24 @ 08:16), Max: 36.7 (09-09-24 @ 18:22)  T(F): 97.9 (09-10-24 @ 08:16), Max: 98 (09-09-24 @ 18:22)  HR: --  BP: --  BP(mean): --  RR: --  SpO2: --    Orthostatic VS  09-10-24 @ 08:16  Lying BP: --/-- HR: --  Sitting BP: 108/60 HR: 105  Standing BP: 122/79 HR: 109  Site: upper right arm  Mode: electronic  Orthostatic VS  09-09-24 @ 18:22  Lying BP: --/-- HR: --  Sitting BP: 130/70 HR: 104  Standing BP: 119/74 HR: 95  Site: --  Mode: --  Orthostatic VS  09-08-24 @ 18:39  Lying BP: --/-- HR: --  Sitting BP: 106/70 HR: 84  Standing BP: 102/66 HR: 98  Site: --  Mode: --

## 2024-09-10 NOTE — BH INPATIENT PSYCHIATRY PROGRESS NOTE - MSE UNSTRUCTURED FT
The patient appears stated age, fair hygiene, unkempt.  The patient was calm, cooperative with the interview and maintained appropriate eye contact, tired throughout interview.  No psychomotor agitation or retardation noted.  Steady gait observed.  The patient's speech continues to be slurred and unintelligible at times, with slow rate, normal in tone and volume, at times with latency.  The patient's mood is "perfect."  Affect is restricted range, stable and appropriate. Thought process is linear. Thought content is concrete with likely persecutory delusions and potential Fregoli delusion (i.e. seeing Will Rich's face on unit). Perceptual disturbances include ongoing AH with CAH, though states the voices are getting less intense and frequent and he does not follow CAH when they involve hurting himself or others; potential VH vs Fregoli delusion as above.  Insight is poor to fair.  Judgment is fair.  Impulse control has been fair on the unit.

## 2024-09-10 NOTE — BH INPATIENT PSYCHIATRY PROGRESS NOTE - CURRENT MEDICATION
MEDICATIONS  (STANDING):  cloZAPine 75 milliGRAM(s) Oral at bedtime  divalproex ER 1000 milliGRAM(s) Oral at bedtime  haloperidol     Tablet 20 milliGRAM(s) Oral at bedtime  LORazepam     Tablet 2 milliGRAM(s) Oral at bedtime  polyethylene glycol 3350 17 Gram(s) Oral daily    MEDICATIONS  (PRN):  acetaminophen     Tablet .. 650 milliGRAM(s) Oral every 6 hours PRN Mild Pain (1 - 3), Moderate Pain (4 - 6)  haloperidol     Tablet 5 milliGRAM(s) Oral every 6 hours PRN combativeness due to schizophrenia  haloperidol    Injectable 5 milliGRAM(s) IntraMuscular once PRN Agitation  LORazepam     Tablet 2 milliGRAM(s) Oral every 6 hours PRN agitation  LORazepam   Injectable 2 milliGRAM(s) IntraMuscular once PRN severe agitation due to psychosis  senna 2 Tablet(s) Oral at bedtime PRN constipation

## 2024-09-10 NOTE — BH INPATIENT PSYCHIATRY PROGRESS NOTE - NSBHATTESTCOMMENTATTENDFT_PSY_A_CORE
Pt remains with AH which in the past have been command in nature (though not currently). Describes possible Fregoli delusion vs VH. He remains in good behavioral control. Continuing with trial of Clozapine. Will consider discontinuing Depakote. C/w Haldol for now.

## 2024-09-10 NOTE — BH INPATIENT PSYCHIATRY PROGRESS NOTE - NSBHFUPINTERVALHXFT_PSY_A_CORE
No events overnight or over weekend. On assessment today, patient presents as calm and cooperative, linear, with less intense and frequent AH, though endorses some CAH and vague VH vs. Fregoli delusion and continues to have slurred speech. Patient states his mood is "perfect" today and has been sleeping well. He reports that the voices are less intense and frequent since starting the clozapine. He describes the voices in more detail today, saying that it is the voice of Mayito Rich (not the actor), who is a rapper that he met on the street in 1998, and he's been hearing his voice since 2003. He says Mayito Rich is jealous of him and sometimes commands him to do things, which he usually obeys, though he does not elaborate on these commands. He says that Mayito Rich sometimes tells him to hurt himself or others and cannot elaborate further on this, but that he has never listened to these commands. He states that he occasionally sees his face in his room and on the unit directly in front of him.     He does feel safe on the unit and denies any side effects of clozapine. States that he is going to the bathroom regularly and does not experience light headedness upon standing or at any other time.      No events overnight. On assessment today, patient presents as calm and cooperative, linear, with less intense and frequent AH, though endorses some CAH and vague VH vs. Fregoli delusion and continues to have slurred speech. Patient states his mood is "perfect" today and has been sleeping well. He reports that the voices are less intense and frequent since starting the clozapine. He describes the voices in more detail today, saying that it is the voice of Mayito Rich (not the actor), who is a rapper that he met on the street in 1998, and he's been hearing his voice since 2003. He says Mayito Rich is jealous of him and sometimes commands him to do things, which he usually obeys, though he does not elaborate on these commands. He says that Mayito Rich sometimes tells him to hurt himself or others and cannot elaborate further on this, but that he has never listened to these commands. He states that he occasionally sees his face in his room and on the unit directly in front of him.     He does feel safe on the unit and denies any side effects of clozapine. States that he is going to the bathroom regularly and does not experience light headedness upon standing or at any other time.

## 2024-09-10 NOTE — BH INPATIENT PSYCHIATRY PROGRESS NOTE - NSBHMETABOLIC_PSY_ALL_CORE_FT
BMI: BMI (kg/m2): 33 (08-26-24 @ 23:58)  HbA1c: A1C with Estimated Average Glucose Result: 5.3 % (09-06-24 @ 08:00)    Glucose:   BP: --Vital Signs Last 24 Hrs  T(C): 36.6 (09-10-24 @ 08:16), Max: 36.7 (09-09-24 @ 18:22)  T(F): 97.9 (09-10-24 @ 08:16), Max: 98 (09-09-24 @ 18:22)  HR: --  BP: --  BP(mean): --  RR: --  SpO2: --    Orthostatic VS  09-10-24 @ 08:16  Lying BP: --/-- HR: --  Sitting BP: 108/60 HR: 105  Standing BP: 122/79 HR: 109  Site: upper right arm  Mode: electronic  Orthostatic VS  09-09-24 @ 18:22  Lying BP: --/-- HR: --  Sitting BP: 130/70 HR: 104  Standing BP: 119/74 HR: 95  Site: --  Mode: --  Orthostatic VS  09-08-24 @ 18:39  Lying BP: --/-- HR: --  Sitting BP: 106/70 HR: 84  Standing BP: 102/66 HR: 98  Site: --  Mode: --    Lipid Panel: Date/Time: 09-06-24 @ 08:00  Cholesterol, Serum: 170  LDL Cholesterol Calculated: 108  HDL Cholesterol, Serum: 43  Total Cholesterol/HDL Ration Measurement: --  Triglycerides, Serum: 97

## 2024-09-11 RX ADMIN — Medication 1000 MILLIGRAM(S): at 20:10

## 2024-09-11 RX ADMIN — CLOZAPINE 75 MILLIGRAM(S): 25 TABLET ORAL at 20:10

## 2024-09-11 RX ADMIN — Medication 2 MILLIGRAM(S): at 20:09

## 2024-09-11 RX ADMIN — Medication 20 MILLIGRAM(S): at 20:10

## 2024-09-11 NOTE — BH INPATIENT PSYCHIATRY PROGRESS NOTE - NSBHASSESSSUMMFT_PSY_ALL_CORE
Pt is a 43yo single man who is domiciled at [facility] w/ PPHx of dx Schizophrenia vs Schizoaffective Disorder, multiple prior admissions including state (last inpt [facility] April 2024, [facility] 2009 and [id number]), no known hx suicide attempt or violence, follows at [facility] on [place] grounds, brought in by EMS called by staff at residence staff after pt threw away all his clothes and belongings in response to CAH with concern for psychotic decompensation in the context of non-adherence with medication and chronic psychotic symptoms that are possibly treatment-resistant.      Pt reports some improvements in AH, though today he shares that he has had CAH for several years as well as potential Fregoli delusion vs VH (i.e. seeing Will Rich's face on unit). He remains in good behavioral control, states that he does not obey CAH when they involve hurting himself or others, and is more linear. Unclear why pt was treated with three antipsychotics; will continue clozapine trial, no side effects at this time. There is some concern for low ANC, as patient is at 1.56 as of 9/6 and 2.05 on 9/10, though this is still above the cutoff of 1.5 for starting clozapine, and patient historically has had low ANCs: usually 2-3 range.    Plan:  - Increased clozapine to 75 mg today  	- ANC/CRP/Troponin weekly qTuesday (reviewed today and within normal limits)  	- C/w bowel regimen - Miralax daily + Senna 2tabs qhs prn constipation   - C/w Haldol 20mg qhs  - S/p Aristada 441mg on 8/15, next due 9/12; pt refuses higher dose, will likely hold off on continuing during Clozapine titration   - C/w Depakote ER to 1000mg qhs - contact Dr. Metz to see if Depakote is necessary and if pt has any hx of acute mood episodes. If not, plan to taper off Depakote.   - C/w Ativan to 2mg QHS given improved behavioral control and daytime sedation    Pt is a 43yo single man who is domiciled at [facility] w/ PPHx of dx Schizophrenia vs Schizoaffective Disorder, multiple prior admissions including [facility] (last inpt [facility] April 2024, [facility] 2009 and [phone]), no known hx suicide attempt or violence, follows at [facility] on [place] grounds, brought in by EMS called by staff at residence staff after pt threw away all his clothes and belongings in response to CAH with concern for psychotic decompensation in the context of non-adherence with medication and chronic psychotic symptoms that are possibly treatment-resistant.      Pt reports some improvements in AH, though continues to endorse potential Fregoli delusion vs VH (i.e. seeing Will Rich's face on unit). He remains in good behavioral control, though perseverative on discharge today. Unclear why pt was treated with three antipsychotics; will continue clozapine trial, no side effects at this time. There is some concern for low ANC, as patient is at 1.56 as of 9/6 and 2.05 on 9/10, though this is still above the cutoff of 1.5 for starting clozapine, and patient historically has had low ANCs: usually 2-3 range.    Plan:  - C/w clozapine 75 mg, plan to increase to 100 mg tomorrow  	- ANC/CRP/Troponin weekly qTuesday (reviewed today and within normal limits)  	- C/w bowel regimen - Miralax daily + Senna 2 tabs qhs prn constipation   - C/w Haldol 20mg qhs  - S/p Aristada 441mg on 8/15, next due 9/12; pt refuses higher dose, will likely hold off on continuing during Clozapine titration   - C/w Depakote ER to 1000mg qhs - contact Dr. Metz to see if Depakote is necessary and if pt has any hx of acute mood episodes. If not, plan to taper off Depakote.   - C/w Ativan to 2mg QHS given improved behavioral control and daytime sedation    Pt is a 45yo single man who is domiciled at [facility] w/ PPHx of dx Schizophrenia vs Schizoaffective Disorder, multiple prior admissions including [facility] (last inpt [facility] April 2024, [facility] 2009 and [id number]), no known hx suicide attempt or violence, follows at [facility] on [place] grounds, brought in by EMS called by staff at residence staff after pt threw away all his clothes and belongings in response to CAH with concern for psychotic decompensation in the context of non-adherence with medication and chronic psychotic symptoms that are possibly treatment-resistant.      Pt reports some improvements in AH, though continues to endorse potential Fregoli delusion vs VH (i.e. seeing Will Rich's face on unit). He remains in good behavioral control, though perseverative on discharge today. Unclear why pt was treated with three antipsychotics; will continue clozapine trial, no side effects at this time. There is some concern for low ANC, as patient is at 1.56 as of 9/6 and 2.05 on 9/10, though this is still above the cutoff of 1.5 for starting clozapine, and patient historically has had low ANCs: usually 2-3 range.    Plan:  - C/w clozapine 75 mg, plan to increase to 100 mg tomorrow  	- ANC/CRP/Troponin weekly qTuesday   	- C/w bowel regimen - Miralax daily + Senna 2 tabs qhs prn constipation   - C/w Haldol 20mg qhs  - S/p Aristada 441mg on 8/15, next due 9/12; pt refuses higher dose, will hold off on continuing during Clozapine titration   - C/w Depakote ER to 1000mg qhs - contact Dr. Metz to see if Depakote is necessary and if pt has any hx of acute mood episodes. If not, plan to taper off Depakote.   - C/w Ativan to 2mg QHS given improved behavioral control and daytime sedation

## 2024-09-11 NOTE — BH INPATIENT PSYCHIATRY PROGRESS NOTE - NSBHCHARTREVIEWVS_PSY_A_CORE FT
Vital Signs Last 24 Hrs  T(C): 36.6 (09-11-24 @ 08:00), Max: 36.9 (09-10-24 @ 18:58)  T(F): 97.8 (09-11-24 @ 08:00), Max: 98.5 (09-10-24 @ 18:58)  HR: --  BP: --  BP(mean): --  RR: --  SpO2: --    Orthostatic VS  09-11-24 @ 08:00  Lying BP: --/-- HR: --  Sitting BP: 121/71 HR: 91  Standing BP: 124/80 HR: 98  Site: --  Mode: --  Orthostatic VS  09-10-24 @ 18:58  Lying BP: --/-- HR: --  Sitting BP: 110/67 HR: 105  Standing BP: 110/62 HR: 110  Site: --  Mode: --  Orthostatic VS  09-10-24 @ 08:16  Lying BP: --/-- HR: --  Sitting BP: 108/60 HR: 105  Standing BP: 122/79 HR: 109  Site: upper right arm  Mode: electronic  Orthostatic VS  09-09-24 @ 18:22  Lying BP: --/-- HR: --  Sitting BP: 130/70 HR: 104  Standing BP: 119/74 HR: 95  Site: --  Mode: --   Vital Signs Last 24 Hrs  T(C): 36.4 (09-12-24 @ 08:11), Max: 37.2 (09-11-24 @ 18:43)  T(F): 97.6 (09-12-24 @ 08:11), Max: 99 (09-11-24 @ 18:43)  HR: --  BP: --  BP(mean): --  RR: --  SpO2: --    Orthostatic VS  09-12-24 @ 08:11  Lying BP: --/-- HR: --  Sitting BP: 106/77 HR: 93  Standing BP: 124/87 HR: 103  Site: --  Mode: --  Orthostatic VS  09-11-24 @ 18:43  Lying BP: --/-- HR: --  Sitting BP: 126/79 HR: 108  Standing BP: 130/72 HR: 106  Site: --  Mode: --  Orthostatic VS  09-11-24 @ 08:00  Lying BP: --/-- HR: --  Sitting BP: 121/71 HR: 91  Standing BP: 124/80 HR: 98  Site: --  Mode: --  Orthostatic VS  09-10-24 @ 18:58  Lying BP: --/-- HR: --  Sitting BP: 110/67 HR: 105  Standing BP: 110/62 HR: 110  Site: --  Mode: --

## 2024-09-11 NOTE — BH INPATIENT PSYCHIATRY PROGRESS NOTE - NSBHMETABOLIC_PSY_ALL_CORE_FT
BMI: BMI (kg/m2): 33 (08-26-24 @ 23:58)  HbA1c: A1C with Estimated Average Glucose Result: 5.3 % (09-06-24 @ 08:00)    Glucose:   BP: --Vital Signs Last 24 Hrs  T(C): 36.6 (09-11-24 @ 08:00), Max: 36.9 (09-10-24 @ 18:58)  T(F): 97.8 (09-11-24 @ 08:00), Max: 98.5 (09-10-24 @ 18:58)  HR: --  BP: --  BP(mean): --  RR: --  SpO2: --    Orthostatic VS  09-11-24 @ 08:00  Lying BP: --/-- HR: --  Sitting BP: 121/71 HR: 91  Standing BP: 124/80 HR: 98  Site: --  Mode: --  Orthostatic VS  09-10-24 @ 18:58  Lying BP: --/-- HR: --  Sitting BP: 110/67 HR: 105  Standing BP: 110/62 HR: 110  Site: --  Mode: --  Orthostatic VS  09-10-24 @ 08:16  Lying BP: --/-- HR: --  Sitting BP: 108/60 HR: 105  Standing BP: 122/79 HR: 109  Site: upper right arm  Mode: electronic  Orthostatic VS  09-09-24 @ 18:22  Lying BP: --/-- HR: --  Sitting BP: 130/70 HR: 104  Standing BP: 119/74 HR: 95  Site: --  Mode: --    Lipid Panel: Date/Time: 09-06-24 @ 08:00  Cholesterol, Serum: 170  LDL Cholesterol Calculated: 108  HDL Cholesterol, Serum: 43  Total Cholesterol/HDL Ration Measurement: --  Triglycerides, Serum: 97   BMI: BMI (kg/m2): 33 (08-26-24 @ 23:58)  HbA1c: A1C with Estimated Average Glucose Result: 5.3 % (09-06-24 @ 08:00)    Glucose:   BP: --Vital Signs Last 24 Hrs  T(C): 36.4 (09-12-24 @ 08:11), Max: 37.2 (09-11-24 @ 18:43)  T(F): 97.6 (09-12-24 @ 08:11), Max: 99 (09-11-24 @ 18:43)  HR: --  BP: --  BP(mean): --  RR: --  SpO2: --    Orthostatic VS  09-12-24 @ 08:11  Lying BP: --/-- HR: --  Sitting BP: 106/77 HR: 93  Standing BP: 124/87 HR: 103  Site: --  Mode: --  Orthostatic VS  09-11-24 @ 18:43  Lying BP: --/-- HR: --  Sitting BP: 126/79 HR: 108  Standing BP: 130/72 HR: 106  Site: --  Mode: --  Orthostatic VS  09-11-24 @ 08:00  Lying BP: --/-- HR: --  Sitting BP: 121/71 HR: 91  Standing BP: 124/80 HR: 98  Site: --  Mode: --  Orthostatic VS  09-10-24 @ 18:58  Lying BP: --/-- HR: --  Sitting BP: 110/67 HR: 105  Standing BP: 110/62 HR: 110  Site: --  Mode: --    Lipid Panel: Date/Time: 09-06-24 @ 08:00  Cholesterol, Serum: 170  LDL Cholesterol Calculated: 108  HDL Cholesterol, Serum: 43  Total Cholesterol/HDL Ration Measurement: --  Triglycerides, Serum: 97

## 2024-09-11 NOTE — BH INPATIENT PSYCHIATRY PROGRESS NOTE - MSE UNSTRUCTURED FT
The patient appears stated age, fair hygiene, unkempt.  The patient was calm, cooperative with the interview and maintained appropriate eye contact, tired throughout interview.  No psychomotor agitation or retardation noted.  Steady gait observed.  The patient's speech continues to be slurred and unintelligible at times, with slow rate, normal in tone and volume, at times with latency.  The patient's mood is "perfect."  Affect is restricted range, stable and appropriate. Thought process is linear. Thought content is concrete with likely persecutory delusions and potential Fregoli delusion (i.e. seeing Will Rich's face on unit). Perceptual disturbances include ongoing AH with CAH, though states the voices are getting less intense and frequent and he does not follow CAH when they involve hurting himself or others; potential VH vs Fregoli delusion as above.  Insight is poor to fair.  Judgment is fair.  Impulse control has been fair on the unit. The patient appears stated age, fair hygiene, unkempt.  The patient was calm, cooperative with the interview and maintained appropriate eye contact, tired throughout interview.  No psychomotor agitation or retardation noted.  Steady gait observed.  The patient's speech more clear today, normal in rate, tone and volume.  The patient's mood is "good."  Affect is restricted range, stable and appropriate. Thought process is linear, perseverative on discharge. Thought content is concrete with likely persecutory delusions and potential Fregoli delusion (i.e. seeing Will Rich's face on unit). Perceptual disturbances include ongoing AH with CAH, though states the voices are getting less intense and frequent and he does not follow CAH when they involve hurting himself or others; potential VH vs Fregoli delusion as above.  Insight is poor to fair.  Judgment is fair.  Impulse control has been fair on the unit.

## 2024-09-11 NOTE — BH INPATIENT PSYCHIATRY PROGRESS NOTE - NSBHFUPINTERVALHXFT_PSY_A_CORE
No events overnight, pt is adherent to all medications. On assessment today, ...     No events overnight, pt is adherent to all medications. On assessment today, patient is calm and cooperative, linear, more alert, continues to have some AVH that is not disturbing to him, though is perseverative about discharge. Patient states his mood is "good" and is sleeping well. Patient says the voices continue to be under control today and are not disturbing him. Patient not experiencing any side effects of clozapine (light headedness or constipation). He is adamant about discharge, stating that he is home sick and uncomfortable on the unit, and states various reasons to support his case for why he should be able to leave. Patient counseled on importance of staying on unit while clozapine is still being titrated and other meds tapered. Patient reminded of disorganized behavior that led to admission and several ED visits over past year due to disturbing voices, and how clozapine can help with these symptoms in the future.

## 2024-09-11 NOTE — BH INPATIENT PSYCHIATRY PROGRESS NOTE - NSBHATTESTCOMMENTATTENDFT_PSY_A_CORE
Pt remains with chronic AH though reports just in the past day these have been less frequent and less distressing. He is very discharge focused today though willing to work with treatment day for the time-being and agreeable to continued titration of Clozapine. Will increase Clozapine to 100mg qhs tomorrow. Holding off on Aristada given low dose and no clear indication. C/w Depakote for now, may be able to d/c. C/w Haldol.

## 2024-09-12 RX ORDER — CLOZAPINE 25 MG/1
100 TABLET ORAL AT BEDTIME
Refills: 0 | Status: DISCONTINUED | OUTPATIENT
Start: 2024-09-12 | End: 2024-09-13

## 2024-09-12 RX ADMIN — Medication 17 GRAM(S): at 08:24

## 2024-09-12 RX ADMIN — Medication 1000 MILLIGRAM(S): at 20:28

## 2024-09-12 RX ADMIN — CLOZAPINE 100 MILLIGRAM(S): 25 TABLET ORAL at 20:29

## 2024-09-12 RX ADMIN — Medication 20 MILLIGRAM(S): at 20:29

## 2024-09-12 NOTE — BH INPATIENT PSYCHIATRY PROGRESS NOTE - CURRENT MEDICATION
MEDICATIONS  (STANDING):  cloZAPine 75 milliGRAM(s) Oral at bedtime  divalproex ER 1000 milliGRAM(s) Oral at bedtime  haloperidol     Tablet 20 milliGRAM(s) Oral at bedtime  LORazepam     Tablet 2 milliGRAM(s) Oral at bedtime  polyethylene glycol 3350 17 Gram(s) Oral daily    MEDICATIONS  (PRN):  acetaminophen     Tablet .. 650 milliGRAM(s) Oral every 6 hours PRN Mild Pain (1 - 3), Moderate Pain (4 - 6)  haloperidol     Tablet 5 milliGRAM(s) Oral every 6 hours PRN combativeness due to schizophrenia  haloperidol    Injectable 5 milliGRAM(s) IntraMuscular once PRN Agitation  LORazepam     Tablet 2 milliGRAM(s) Oral every 6 hours PRN agitation  LORazepam   Injectable 2 milliGRAM(s) IntraMuscular once PRN severe agitation due to psychosis  senna 2 Tablet(s) Oral at bedtime PRN constipation   MEDICATIONS  (STANDING):  cloZAPine 100 milliGRAM(s) Oral at bedtime  divalproex ER 1000 milliGRAM(s) Oral at bedtime  haloperidol     Tablet 20 milliGRAM(s) Oral at bedtime  polyethylene glycol 3350 17 Gram(s) Oral daily    MEDICATIONS  (PRN):  acetaminophen     Tablet .. 650 milliGRAM(s) Oral every 6 hours PRN Mild Pain (1 - 3), Moderate Pain (4 - 6)  haloperidol     Tablet 5 milliGRAM(s) Oral every 6 hours PRN combativeness due to schizophrenia  haloperidol    Injectable 5 milliGRAM(s) IntraMuscular once PRN Agitation  LORazepam     Tablet 2 milliGRAM(s) Oral every 6 hours PRN agitation  LORazepam   Injectable 2 milliGRAM(s) IntraMuscular once PRN severe agitation due to psychosis  senna 2 Tablet(s) Oral at bedtime PRN constipation   MEDICATIONS  (STANDING):  cloZAPine 100 milliGRAM(s) Oral at bedtime  divalproex ER 1000 milliGRAM(s) Oral at bedtime  haloperidol     Tablet 20 milliGRAM(s) Oral at bedtime  polyethylene glycol 3350 17 Gram(s) Oral daily    MEDICATIONS  (PRN):  acetaminophen     Tablet .. 650 milliGRAM(s) Oral every 6 hours PRN Mild Pain (1 - 3), Moderate Pain (4 - 6)  haloperidol     Tablet 5 milliGRAM(s) Oral every 6 hours PRN combativeness due to schizophrenia  haloperidol    Injectable 5 milliGRAM(s) IntraMuscular once PRN Agitation  senna 2 Tablet(s) Oral at bedtime PRN constipation

## 2024-09-12 NOTE — BH INPATIENT PSYCHIATRY PROGRESS NOTE - MSE UNSTRUCTURED FT
The patient appears stated age, fair hygiene, unkempt.  The patient was calm, cooperative with the interview and maintained appropriate eye contact, tired throughout interview.  No psychomotor agitation or retardation noted.  Steady gait observed.  The patient's speech more clear today, normal in rate, tone and volume.  The patient's mood is "good."  Affect is restricted range, stable and appropriate. Thought process is linear, perseverative on discharge. Thought content is concrete with likely persecutory delusions and potential Fregoli delusion (i.e. seeing Will Rich's face on unit). Perceptual disturbances include ongoing AH with CAH, though states the voices are getting less intense and frequent and he does not follow CAH when they involve hurting himself or others; potential VH vs Fregoli delusion as above.  Insight is poor to fair.  Judgment is fair.  Impulse control has been fair on the unit. The patient appears stated age, fair hygiene, unkempt.  The patient was calm, cooperative with the interview and maintained appropriate eye contact.  No psychomotor agitation or retardation noted.  Steady gait observed.  The patient's speech is occasionally unintelligible, though mostly normal in rate, tone and volume.  The patient's mood is "positive."  Affect is restricted range, stable and appropriate. Thought process is linear, perseverative on discharge. Thought content is concrete with likely persecutory delusions and potential Fregoli delusion (i.e. seeing Will Rich's face on unit). Denies perceptual disturbances, though likely ongoing AH with CAH; potential VH vs Fregoli delusion as above.  Insight is poor to fair.  Judgment is fair.  Impulse control has been fair on the unit. The patient appears stated age, poor hygiene, unkempt.  The patient was calm, cooperative with the interview and maintained appropriate eye contact.  No psychomotor agitation or retardation noted.  Steady gait observed.  The patient's speech is occasionally unintelligible, though mostly normal in rate, tone and volume.  The patient's mood is "positive."  Affect is irritable, restricted range. Thought process is linear, perseverative on discharge. Thought content is concrete with likely persecutory delusions and potential Fregoli delusion (i.e. seeing Will Rich's face on unit). Denies perceptual disturbances, though likely ongoing AH, possible CAH; potential VH vs Fregoli delusion as above.  Insight is poor to fair.  Judgment is fair.  Impulse control has been fair on the unit.

## 2024-09-12 NOTE — BH INPATIENT PSYCHIATRY PROGRESS NOTE - NSBHATTESTCOMMENTATTENDFT_PSY_A_CORE
Pt now denies AH though suspect these are ongoing. Pt presents as paranoid and irritable today, very discharge-focused. Remains with concern about poor self-care in the midst of ongoing Clozapine titration. Pt submitted court request for discharge though for this reason he continues to require hospitalization Pt now denies AH though suspect these are ongoing. Pt presents as paranoid and irritable today, very discharge-focused. Remains with concern about poor self-care in the midst of ongoing Clozapine titration. Pt submitted court request for discharge though for this reason he continues to require hospitalization for safety to self. Will c/w Clozapine titration.

## 2024-09-12 NOTE — BH INPATIENT PSYCHIATRY PROGRESS NOTE - NSBHASSESSSUMMFT_PSY_ALL_CORE
Pt is a 43yo single man who is domiciled at [facility] w/ PPHx of dx Schizophrenia vs Schizoaffective Disorder, multiple prior admissions including [facility] (last inpt [facility] April 2024, [facility] 2009 and [phone]), no known hx suicide attempt or violence, follows at [facility] on [place] grounds, brought in by EMS called by staff at residence staff after pt threw away all his clothes and belongings in response to CAH with concern for psychotic decompensation in the context of non-adherence with medication and chronic psychotic symptoms that are possibly treatment-resistant.      Pt reports some improvements in AH, though continues to endorse potential Fregoli delusion vs VH (i.e. seeing Will Rich's face on unit). He remains in good behavioral control, though perseverative on discharge today. Unclear why pt was treated with three antipsychotics; will continue clozapine trial, no side effects at this time. There is some concern for low ANC, as patient is at 1.56 as of 9/6 and 2.05 on 9/10, though this is still above the cutoff of 1.5 for starting clozapine, and patient historically has had low ANCs: usually 2-3 range.    Plan:  - C/w clozapine 75 mg, plan to increase to 100 mg tomorrow  	- ANC/CRP/Troponin weekly qTuesday   	- C/w bowel regimen - Miralax daily + Senna 2 tabs qhs prn constipation   - C/w Haldol 20mg qhs  - S/p Aristada 441mg on 8/15, next due 9/12; pt refuses higher dose, will hold off on continuing during Clozapine titration   - C/w Depakote ER to 1000mg qhs - contact Dr. Metz to see if Depakote is necessary and if pt has any hx of acute mood episodes. If not, plan to taper off Depakote.   - C/w Ativan to 2mg QHS given improved behavioral control and daytime sedation    Pt is a 45yo single man who is domiciled at [facility] w/ PPHx of dx Schizophrenia vs Schizoaffective Disorder, multiple prior admissions including state (last inpt [facility] April 2024, [facility] 2009 and [phone]), no known hx suicide attempt or violence, follows at [facility] on [place] grounds, brought in by EMS called by staff at residence staff after pt threw away all his clothes and belongings in response to CAH with concern for psychotic decompensation in the context of non-adherence with medication and chronic psychotic symptoms that are possibly treatment-resistant.      Pt likely still experiencing AH with CAH, though remains in good behavioral control, continues to be perseverative on discharge today. Unclear why pt was treated with three antipsychotics; will continue clozapine trial, no side effects at this time. There is some concern for low ANC, as patient is at 1.56 as of 9/6 and 2.05 on 9/10, though this is still above the cutoff of 1.5 for starting clozapine, and patient historically has had low ANCs: usually 2-3 range.    Plan:  - Titrate clozapine to 100 mg QHS, plan to increase to 125 mg tomorrow  	- ANC/CRP/Troponin weekly qTuesday   	- C/w bowel regimen - Miralax daily + Senna 2 tabs qhs prn constipation   - C/w Haldol 20mg qhs  - S/p Aristada 441mg on 8/15, next due 9/12; pt refuses higher dose, will hold off on continuing during Clozapine titration   - C/w Depakote ER to 1000mg qhs  - D/c Ativan 2mg QHS given improved behavioral control and daytime sedation    Pt is a 43yo single man who is domiciled at [facility] w/ PPHx of dx Schizophrenia vs Schizoaffective Disorder, multiple prior admissions including state (last inpt [facility] April 2024, [facility] 2009 and [id number]), no known hx suicide attempt or violence, follows at [facility] on [place] grounds, brought in by EMS called by staff at residence staff after pt threw away all his clothes and belongings in response to CAH with concern for psychotic decompensation in the context of non-adherence with medication and chronic psychotic symptoms that are possibly treatment-resistant.      Pt likely still experiencing AH with CAH, though remains in good behavioral control, continues to be perseverative on discharge today. Unclear why pt was treated with three antipsychotics; will continue clozapine trial, no side effects at this time. There is some concern for low ANC, as patient is at 1.56 as of 9/6 and 2.05 on 9/10, though this is still above the cutoff of 1.5 for starting clozapine, and patient historically has had low ANCs: usually 2-3 range.    Plan:  - Titrate clozapine to 100 mg QHS, plan to increase to 125 mg tomorrow  	- ANC/CRP/Troponin weekly qTuesday   	- C/w bowel regimen - Miralax daily + Senna 2 tabs qhs prn constipation   - C/w Haldol 20mg qhs  - S/p Aristada 441mg on 8/15, next due 9/12; pt refuses higher dose, will hold off on continuing during Clozapine titration   - C/w Depakote ER to 1000mg qhs  - D/c Ativan 2mg QHS given improved behavioral control and daytime sedation   - Dispo: Pt submitted court request for discharge; given that pt remains with ongoing psychotic symptoms, poor insight, and having just recently started Clozapine, he remains an increased risk of harm to self and others and requires ongoing hospitalization

## 2024-09-12 NOTE — BH INPATIENT PSYCHIATRY PROGRESS NOTE - NSBHFUPINTERVALHXFT_PSY_A_CORE
No events overnight, pt is adherent to all medications. ...     No events overnight, pt is adherent to all medications. On assessment today, pt is calm and cooperative, continues to have linear thought process, reports no AH at this time, though increasingly insistent on discharge. Pt states his mood is "positive", but that he is not able to sleep well because his roommate snores. He complains about having to room with a "dark sinned man", saying that it's "not right." He states he will not shower here and that he only uses alcohol wipes to clean himself, that showering and drying himself off takes too long, "like 40 minutes."     He is adamant about keeping his medications the same and discharging tomorrow, as he feels that the voices are under control. Pt is provided hospital 's phone number and is later seen speaking on the phone with . He is then seen pacing the halls talking to himself.     Per collateral from Dr. Metz on the phone today: when Dr. Metz took over this patient's care about one year ago, patient had already been taking Depakote. Dr. Metz has never seen or heard of patient experiencing an acute mood episode over the past year while he has been patient's psychiatrist. Per Dr. Metz's chart review, patient may have had an acute mood episode in the past, which is why he was started on Depakote.  No events overnight, pt is adherent to all medications. On assessment today, pt is calm and cooperative, continues to have linear thought process, reports no AH at this time, though increasingly insistent on discharge. Pt states his mood is "positive", but that he is not able to sleep well because his roommate snores. He complains about having to room with a "dark skinned man", saying that it's "not right." He states he will not shower here and that he only uses alcohol wipes to clean himself, that showering and drying himself off takes too long, "like 40 minutes."     He is adamant about keeping his medications the same and discharging tomorrow, as he feels that the voices are under control. Pt is provided hospital 's phone number and is later seen speaking on the phone with . He is then seen pacing the halls talking to himself.     Per collateral from Dr. Metz on the phone today: when Dr. Metz took over this patient's care about one year ago, patient had already been taking Depakote. Dr. Metz has never seen or heard of patient experiencing an acute mood episode over the past year while he has been patient's psychiatrist. Per Dr. Metz's chart review, patient may have had an acute mood episode in the past, which is why he was started on Depakote.

## 2024-09-12 NOTE — BH INPATIENT PSYCHIATRY PROGRESS NOTE - NSBHCHARTREVIEWVS_PSY_A_CORE FT
Vital Signs Last 24 Hrs  T(C): 36.4 (09-12-24 @ 08:11), Max: 37.2 (09-11-24 @ 18:43)  T(F): 97.6 (09-12-24 @ 08:11), Max: 99 (09-11-24 @ 18:43)  HR: --  BP: --  BP(mean): --  RR: --  SpO2: --    Orthostatic VS  09-12-24 @ 08:11  Lying BP: --/-- HR: --  Sitting BP: 106/77 HR: 93  Standing BP: 124/87 HR: 103  Site: --  Mode: --  Orthostatic VS  09-11-24 @ 18:43  Lying BP: --/-- HR: --  Sitting BP: 126/79 HR: 108  Standing BP: 130/72 HR: 106  Site: --  Mode: --  Orthostatic VS  09-11-24 @ 08:00  Lying BP: --/-- HR: --  Sitting BP: 121/71 HR: 91  Standing BP: 124/80 HR: 98  Site: --  Mode: --  Orthostatic VS  09-10-24 @ 18:58  Lying BP: --/-- HR: --  Sitting BP: 110/67 HR: 105  Standing BP: 110/62 HR: 110  Site: --  Mode: --   Vital Signs Last 24 Hrs  T(C): 36.6 (09-13-24 @ 08:39), Max: 36.8 (09-12-24 @ 20:29)  T(F): 97.9 (09-13-24 @ 08:39), Max: 98.3 (09-12-24 @ 20:29)  HR: --  BP: --  BP(mean): --  RR: --  SpO2: --    Orthostatic VS  09-13-24 @ 08:39  Lying BP: --/-- HR: --  Sitting BP: 134/73 HR: 96  Standing BP: 116/81 HR: 101  Site: --  Mode: --  Orthostatic VS  09-12-24 @ 20:29  Lying BP: --/-- HR: --  Sitting BP: 150/81 HR: 105  Standing BP: 142/92 HR: 105  Site: upper left arm  Mode: electronic  Orthostatic VS  09-12-24 @ 08:11  Lying BP: --/-- HR: --  Sitting BP: 106/77 HR: 93  Standing BP: 124/87 HR: 103  Site: --  Mode: --  Orthostatic VS  09-11-24 @ 18:43  Lying BP: --/-- HR: --  Sitting BP: 126/79 HR: 108  Standing BP: 130/72 HR: 106  Site: --  Mode: --

## 2024-09-12 NOTE — BH INPATIENT PSYCHIATRY PROGRESS NOTE - NSBHMETABOLIC_PSY_ALL_CORE_FT
BMI: BMI (kg/m2): 33 (08-26-24 @ 23:58)  HbA1c: A1C with Estimated Average Glucose Result: 5.3 % (09-06-24 @ 08:00)    Glucose:   BP: --Vital Signs Last 24 Hrs  T(C): 36.4 (09-12-24 @ 08:11), Max: 37.2 (09-11-24 @ 18:43)  T(F): 97.6 (09-12-24 @ 08:11), Max: 99 (09-11-24 @ 18:43)  HR: --  BP: --  BP(mean): --  RR: --  SpO2: --    Orthostatic VS  09-12-24 @ 08:11  Lying BP: --/-- HR: --  Sitting BP: 106/77 HR: 93  Standing BP: 124/87 HR: 103  Site: --  Mode: --  Orthostatic VS  09-11-24 @ 18:43  Lying BP: --/-- HR: --  Sitting BP: 126/79 HR: 108  Standing BP: 130/72 HR: 106  Site: --  Mode: --  Orthostatic VS  09-11-24 @ 08:00  Lying BP: --/-- HR: --  Sitting BP: 121/71 HR: 91  Standing BP: 124/80 HR: 98  Site: --  Mode: --  Orthostatic VS  09-10-24 @ 18:58  Lying BP: --/-- HR: --  Sitting BP: 110/67 HR: 105  Standing BP: 110/62 HR: 110  Site: --  Mode: --    Lipid Panel: Date/Time: 09-06-24 @ 08:00  Cholesterol, Serum: 170  LDL Cholesterol Calculated: 108  HDL Cholesterol, Serum: 43  Total Cholesterol/HDL Ration Measurement: --  Triglycerides, Serum: 97   BMI: BMI (kg/m2): 33 (08-26-24 @ 23:58)  HbA1c: A1C with Estimated Average Glucose Result: 5.3 % (09-06-24 @ 08:00)    Glucose:   BP: --Vital Signs Last 24 Hrs  T(C): 36.6 (09-13-24 @ 08:39), Max: 36.8 (09-12-24 @ 20:29)  T(F): 97.9 (09-13-24 @ 08:39), Max: 98.3 (09-12-24 @ 20:29)  HR: --  BP: --  BP(mean): --  RR: --  SpO2: --    Orthostatic VS  09-13-24 @ 08:39  Lying BP: --/-- HR: --  Sitting BP: 134/73 HR: 96  Standing BP: 116/81 HR: 101  Site: --  Mode: --  Orthostatic VS  09-12-24 @ 20:29  Lying BP: --/-- HR: --  Sitting BP: 150/81 HR: 105  Standing BP: 142/92 HR: 105  Site: upper left arm  Mode: electronic  Orthostatic VS  09-12-24 @ 08:11  Lying BP: --/-- HR: --  Sitting BP: 106/77 HR: 93  Standing BP: 124/87 HR: 103  Site: --  Mode: --  Orthostatic VS  09-11-24 @ 18:43  Lying BP: --/-- HR: --  Sitting BP: 126/79 HR: 108  Standing BP: 130/72 HR: 106  Site: --  Mode: --    Lipid Panel: Date/Time: 09-06-24 @ 08:00  Cholesterol, Serum: 170  LDL Cholesterol Calculated: 108  HDL Cholesterol, Serum: 43  Total Cholesterol/HDL Ration Measurement: --  Triglycerides, Serum: 97

## 2024-09-12 NOTE — BH INPATIENT PSYCHIATRY PROGRESS NOTE - PRN MEDS
MEDICATIONS  (PRN):  acetaminophen     Tablet .. 650 milliGRAM(s) Oral every 6 hours PRN Mild Pain (1 - 3), Moderate Pain (4 - 6)  haloperidol     Tablet 5 milliGRAM(s) Oral every 6 hours PRN combativeness due to schizophrenia  haloperidol    Injectable 5 milliGRAM(s) IntraMuscular once PRN Agitation  LORazepam     Tablet 2 milliGRAM(s) Oral every 6 hours PRN agitation  LORazepam   Injectable 2 milliGRAM(s) IntraMuscular once PRN severe agitation due to psychosis  senna 2 Tablet(s) Oral at bedtime PRN constipation   MEDICATIONS  (PRN):  acetaminophen     Tablet .. 650 milliGRAM(s) Oral every 6 hours PRN Mild Pain (1 - 3), Moderate Pain (4 - 6)  haloperidol     Tablet 5 milliGRAM(s) Oral every 6 hours PRN combativeness due to schizophrenia  haloperidol    Injectable 5 milliGRAM(s) IntraMuscular once PRN Agitation  senna 2 Tablet(s) Oral at bedtime PRN constipation

## 2024-09-13 PROCEDURE — 99214 OFFICE O/P EST MOD 30 MIN: CPT | Mod: GC

## 2024-09-13 RX ORDER — CLOZAPINE 25 MG/1
125 TABLET ORAL AT BEDTIME
Refills: 0 | Status: COMPLETED | OUTPATIENT
Start: 2024-09-13 | End: 2024-09-13

## 2024-09-13 RX ORDER — CLOZAPINE 25 MG/1
175 TABLET ORAL AT BEDTIME
Refills: 0 | Status: COMPLETED | OUTPATIENT
Start: 2024-09-15 | End: 2024-09-15

## 2024-09-13 RX ORDER — CLOZAPINE 25 MG/1
150 TABLET ORAL AT BEDTIME
Refills: 0 | Status: COMPLETED | OUTPATIENT
Start: 2024-09-14 | End: 2024-09-14

## 2024-09-13 RX ADMIN — Medication 20 MILLIGRAM(S): at 20:26

## 2024-09-13 RX ADMIN — Medication 1000 MILLIGRAM(S): at 20:26

## 2024-09-13 RX ADMIN — CLOZAPINE 125 MILLIGRAM(S): 25 TABLET ORAL at 20:26

## 2024-09-13 RX ADMIN — Medication 17 GRAM(S): at 08:21

## 2024-09-13 NOTE — BH INPATIENT PSYCHIATRY PROGRESS NOTE - NSBHFUPINTERVALHXFT_PSY_A_CORE
No events overnight, pt is adherent to all medications. On assessment today, pt is calm and cooperative, noticeably more pleasant with providers today, continues to have linear thought process, denying AVH, less focused on discharge today, improved insight and judgment. Patient states he feels "good" today and is sleeping well. He appreciates the room change, likes his roommate, is keeping his room clean, and showered yesterday because an RN gave him a bar soap. He is denying AVH and feels that this is a "relief" to not be hearing voices at this time. He is amenable to discharge later next week and understands that clozapine needs to be gradually titrated and he needs to be monitored closely for side effects during this time. He denies any side effects of clozapine at this time, is having regular BMs and denies any light headedness.   No events overnight, pt is adherent to all medications. On assessment today, pt is calm and cooperative, noticeably more pleasant with providers today, continues to have linear thought process, denying AVH, less focused on discharge today, improved insight and judgment. Patient states he feels "good" today and is sleeping well. He appreciates the room change, likes his roommate, is keeping his room clean, and showered yesterday because an RN gave him a bar soap. He is denying AVH and feels that this is a "relief" to not be hearing voices at this time. He is amenable to continued hospitalization during Clozapine titration. He denies any side effects of clozapine at this time, is having regular BMs and denies any light headedness.

## 2024-09-13 NOTE — BH INPATIENT PSYCHIATRY PROGRESS NOTE - NSBHASSESSSUMMFT_PSY_ALL_CORE
Pt is a 43yo single man who is domiciled at [facility] w/ PPHx of dx Schizophrenia vs Schizoaffective Disorder, multiple prior admissions including state (last inpt [facility] April 2024, [facility] 2009 and [phone]), no known hx suicide attempt or violence, follows at [facility] on [place] grounds, brought in by EMS called by staff at residence staff after pt threw away all his clothes and belongings in response to CAH with concern for psychotic decompensation in the context of non-adherence with medication and chronic psychotic symptoms that are possibly treatment-resistant.      Pt potentially still experiencing AH with CAH, though denies perceptual disturbances, remains in good behavioral control, more amenable to discharge later next week. Unclear why pt was treated with three antipsychotics; will continue clozapine trial, no side effects at this time. There is some concern for low ANC, as patient is at 1.56 as of 9/6 and 2.05 on 9/10, though this is still above the cutoff of 1.5 for starting clozapine, and patient historically has had low ANCs: usually 2-3 range.    Plan:  - Titrate clozapine to 125 mg QHS, plan to increase by 25 mg every day (orders are in through this weekend)  	- ANC/CRP/Troponin weekly qTuesday   	- C/w bowel regimen - Miralax daily + Senna 2 tabs qhs prn constipation   - C/w Haldol 20mg qhs  - S/p Aristada 441mg on 8/15, next due 9/12; pt refuses higher dose, will hold off on continuing during Clozapine titration   - C/w Depakote ER to 1000mg qhs  - Dispo: Pt submitted court request for discharge; given that pt remains with ongoing psychotic symptoms, poor insight, and having just recently started Clozapine, he remains an increased risk of harm to self and others and requires ongoing hospitalization    Pt is a 43yo single man who is domiciled at [facility] w/ PPHx of dx Schizophrenia vs Schizoaffective Disorder, multiple prior admissions including state (last inpt [facility] April 2024, [facility] 2009 and [phone]), no known hx suicide attempt or violence, follows at [facility] on [place] grounds, brought in by EMS called by staff at residence staff after pt threw away all his clothes and belongings in response to CAH with concern for psychotic decompensation in the context of non-adherence with medication and chronic psychotic symptoms that are possibly treatment-resistant.      Pt potentially still experiencing AH with CAH, though denies perceptual disturbances, remains in good behavioral control, more amenable to discharge later next week. Unclear why pt was treated with three antipsychotics; will continue clozapine trial, no side effects at this time.     Plan:  - Titrate clozapine to 125 mg QHS, plan to increase by 25 mg every day (orders are in through this weekend)  	- ANC/CRP/Troponin weekly qTuesday   	- C/w bowel regimen - Miralax daily + Senna 2 tabs qhs prn constipation   - C/w Haldol 20mg qhs  - S/p Aristada 441mg on 8/15, next due 9/12; pt refuses higher dose, will hold off on continuing during Clozapine titration   - C/w Depakote ER to 1000mg qhs  - Dispo: Pt submitted court request for discharge; given that pt remains with ongoing psychotic symptoms, poor insight, and having just recently started Clozapine, he remains an increased risk of harm to self and others and requires ongoing hospitalization

## 2024-09-13 NOTE — BH INPATIENT PSYCHIATRY PROGRESS NOTE - NSBHMETABOLIC_PSY_ALL_CORE_FT
BMI: BMI (kg/m2): 33 (08-26-24 @ 23:58)  HbA1c: A1C with Estimated Average Glucose Result: 5.3 % (09-06-24 @ 08:00)    Glucose:   BP: --Vital Signs Last 24 Hrs  T(C): 36.6 (09-13-24 @ 08:39), Max: 36.8 (09-12-24 @ 20:29)  T(F): 97.9 (09-13-24 @ 08:39), Max: 98.3 (09-12-24 @ 20:29)  HR: --  BP: --  BP(mean): --  RR: --  SpO2: --    Orthostatic VS  09-13-24 @ 08:39  Lying BP: --/-- HR: --  Sitting BP: 134/73 HR: 96  Standing BP: 116/81 HR: 101  Site: --  Mode: --  Orthostatic VS  09-12-24 @ 20:29  Lying BP: --/-- HR: --  Sitting BP: 150/81 HR: 105  Standing BP: 142/92 HR: 105  Site: upper left arm  Mode: electronic  Orthostatic VS  09-12-24 @ 08:11  Lying BP: --/-- HR: --  Sitting BP: 106/77 HR: 93  Standing BP: 124/87 HR: 103  Site: --  Mode: --  Orthostatic VS  09-11-24 @ 18:43  Lying BP: --/-- HR: --  Sitting BP: 126/79 HR: 108  Standing BP: 130/72 HR: 106  Site: --  Mode: --    Lipid Panel: Date/Time: 09-06-24 @ 08:00  Cholesterol, Serum: 170  LDL Cholesterol Calculated: 108  HDL Cholesterol, Serum: 43  Total Cholesterol/HDL Ration Measurement: --  Triglycerides, Serum: 97

## 2024-09-13 NOTE — BH INPATIENT PSYCHIATRY PROGRESS NOTE - CURRENT MEDICATION
MEDICATIONS  (STANDING):  cloZAPine 125 milliGRAM(s) Oral at bedtime  divalproex ER 1000 milliGRAM(s) Oral at bedtime  haloperidol     Tablet 20 milliGRAM(s) Oral at bedtime  polyethylene glycol 3350 17 Gram(s) Oral daily    MEDICATIONS  (PRN):  acetaminophen     Tablet .. 650 milliGRAM(s) Oral every 6 hours PRN Mild Pain (1 - 3), Moderate Pain (4 - 6)  haloperidol     Tablet 5 milliGRAM(s) Oral every 6 hours PRN combativeness due to schizophrenia  haloperidol    Injectable 5 milliGRAM(s) IntraMuscular once PRN Agitation  senna 2 Tablet(s) Oral at bedtime PRN constipation

## 2024-09-13 NOTE — BH INPATIENT PSYCHIATRY PROGRESS NOTE - PRN MEDS
MEDICATIONS  (PRN):  acetaminophen     Tablet .. 650 milliGRAM(s) Oral every 6 hours PRN Mild Pain (1 - 3), Moderate Pain (4 - 6)  haloperidol     Tablet 5 milliGRAM(s) Oral every 6 hours PRN combativeness due to schizophrenia  haloperidol    Injectable 5 milliGRAM(s) IntraMuscular once PRN Agitation  senna 2 Tablet(s) Oral at bedtime PRN constipation

## 2024-09-13 NOTE — BH INPATIENT PSYCHIATRY PROGRESS NOTE - NSBHCHARTREVIEWVS_PSY_A_CORE FT
Vital Signs Last 24 Hrs  T(C): 36.6 (09-13-24 @ 08:39), Max: 36.8 (09-12-24 @ 20:29)  T(F): 97.9 (09-13-24 @ 08:39), Max: 98.3 (09-12-24 @ 20:29)  HR: --  BP: --  BP(mean): --  RR: --  SpO2: --    Orthostatic VS  09-13-24 @ 08:39  Lying BP: --/-- HR: --  Sitting BP: 134/73 HR: 96  Standing BP: 116/81 HR: 101  Site: --  Mode: --  Orthostatic VS  09-12-24 @ 20:29  Lying BP: --/-- HR: --  Sitting BP: 150/81 HR: 105  Standing BP: 142/92 HR: 105  Site: upper left arm  Mode: electronic  Orthostatic VS  09-12-24 @ 08:11  Lying BP: --/-- HR: --  Sitting BP: 106/77 HR: 93  Standing BP: 124/87 HR: 103  Site: --  Mode: --  Orthostatic VS  09-11-24 @ 18:43  Lying BP: --/-- HR: --  Sitting BP: 126/79 HR: 108  Standing BP: 130/72 HR: 106  Site: --  Mode: --

## 2024-09-13 NOTE — BH INPATIENT PSYCHIATRY PROGRESS NOTE - MSE UNSTRUCTURED FT
The patient appears stated age, poor hygiene, unkempt.  The patient was calm, cooperative with the interview and maintained appropriate eye contact.  No psychomotor agitation or retardation noted.  Steady gait observed.  The patient's speech is normal in rate, tone and volume.  The patient's mood is "good."  Affect is euthymic, full range, smiling today. Thought process is linear. Thought content is concrete with no spontaneous mention of delusional beliefs. Denies perceptual disturbances, though potential ongoing AH, possible CAH.  Insight is fair.  Judgment is fair.  Impulse control has been fair on the unit. The patient appears stated age, fair hygiene, unkempt.  The patient was calm, cooperative with the interview and maintained appropriate eye contact.  No psychomotor agitation or retardation noted.  Steady gait observed.  The patient's speech is normal in rate, tone and volume.  The patient's mood is "good."  Affect is euthymic, full range, smiling today. Thought process is linear. Thought content is concrete with no spontaneous mention of delusional beliefs. Denies perceptual disturbances, though potential ongoing AH, possible CAH.  Insight is fair.  Judgment is fair.  Impulse control has been fair on the unit.

## 2024-09-13 NOTE — BH INPATIENT PSYCHIATRY PROGRESS NOTE - NSBHATTESTCOMMENTATTENDFT_PSY_A_CORE
Pt in better spirits today and now amendable to continued hospitalization during Clozapine titration. He denies AH today and appears relieved by this. In good behavioral control. Continuing with Clozapine titration through the weekend.

## 2024-09-14 RX ADMIN — Medication 1000 MILLIGRAM(S): at 20:03

## 2024-09-14 RX ADMIN — Medication 17 GRAM(S): at 08:17

## 2024-09-14 RX ADMIN — CLOZAPINE 150 MILLIGRAM(S): 25 TABLET ORAL at 20:03

## 2024-09-14 RX ADMIN — Medication 20 MILLIGRAM(S): at 20:03

## 2024-09-15 RX ADMIN — Medication 1000 MILLIGRAM(S): at 20:18

## 2024-09-15 RX ADMIN — Medication 20 MILLIGRAM(S): at 20:18

## 2024-09-15 RX ADMIN — Medication 17 GRAM(S): at 08:16

## 2024-09-15 RX ADMIN — CLOZAPINE 175 MILLIGRAM(S): 25 TABLET ORAL at 20:18

## 2024-09-16 PROCEDURE — 99231 SBSQ HOSP IP/OBS SF/LOW 25: CPT | Mod: GC

## 2024-09-16 RX ORDER — CLOZAPINE 25 MG/1
200 TABLET ORAL AT BEDTIME
Refills: 0 | Status: DISCONTINUED | OUTPATIENT
Start: 2024-09-16 | End: 2024-09-17

## 2024-09-16 RX ORDER — HALOPERIDOL LACTATE 2 MG/ML
15 CONCENTRATE, ORAL ORAL AT BEDTIME
Refills: 0 | Status: DISCONTINUED | OUTPATIENT
Start: 2024-09-16 | End: 2024-09-18

## 2024-09-16 RX ADMIN — Medication 15 MILLIGRAM(S): at 20:30

## 2024-09-16 RX ADMIN — CLOZAPINE 200 MILLIGRAM(S): 25 TABLET ORAL at 20:30

## 2024-09-16 RX ADMIN — Medication 1000 MILLIGRAM(S): at 20:30

## 2024-09-16 NOTE — BH INPATIENT PSYCHIATRY PROGRESS NOTE - NSBHMETABOLIC_PSY_ALL_CORE_FT
BMI: BMI (kg/m2): 33 (08-26-24 @ 23:58)  HbA1c: A1C with Estimated Average Glucose Result: 5.3 % (09-06-24 @ 08:00)    Glucose:   BP: --Vital Signs Last 24 Hrs  T(C): 36.2 (09-16-24 @ 08:03), Max: 36.8 (09-15-24 @ 19:01)  T(F): 97.1 (09-16-24 @ 08:03), Max: 98.2 (09-15-24 @ 19:01)  HR: --  BP: --  BP(mean): --  RR: --  SpO2: --    Orthostatic VS  09-16-24 @ 08:03  Lying BP: --/-- HR: --  Sitting BP: 117/73 HR: 80  Standing BP: 109/70 HR: 84  Site: --  Mode: --  Orthostatic VS  09-15-24 @ 19:01  Lying BP: --/-- HR: --  Sitting BP: 132/88 HR: 113  Standing BP: 128/74 HR: 116  Site: --  Mode: --  Orthostatic VS  09-15-24 @ 08:01  Lying BP: --/-- HR: --  Sitting BP: 130/84 HR: 98  Standing BP: 134/78 HR: 101  Site: --  Mode: --  Orthostatic VS  09-14-24 @ 18:40  Lying BP: --/-- HR: --  Sitting BP: 127/82 HR: 115  Standing BP: 121/80 HR: 100  Site: --  Mode: --    Lipid Panel: Date/Time: 09-06-24 @ 08:00  Cholesterol, Serum: 170  LDL Cholesterol Calculated: 108  HDL Cholesterol, Serum: 43  Total Cholesterol/HDL Ration Measurement: --  Triglycerides, Serum: 97

## 2024-09-16 NOTE — BH INPATIENT PSYCHIATRY PROGRESS NOTE - NSBHCHARTREVIEWVS_PSY_A_CORE FT
Vital Signs Last 24 Hrs  T(C): 36.2 (09-16-24 @ 08:03), Max: 36.8 (09-15-24 @ 19:01)  T(F): 97.1 (09-16-24 @ 08:03), Max: 98.2 (09-15-24 @ 19:01)  HR: --  BP: --  BP(mean): --  RR: --  SpO2: --    Orthostatic VS  09-16-24 @ 08:03  Lying BP: --/-- HR: --  Sitting BP: 117/73 HR: 80  Standing BP: 109/70 HR: 84  Site: --  Mode: --  Orthostatic VS  09-15-24 @ 19:01  Lying BP: --/-- HR: --  Sitting BP: 132/88 HR: 113  Standing BP: 128/74 HR: 116  Site: --  Mode: --  Orthostatic VS  09-15-24 @ 08:01  Lying BP: --/-- HR: --  Sitting BP: 130/84 HR: 98  Standing BP: 134/78 HR: 101  Site: --  Mode: --  Orthostatic VS  09-14-24 @ 18:40  Lying BP: --/-- HR: --  Sitting BP: 127/82 HR: 115  Standing BP: 121/80 HR: 100  Site: --  Mode: --

## 2024-09-16 NOTE — DIETITIAN INITIAL EVALUATION ADULT - OTHER INFO
Pt is a 45 y/o male with is domiciled at [facility] w/ PPHx of dx Schizophrenia vs Schizoaffective Disorder, multiple prior admissions including state, no known hx suicide attempt or violence, follows at [facility] on [place], brought in by EMS called by staff at residence staff after pt threw away all his clothes and belongings in response to CAH with concern for psychotic decompensation in the context of non-adherence with medication and chronic psychotic symptoms that are possibly treatment-resistant.    Admitted to [place] [id number]. Pt reports fair appetite/po intake at present. Pt states dislikes some hospital foods. No GI distress noted. NKFA. Food preferences explored and implemented.   Weight 8/26 315 lbs, 9/7 261 lbs, 9/14 258 lbs. Questionable weight loss of 57 lbs since admission ? Pt can not recall  UBW.  Encouraged po intake. Pt verbalized understanding.      Pt is a 45 y/o male with is domiciled at [facility] w/ PPHx of dx Schizophrenia vs Schizoaffective Disorder, multiple prior admissions including state, no known hx suicide attempt or violence, follows at [facility] on [place] grounds, brought in to [facility] ED by EMS called by staff at residence staff after pt threw away all his clothes and belongings in response to CAH with concern for psychotic decompensation in the context of non-adherence with medication and chronic psychotic symptoms that are possibly treatment-resistant.    Admitted to [place] [id number]. Pt reports fair appetite/po intake at present. Pt states dislikes some hospital foods. No GI distress noted. NKFA. Food preferences explored and implemented.   Weight 8/26 315 lbs, 9/7 261 lbs, 9/14 258 lbs. Questionable weight loss of 57 lbs since admission ? Pt can not recall  UBW.  Encouraged po intake. Pt verbalized understanding.

## 2024-09-16 NOTE — BH INPATIENT PSYCHIATRY PROGRESS NOTE - CURRENT MEDICATION
MEDICATIONS  (STANDING):  cloZAPine 200 milliGRAM(s) Oral at bedtime  divalproex ER 1000 milliGRAM(s) Oral at bedtime  haloperidol     Tablet 20 milliGRAM(s) Oral at bedtime  polyethylene glycol 3350 17 Gram(s) Oral daily    MEDICATIONS  (PRN):  acetaminophen     Tablet .. 650 milliGRAM(s) Oral every 6 hours PRN Mild Pain (1 - 3), Moderate Pain (4 - 6)  haloperidol     Tablet 5 milliGRAM(s) Oral every 6 hours PRN combativeness due to schizophrenia  haloperidol    Injectable 5 milliGRAM(s) IntraMuscular once PRN Agitation  senna 2 Tablet(s) Oral at bedtime PRN constipation   MEDICATIONS  (STANDING):  cloZAPine 200 milliGRAM(s) Oral at bedtime  divalproex ER 1000 milliGRAM(s) Oral at bedtime  haloperidol     Tablet 15 milliGRAM(s) Oral at bedtime  polyethylene glycol 3350 17 Gram(s) Oral daily    MEDICATIONS  (PRN):  acetaminophen     Tablet .. 650 milliGRAM(s) Oral every 6 hours PRN Mild Pain (1 - 3), Moderate Pain (4 - 6)  haloperidol     Tablet 5 milliGRAM(s) Oral every 6 hours PRN combativeness due to schizophrenia  haloperidol    Injectable 5 milliGRAM(s) IntraMuscular once PRN Agitation  senna 2 Tablet(s) Oral at bedtime PRN constipation

## 2024-09-16 NOTE — BH INPATIENT PSYCHIATRY PROGRESS NOTE - NSBHATTESTCOMMENTATTENDFT_PSY_A_CORE
Pt reports delusions of control today though now denying AH, remains in good behavioral control. Will c/w Clozapine titration to 200mg qhs. Taper Haldol and consider further taper of Depakote.

## 2024-09-16 NOTE — DIETITIAN INITIAL EVALUATION ADULT - PERTINENT MEDS FT
MEDICATIONS  (STANDING):  cloZAPine 200 milliGRAM(s) Oral at bedtime  divalproex ER 1000 milliGRAM(s) Oral at bedtime  haloperidol     Tablet 20 milliGRAM(s) Oral at bedtime  polyethylene glycol 3350 17 Gram(s) Oral daily    MEDICATIONS  (PRN):  acetaminophen     Tablet .. 650 milliGRAM(s) Oral every 6 hours PRN Mild Pain (1 - 3), Moderate Pain (4 - 6)  haloperidol     Tablet 5 milliGRAM(s) Oral every 6 hours PRN combativeness due to schizophrenia  haloperidol    Injectable 5 milliGRAM(s) IntraMuscular once PRN Agitation  senna 2 Tablet(s) Oral at bedtime PRN constipation

## 2024-09-16 NOTE — BH INPATIENT PSYCHIATRY PROGRESS NOTE - MSE UNSTRUCTURED FT
The patient appears stated age, fair hygiene, unkempt.  The patient was calm, cooperative with the interview and maintained appropriate eye contact.  No psychomotor agitation or retardation noted.  Steady gait observed.  The patient's speech is normal in rate, tone and volume.  The patient's mood is "good."  Affect is euthymic, full range, smiling today. Thought process is linear. Thought content is concrete with no spontaneous mention of delusional beliefs. Denies perceptual disturbances, though potential ongoing AH, possible CAH.  Insight is fair.  Judgment is fair.  Impulse control has been fair on the unit. The patient appears stated age, fair hygiene, unkempt.  The patient was calm, cooperative with the interview and maintained appropriate eye contact.  No psychomotor agitation or retardation noted.  Steady gait observed.  The patient's speech is normal in rate, tone and volume.  The patient's mood is "good."  Affect is euthymic, full range, smiling today. Thought process is linear. Thought content is concrete, endorsing delusions of control today. Denies perceptual disturbances, though potential ongoing AH, possible CAH.  Insight is poor to fair.  Judgment is fair.  Impulse control has been fair on the unit.

## 2024-09-16 NOTE — BH INPATIENT PSYCHIATRY PROGRESS NOTE - NSBHASSESSSUMMFT_PSY_ALL_CORE
Pt is a 45yo single man who is domiciled at [facility] w/ PPHx of dx Schizophrenia vs Schizoaffective Disorder, multiple prior admissions including state (last inpt [facility] April 2024, [facility] 2009 and [phone]), no known hx suicide attempt or violence, follows at [facility] on [place] grounds, brought in by EMS called by staff at residence staff after pt threw away all his clothes and belongings in response to CAH with concern for psychotic decompensation in the context of non-adherence with medication and chronic psychotic symptoms that are possibly treatment-resistant.      Pt potentially still experiencing AH with CAH, though denies perceptual disturbances, remains in good behavioral control, more amenable to discharge later next week. Unclear why pt was treated with three antipsychotics; will continue clozapine trial, no side effects at this time.     Plan:  - Titrate clozapine to 125 mg QHS, plan to increase by 25 mg every day (orders are in through this weekend)  	- ANC/CRP/Troponin weekly qTuesday   	- C/w bowel regimen - Miralax daily + Senna 2 tabs qhs prn constipation   - C/w Haldol 20mg qhs  - S/p Aristada 441mg on 8/15, next due 9/12; pt refuses higher dose, will hold off on continuing during Clozapine titration   - C/w Depakote ER to 1000mg qhs  - Dispo: Pt submitted court request for discharge; given that pt remains with ongoing psychotic symptoms, poor insight, and having just recently started Clozapine, he remains an increased risk of harm to self and others and requires ongoing hospitalization    Pt is a 43yo single man who is domiciled at [facility] w/ PPHx of dx Schizophrenia vs Schizoaffective Disorder, multiple prior admissions including state (last inpt [facility] April 2024, [facility] 2009 and [phone]), no known hx suicide attempt or violence, follows at [facility] on [place] grounds, brought in by EMS called by staff at residence staff after pt threw away all his clothes and belongings in response to CAH with concern for psychotic decompensation in the context of non-adherence with medication and chronic psychotic symptoms that are possibly treatment-resistant.      Pt now endorsing new delusions of control, potentially still experiencing AH with CAH though denies perceptual disturbances, remains in good behavioral control, more amenable to discharge later this week. Unclear why pt was treated with three antipsychotics; will continue clozapine trial, no side effects at this time.     Plan:  - Titrate clozapine to 200 mg QHS, plan to increase by 25 mg every day  	- ANC/CRP/Troponin weekly qTuesday   	- C/w bowel regimen - Miralax daily + Senna 2 tabs qhs prn constipation   - Taper Haldol to 15 mg qhs, goal of tapering completely  - S/p Aristada 441mg on 8/15, next due 9/12; pt refuses higher dose, will hold off on continuing during Clozapine titration   - C/w Depakote ER to 1000mg qhs  - Dispo: Pt submitted court request for discharge; given that pt remains with ongoing psychotic symptoms, poor insight, and having just recently started Clozapine, he remains an increased risk of harm to self and others and requires ongoing hospitalization

## 2024-09-16 NOTE — BH INPATIENT PSYCHIATRY PROGRESS NOTE - NSBHFUPINTERVALHXFT_PSY_A_CORE
No events overnight, pt is adherent to all medications. On assessment today, ...   No events overnight, pt is adherent to all medications. On assessment today, pt is calm and cooperative, mostly linear in thought process, denying AVH, though endorsing a new delusion of control and with increased slurred speech. Pt states his mood is "good" and he is sleeping well. He denies hearing any voices or seeing any faces on the unit. He reports that he is slurring his words because multiple people (including Mayito Rich) are "putting a speech impediment in my mouth." When probed further on whether these people are physically present on the unit, he states that they are not. When asked how these people are doing this if they are not present, he states, "I don't know, they're forcing it into my mouth against my will."     Pt denies any side effects of clozapine, states he is having normal BMs and has no light headedness. He is amenable to continuing clozapine and gradually increasing dose.

## 2024-09-17 LAB
BASOPHILS # BLD AUTO: 0.04 K/UL — SIGNIFICANT CHANGE UP (ref 0–0.2)
BASOPHILS NFR BLD AUTO: 0.7 % — SIGNIFICANT CHANGE UP (ref 0–2)
CRP SERPL HS-MCNC: 7.6 MG/L — HIGH
EOSINOPHIL # BLD AUTO: 0.17 K/UL — SIGNIFICANT CHANGE UP (ref 0–0.5)
EOSINOPHIL NFR BLD AUTO: 2.9 % — SIGNIFICANT CHANGE UP (ref 0–6)
HCT VFR BLD CALC: 46 % — SIGNIFICANT CHANGE UP (ref 39–50)
HGB BLD-MCNC: 14.9 G/DL — SIGNIFICANT CHANGE UP (ref 13–17)
IANC: 2.79 K/UL — SIGNIFICANT CHANGE UP (ref 1.8–7.4)
IMM GRANULOCYTES NFR BLD AUTO: 0.3 % — SIGNIFICANT CHANGE UP (ref 0–0.9)
LYMPHOCYTES # BLD AUTO: 2.52 K/UL — SIGNIFICANT CHANGE UP (ref 1–3.3)
LYMPHOCYTES # BLD AUTO: 42.8 % — SIGNIFICANT CHANGE UP (ref 13–44)
MCHC RBC-ENTMCNC: 29.5 PG — SIGNIFICANT CHANGE UP (ref 27–34)
MCHC RBC-ENTMCNC: 32.4 GM/DL — SIGNIFICANT CHANGE UP (ref 32–36)
MCV RBC AUTO: 91.1 FL — SIGNIFICANT CHANGE UP (ref 80–100)
MONOCYTES # BLD AUTO: 0.35 K/UL — SIGNIFICANT CHANGE UP (ref 0–0.9)
MONOCYTES NFR BLD AUTO: 5.9 % — SIGNIFICANT CHANGE UP (ref 2–14)
NEUTROPHILS # BLD AUTO: 2.79 K/UL — SIGNIFICANT CHANGE UP (ref 1.8–7.4)
NEUTROPHILS NFR BLD AUTO: 47.4 % — SIGNIFICANT CHANGE UP (ref 43–77)
NRBC # BLD: 0 /100 WBCS — SIGNIFICANT CHANGE UP (ref 0–0)
NRBC # FLD: 0 K/UL — SIGNIFICANT CHANGE UP (ref 0–0)
PLATELET # BLD AUTO: 284 K/UL — SIGNIFICANT CHANGE UP (ref 150–400)
RBC # BLD: 5.05 M/UL — SIGNIFICANT CHANGE UP (ref 4.2–5.8)
RBC # FLD: 13.1 % — SIGNIFICANT CHANGE UP (ref 10.3–14.5)
TROPONIN T, HIGH SENSITIVITY RESULT: 7 NG/L — SIGNIFICANT CHANGE UP
WBC # BLD: 5.89 K/UL — SIGNIFICANT CHANGE UP (ref 3.8–10.5)
WBC # FLD AUTO: 5.89 K/UL — SIGNIFICANT CHANGE UP (ref 3.8–10.5)

## 2024-09-17 RX ORDER — CLOZAPINE 25 MG/1
225 TABLET ORAL AT BEDTIME
Refills: 0 | Status: DISCONTINUED | OUTPATIENT
Start: 2024-09-17 | End: 2024-09-18

## 2024-09-17 RX ADMIN — Medication 15 MILLIGRAM(S): at 20:00

## 2024-09-17 RX ADMIN — CLOZAPINE 225 MILLIGRAM(S): 25 TABLET ORAL at 20:00

## 2024-09-17 RX ADMIN — Medication 1000 MILLIGRAM(S): at 20:00

## 2024-09-17 NOTE — BH INPATIENT PSYCHIATRY PROGRESS NOTE - NSBHASSESSSUMMFT_PSY_ALL_CORE
Pt is a 43yo single man who is domiciled at [facility] w/ PPHx of dx Schizophrenia vs Schizoaffective Disorder, multiple prior admissions including state (last inpt [facility] April 2024, [facility] 2009 and [phone]), no known hx suicide attempt or violence, follows at [facility] on [place] grounds, brought in by EMS called by staff at residence staff after pt threw away all his clothes and belongings in response to CAH with concern for psychotic decompensation in the context of non-adherence with medication and chronic psychotic symptoms that are possibly treatment-resistant.      Pt continues to endorse delusions of control, potentially still experiencing AH with CAH though consistently denying perceptual disturbances for several days, remains in good behavioral control, more amenable to discharge later this week. Unclear why pt was treated with three antipsychotics; will continue clozapine trial, no side effects at this time.     Plan:  - Titrate clozapine to 225 mg QHS, plan to increase by 25 mg every day  	- ANC/CRP/Troponin weekly qTuesday   	- C/w bowel regimen - Miralax daily + Senna 2 tabs qhs prn constipation   - C/w Haldol 15 mg qhs, goal of tapering completely  - S/p Aristada 441mg on 8/15, next due 9/12; pt refuses higher dose, will hold off on continuing during Clozapine titration   - C/w Depakote ER to 1000mg qhs  - Dispo: Pt submitted court request for discharge; given that pt remains with ongoing psychotic symptoms, poor insight, and having just recently started Clozapine, he remains an increased risk of harm to self and others and requires ongoing hospitalization    Pt is a 43yo single man who is domiciled at [facility] w/ PPHx of dx Schizophrenia vs Schizoaffective Disorder, multiple prior admissions including state (last inpt [facility] April 2024, [facility] 2009 and [phone]), no known hx suicide attempt or violence, follows at [facility] on [place] grounds, brought in by EMS called by staff at residence staff after pt threw away all his clothes and belongings in response to CAH with concern for psychotic decompensation in the context of non-adherence with medication and chronic psychotic symptoms that are possibly treatment-resistant.      Pt continues to endorse delusions of control, potentially still experiencing AH with CAH though consistently denying perceptual disturbances for several days, remains in good behavioral control. Unclear why pt was previously treated with three antipsychotics; will continue clozapine trial, no side effects at this time.     Plan:  - Titrate clozapine to 225 mg QHS, plan to increase by 25 mg every day  	- ANC/CRP/Troponin weekly qTuesday   	- C/w bowel regimen - Miralax daily + Senna 2 tabs qhs prn constipation   - C/w Haldol 15 mg qhs, goal of tapering completely  - S/p Aristada 441mg on 8/15, next due 9/12; pt refuses higher dose, will hold off on continuing during Clozapine titration   - C/w Depakote ER to 1000mg qhs  - Dispo: Pt submitted court request for discharge; given that pt remains with ongoing psychotic symptoms, poor insight, and having just recently started Clozapine, he remains an increased risk of harm to self and others and requires ongoing hospitalization

## 2024-09-17 NOTE — BH INPATIENT PSYCHIATRY PROGRESS NOTE - NSBHMETABOLIC_PSY_ALL_CORE_FT
BMI: BMI (kg/m2): 33 (08-26-24 @ 23:58)  HbA1c: A1C with Estimated Average Glucose Result: 5.3 % (09-06-24 @ 08:00)    Glucose:   BP: --Vital Signs Last 24 Hrs  T(C): 36.5 (09-17-24 @ 08:08), Max: 36.9 (09-16-24 @ 20:16)  T(F): 97.7 (09-17-24 @ 08:08), Max: 98.5 (09-16-24 @ 20:16)  HR: --  BP: --  BP(mean): --  RR: --  SpO2: --    Orthostatic VS  09-17-24 @ 08:08  Lying BP: --/-- HR: --  Sitting BP: 133/73 HR: 101  Standing BP: 120/78 HR: 106  Site: --  Mode: --  Orthostatic VS  09-16-24 @ 20:16  Lying BP: --/-- HR: --  Sitting BP: 117/73 HR: 112  Standing BP: 119/76 HR: 115  Site: --  Mode: --  Orthostatic VS  09-16-24 @ 08:03  Lying BP: --/-- HR: --  Sitting BP: 117/73 HR: 80  Standing BP: 109/70 HR: 84  Site: --  Mode: --  Orthostatic VS  09-15-24 @ 19:01  Lying BP: --/-- HR: --  Sitting BP: 132/88 HR: 113  Standing BP: 128/74 HR: 116  Site: --  Mode: --    Lipid Panel: Date/Time: 09-06-24 @ 08:00  Cholesterol, Serum: 170  LDL Cholesterol Calculated: 108  HDL Cholesterol, Serum: 43  Total Cholesterol/HDL Ration Measurement: --  Triglycerides, Serum: 97   BMI: BMI (kg/m2): 33 (08-26-24 @ 23:58)  HbA1c: A1C with Estimated Average Glucose Result: 5.3 % (09-06-24 @ 08:00)    Glucose:   BP: --Vital Signs Last 24 Hrs  T(C): 36.6 (09-18-24 @ 08:24), Max: 36.8 (09-17-24 @ 18:53)  T(F): 97.9 (09-18-24 @ 08:24), Max: 98.3 (09-17-24 @ 18:53)  HR: --  BP: --  BP(mean): --  RR: --  SpO2: --    Orthostatic VS  09-18-24 @ 08:24  Lying BP: --/-- HR: --  Sitting BP: 135/85 HR: 108  Standing BP: 132/84 HR: 114  Site: --  Mode: --  Orthostatic VS  09-17-24 @ 18:53  Lying BP: --/-- HR: --  Sitting BP: 131/78 HR: 107  Standing BP: 130/79 HR: 112  Site: --  Mode: --  Orthostatic VS  09-17-24 @ 08:08  Lying BP: --/-- HR: --  Sitting BP: 133/73 HR: 101  Standing BP: 120/78 HR: 106  Site: --  Mode: --  Orthostatic VS  09-16-24 @ 20:16  Lying BP: --/-- HR: --  Sitting BP: 117/73 HR: 112  Standing BP: 119/76 HR: 115  Site: --  Mode: --    Lipid Panel: Date/Time: 09-06-24 @ 08:00  Cholesterol, Serum: 170  LDL Cholesterol Calculated: 108  HDL Cholesterol, Serum: 43  Total Cholesterol/HDL Ration Measurement: --  Triglycerides, Serum: 97

## 2024-09-17 NOTE — BH INPATIENT PSYCHIATRY PROGRESS NOTE - MSE UNSTRUCTURED FT
The patient appears stated age, fair hygiene, unkempt.  The patient was calm, cooperative with the interview and maintained appropriate eye contact.  No psychomotor agitation or retardation noted.  Steady gait observed.  The patient's speech is normal in rate, tone and volume, though increase in slurred speech recently.  The patient's mood is "good."  Affect is euthymic, full range. Thought process is linear. Thought content is concrete, continues to endorse delusions of control today, though denies other persecutory or grandiose delusions. Denies perceptual disturbances, though potential ongoing AH, possible CAH.  Insight is poor to fair.  Judgment is fair.  Impulse control has been fair on the unit.

## 2024-09-17 NOTE — BH INPATIENT PSYCHIATRY PROGRESS NOTE - NSBHFUPINTERVALHXFT_PSY_A_CORE
No events overnight, pt is adherent to all medications. On assessment today, pt is calm and cooperative, mostly linear in thought process, denying AVH, though continues to endorse delusion of control and with slurred speech. Pt states his mood is "good" and he is sleeping well. He denies hearing any voices or seeing any faces on the unit. He reports a great sense of relief now that he isn't hearing voices. Consistent with his report yesterday, he still feels that several people are putting a speech impediment into his mouth. He says sometimes this is better than others, and right now he feels it is not so bad. He does not feel that these people are trying to control what he says or what he thinks, but just that they "jumble" his words in his mouth sometimes. He reports eating well and feels grateful for his life, for his health, and for his family. He agrees to take a shower today.     Pt denies any side effects of clozapine, states he is having normal BMs and has no light headedness. He is amenable with plan to continue increasing clozapine dose daily.

## 2024-09-17 NOTE — BH INPATIENT PSYCHIATRY PROGRESS NOTE - CURRENT MEDICATION
MEDICATIONS  (STANDING):  cloZAPine 225 milliGRAM(s) Oral at bedtime  divalproex ER 1000 milliGRAM(s) Oral at bedtime  haloperidol     Tablet 15 milliGRAM(s) Oral at bedtime  polyethylene glycol 3350 17 Gram(s) Oral daily    MEDICATIONS  (PRN):  acetaminophen     Tablet .. 650 milliGRAM(s) Oral every 6 hours PRN Mild Pain (1 - 3), Moderate Pain (4 - 6)  haloperidol     Tablet 5 milliGRAM(s) Oral every 6 hours PRN combativeness due to schizophrenia  haloperidol    Injectable 5 milliGRAM(s) IntraMuscular once PRN Agitation  LORazepam     Tablet 2 milliGRAM(s) Oral every 6 hours PRN agitation  LORazepam   Injectable 2 milliGRAM(s) IntraMuscular once PRN severe agitation due to psychosis  senna 2 Tablet(s) Oral at bedtime PRN constipation

## 2024-09-17 NOTE — BH INPATIENT PSYCHIATRY PROGRESS NOTE - NSBHCHARTREVIEWVS_PSY_A_CORE FT
Vital Signs Last 24 Hrs  T(C): 36.5 (09-17-24 @ 08:08), Max: 36.9 (09-16-24 @ 20:16)  T(F): 97.7 (09-17-24 @ 08:08), Max: 98.5 (09-16-24 @ 20:16)  HR: --  BP: --  BP(mean): --  RR: --  SpO2: --    Orthostatic VS  09-17-24 @ 08:08  Lying BP: --/-- HR: --  Sitting BP: 133/73 HR: 101  Standing BP: 120/78 HR: 106  Site: --  Mode: --  Orthostatic VS  09-16-24 @ 20:16  Lying BP: --/-- HR: --  Sitting BP: 117/73 HR: 112  Standing BP: 119/76 HR: 115  Site: --  Mode: --  Orthostatic VS  09-16-24 @ 08:03  Lying BP: --/-- HR: --  Sitting BP: 117/73 HR: 80  Standing BP: 109/70 HR: 84  Site: --  Mode: --  Orthostatic VS  09-15-24 @ 19:01  Lying BP: --/-- HR: --  Sitting BP: 132/88 HR: 113  Standing BP: 128/74 HR: 116  Site: --  Mode: --   Vital Signs Last 24 Hrs  T(C): 36.6 (09-18-24 @ 08:24), Max: 36.8 (09-17-24 @ 18:53)  T(F): 97.9 (09-18-24 @ 08:24), Max: 98.3 (09-17-24 @ 18:53)  HR: --  BP: --  BP(mean): --  RR: --  SpO2: --    Orthostatic VS  09-18-24 @ 08:24  Lying BP: --/-- HR: --  Sitting BP: 135/85 HR: 108  Standing BP: 132/84 HR: 114  Site: --  Mode: --  Orthostatic VS  09-17-24 @ 18:53  Lying BP: --/-- HR: --  Sitting BP: 131/78 HR: 107  Standing BP: 130/79 HR: 112  Site: --  Mode: --  Orthostatic VS  09-17-24 @ 08:08  Lying BP: --/-- HR: --  Sitting BP: 133/73 HR: 101  Standing BP: 120/78 HR: 106  Site: --  Mode: --  Orthostatic VS  09-16-24 @ 20:16  Lying BP: --/-- HR: --  Sitting BP: 117/73 HR: 112  Standing BP: 119/76 HR: 115  Site: --  Mode: --

## 2024-09-17 NOTE — BH INPATIENT PSYCHIATRY PROGRESS NOTE - NSBHATTESTCOMMENTATTENDFT_PSY_A_CORE
Pt remains with some AH and delusions though psychosis appears to be improving and pt remains in good behavioral control. Will c/w Clozapine titration. Will consider further taper of Haldol and Depakote.

## 2024-09-18 PROCEDURE — 99232 SBSQ HOSP IP/OBS MODERATE 35: CPT | Mod: GC

## 2024-09-18 RX ORDER — CLOZAPINE 25 MG/1
250 TABLET ORAL AT BEDTIME
Refills: 0 | Status: DISCONTINUED | OUTPATIENT
Start: 2024-09-18 | End: 2024-09-19

## 2024-09-18 RX ORDER — ATROPINE SULFATE 10 MG/ML
1 SOLUTION/ DROPS OPHTHALMIC AT BEDTIME
Refills: 0 | Status: DISCONTINUED | OUTPATIENT
Start: 2024-09-18 | End: 2024-09-24

## 2024-09-18 RX ORDER — HALOPERIDOL LACTATE 2 MG/ML
10 CONCENTRATE, ORAL ORAL AT BEDTIME
Refills: 0 | Status: DISCONTINUED | OUTPATIENT
Start: 2024-09-18 | End: 2024-09-19

## 2024-09-18 RX ADMIN — Medication 1000 MILLIGRAM(S): at 20:01

## 2024-09-18 RX ADMIN — Medication 10 MILLIGRAM(S): at 20:01

## 2024-09-18 RX ADMIN — ATROPINE SULFATE 1 DROP(S): 10 SOLUTION/ DROPS OPHTHALMIC at 20:03

## 2024-09-18 RX ADMIN — CLOZAPINE 250 MILLIGRAM(S): 25 TABLET ORAL at 20:01

## 2024-09-18 NOTE — BH INPATIENT PSYCHIATRY PROGRESS NOTE - MSE UNSTRUCTURED FT
The patient appears stated age, fair hygiene, unkempt.  The patient was calm, cooperative with the interview and maintained appropriate eye contact.  No psychomotor agitation or retardation noted.  Steady gait observed.  The patient's speech is normal in rate, tone and volume, though increase in slurred speech recently.  The patient's mood is "good."  Affect is euthymic, full range. Thought process is mostly linear, though perseverative on discharge and living conditions on unit. Thought content is concrete, continues to endorse delusions of control, though denies other persecutory or grandiose delusions. Denies perceptual disturbances at this time, though potential ongoing AH, possible CAH.  Insight is poor to fair.  Judgment is fair.  Impulse control has been fair on the unit. The patient appears stated age, fair hygiene, unkempt.  The patient was calm, cooperative with the interview and maintained appropriate eye contact.  No psychomotor agitation or retardation noted.  Steady gait observed.  The patient's speech is normal in rate, tone and volume, though with poor articulation. The patient's mood is "good."  Affect is euthymic, blunted. Thought process is mostly linear, though perseverative on discharge and living conditions on unit. Thought content is concrete, continues to endorse delusions of control, though denies other persecutory or grandiose delusions. Denies perceptual disturbances at this time, though potential ongoing AH, possible CAH.  Insight is poor to fair.  Judgment is fair.  Impulse control has been fair on the unit.

## 2024-09-18 NOTE — BH INPATIENT PSYCHIATRY PROGRESS NOTE - NSBHFUPINTERVALHXFT_PSY_A_CORE
No events overnight, pt is adherent to all medications. On assessment today, pt is calm and cooperative, mostly linear in thought process, denying AVH, though increasingly perseverative today and continues to endorse delusion of control and with increase in slurred speech today. Pt states his mood is "good" and he is sleeping well. He denies hearing any voices at this time, but does acknowledge that he was hearing voices last night and was telling them to "stop following me" repeatedly. He is unsure what is causing his slurred speech, but states it is possible that it is other people putting it into his mouth. He perseverates on his room being too small here, the hallway being small, and having a roommate that he doesn't like living with. He is focused on discharge, but amenable to continue with the plan and stay a bit longer until clozapine dose is appropriate.     Pt denies any side effects of clozapine, states he is having normal BMs and has no light headedness. He is amenable with plan to continue increasing clozapine dose daily.

## 2024-09-18 NOTE — BH INPATIENT PSYCHIATRY PROGRESS NOTE - NSBHMETABOLIC_PSY_ALL_CORE_FT
BMI: BMI (kg/m2): 33 (08-26-24 @ 23:58)  HbA1c: A1C with Estimated Average Glucose Result: 5.3 % (09-06-24 @ 08:00)    Glucose:   BP: --Vital Signs Last 24 Hrs  T(C): 36.6 (09-18-24 @ 08:24), Max: 36.8 (09-17-24 @ 18:53)  T(F): 97.9 (09-18-24 @ 08:24), Max: 98.3 (09-17-24 @ 18:53)  HR: --  BP: --  BP(mean): --  RR: --  SpO2: --    Orthostatic VS  09-18-24 @ 08:24  Lying BP: --/-- HR: --  Sitting BP: 135/85 HR: 108  Standing BP: 132/84 HR: 114  Site: --  Mode: --  Orthostatic VS  09-17-24 @ 18:53  Lying BP: --/-- HR: --  Sitting BP: 131/78 HR: 107  Standing BP: 130/79 HR: 112  Site: --  Mode: --  Orthostatic VS  09-17-24 @ 08:08  Lying BP: --/-- HR: --  Sitting BP: 133/73 HR: 101  Standing BP: 120/78 HR: 106  Site: --  Mode: --  Orthostatic VS  09-16-24 @ 20:16  Lying BP: --/-- HR: --  Sitting BP: 117/73 HR: 112  Standing BP: 119/76 HR: 115  Site: --  Mode: --    Lipid Panel: Date/Time: 09-06-24 @ 08:00  Cholesterol, Serum: 170  LDL Cholesterol Calculated: 108  HDL Cholesterol, Serum: 43  Total Cholesterol/HDL Ration Measurement: --  Triglycerides, Serum: 97

## 2024-09-18 NOTE — BH INPATIENT PSYCHIATRY PROGRESS NOTE - NSBHASSESSSUMMFT_PSY_ALL_CORE
Pt is a 43yo single man who is domiciled at [facility] w/ PPHx of dx Schizophrenia vs Schizoaffective Disorder, multiple prior admissions including state (last inpt [facility] April 2024, [facility] 2009 and [id number]), no known hx suicide attempt or violence, follows at [facility] on [place] grounds, brought in by EMS called by staff at residence staff after pt threw away all his clothes and belongings in response to CAH with concern for psychotic decompensation in the context of non-adherence with medication and chronic psychotic symptoms that are possibly treatment-resistant.      Pt continues to endorse delusions of control, endorsed AH bothering him last night, denying perceptual disturbances at this time, remains in good behavioral control though perseverative on discharge. Unclear why pt was previously treated with three antipsychotics; will continue clozapine trial, no side effects at this time.     Plan:  - Titrate clozapine to 250 mg QHS, plan to increase by 25 mg every day  	- ANC/CRP/Troponin weekly qTuesday   	- C/w bowel regimen - Miralax daily + Senna 2 tabs qhs prn constipation   - Taper Haldol to 10 mg qhs, goal of tapering completely  - S/p Aristada 441mg on 8/15, next due 9/12; pt refuses higher dose, will hold off on continuing during Clozapine titration   - C/w Depakote ER to 1000mg qhs  - Dispo: Pt submitted court request for discharge; given that pt remains with ongoing psychotic symptoms, poor insight, and having just recently started Clozapine, he remains an increased risk of harm to self and others and requires ongoing hospitalization

## 2024-09-18 NOTE — BH INPATIENT PSYCHIATRY PROGRESS NOTE - CURRENT MEDICATION
MEDICATIONS  (STANDING):  atropine 1% Ophthalmic Solution for SubLingual Use 1 Drop(s) SubLingual at bedtime  cloZAPine 250 milliGRAM(s) Oral at bedtime  divalproex ER 1000 milliGRAM(s) Oral at bedtime  haloperidol     Tablet 10 milliGRAM(s) Oral at bedtime  polyethylene glycol 3350 17 Gram(s) Oral daily    MEDICATIONS  (PRN):  acetaminophen     Tablet .. 650 milliGRAM(s) Oral every 6 hours PRN Mild Pain (1 - 3), Moderate Pain (4 - 6)  haloperidol     Tablet 5 milliGRAM(s) Oral every 6 hours PRN combativeness due to schizophrenia  haloperidol    Injectable 5 milliGRAM(s) IntraMuscular once PRN Agitation  LORazepam     Tablet 2 milliGRAM(s) Oral every 6 hours PRN agitation  LORazepam   Injectable 2 milliGRAM(s) IntraMuscular once PRN severe agitation due to psychosis  senna 2 Tablet(s) Oral at bedtime PRN constipation

## 2024-09-18 NOTE — BH INPATIENT PSYCHIATRY PROGRESS NOTE - NSBHCHARTREVIEWVS_PSY_A_CORE FT
Vital Signs Last 24 Hrs  T(C): 36.6 (09-18-24 @ 08:24), Max: 36.8 (09-17-24 @ 18:53)  T(F): 97.9 (09-18-24 @ 08:24), Max: 98.3 (09-17-24 @ 18:53)  HR: --  BP: --  BP(mean): --  RR: --  SpO2: --    Orthostatic VS  09-18-24 @ 08:24  Lying BP: --/-- HR: --  Sitting BP: 135/85 HR: 108  Standing BP: 132/84 HR: 114  Site: --  Mode: --  Orthostatic VS  09-17-24 @ 18:53  Lying BP: --/-- HR: --  Sitting BP: 131/78 HR: 107  Standing BP: 130/79 HR: 112  Site: --  Mode: --  Orthostatic VS  09-17-24 @ 08:08  Lying BP: --/-- HR: --  Sitting BP: 133/73 HR: 101  Standing BP: 120/78 HR: 106  Site: --  Mode: --  Orthostatic VS  09-16-24 @ 20:16  Lying BP: --/-- HR: --  Sitting BP: 117/73 HR: 112  Standing BP: 119/76 HR: 115  Site: --  Mode: --

## 2024-09-18 NOTE — BH INPATIENT PSYCHIATRY PROGRESS NOTE - NSBHATTESTCOMMENTATTENDFT_PSY_A_CORE
Pt remains with AH though these appear to be less frequent. Remains with some delusions of control. Very perseverative today and preoccupied with conditions of the unit. Remains in good behavioral control. Mostly keeps to himself. Continuing with Clozapine titration and will taper Haldol.

## 2024-09-19 PROCEDURE — 99232 SBSQ HOSP IP/OBS MODERATE 35: CPT | Mod: GC

## 2024-09-19 RX ORDER — CLOZAPINE 25 MG/1
275 TABLET ORAL AT BEDTIME
Refills: 0 | Status: DISCONTINUED | OUTPATIENT
Start: 2024-09-19 | End: 2024-09-20

## 2024-09-19 RX ORDER — HALOPERIDOL LACTATE 2 MG/ML
5 CONCENTRATE, ORAL ORAL AT BEDTIME
Refills: 0 | Status: DISCONTINUED | OUTPATIENT
Start: 2024-09-19 | End: 2024-09-20

## 2024-09-19 RX ADMIN — Medication 5 MILLIGRAM(S): at 20:07

## 2024-09-19 RX ADMIN — CLOZAPINE 275 MILLIGRAM(S): 25 TABLET ORAL at 20:07

## 2024-09-19 RX ADMIN — Medication 1000 MILLIGRAM(S): at 20:07

## 2024-09-19 NOTE — BH TREATMENT PLAN - NSBHPRIMARYDX_PSY_ALL_CORE
Schizoaffective disorder    
Schizophrenia    
Schizophrenia    
Schizoaffective disorder    
Schizophrenia

## 2024-09-19 NOTE — BH TREATMENT PLAN - NSTXPSYCHOINTERRN_PSY_ALL_CORE
Provide reality testing and redirection  Administer meds per MD orders, monitor for effect/se's  Utilize prn meds for worsening sx's/anx/agit
assessed mood and thought content
Provide reality testing and redirection  Administer meds per MD orders, monitor for effect/se's  Utilize prn meds for worsening sx's/anx/agit
Assess thought process daily  Monitor medication compliance and response  Maintain reality orientation daily   Maintain safe and therapeutic process  If command hallucinations present, assess for content and direction

## 2024-09-19 NOTE — BH TREATMENT PLAN - NSTXSLFCREINTERMD_PSY_ALL_CORE
Psychiatrist is encouraging patient to shower and improve self care. 
Psychiatrist is encouraging patient to shower and improve self care.

## 2024-09-19 NOTE — BH INPATIENT PSYCHIATRY PROGRESS NOTE - NSBHMETABOLIC_PSY_ALL_CORE_FT
BMI: BMI (kg/m2): 33 (08-26-24 @ 23:58)  HbA1c: A1C with Estimated Average Glucose Result: 5.3 % (09-06-24 @ 08:00)    Glucose:   BP: --Vital Signs Last 24 Hrs  T(C): 36.7 (09-19-24 @ 08:13), Max: 36.7 (09-18-24 @ 19:35)  T(F): 98 (09-19-24 @ 08:13), Max: 98.1 (09-18-24 @ 19:35)  HR: --  BP: --  BP(mean): --  RR: 18 (09-19-24 @ 08:13) (18 - 18)  SpO2: --    Orthostatic VS  09-19-24 @ 08:13  Lying BP: --/-- HR: --  Sitting BP: 137/86 HR: 109  Standing BP: 123/79 HR: 119  Site: --  Mode: --  Orthostatic VS  09-18-24 @ 19:35  Lying BP: --/-- HR: --  Sitting BP: 136/79 HR: 107  Standing BP: 129/85 HR: 102  Site: --  Mode: --  Orthostatic VS  09-18-24 @ 08:24  Lying BP: --/-- HR: --  Sitting BP: 135/85 HR: 108  Standing BP: 132/84 HR: 114  Site: --  Mode: --  Orthostatic VS  09-17-24 @ 18:53  Lying BP: --/-- HR: --  Sitting BP: 131/78 HR: 107  Standing BP: 130/79 HR: 112  Site: --  Mode: --    Lipid Panel: Date/Time: 09-06-24 @ 08:00  Cholesterol, Serum: 170  LDL Cholesterol Calculated: 108  HDL Cholesterol, Serum: 43  Total Cholesterol/HDL Ration Measurement: --  Triglycerides, Serum: 97

## 2024-09-19 NOTE — BH INPATIENT PSYCHIATRY PROGRESS NOTE - CURRENT MEDICATION
MEDICATIONS  (STANDING):  atropine 1% Ophthalmic Solution for SubLingual Use 1 Drop(s) SubLingual at bedtime  cloZAPine 275 milliGRAM(s) Oral at bedtime  divalproex ER 1000 milliGRAM(s) Oral at bedtime  haloperidol     Tablet 10 milliGRAM(s) Oral at bedtime  polyethylene glycol 3350 17 Gram(s) Oral daily    MEDICATIONS  (PRN):  acetaminophen     Tablet .. 650 milliGRAM(s) Oral every 6 hours PRN Mild Pain (1 - 3), Moderate Pain (4 - 6)  haloperidol     Tablet 5 milliGRAM(s) Oral every 6 hours PRN combativeness due to schizophrenia  haloperidol    Injectable 5 milliGRAM(s) IntraMuscular once PRN Agitation  LORazepam     Tablet 2 milliGRAM(s) Oral every 6 hours PRN agitation  LORazepam   Injectable 2 milliGRAM(s) IntraMuscular once PRN severe agitation due to psychosis  senna 2 Tablet(s) Oral at bedtime PRN constipation

## 2024-09-19 NOTE — BH INPATIENT PSYCHIATRY PROGRESS NOTE - MSE UNSTRUCTURED FT
The patient appears stated age, fair hygiene, unkempt.  The patient was calm, cooperative with the interview and maintained appropriate eye contact.  No psychomotor agitation or retardation noted.  Steady gait observed.  The patient's speech is normal in rate, tone and volume, with improvements in articulation today. The patient's mood is "good."  Affect is euthymic, blunted. Thought process is mostly linear, less perseverative on discharge today. Thought content is concrete, denies persecutory or grandiose delusions. Denies perceptual disturbances at this time, though potential ongoing AH, possible CAH.  Insight is poor to fair.  Judgment is fair.  Impulse control has been fair on the unit. The patient appears stated age, fair hygiene, unkempt.  The patient was calm, cooperative with the interview and maintained appropriate eye contact.  No psychomotor agitation or retardation noted.  Steady gait observed.  The patient's speech is normal in rate, tone and volume, with improvements in articulation today. The patient's mood is "good."  Affect is euthymic, blunted. Thought process is mostly linear, less perseverative on discharge today. Thought content is concrete, denies persecutory or grandiose delusions. Denies perceptual disturbances at this time, though potential ongoing AH.  Insight is poor to fair.  Judgment is fair.  Impulse control has been fair on the unit.

## 2024-09-19 NOTE — BH TREATMENT PLAN - NSTXPSYCHOGOAL_PSY_ALL_CORE
Will identify 2 coping skills that help mitigate hallucinations
Will identify 2 coping skills that assist with focus on reality
Will identify 2 coping skills that help mitigate hallucinations

## 2024-09-19 NOTE — BH TREATMENT PLAN - NSTXPSYCHOINTERPR_PSY_ALL_CORE
Psychiatric rehabilitation staff will provide encouragement, support, and psychoeducation to assist the patient in the progression of his treatment goal.
Writer met with patient to assess progress towards psychiatric rehabilitation goal. Patient was receptive to meeting with writer and superficially engaged. Patient has made minimal progress towards psychiatric rehabilitation goal of identifying 2 coping skills to mitigate hallucinations. Patient minimally attends psychiatric rehabilitation groups despite staff encouragement. Psychiatric rehabilitation recommends patient continue to attend groups and activities in the milieu and participate in 1:1 engagement to continue exploring coping skills to manage symptoms.
Psychiatric rehabilitation staff will provide encouragement, support, and psychoeducation to assist the patient in the progression of his treatment goal.
Writer met with patient to assess progress towards psychiatric rehabilitation goal. Patient was lying in bed on approach, and appeared lethargic.  Patient has made minimal progress towards psychiatric rehabilitation goal of identifying 2 coping skills to mitigate hallucinations. Patient minimally attends psychiatric rehabilitation groups despite staff encouragement. Psychiatric rehabilitation recommends patient continue to attend groups and activities in the milieu and participate in 1:1 engagement to continue exploring coping skills to manage symptoms.
Writer met with patient to assess progress towards Psychiatric Rehabilitation goals over the past seven days. During time of engagement, patient presented with euthymic mood. Patient presented as appropriately dressed and well-groomed. Upon review, patient has made significant progress specified goal of identifying two coping skills that mitigate hallucinations, as evidenced by patient identifying one coping skill to help mitigate hallucinations, in addition to maintaining medication adherences, includes watching major league sport and keeping track of major league sport statistics. Patient has been attending few groups on the unit. Within group setting patient has been appropriately participatory. Psychiatric Rehabilitation staff will continue to support patient via 1:1 engagement and encourage programming attendance in effort to facilitate continued progress towards goal.

## 2024-09-19 NOTE — BH INPATIENT PSYCHIATRY PROGRESS NOTE - NSBHCHARTREVIEWVS_PSY_A_CORE FT
Vital Signs Last 24 Hrs  T(C): 36.7 (09-19-24 @ 08:13), Max: 36.7 (09-18-24 @ 19:35)  T(F): 98 (09-19-24 @ 08:13), Max: 98.1 (09-18-24 @ 19:35)  HR: --  BP: --  BP(mean): --  RR: 18 (09-19-24 @ 08:13) (18 - 18)  SpO2: --    Orthostatic VS  09-19-24 @ 08:13  Lying BP: --/-- HR: --  Sitting BP: 137/86 HR: 109  Standing BP: 123/79 HR: 119  Site: --  Mode: --  Orthostatic VS  09-18-24 @ 19:35  Lying BP: --/-- HR: --  Sitting BP: 136/79 HR: 107  Standing BP: 129/85 HR: 102  Site: --  Mode: --  Orthostatic VS  09-18-24 @ 08:24  Lying BP: --/-- HR: --  Sitting BP: 135/85 HR: 108  Standing BP: 132/84 HR: 114  Site: --  Mode: --  Orthostatic VS  09-17-24 @ 18:53  Lying BP: --/-- HR: --  Sitting BP: 131/78 HR: 107  Standing BP: 130/79 HR: 112  Site: --  Mode: --

## 2024-09-19 NOTE — BH INPATIENT PSYCHIATRY PROGRESS NOTE - NSBHFUPINTERVALHXFT_PSY_A_CORE
No events overnight, pt is adherent to all medications. On assessment today, pt is calm and cooperative, mostly linear in thought process, less perseverative on discharge, denying AVH, with improvement in slurred speech today. Pt states his mood is "good" and he is sleeping well. He denies hearing any voices at this time. He is unsure what is causing his slurred speech, but states it is better today and fluctuates in intensity over time.     Pt denies any side effects of clozapine, states he is having normal BMs and has no light headedness. He is amenable with plan to continue increasing clozapine dose daily.

## 2024-09-19 NOTE — BH TREATMENT PLAN - NSTXDCOTHRINTERSW_PSY_ALL_CORE
SW to support patient and promote medication compliance and treatment adherence. Patient now on Clozapine 200mg, plan for 300mg dose.
SW to support patient and promote medication compliance and treatment adherence.
SW to support patient and promote medication compliance and treatment adherence. Patient now beginning Clozapine

## 2024-09-19 NOTE — BH INPATIENT PSYCHIATRY PROGRESS NOTE - NSBHASSESSSUMMFT_PSY_ALL_CORE
Pt is a 43yo single man who is domiciled at [facility] w/ PPHx of dx Schizophrenia vs Schizoaffective Disorder, multiple prior admissions including state (last inpt [facility] April 2024, [facility] 2009 and [id number]), no known hx suicide attempt or violence, follows at [facility] on [place] grounds, brought in by EMS called by staff at residence staff after pt threw away all his clothes and belongings in response to CAH with concern for psychotic decompensation in the context of non-adherence with medication and chronic psychotic symptoms that are possibly treatment-resistant.      Pt denying perceptual disturbances at this time, possible ongoing delusions of control regarding speech impediment, linear thought process, less perseverative on discharge today, remains in good behavioral control. Unclear why pt was previously treated with three antipsychotics; will continue clozapine trial, no side effects at this time.     Plan:  - Titrate clozapine to 275 mg QHS, plan to increase by 25 mg every day  	- ANC/CRP/Troponin weekly qTuesday   	- C/w bowel regimen - Miralax daily + Senna 2 tabs qhs prn constipation   - C/w Haldol to 10 mg qhs, goal of tapering completely  - S/p Aristada 441mg on 8/15, next due 9/12; pt refuses higher dose, will hold off on continuing during Clozapine titration   - C/w Depakote ER to 1000mg qhs  - Dispo: Pt submitted court request for discharge; given that pt remains with ongoing psychotic symptoms, poor insight, and having just recently started Clozapine, he remains an increased risk of harm to self and others and requires ongoing hospitalization    Pt is a 43yo single man who is domiciled at [facility] w/ PPHx of dx Schizophrenia vs Schizoaffective Disorder, multiple prior admissions including state (last inpt [facility] April 2024, [facility] 2009 and [phone]), no known hx suicide attempt or violence, follows at [facility] on [place] grounds, brought in by EMS called by staff at residence staff after pt threw away all his clothes and belongings in response to CAH with concern for psychotic decompensation in the context of non-adherence with medication and chronic psychotic symptoms that are possibly treatment-resistant.      Pt denying perceptual disturbances at this time, possible ongoing delusions of control regarding speech impediment, linear thought process, less perseverative on discharge today, remains in good behavioral control. Unclear why pt was previously treated with three antipsychotics; will continue clozapine trial, no side effects at this time.     Plan:  - Titrate clozapine to 275 mg QHS, plan to increase by 25 mg every day  	- ANC/CRP/Troponin weekly qTuesday   	- C/w bowel regimen - Miralax daily + Senna 2 tabs qhs prn constipation   - Taper Haldol to 5mg qhs, goal of tapering completely  - S/p Aristada 441mg on 8/15, next due 9/12; pt refuses higher dose, will hold off on continuing during Clozapine titration   - C/w Depakote ER to 1000mg qhs  - Dispo: Pt submitted court request for discharge; given that pt remains with ongoing psychotic symptoms, poor insight, and having just recently started Clozapine, he remains an increased risk of harm to self and others and requires ongoing hospitalization

## 2024-09-19 NOTE — BH TREATMENT PLAN - NSTXDCOTHRGOAL_PSY_ALL_CORE
Patient will present and report a decrease in symptoms over the course of the next several days

## 2024-09-19 NOTE — BH TREATMENT PLAN - NSTXPSYCHOINTERMD_PSY_ALL_CORE
Trial of Clozapine, continuing titration
Psychiatrist is working with patient to optimize medication regimen to mitigate psychotic symptoms (e.g. auditory hallucinations). Currently patient is being started on a trial of clozapine.
Psychiatrist is working with patient to optimize medication regimen to mitigate psychotic symptoms (e.g. auditory hallucinations). Currently patient is being continued on a trial of clozapine, gradually increasing dose with no side effects thus far.

## 2024-09-19 NOTE — BH TREATMENT PLAN - NSTXPATIENTPARTICIPATE_PSY_ALL_CORE
Patient participated in identification of needs/problems/goals for treatment/Patient participated in defining interventions/Patient participated in development of after care plan
Patient participated in defining interventions
Patient participated in identification of needs/problems/goals for treatment/Patient participated in development of after care plan
No, patient unwilling to participate
Patient participated in identification of needs/problems/goals for treatment/Patient participated in development of after care plan

## 2024-09-19 NOTE — BH TREATMENT PLAN - NSCMSPTSTRENGTHS_PSY_ALL_CORE
Unable to obtain (specify)
Humor/Physically healthy/Resourceful/Self-reliant
Unable to obtain (specify)
Humor/Physically healthy/Resourceful/Self-reliant
Humor/Physically healthy/Resourceful/Self-reliant

## 2024-09-19 NOTE — BH TREATMENT PLAN - NSTXSLFCREINTERRN_PSY_ALL_CORE
Provide toiletries. Prompt and direct/assist with adls as needed.
Provide toiletries. Prompt and direct/assist with adls as needed.

## 2024-09-19 NOTE — BH INPATIENT PSYCHIATRY PROGRESS NOTE - NSBHATTESTCOMMENTATTENDFT_PSY_A_CORE
Psychosis is improving; pt reporting less frequent AH and today denies delusions of control, remains in good behavioral control. Continuing with Clozapine titration and will taper Haldol to 5mg.

## 2024-09-20 PROCEDURE — 99232 SBSQ HOSP IP/OBS MODERATE 35: CPT

## 2024-09-20 RX ORDER — CLOZAPINE 25 MG/1
300 TABLET ORAL AT BEDTIME
Refills: 0 | Status: DISCONTINUED | OUTPATIENT
Start: 2024-09-20 | End: 2024-09-24

## 2024-09-20 RX ADMIN — Medication 1000 MILLIGRAM(S): at 20:19

## 2024-09-20 RX ADMIN — CLOZAPINE 300 MILLIGRAM(S): 25 TABLET ORAL at 20:19

## 2024-09-20 RX ADMIN — ATROPINE SULFATE 1 DROP(S): 10 SOLUTION/ DROPS OPHTHALMIC at 20:20

## 2024-09-20 NOTE — BH INPATIENT PSYCHIATRY PROGRESS NOTE - NSBHFUPINTERVALHXFT_PSY_A_CORE
No acute events overnight. Pt compliant with medications. Reports sleeping well last night without acute concerns. States that he does not have AH anymore. No longer feels his speech is being controlled. Denies any medication side effects. Agreeable to plan for discharge on Tuesday. As far as plans after discharge, states he looks forward to speaking with his family. Denies any SI or HI. No acute events overnight. Pt compliant with medications. Reports sleeping well last night without acute concerns. States that he does not have AH anymore. No longer feels his speech is being controlled. Denies any medication side effects. Agreeable to plan for discharge on Tuesday and further titration of clozapine tonight. As far as plans after discharge, states he looks forward to speaking with his family. Denies any SI or HI.

## 2024-09-20 NOTE — BH INPATIENT PSYCHIATRY PROGRESS NOTE - NSBHMETABOLIC_PSY_ALL_CORE_FT
BMI: BMI (kg/m2): 33 (08-26-24 @ 23:58)  HbA1c: A1C with Estimated Average Glucose Result: 5.3 % (09-06-24 @ 08:00)    Glucose:   BP: --Vital Signs Last 24 Hrs  T(C): 36.9 (09-20-24 @ 08:15), Max: 37 (09-19-24 @ 20:18)  T(F): 98.4 (09-20-24 @ 08:15), Max: 98.6 (09-19-24 @ 20:18)  HR: --  BP: --  BP(mean): --  RR: --  SpO2: --    Orthostatic VS  09-20-24 @ 08:15  Lying BP: --/-- HR: --  Sitting BP: 128/78 HR: 113  Standing BP: 113/77 HR: 118  Site: --  Mode: --  Orthostatic VS  09-19-24 @ 20:18  Lying BP: --/-- HR: --  Sitting BP: 129/81 HR: 103  Standing BP: 120/72 HR: 114  Site: --  Mode: --  Orthostatic VS  09-19-24 @ 08:13  Lying BP: --/-- HR: --  Sitting BP: 137/86 HR: 109  Standing BP: 123/79 HR: 119  Site: --  Mode: --  Orthostatic VS  09-18-24 @ 19:35  Lying BP: --/-- HR: --  Sitting BP: 136/79 HR: 107  Standing BP: 129/85 HR: 102  Site: --  Mode: --    Lipid Panel: Date/Time: 09-06-24 @ 08:00  Cholesterol, Serum: 170  LDL Cholesterol Calculated: 108  HDL Cholesterol, Serum: 43  Total Cholesterol/HDL Ration Measurement: --  Triglycerides, Serum: 97

## 2024-09-20 NOTE — BH INPATIENT PSYCHIATRY PROGRESS NOTE - CURRENT MEDICATION
MEDICATIONS  (STANDING):  atropine 1% Ophthalmic Solution for SubLingual Use 1 Drop(s) SubLingual at bedtime  cloZAPine 300 milliGRAM(s) Oral at bedtime  divalproex ER 1000 milliGRAM(s) Oral at bedtime  haloperidol     Tablet 5 milliGRAM(s) Oral at bedtime  polyethylene glycol 3350 17 Gram(s) Oral daily    MEDICATIONS  (PRN):  acetaminophen     Tablet .. 650 milliGRAM(s) Oral every 6 hours PRN Mild Pain (1 - 3), Moderate Pain (4 - 6)  haloperidol     Tablet 5 milliGRAM(s) Oral every 6 hours PRN combativeness due to schizophrenia  haloperidol    Injectable 5 milliGRAM(s) IntraMuscular once PRN Agitation  LORazepam     Tablet 2 milliGRAM(s) Oral every 6 hours PRN agitation  LORazepam   Injectable 2 milliGRAM(s) IntraMuscular once PRN severe agitation due to psychosis  senna 2 Tablet(s) Oral at bedtime PRN constipation   MEDICATIONS  (STANDING):  atropine 1% Ophthalmic Solution for SubLingual Use 1 Drop(s) SubLingual at bedtime  cloZAPine 300 milliGRAM(s) Oral at bedtime  divalproex ER 1000 milliGRAM(s) Oral at bedtime  polyethylene glycol 3350 17 Gram(s) Oral daily    MEDICATIONS  (PRN):  acetaminophen     Tablet .. 650 milliGRAM(s) Oral every 6 hours PRN Mild Pain (1 - 3), Moderate Pain (4 - 6)  haloperidol     Tablet 5 milliGRAM(s) Oral every 6 hours PRN combativeness due to schizophrenia  haloperidol    Injectable 5 milliGRAM(s) IntraMuscular once PRN Agitation  LORazepam     Tablet 2 milliGRAM(s) Oral every 6 hours PRN agitation  LORazepam   Injectable 2 milliGRAM(s) IntraMuscular once PRN severe agitation due to psychosis  senna 2 Tablet(s) Oral at bedtime PRN constipation

## 2024-09-20 NOTE — BH INPATIENT PSYCHIATRY PROGRESS NOTE - NSBHASSESSSUMMFT_PSY_ALL_CORE
Pt is a 43yo single man who is domiciled at [facility] w/ PPHx of dx Schizophrenia vs Schizoaffective Disorder, multiple prior admissions including state (last inpt [facility] April 2024, [facility] 2009 and [id number]), no known hx suicide attempt or violence, follows at [facility] on [place] grounds, brought in by EMS called by staff at residence staff after pt threw away all his clothes and belongings in response to CAH with concern for psychotic decompensation in the context of non-adherence with medication and chronic psychotic symptoms that are possibly treatment-resistant.      Pt denying perceptual disturbances at this time, possible ongoing delusions of control regarding speech impediment, linear thought process, less perseverative on discharge today, remains in good behavioral control. Unclear why pt was previously treated with three antipsychotics; will continue clozapine trial, no side effects at this time.     Plan:  - Titrate clozapine to 275 mg QHS, plan to increase by 25 mg every day  	- ANC/CRP/Troponin weekly qTuesday   	- C/w bowel regimen - Miralax daily + Senna 2 tabs qhs prn constipation   - Taper Haldol to 5mg qhs, goal of tapering completely  - S/p Aristada 441mg on 8/15, next due 9/12; pt refuses higher dose, will hold off on continuing during Clozapine titration   - C/w Depakote ER to 1000mg qhs  - Dispo: Pt submitted court request for discharge; given that pt remains with ongoing psychotic symptoms, poor insight, and having just recently started Clozapine, he remains an increased risk of harm to self and others and requires ongoing hospitalization    Pt is a 43yo single man who is domiciled at [facility] w/ PPHx of dx Schizophrenia vs Schizoaffective Disorder, multiple prior admissions including state (last inpt [facility] April 2024, [facility] 2009 and [phone]), no known hx suicide attempt or violence, follows at [facility] on [place] grounds, brought in by EMS called by staff at residence staff after pt threw away all his clothes and belongings in response to CAH with concern for psychotic decompensation in the context of non-adherence with medication and chronic psychotic symptoms that are possibly treatment-resistant.      9/20: Pt denying perceptual disturbances at this time without overt evidence of ongoing delusions at this time. However, per nursing report remains internally preoccupied at times. Has been compliant with medications and tolerating well. Will increase dose of clozapine to 300 mg QHS and obtain level on 9/23. Will d/c haldol given unclear benefit and to minimize polypharmacy. Remains in good behavioral control. Will continue to monitor symptoms and aim for discharge on 9/24.    Plan:  - Titrate clozapine to 300 mg QHS, clozapine level ordered for 9/23  	- ANC/CRP/Troponin weekly qTuesday   	- C/w bowel regimen - Miralax daily + Senna 2 tabs qhs prn constipation   - D/c Haldol 5mg qhs  - S/p Aristada 441mg on 8/15, next due 9/12; pt refuses higher dose, will hold off on continuing during Clozapine titration   - C/w Depakote ER to 1000mg qhs  - Dispo: Pt submitted court request for discharge; given that pt remains with ongoing psychotic symptoms, poor insight, and undergoing Clozapine titration, he remains an increased risk of harm to self and others and requires ongoing hospitalization though nearing readiness for discharge

## 2024-09-20 NOTE — BH INPATIENT PSYCHIATRY PROGRESS NOTE - MSE UNSTRUCTURED FT
The patient appears stated age, fair hygiene, unkempt.  The patient was calm, cooperative with the interview and maintained appropriate eye contact.  No psychomotor agitation or retardation noted.  Steady gait observed.  The patient's speech is normal in rate, tone and volume, with improvements in articulation today. The patient's mood is "good."  Affect is euthymic, blunted. Thought process is mostly linear, less perseverative on discharge today. Thought content is concrete, denies persecutory or grandiose delusions. Denies perceptual disturbances at this time, though potential ongoing AH.  Insight is poor to fair.  Judgment is fair.  Impulse control has been fair on the unit. The patient appears stated age, fair hygiene, unkempt.  The patient was calm, cooperative with the interview and maintained appropriate eye contact.  No psychomotor agitation or retardation noted.  Steady gait observed.  The patient's speech is normal in rate, tone and volume, with ongoing improvements in articulation. The patient's mood is "good."  Affect is euthymic, blunted. Thought process is mostly linear, not perseverative on discharge today. Thought content is concrete, denies persecutory or grandiose delusions. Denies perceptual disturbances at this time, denies AH though may be internally preoccupied at times per nursing report.  Insight is poor to fair.  Judgment is fair.  Impulse control has been fair on the unit.

## 2024-09-20 NOTE — BH INPATIENT PSYCHIATRY PROGRESS NOTE - NSBHCHARTREVIEWVS_PSY_A_CORE FT
Vital Signs Last 24 Hrs  T(C): 36.9 (09-20-24 @ 08:15), Max: 37 (09-19-24 @ 20:18)  T(F): 98.4 (09-20-24 @ 08:15), Max: 98.6 (09-19-24 @ 20:18)  HR: --  BP: --  BP(mean): --  RR: --  SpO2: --    Orthostatic VS  09-20-24 @ 08:15  Lying BP: --/-- HR: --  Sitting BP: 128/78 HR: 113  Standing BP: 113/77 HR: 118  Site: --  Mode: --  Orthostatic VS  09-19-24 @ 20:18  Lying BP: --/-- HR: --  Sitting BP: 129/81 HR: 103  Standing BP: 120/72 HR: 114  Site: --  Mode: --  Orthostatic VS  09-19-24 @ 08:13  Lying BP: --/-- HR: --  Sitting BP: 137/86 HR: 109  Standing BP: 123/79 HR: 119  Site: --  Mode: --  Orthostatic VS  09-18-24 @ 19:35  Lying BP: --/-- HR: --  Sitting BP: 136/79 HR: 107  Standing BP: 129/85 HR: 102  Site: --  Mode: --

## 2024-09-21 RX ADMIN — CLOZAPINE 300 MILLIGRAM(S): 25 TABLET ORAL at 20:26

## 2024-09-21 RX ADMIN — Medication 1000 MILLIGRAM(S): at 20:26

## 2024-09-21 RX ADMIN — ATROPINE SULFATE 1 DROP(S): 10 SOLUTION/ DROPS OPHTHALMIC at 20:27

## 2024-09-21 RX ADMIN — Medication 17 GRAM(S): at 08:31

## 2024-09-22 RX ADMIN — Medication 17 GRAM(S): at 09:11

## 2024-09-22 RX ADMIN — Medication 1000 MILLIGRAM(S): at 20:13

## 2024-09-22 RX ADMIN — ATROPINE SULFATE 1 DROP(S): 10 SOLUTION/ DROPS OPHTHALMIC at 20:12

## 2024-09-22 RX ADMIN — CLOZAPINE 300 MILLIGRAM(S): 25 TABLET ORAL at 20:13

## 2024-09-23 PROCEDURE — 99231 SBSQ HOSP IP/OBS SF/LOW 25: CPT

## 2024-09-23 RX ORDER — ASTEMIZOLE 10 MG
2 TABLET ORAL
Qty: 60 | Refills: 0
Start: 2024-09-23 | End: 2024-10-22

## 2024-09-23 RX ORDER — SENNOSIDES 8.6 MG
2 TABLET ORAL
Qty: 60 | Refills: 0
Start: 2024-09-23 | End: 2024-10-22

## 2024-09-23 RX ORDER — ATROPINE SULFATE 10 MG/ML
2 SOLUTION/ DROPS OPHTHALMIC
Qty: 1 | Refills: 0
Start: 2024-09-23 | End: 2024-10-22

## 2024-09-23 RX ORDER — ARIPIPRAZOLE 5 MG/1
441 TABLET ORAL
Refills: 0 | DISCHARGE

## 2024-09-23 RX ORDER — CLOZAPINE 25 MG/1
3 TABLET ORAL
Qty: 21 | Refills: 0
Start: 2024-09-23 | End: 2024-09-29

## 2024-09-23 RX ADMIN — CLOZAPINE 300 MILLIGRAM(S): 25 TABLET ORAL at 20:14

## 2024-09-23 RX ADMIN — Medication 1000 MILLIGRAM(S): at 20:14

## 2024-09-23 RX ADMIN — ATROPINE SULFATE 1 DROP(S): 10 SOLUTION/ DROPS OPHTHALMIC at 20:18

## 2024-09-23 NOTE — BH INPATIENT PSYCHIATRY DISCHARGE NOTE - MSE UNSTRUCTURED FT
The patient appears stated age, fair hygiene, unkempt.  The patient was calm, cooperative with the interview and maintained appropriate eye contact.  No psychomotor agitation or retardation noted.  The patient's speech is normal in rate, tone and volume, with ongoing improvements in articulation. The patient's mood is "good."  Affect is euthymic, blunted. Thought process is linear. Thought content is concrete, denies persecutory or grandiose delusions. Denies perceptual disturbances at this time, denies AH currently.  Insight is fair.  Judgment is fair.  Impulse control has been fair on the unit. The patient appears stated age, now with improved and appropriate grooming and hygiene.  The patient was calm, cooperative with the interview and maintained appropriate eye contact.  No psychomotor agitation or retardation noted.  The patient's speech is normal in rate, tone and volume, with ongoing improvements in articulation. The patient's mood is "good."  Affect is euthymic, blunted. Thought process is linear. Thought content is concrete, denies persecutory or grandiose delusions, denies SIIP and HIIP. Perception: reports very infrequent AH, denies CAH.  Insight is fair.  Judgment is fair.  Impulse control has been fair on the unit.

## 2024-09-23 NOTE — BH INPATIENT PSYCHIATRY PROGRESS NOTE - NSBHCHARTREVIEWVS_PSY_A_CORE FT
Vital Signs Last 24 Hrs  T(C): 36.5 (09-23-24 @ 08:16), Max: 37.1 (09-22-24 @ 18:54)  T(F): 97.7 (09-23-24 @ 08:16), Max: 98.8 (09-22-24 @ 18:54)  HR: --  BP: --  BP(mean): --  RR: --  SpO2: --    Orthostatic VS  09-23-24 @ 08:16  Lying BP: --/-- HR: --  Sitting BP: 134/77 HR: 101  Standing BP: 132/78 HR: 110  Site: --  Mode: --  Orthostatic VS  09-22-24 @ 18:54  Lying BP: --/-- HR: --  Sitting BP: 125/84 HR: 116  Standing BP: 110/76 HR: 119  Site: --  Mode: --  Orthostatic VS  09-22-24 @ 06:54  Lying BP: --/-- HR: --  Sitting BP: 123/84 HR: 70  Standing BP: 123/64 HR: 73  Site: --  Mode: --  Orthostatic VS  09-21-24 @ 20:11  Lying BP: --/-- HR: --  Sitting BP: 128/83 HR: 116  Standing BP: 116/62 HR: 120  Site: --  Mode: --

## 2024-09-23 NOTE — BH INPATIENT PSYCHIATRY PROGRESS NOTE - NSTXDCOTHRPROGRES_PSY_ALL_CORE
No Change
Improving
No Change
Improving
No Change
No Change
Improving
No Change
Improving
Improving
No Change

## 2024-09-23 NOTE — BH INPATIENT PSYCHIATRY PROGRESS NOTE - MSE OPTIONS
Unstructured MSE
Structured MSE
Unstructured MSE
Unstructured MSE
Structured MSE

## 2024-09-23 NOTE — BH INPATIENT PSYCHIATRY PROGRESS NOTE - NSTXPSYCHODATETRGT_PSY_ALL_CORE
17-Sep-2024
24-Sep-2024
05-Sep-2024
05-Sep-2024
12-Sep-2024
12-Sep-2024
24-Sep-2024
19-Sep-2024
24-Sep-2024
17-Sep-2024
10-Sep-2024
12-Sep-2024
19-Sep-2024
24-Sep-2024
17-Sep-2024
24-Sep-2024
05-Sep-2024
03-Sep-2024
no

## 2024-09-23 NOTE — BH INPATIENT PSYCHIATRY PROGRESS NOTE - NSBHFUPINTERVALCCFT_PSY_A_CORE
Patient seen, chart reviewed, case discussed with team.  Patient seen for follow up of psychosis
Patient seen, chart reviewed, case discussed with team.  Patient seen for follow up of psychosis
reported feeling "good" 
f/u psychosis
f/u psychosis
Patient seen, chart reviewed, case discussed with team.  Patient seen for follow up of psychosis
f/u psychosis
reported feeling "ok". 
f/u psychosis
reported feeling "ok". 
f/u psychosis
reported feeling "ok". 
reported feeling "ok". 
f/u psychosis
reported feeling "good" 
f/u psychosis

## 2024-09-23 NOTE — BH INPATIENT PSYCHIATRY DISCHARGE NOTE - NSDCCPCAREPLAN_GEN_ALL_CORE_FT
PRINCIPAL DISCHARGE DIAGNOSIS  Diagnosis: Schizophrenia  Assessment and Plan of Treatment: Continue taking clozapine as prescribed with weekly blood draw. Follow up with your outpatient psychiatrist.

## 2024-09-23 NOTE — BH INPATIENT PSYCHIATRY PROGRESS NOTE - NSTXSLFCREINTERMD_PSY_ALL_CORE
Psychiatrist is encouraging patient to shower and improve self care. 

## 2024-09-23 NOTE — BH INPATIENT PSYCHIATRY PROGRESS NOTE - NSTXPSYCHOGOAL_PSY_ALL_CORE
Will identify 2 coping skills that help mitigate hallucinations
Will identify 2 coping skills that assist with focus on reality
Will identify 2 coping skills that assist with focus on reality
Will identify 2 coping skills that help mitigate hallucinations
Will identify 2 coping skills that help mitigate hallucinations
Will identify 2 coping skills that assist with focus on reality
Will identify 2 coping skills that help mitigate hallucinations
Will identify 2 coping skills that assist with focus on reality
Will identify 2 coping skills that help mitigate hallucinations

## 2024-09-23 NOTE — BH INPATIENT PSYCHIATRY DISCHARGE NOTE - ATTENDING DISCHARGE PHYSICAL EXAMINATION:
Discharge MSE: The patient appears stated age, now with improved and appropriate grooming and hygiene.  The patient was calm, cooperative with the interview and maintained appropriate eye contact.  No psychomotor agitation or retardation noted.  The patient's speech is normal in rate, tone and volume, with ongoing improvements in articulation. The patient's mood is "good."  Affect is euthymic, blunted. Thought process is linear. Thought content is concrete, denies persecutory or grandiose delusions, denies SIIP and HIIP. Perception: reports very infrequent AH, denies CAH.  Insight is fair.  Judgment is fair.  Impulse control has been fair on the unit.

## 2024-09-23 NOTE — BH INPATIENT PSYCHIATRY PROGRESS NOTE - NSTXDCOTHRDATEEST_PSY_ALL_CORE
29-Aug-2024
03-Sep-2024
03-Sep-2024
27-Aug-2024
03-Sep-2024
27-Aug-2024
10-Sep-2024
03-Sep-2024
27-Aug-2024
10-Sep-2024
27-Aug-2024
10-Sep-2024
27-Aug-2024
10-Sep-2024
10-Sep-2024
29-Aug-2024
27-Aug-2024
29-Aug-2024

## 2024-09-23 NOTE — BH DISCHARGE NOTE NURSING/SOCIAL WORK/PSYCH REHAB - DISCHARGE INSTRUCTIONS AFTERCARE APPOINTMENTS
In order to check the location, date, or time of your aftercare appointment, please refer to your Discharge Instructions Document given to you upon leaving the hospital.  If you have lost the instructions please call [phone]

## 2024-09-23 NOTE — BH INPATIENT PSYCHIATRY PROGRESS NOTE - NSTXPROBDCOTHR_PSY_ALL_CORE
DISCHARGE ISSUE - OTHER

## 2024-09-23 NOTE — BH INPATIENT PSYCHIATRY PROGRESS NOTE - NSBHMETABOLICLABS_PSY_ALL_CORE
Labs within last 12 months
All labs not within last 12 months, ordered
Labs within last 12 months

## 2024-09-23 NOTE — BH INPATIENT PSYCHIATRY PROGRESS NOTE - NSBHATTESTTYPEVISIT_PSY_A_CORE
Attending Only
Attending with Resident/Fellow/Student
Attending Only
Attending with Resident/Fellow/Student
Attending Only
CHAN without on-site Attending supervision

## 2024-09-23 NOTE — BH INPATIENT PSYCHIATRY PROGRESS NOTE - NSTXDCOTHRDATETRGT_PSY_ALL_CORE
10-Sep-2024
24-Sep-2024
24-Sep-2024
17-Sep-2024
10-Sep-2024
10-Sep-2024
05-Sep-2024
10-Sep-2024
24-Sep-2024
05-Sep-2024
24-Sep-2024
10-Sep-2024
24-Sep-2024
17-Sep-2024
03-Sep-2024
05-Sep-2024

## 2024-09-23 NOTE — BH DISCHARGE NOTE NURSING/SOCIAL WORK/PSYCH REHAB - PATIENT PORTAL LINK FT
You can access the FollowMyHealth Patient Portal offered by [facility] by registering at the following website: [email or web address]. By joining [facility]’s FollowMyHealth portal, you will also be able to view your health information using other applications (apps) compatible with our system.

## 2024-09-23 NOTE — BH INPATIENT PSYCHIATRY PROGRESS NOTE - NSBHROSSYSTEMS_PSY_ALL_CORE
Psychiatric
Musculoskeletal.../Psychiatric
Psychiatric

## 2024-09-23 NOTE — BH SAFETY PLAN - ADD ADDITIONAL INTERNAL COPING STRATEGIES
Curettage Text: The wound bed was treated with curettage after the biopsy was performed. Hide Second Anesthesia?: No Path Notes (To The Dermatopathologist): Lesion measured today.6mm Post-Care Instructions: I reviewed with the patient in detail post-care instructions. Patient is to keep the biopsy site dry overnight, and then apply bacitracin twice daily until healed. Patient may apply hydrogen peroxide soaks to remove any crusting. Wound Care: Petrolatum Dressing: bandage Information: Selecting Yes will display possible errors in your note based on the variables you have selected. This validation is only offered as a suggestion for you. PLEASE NOTE THAT THE VALIDATION TEXT WILL BE REMOVED WHEN YOU FINALIZE YOUR NOTE. IF YOU WANT TO FAX A PRELIMINARY NOTE YOU WILL NEED TO TOGGLE THIS TO 'NO' IF YOU DO NOT WANT IT IN YOUR FAXED NOTE. Was A Bandage Applied: Yes Add Additional Internal Coping Strategies... Depth Of Biopsy: dermis Biopsy Type: H and E X Size Of Lesion In Cm: 0 Anesthesia Volume In Cc: 0.5 Silver Nitrate Text: The wound bed was treated with silver nitrate after the biopsy was performed. Billing Type: Third-Party Bill Electrodesiccation And Curettage Text: The wound bed was treated with electrodesiccation and curettage after the biopsy was performed. Type Of Destruction Used: Curettage Electrodesiccation Text: The wound bed was treated with electrodesiccation after the biopsy was performed. Hemostasis: Drysol Cryotherapy Text: The wound bed was treated with cryotherapy after the biopsy was performed. Size Of Lesion In Cm: 0.4 Notification Instructions: Patient will be notified of biopsy results. However, patient instructed to call the office if not contacted within 2 weeks. Consent: Written consent was obtained and risks were reviewed including but not limited to scarring, infection, bleeding, scabbing, incomplete removal, nerve damage and allergy to anesthesia. Path Notes (To The Dermatopathologist): Lesion measured today.4mm Detail Level: Detailed Biopsy Method: Dermablade Anesthesia Type: 1% lidocaine with epinephrine Size Of Lesion In Cm: 0.6

## 2024-09-23 NOTE — BH INPATIENT PSYCHIATRY PROGRESS NOTE - CURRENT MEDICATION
MEDICATIONS  (STANDING):  atropine 1% Ophthalmic Solution for SubLingual Use 1 Drop(s) SubLingual at bedtime  cloZAPine 300 milliGRAM(s) Oral at bedtime  divalproex ER 1000 milliGRAM(s) Oral at bedtime  polyethylene glycol 3350 17 Gram(s) Oral daily    MEDICATIONS  (PRN):  acetaminophen     Tablet .. 650 milliGRAM(s) Oral every 6 hours PRN Mild Pain (1 - 3), Moderate Pain (4 - 6)  haloperidol     Tablet 5 milliGRAM(s) Oral every 6 hours PRN combativeness due to schizophrenia  haloperidol    Injectable 5 milliGRAM(s) IntraMuscular once PRN Agitation  LORazepam     Tablet 2 milliGRAM(s) Oral every 6 hours PRN agitation  LORazepam   Injectable 2 milliGRAM(s) IntraMuscular once PRN severe agitation due to psychosis  senna 2 Tablet(s) Oral at bedtime PRN constipation

## 2024-09-23 NOTE — BH DISCHARGE NOTE NURSING/SOCIAL WORK/PSYCH REHAB - NSBHDCADDR1FT_A_CORE
[facility] [id number]  [place] [address] [facility] [address] [place] [address]  [place] [address]

## 2024-09-23 NOTE — BH INPATIENT PSYCHIATRY DISCHARGE NOTE - HPI (INCLUDE ILLNESS QUALITY, SEVERITY, DURATION, TIMING, CONTEXT, MODIFYING FACTORS, ASSOCIATED SIGNS AND SYMPTOMS)
He arrived on the unit uneventfully.  Apparently did not require IM prns in the ED.  Compliant with meds here, slept.  Today, not very cooperative, remained in bed, did not desire to talk.  Note VPA<3 suggesting noncompliance, note utox negative for cannabis in ed.  Phoned Dr. Metz at [phone] to figure out when last received aristasa 441 (reportedly next appoint 9/14 or 9/16; unclear when previous appointment was, if he received aristada).    Note patient has history of indeterminate rxn to ppd, clear cxr, had been given INH for prophylaxis but was not compliant.    AS PER THE [facility] ED B[facility] DATED 8/26/2024  Pt is a 45 yo M, single, non-caregiver, domiciled at [facility], psych hx of  Schizophrenia vs Schizoaffective Disorder, multiple prior admissions including state (last inpt  [facility] April 2024, [facility] 2009 and [phone]), per chart cannabis use disorder (residence staff denies recent use), no known hx suicide attempt or violence, follows at [facility] on [place] grounds, no known legal issues, PMH HTN (not on meds) and anemia per psyckes, brought in by EMS called by residence staff after pt reportedly threw out all his clothes yesterday in response to CAH.   Patient initially appeared suspicious of staff, did not want to speak knowing staff was standing outside his room, then went to sit in the hallway and asked to be interviewed there. Patient appeared thought blocked and unable to answer most questions. Stated he felt he "couldn't breathe" and threw out his clothes. Patient then stopped answering questions, was offered a PRN for distress but declined. Writer briefly terminated interview to allow pt to have a meal.  Upon reassessment, pt was a bit more engaged, but observed to be actively responding to internal stimuli. Patient states he cannot answer questions because his "thoughts are blocked".  He states the other people in his residence take his thoughts because they want him to die. He states he is scared. He states he doesn't 'punch' to defend himself but couldn't elaborate. He states he has poor hearing in one ear, unable to explain further. He endorses CAH to kill himself, unable to state specific plan. He states "I don't want to pass away but I want to be happy with God". Unable to elicit further history due to active psychosis.    See  note for collateral.    Message left for Dr. Metz at [facility].    PSYCKES lists a "nonspecific reaction to tuberculin skin test" in May 2022, and a script for Isoniazid last picked up May 2024 - confirmed with Demetria this medication was discontinued in May 2024.

## 2024-09-23 NOTE — BH INPATIENT PSYCHIATRY PROGRESS NOTE - NSTXPSYCHOPROGRES_PSY_ALL_CORE
Improving
No Change
Improving
No Change
Improving
Improving
No Change
Improving
Improving
No Change
Improving
Improving
No Change
No Change

## 2024-09-23 NOTE — BH INPATIENT PSYCHIATRY PROGRESS NOTE - NSTXDCOTHRGOAL_PSY_ALL_CORE
Patient will present and report a decrease in symptoms over the course of the next several days

## 2024-09-23 NOTE — BH INPATIENT PSYCHIATRY PROGRESS NOTE - NSBHASSESSSUMMFT_PSY_ALL_CORE
Pt is a 43yo single man who is domiciled at [facility] w/ PPHx of dx Schizophrenia vs Schizoaffective Disorder, multiple prior admissions including state (last inpt [facility] April 2024, [facility] 2009 and [phone]), no known hx suicide attempt or violence, follows at [facility] on [place] grounds, brought in by EMS called by staff at residence staff after pt threw away all his clothes and belongings in response to CAH with concern for psychotic decompensation in the context of non-adherence with medication and chronic psychotic symptoms that are possibly treatment-resistant.      9/23: Pt denying perceptual disturbances without overt evidence of ongoing delusions at this time. He has been compliant with medications and tolerating well. Will continue dose of clozapine 300 mg QHS and obtain level on 9/24 prior to planned discharge. Remains in good behavioral control.    Plan:  - Continue clozapine 300 mg QHS, clozapine level ordered for 9/24  	- ANC/CRP/Troponin weekly qTuesday   	- C/w bowel regimen - Miralax daily + Senna 2 tabs qhs prn constipation   - Discontinued Haldol 5mg qhs given unclear benefit  - S/p Aristada 441mg on 8/15, next due 9/12; pt refuses higher dose, will hold off on continuing during Clozapine titration   - C/w Depakote ER to 1000mg qhs  - Dispo: Pt submitted court request for discharge; given that pt had remained with ongoing psychotic symptoms, poor insight, and was undergoing Clozapine titration, he remained an increased risk of harm to self and others requiring ongoing hospitalization. Symptoms have now improved with plans for ongoing monitoring and plans for discharge tomorrow.

## 2024-09-23 NOTE — BH INPATIENT PSYCHIATRY PROGRESS NOTE - NSBHATTESTBILLING_PSY_A_CORE
75837-Dbuoxurnfk OBS or IP - moderate complexity OR 35-49 mins
14171-Ymtzuvisyb OBS or IP - low complexity OR 25-34 mins
[id number]-Zdgrvckuqq OBS or IP - moderate complexity OR 35-49 mins
32849-Xsgrsrqkyz OBS or IP - low complexity OR 25-34 mins
61441-Xgyqggjcno OBS or IP - moderate complexity OR 35-49 mins
10000-Zzllymyogl OBS or IP - moderate complexity OR 35-49 mins
31098-Byrdnhtjme OBS or IP - moderate complexity OR 35-49 mins
00575-Gvzjzajuqj OBS or IP - moderate complexity OR 35-49 mins
04851-Ozlzrenpws OBS or IP - moderate complexity OR 35-49 mins
29969-Jugnszwgpm OBS or IP - moderate complexity OR 35-49 mins
19759-Dmjjdyajlf OBS or IP - moderate complexity OR 35-49 mins
37569-Soqbimlrpe OBS or IP - moderate complexity OR 35-49 mins
98103-Caosbnodsc OBS or IP - high complexity OR 50-79 mins
62000-Vejabbtjca OBS or IP - moderate complexity OR 35-49 mins
51402-Lxrcfxjavi OBS or IP - low complexity OR 25-34 mins
39441-Rkuigjmqxo OBS or IP - moderate complexity OR 35-49 mins
66724-Rxwzfkntge - moderate complexity OR 30-39 mins
40069-Wtjzpuseal OBS or IP - moderate complexity OR 35-49 mins

## 2024-09-23 NOTE — BH INPATIENT PSYCHIATRY DISCHARGE NOTE - NSBHFUPINTERVALHXFT_PSY_A_CORE
No 24 hour events, pt compliant with all medications, no PRNs required. On assessment, pt is calm and cooperative, denying any perceptual disturbances and not endorsing any delusional beliefs or paranoia. Pt states his mood is "good" and he's been sleeping well. He states that he's hearing voices on rare occasion and they are not commanding him to hurt himself or others. He denies any visual hallucinations and does not endorse any delusions of control regarding his speech impediment. He feels safe discharging home and denies feeling like anyone is out to get him. He denies any SIIP or HIIP. He denies feeling any side effects of clozapine, including light headedness or constipation. He plans to return home, reconnect with family, clean up his apartment, and watch sports.

## 2024-09-23 NOTE — BH INPATIENT PSYCHIATRY PROGRESS NOTE - NSTXSLFCREGOAL_PSY_ALL_CORE
Will perform ADLs without assistance/prompts daily

## 2024-09-23 NOTE — BH INPATIENT PSYCHIATRY PROGRESS NOTE - NSBHFUPINTERVALHXFT_PSY_A_CORE
No acute events over the weekend. Pt compliant with medications. He denies any acute concerns. Reports sleeping well. Denies any SI/HI/AVH. When asked whether he feels his speech is being controlled, he states "sometimes" but is not concerned about this right now. Denies any medication side effects. Agreeable to discharge tomorrow.

## 2024-09-23 NOTE — BH INPATIENT PSYCHIATRY DISCHARGE NOTE - HOSPITAL COURSE
Pt is a 45yo single man who is domiciled at [facility] w/ PPHx of dx Schizophrenia vs Schizoaffective Disorder, multiple prior admissions including [facility] (last inpt [facility] April 2024, [facility] 2009 and [phone]), no known hx suicide attempt or violence, follows at [facility] on [place] grounds, brought in by EMS called by staff at residence staff after pt threw away all his clothes and belongings in response to CAH with concern for psychotic decompensation in the context of non-adherence with medication and chronic psychotic symptoms that are possibly treatment-resistant.     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    Medication Changes:  Haldol 20 mg QHS was continued from home on admission, then tapered completely  Seroquel 200 mg QHS was continued from home on admission, then tapered completely  Depakote 1500 mg ER QHS was continued from home on admission, then tapered to 1000 mg QHS  Ativan 2 mg TID was started on admission for agitation, then tapered completely upon improved behavioral control  Clozapine was started at 25 mg QHS on 9/6/24 and gradually titrated to 300 mg QHS  Atropine 1% sublingual solution, 1 drop QHS was started to control excess oral secretions overnight  Miralax 17g daily was started to prevent constipation    Hospital Course:  Initially, pt displayed symptoms of psychosis, as stated above, and presented as agitated, disorganized, and responding to internal stimuli. Due to his agitation, pt was started on ativan 2 mg TID. Pt also endorsed ongoing auditory hallucinations that were occasionally commanding him to hurt himself and others, though he consistently stated that he would not listen to these commands and he would never hurt himself or others. He also believed that someone or several people were putting a speech impediment in his mouth, which was causing his poor articulation. Pt lacked regular self care, was refusing to take showers or change clothes, and was malodorous. He was also sexually preoccupied and displayed inappropriate behavior towards another pt on the unit, but was redirectable by staff when approached about this behavior. Despite adherence to his home medication regimen for several weeks on the unit, these symptoms did not show significant improvement.     Given these symptoms of psychosis despite multiple medications, history of numerous ED visits over the past several years due to disturbing voices, and concern for polypharmacy, clozapine 25 mg QHS was started on 9/6/24 and gradually titrated to a dose of 300 mg QHS, while other psychotropic medications were tapered as detailed above. Psychoeducation was provided on the benefits, risks, and side effects of clozapine, including light headedness, constipation, low ANC or agranulocytosis, increased risk for seizures, and myocarditis, as well as the requirement to have weekly blood draws, and pt verbalized understanding of these factors and agreed to start clozapine. Upon discharge, clozapine level is pending and close follow up with weekly blood draws checking for ANC, CRP, and troponin are necessary in order for pt to continue receiving further doses of clozapine. His most recent ANC was 3800, CRP was 19.2, and troponin was 6.    Pt's symptoms of psychosis, behavioral control, and self care gradually improved over the course of the hospitalization. He now presents as more linear, has continued to experience occasional auditory hallucinations but states that they are under control and not bothering him constantly the way they did previously, consistently denies command auditory hallucinations, consistently denies visual hallucinations and delusions of control, and has improved speech articulation. He also started to shower more consistently, was no longer malodorous, and consistently displayed good behavioral control without sexual preoccupation. He slept well consistently and though his affect was blunted, he consistently reported good mood. He never required PRN medications and never endorsed SIIP nor HIIP.     Risk Assessment:   Pt remains an increased CHRONIC risk of harm to self and others due to schizophrenia vs. schizoaffective diagnosis with command auditory hallucinations. Acute risk factors include ongoing psychosis with auditory hallucinations, now treated with inpatient hospitalization and significant improvement in symptoms. Other protective factors that mitigate acute risks include pt has consistently denied SIIP and HIIP, he is no longer experiencing command auditory hallucinations, he has been adherent to medications and did not require any PRNs during this hospitalization, no known hx of SA or violence, he is future-oriented (with plans to see family, clean and organize his apartment, and enjoy sports), has stable housing, has good followup with outpatient psychiatrist, does not have access to firearms, and agreeable to continue taking clozapine and going to followup appointments/blood draws. Thus, at present, pt is not an acute risk fo harm to self or others, has achieved optimal benefits from hospitalization, and is appropriate for less restrictive level of care and discharge home. Pt is agreeable to call 911 or go to nearest ED with any imminent safety concerns for self or others.     Pt is a 43yo single man who is domiciled at [facility] w/ PPHx of dx Schizophrenia vs Schizoaffective Disorder, multiple prior admissions including [facility] (last inpt [facility] April 2024, [facility] 2009 and [phone]), no known hx suicide attempt or violence, follows at [facility] on [place] grounds, brought in by EMS called by staff at residence staff after pt threw away all his clothes and belongings in response to CAH with concern for psychotic decompensation in the context of possible non-adherence with medication and chronic psychotic symptoms that are likely treatment-resistant.     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    Pt was initially restarted on home meds Haldol 20mg qhs, Seroquel 200mg daily, and Depakote ER 1500mg qhs. We held off on restarting home med Aristada 441mg given pt's refusal to increase dose and unclear benefit. Ativan 2mg TID was started due to psychotic agitation (which was later tapered and discontinued prior to discharge with improved behavioral control). On these medications pt exhibited little to no improvement in symptoms.   Given chronicity of pt's symptoms despite treatment with three antipsychotics (though notably not at max dose) and goal to minimize polypharmacy, Clozapine was started for treatment-resistant psychosis and titrated to 300mg qhs. During titration, Haldol 20mg and Seroquel 200mg were discontinued. Depakote ER was tapered to 1000mg qhs (and could likely be discontinued in the outpatient setting given unclear indication for such and lack of mood symptoms). Pt was also started on sublingual Atropine drops for Clozapine-induced sialorrhea and Miralax and Senna for Clozapine-induced constipation. ANC, Troponin, and CRP were monitored weekly during titration.     Psychoeducation was provided to the patient on the benefits, risks, and side effects of clozapine, including orthostatic hypotension and tachycardia (with symptom of lightheadedness, constipation, low ANC or agranulocytosis, increased risk for seizures, and myocarditis, as well as the requirement to have weekly blood draws; pt verbalized understanding of these side effects and monitoring parameters.     Upon initiation of Clozapine, pt's psychotic symptoms improved, as did his behavior and self-care. He remains with some AH though these are less frequent and no longer command in nature. Delusions of control appeared to resolve. Thought process is now more linear. Pt has been in good behavioral control without any recent episodes of agitation (and he notably has never been aggressive or violent; has not required IM prns or restraints). He has been showering more regularly with improved grooming and hygiene. He has been appropriate with regards to his interactions with peers (including female peers). He has been sleeping well and has consistently denied SIIP and HIIP. Mood, though blunted, has been stable. Pt attended some groups on the unit though largely kept to himself. He submitted a court request for discharge but was discharged prior to his court date.     Pt was discharged with a 7-day supply of Clozapine 300mg qhs (Clozapine was started on 9/6/24 and he will need weekly ANC, next due on 10/1/24; ANC on day of discharge 9/24/24 was 3,800). Clozapine level was drawn on day of discharge and is currently pending.   Pt was discharged with a 30-day supply of Depakote ER 1000mg, Miralax, Senna, and sublingual atropine drops.  The above medications were sent to pt's home pharmacy at the request of his housing.   Pt will be returning to his housing on [place] grounds at [facility] and he will return to outpt treatment with his psychiatrist Dr. Metz.     Risk Assessment:   Pt remains an increased CHRONIC risk of harm to self and others due to schizophrenia diagnosis, hx of prior hospitalizations, hx of nonadherence to medication, having few social supports, and hx of aggression when psychotic. Acute risk factors include recent decompensated psychosis with CAH, now treated with inpatient hospitalization and significant improvement in symptoms. Other protective factors that mitigate acute risks include pt has consistently denied SIIP and HIIP, he is no longer experiencing command auditory hallucinations, he has been adherent to medications, no known hx of SA, he is future-oriented (with plans to see family, clean and organize his apartment, and enjoy sports), has stable housing, has good followup with outpatient psychiatrist, does not have access to firearms, and he is able to engage in safety planning. Thus, at present, pt is not an acute risk fo harm to self or others, has achieved optimal benefits from hospitalization, and is appropriate for less restrictive level of care and discharge home. Pt is agreeable to call 911 or go to nearest ED with any imminent safety concerns for self or others.

## 2024-09-23 NOTE — BH INPATIENT PSYCHIATRY PROGRESS NOTE - NSBHMETABOLIC_PSY_ALL_CORE_FT
BMI: BMI (kg/m2): 33 (08-26-24 @ 23:58)  HbA1c: A1C with Estimated Average Glucose Result: 5.3 % (09-06-24 @ 08:00)    Glucose:   BP: --Vital Signs Last 24 Hrs  T(C): 36.5 (09-23-24 @ 08:16), Max: 37.1 (09-22-24 @ 18:54)  T(F): 97.7 (09-23-24 @ 08:16), Max: 98.8 (09-22-24 @ 18:54)  HR: --  BP: --  BP(mean): --  RR: --  SpO2: --    Orthostatic VS  09-23-24 @ 08:16  Lying BP: --/-- HR: --  Sitting BP: 134/77 HR: 101  Standing BP: 132/78 HR: 110  Site: --  Mode: --  Orthostatic VS  09-22-24 @ 18:54  Lying BP: --/-- HR: --  Sitting BP: 125/84 HR: 116  Standing BP: 110/76 HR: 119  Site: --  Mode: --  Orthostatic VS  09-22-24 @ 06:54  Lying BP: --/-- HR: --  Sitting BP: 123/84 HR: 70  Standing BP: 123/64 HR: 73  Site: --  Mode: --  Orthostatic VS  09-21-24 @ 20:11  Lying BP: --/-- HR: --  Sitting BP: 128/83 HR: 116  Standing BP: 116/62 HR: 120  Site: --  Mode: --    Lipid Panel: Date/Time: 09-06-24 @ 08:00  Cholesterol, Serum: 170  LDL Cholesterol Calculated: 108  HDL Cholesterol, Serum: 43  Total Cholesterol/HDL Ration Measurement: --  Triglycerides, Serum: 97

## 2024-09-23 NOTE — BH INPATIENT PSYCHIATRY PROGRESS NOTE - MSE UNSTRUCTURED FT
The patient appears stated age, fair hygiene, unkempt.  The patient was calm, cooperative with the interview and maintained appropriate eye contact.  No psychomotor agitation or retardation noted.  The patient's speech is normal in rate, tone and volume, with ongoing improvements in articulation. The patient's mood is "good."  Affect is euthymic, blunted. Thought process is linear. Thought content is concrete, denies persecutory or grandiose delusions. Denies perceptual disturbances at this time, denies AH today.  Insight is poor to fair.  Judgment is fair.  Impulse control has been fair on the unit.

## 2024-09-23 NOTE — BH INPATIENT PSYCHIATRY PROGRESS NOTE - NSTXPSYCHODATEEST_PSY_ALL_CORE
27-Aug-2024
27-Aug-2024
12-Sep-2024
27-Aug-2024
26-Aug-2024
12-Sep-2024
26-Aug-2024
27-Aug-2024
26-Aug-2024
29-Aug-2024
27-Aug-2024
29-Aug-2024
29-Aug-2024

## 2024-09-23 NOTE — BH INPATIENT PSYCHIATRY DISCHARGE NOTE - OTHER PAST PSYCHIATRIC HISTORY (INCLUDE DETAILS REGARDING ONSET, COURSE OF ILLNESS, INPATIENT/OUTPATIENT TREATMENT)
Schizophrenia vs Schizoaffective Disorder, multiple prior admissions including state (last inpt  [facility] April 2024, [facility] 2009 and [id number]), per chart cannabis use disorder (residence staff denies recent use), no known hx suicide attempt or violence, [facility] [place] [facility] with Dr. Metz

## 2024-09-23 NOTE — BH INPATIENT PSYCHIATRY DISCHARGE NOTE - NSBHASSESSSUMMFT_PSY_ALL_CORE
Pt is a 43yo single man who is domiciled at [facility] w/ PPHx of dx Schizophrenia vs Schizoaffective Disorder, multiple prior admissions including state (last inpt [facility] April 2024, [facility] 2009 and [id number]), no known hx suicide attempt or violence, follows at [facility] [place] grounds, brought in by EMS called by staff at residence staff after pt threw away all his clothes and belongings in response to CAH with concern for psychotic decompensation in the context of non-adherence with medication and chronic psychotic symptoms that are possibly treatment-resistant.      9/24: Pt continues to deny perceptual disturbances without overt evidence of ongoing delusions at this time. He has been compliant with medications and tolerating well. Will continue dose of clozapine 300 mg QHS, level now pending. Remains in good behavioral control. Plan for discharge today, given pt does not meet criteria for ongoing psychiatric admission and is safe for discharge.  Pt is a 45yo single man who is domiciled at [facility] w/ PPHx of dx Schizophrenia vs Schizoaffective Disorder, multiple prior admissions including state (last inpt [facility] April 2024, [facility] 2009 and [id number]), no known hx suicide attempt or violence, follows at [facility] [place] grounds, brought in by EMS called by staff at residence staff after pt threw away all his clothes and belongings in response to CAH with concern for psychotic decompensation in the context of non-adherence with medication and chronic psychotic symptoms that are possibly treatment-resistant.      9/24: Pt continues to deny perceptual disturbances without overt evidence of ongoing delusions at this time. He has been compliant with medications and tolerating well. Will continue dose of clozapine 300 mg QHS w/ weekly ANC, level now pending. Remains in good behavioral control. Plan for discharge today, given pt does not meet criteria for ongoing psychiatric admission and is safe for discharge.

## 2024-09-23 NOTE — BH INPATIENT PSYCHIATRY PROGRESS NOTE - NSTXPSYCHOINTERMD_PSY_ALL_CORE
Psychiatrist is working with patient to optimize medication regimen to mitigate psychotic symptoms (e.g. auditory hallucinations). Currently patient is being continued on a trial of clozapine, gradually increasing dose with no side effects thus far. 
Psychiatrist is working with patient to optimize medication regimen to mitigate psychotic symptoms (e.g. auditory hallucinations). Currently patient is being continued on a trial of clozapine, gradually increasing dose with no side effects thus far. 
Psychiatrist is working with patient to optimize medication regimen to mitigate psychotic symptoms (e.g. auditory hallucinations). Currently patient is being started on a trial of clozapine.
Psychiatrist is working with patient to optimize medication regimen to mitigate psychotic symptoms (e.g. auditory hallucinations). Currently patient is being started on a trial of clozapine.
Trial of Clozapine, continuing titration
Trial of Clozapine, continuing titration
Psychiatrist is working with patient to optimize medication regimen to mitigate psychotic symptoms (e.g. auditory hallucinations). Currently patient is being started on a trial of clozapine.
Psychiatrist is working with patient to optimize medication regimen to mitigate psychotic symptoms (e.g. auditory hallucinations). Currently patient is being started on a trial of clozapine.
Psychiatrist is working with patient to optimize medication regimen to mitigate psychotic symptoms (e.g. auditory hallucinations). Currently patient is being continued on a trial of clozapine, gradually increasing dose with no side effects thus far. 
Psychiatrist is working with patient to optimize medication regimen to mitigate psychotic symptoms (e.g. auditory hallucinations). Currently patient is being started on a trial of clozapine.

## 2024-09-23 NOTE — BH INPATIENT PSYCHIATRY PROGRESS NOTE - NSBHTIMEACTIVITIESPERFORMED_PSY_A_CORE
chart review, patient interview, team meeting
chart review, patient interview, team meeting, medication adjustment
pt interview, team meeting, medication changes, documentation 
chart review, patient interview, team meeting, medication changes, documentation

## 2024-09-23 NOTE — BH INPATIENT PSYCHIATRY PROGRESS NOTE - NSBHCONSBHPROVDETAILS_PSY_A_CORE  FT
as above

## 2024-09-23 NOTE — BH DISCHARGE NOTE NURSING/SOCIAL WORK/PSYCH REHAB - NSCDUDCCRISIS_PSY_A_CORE
formerly [facility]  1 ([phone]) formerly [facility]-WELL ([phone])  Text "WELL" to [phone]  Website: www.[employer]/.National Suicide Prevention Lifeline 2 (533) 882-7625/.  [facility]  [address] [place], [place] [address] [phone]   Hours:  Monday through Friday from 9 AM to 3 PM/988 Suicide and Crisis Lifeline

## 2024-09-23 NOTE — BH INPATIENT PSYCHIATRY DISCHARGE NOTE - NSBHDCHANDOFFFT_PSY_ALL_CORE
Handoff faxed to outpatient psychiatrist Dr. Metz Attempted to call outpatient psychiatrist Dr. Metz, but could not be reached, so discharge summary faxed. Attempted to call outpatient psychiatrist Dr. Metz, but could not be reached, so discharge summary faxed by . Please contact Dr. Iraheta at [phone] with further questions.

## 2024-09-23 NOTE — BH DISCHARGE NOTE NURSING/SOCIAL WORK/PSYCH REHAB - NSDCPRGOAL_PSY_ALL_CORE
Patient met specified goal of identifying 2 coping skills that mitigate hallucinations. Progress was evidenced by patient's ability to identify reading and writing as coping skills. Patient attended some groups and was appropriate with peers. Patient was visible on the milieu. Patient completed a safety plan upon discharge.

## 2024-09-23 NOTE — BH DISCHARGE NOTE NURSING/SOCIAL WORK/PSYCH REHAB - NSDCADDINFO1FT_PSY_ALL_CORE
Please arrive at least 5-10 minutes early. Please arrive at least 5-10 minutes early. You will be meeting with OLGA Gimenez

## 2024-09-23 NOTE — BH INPATIENT PSYCHIATRY DISCHARGE NOTE - NSDCMRMEDTOKEN_GEN_ALL_CORE_FT
atropine 1% ophthalmic solution: 2 application sublingual once a day (at bedtime) 2 drops sublingual at bedtime  cloZAPine 100 mg oral tablet: 3 tab(s) orally once a day (at bedtime)  divalproex sodium 500 mg oral tablet, extended release: 2 tab(s) orally once a day (at bedtime)  polyethylene glycol 3350 oral powder for reconstitution: 17 gram(s) orally once a day  senna leaf extract oral tablet: 2 tab(s) orally once a day (at bedtime) as needed for constipation

## 2024-09-24 VITALS — TEMPERATURE: 98 F

## 2024-09-24 LAB
BASOPHILS # BLD AUTO: 0.04 K/UL — SIGNIFICANT CHANGE UP (ref 0–0.2)
BASOPHILS NFR BLD AUTO: 0.6 % — SIGNIFICANT CHANGE UP (ref 0–2)
CRP SERPL HS-MCNC: 19.2 MG/L — HIGH
EOSINOPHIL # BLD AUTO: 0.26 K/UL — SIGNIFICANT CHANGE UP (ref 0–0.5)
EOSINOPHIL NFR BLD AUTO: 4.1 % — SIGNIFICANT CHANGE UP (ref 0–6)
HCT VFR BLD CALC: 44.5 % — SIGNIFICANT CHANGE UP (ref 39–50)
HGB BLD-MCNC: 14.8 G/DL — SIGNIFICANT CHANGE UP (ref 13–17)
IANC: 3.8 K/UL — SIGNIFICANT CHANGE UP (ref 1.8–7.4)
IMM GRANULOCYTES NFR BLD AUTO: 0.2 % — SIGNIFICANT CHANGE UP (ref 0–0.9)
LYMPHOCYTES # BLD AUTO: 1.95 K/UL — SIGNIFICANT CHANGE UP (ref 1–3.3)
LYMPHOCYTES # BLD AUTO: 30.5 % — SIGNIFICANT CHANGE UP (ref 13–44)
MCHC RBC-ENTMCNC: 29.6 PG — SIGNIFICANT CHANGE UP (ref 27–34)
MCHC RBC-ENTMCNC: 33.3 GM/DL — SIGNIFICANT CHANGE UP (ref 32–36)
MCV RBC AUTO: 89 FL — SIGNIFICANT CHANGE UP (ref 80–100)
MONOCYTES # BLD AUTO: 0.33 K/UL — SIGNIFICANT CHANGE UP (ref 0–0.9)
MONOCYTES NFR BLD AUTO: 5.2 % — SIGNIFICANT CHANGE UP (ref 2–14)
NEUTROPHILS # BLD AUTO: 3.8 K/UL — SIGNIFICANT CHANGE UP (ref 1.8–7.4)
NEUTROPHILS NFR BLD AUTO: 59.4 % — SIGNIFICANT CHANGE UP (ref 43–77)
NRBC # BLD: 0 /100 WBCS — SIGNIFICANT CHANGE UP (ref 0–0)
NRBC # FLD: 0 K/UL — SIGNIFICANT CHANGE UP (ref 0–0)
PLATELET # BLD AUTO: 289 K/UL — SIGNIFICANT CHANGE UP (ref 150–400)
RBC # BLD: 5 M/UL — SIGNIFICANT CHANGE UP (ref 4.2–5.8)
RBC # FLD: 13.2 % — SIGNIFICANT CHANGE UP (ref 10.3–14.5)
TROPONIN T, HIGH SENSITIVITY RESULT: 6 NG/L — SIGNIFICANT CHANGE UP
WBC # BLD: 6.39 K/UL — SIGNIFICANT CHANGE UP (ref 3.8–10.5)
WBC # FLD AUTO: 6.39 K/UL — SIGNIFICANT CHANGE UP (ref 3.8–10.5)

## 2024-09-24 NOTE — DISCHARGE NOTE NURSING/CASE MANAGEMENT/SOCIAL WORK - NSDCPEFALRISK_GEN_ALL_CORE
For information on Fall & Injury Prevention, visit: [email or web address] OR  [email or web address] OR  https://www.cdc.gov/steadi/patient.html

## 2024-09-25 LAB — CLOZAPINE SERPL-MCNC: 669 NG/ML — HIGH (ref 350–600)

## 2024-10-07 NOTE — ED ADULT TRIAGE NOTE - NS_BH TRG QUESTION6B_ED_ALL_ED
Teresa Mao  tolerated hydration treatment well with no complications.      Teresa Mao is aware of future appt on 10/9 at 8AM.     AVS printed and given to Teresa Mao: No (Declined by Teresa Mao).      No

## 2024-10-25 ENCOUNTER — EMERGENCY (EMERGENCY)
Facility: HOSPITAL | Age: 44
LOS: 1 days | Discharge: HOME | End: 2024-10-25
Admitting: EMERGENCY MEDICINE
Payer: MEDICAID

## 2024-10-25 VITALS
SYSTOLIC BLOOD PRESSURE: 110 MMHG | HEART RATE: 88 BPM | RESPIRATION RATE: 16 BRPM | DIASTOLIC BLOOD PRESSURE: 97 MMHG | TEMPERATURE: 99 F | HEIGHT: 73 IN | OXYGEN SATURATION: 96 %

## 2024-10-25 PROCEDURE — 99284 EMERGENCY DEPT VISIT MOD MDM: CPT

## 2024-10-25 RX ORDER — HALOPERIDOL LACTATE 2 MG/ML
5 CONCENTRATE, ORAL ORAL ONCE
Refills: 0 | Status: DISCONTINUED | OUTPATIENT
Start: 2024-10-25 | End: 2024-10-25

## 2024-10-25 RX ORDER — QUETIAPINE FUMARATE 50 MG/1
50 TABLET, FILM COATED ORAL ONCE
Refills: 0 | Status: COMPLETED | OUTPATIENT
Start: 2024-10-25 | End: 2024-10-25

## 2024-10-25 RX ORDER — HALOPERIDOL LACTATE 2 MG/ML
5 CONCENTRATE, ORAL ORAL ONCE
Refills: 0 | Status: COMPLETED | OUTPATIENT
Start: 2024-10-25 | End: 2024-10-25

## 2024-10-25 RX ADMIN — QUETIAPINE FUMARATE 50 MILLIGRAM(S): 50 TABLET, FILM COATED ORAL at 21:45

## 2024-10-25 RX ADMIN — Medication 5 MILLIGRAM(S): at 22:16

## 2024-10-25 NOTE — ED PROVIDER NOTE - CLINICAL SUMMARY MEDICAL DECISION MAKING FREE TEXT BOX
43 y/o M hx Schizophrenia BIBA from   [facility]  secondary to hearing voices.  Admits that the voices are louder than normal.  Denies SI/HI/AH/VH.  Denies pain, SOB, fever, chills, chest/abdominal discomfort . Denies falling, punching or kicking any objects.  No evidence of physical injures, broken skin or deformities.   Denies use of alochol or illicit drugs.    SW consulted. Medical evaluation performed. There is no clinical evidence of intoxication or any acute medical problem requiring immediate intervention.  D/C to [facility] via EMS.

## 2024-10-25 NOTE — ED ADULT TRIAGE NOTE - CHIEF COMPLAINT QUOTE
Pt arrives to ED from [place] [facility] c/o hearing voices telling him to hurt himself and other people. Pt states he does not want to hurt himself or others.  Pt denies drug or ETOH use,  Hx: Depression, schizophrenia, HTN.

## 2024-10-25 NOTE — ED PROVIDER NOTE - OBJECTIVE STATEMENT
45 y/o M hx Schizophrenia BIBA from   [facility]  secondary to hearing voices.  Admits that the voices are louder than normal.  Denies SI/HI/AH/VH.  Denies pain, SOB, fever, chills, chest/abdominal discomfort . Denies falling, punching or kicking any objects.  No evidence of physical injures, broken skin or deformities.   Denies use of alochol or illicit drugs.

## 2024-10-25 NOTE — ED PROVIDER NOTE - PATIENT PORTAL LINK FT
You can access the FollowMyHealth Patient Portal offered by [facility] by registering at the following website: [email or web address]. By joining [facility]’s FollowMyHealth portal, you will also be able to view your health information using other applications (apps) compatible with our system.

## 2024-10-25 NOTE — ED ADULT NURSE NOTE - OBJECTIVE STATEMENT
Pt received to  accompanied by EMS from [facility] [id number]. Per EMS, pt has a hx of schizophrenia and expressed SI so 911 was called. Pt presents calm and cooperative; denies SI and HI but does admit to experiencing AH but denies voices are commanding. Pt states he has been compliant with prescribed medications but did not take his evening dose. Pt denies drug or alcohol use; belongings secured for safety. Pt awaiting further evaluation.

## 2024-10-25 NOTE — ED BEHAVIORAL HEALTH NOTE - BEHAVIORAL HEALTH NOTE
Per provider, KARISHMA kruse, patient is cleared and is able to return to their previous residence, [facility] [address] ([phone]).  has spoken to  basilia  confirmed that patients mode of transportation is [employer] and that patient travels INDEPENDENTLY . Clinical provider is in agreement with [facility] back to group home. Verbal huddle regarding coordination of care completed with interdisciplinary team.   Transportation coordinated via mas Inv#  Inv# [id number] w/ [employer] [phone].

## 2024-10-25 NOTE — ED ADULT NURSE NOTE - CHIEF COMPLAINT QUOTE
Pt arrives to ED from [place] [address] c/o hearing voices telling him to hurt himself and other people. Pt states he does not want to hurt himself or others.  Pt denies drug or ETOH use,  Hx: Depression, schizophrenia, HTN.

## 2024-10-25 NOTE — ED PROVIDER NOTE - PSYCHIATRIC PERCEPTION
Subjective   Raymundo Fall is a 54 y.o. male presents for follow up for hypertension and diabetes. Taking medication as prescribed. Tolerating well.   Younger sister with Cystic fibrosis  Oldest sister is carrier    Diabetes   He presents for his follow-up diabetic visit. He has type 2 diabetes mellitus. His disease course has been stable. There are no hypoglycemic associated symptoms. Pertinent negatives for hypoglycemia include no headaches or sweats. There are no diabetic associated symptoms. Pertinent negatives for diabetes include no blurred vision and no chest pain. There are no hypoglycemic complications. Symptoms are stable. There are no diabetic complications. Risk factors for coronary artery disease include diabetes mellitus, dyslipidemia, male sex and hypertension. Current diabetic treatment includes oral agent (triple therapy). He is compliant with treatment most of the time. He is following a generally healthy diet. An ACE inhibitor/angiotensin II receptor blocker is being taken.   Hypertension   This is a chronic problem. The current episode started more than 1 year ago. The problem is unchanged. The problem is controlled. Pertinent negatives include no anxiety, blurred vision, chest pain, headaches, malaise/fatigue, neck pain, orthopnea, palpitations, peripheral edema, PND, shortness of breath or sweats. There are no associated agents to hypertension. Risk factors for coronary artery disease include male gender, dyslipidemia, diabetes mellitus and obesity. Past treatments include calcium channel blockers and angiotensin blockers. Current antihypertension treatment includes angiotensin blockers and calcium channel blockers. The current treatment provides significant improvement. There are no compliance problems.         The following portions of the patient's history were reviewed and updated as appropriate: allergies, current medications, past family history, past medical history, past social  history, past surgical history and problem list.    Review of Systems   Constitutional: Negative.  Negative for malaise/fatigue.   HENT: Negative.    Eyes: Negative.  Negative for blurred vision.   Respiratory: Negative.  Negative for shortness of breath.    Cardiovascular: Negative.  Negative for chest pain, palpitations, orthopnea and PND.   Gastrointestinal: Negative.    Endocrine: Negative.    Genitourinary: Negative.    Musculoskeletal: Negative.  Negative for neck pain.   Allergic/Immunologic: Negative.    Neurological: Negative.  Negative for headaches.   Hematological: Negative.    Psychiatric/Behavioral: Negative.        Objective   Physical Exam   Constitutional: He is oriented to person, place, and time. He appears well-developed and well-nourished.   Cardiovascular: Normal rate, regular rhythm and normal heart sounds. Exam reveals no gallop and no friction rub.   No murmur heard.  Pulmonary/Chest: Effort normal and breath sounds normal. No stridor. No respiratory distress. He has no wheezes.   Abdominal: Soft. Bowel sounds are normal. He exhibits no distension. There is no tenderness.   Neurological: He is alert and oriented to person, place, and time.   Skin: Skin is warm and dry.   Psychiatric: He has a normal mood and affect.   Vitals reviewed.      Assessment/Plan   Raymundo was seen today for follow-up.    Diagnoses and all orders for this visit:    Type 2 diabetes mellitus with hyperglycemia, without long-term current use of insulin (CMS/[facility])  -     Hemoglobin A1c  -     Comprehensive metabolic panel  -     Discontinue: glucose blood test strip; One Touch Ultra II Lancets Check blood sugar twice daily and as needed  -     Discontinue: ONETOUCH DELICA LANCETS 33G misc; Check blood sugar twice daily and as needed    Medication management  -     CBC & Differential    Screening for prostate cancer  -     PSA Screen    Screening for thyroid disorder  -     TSH Rfx On Abnormal To Free  T4    Hyperlipidemia, unspecified hyperlipidemia type  -     Lipid Panel With LDL / HDL Ratio    Other orders  -     Flucelvax Quad=>4Years (2821-9099)    l  Ok for flu shot today  Labs ordered  Reorder glucometer per insurance  Follow up in 6 months, sooner if needed          normal

## 2024-12-04 NOTE — BH PATIENT PROFILE - NSPROIMPLANTSMEDDEV_GEN_A_NUR
4 PAGE(S)  TO/FROM [facility], Deepika Michele  WITH - Order       [x] Received   [] Given   [] Faxed   [] Mailed  for provider [] Dr. Morrow    [] Dr. Wharton   [] Dr. Whitaker   [] Dr. Marrufo   [] Clary Mckinney NP   [x]  Provider -     Does parent need a copy of form, or do they want it mailed, picked up or faxed?       []  PLACED IN PROVIDERS FOLDER AT CHECK IN 3 (Route any forms to non-triage that need to be completed)  []  PLACED IN PROVIDERS BASKET  [x]  PLACED IN  BASKET (Route to [facility])  []  PLACED IN REFERRAL SPECIALIST BASKET   []  PLACED IN X-RAY OFFICE  []  OTHER     None

## 2024-12-07 ENCOUNTER — EMERGENCY (EMERGENCY)
Facility: HOSPITAL | Age: 44
LOS: 1 days | Discharge: HOME | End: 2024-12-07
Attending: EMERGENCY MEDICINE | Admitting: EMERGENCY MEDICINE
Payer: MEDICAID

## 2024-12-07 VITALS
RESPIRATION RATE: 15 BRPM | HEART RATE: 88 BPM | DIASTOLIC BLOOD PRESSURE: 88 MMHG | HEIGHT: 73 IN | OXYGEN SATURATION: 95 % | TEMPERATURE: 99 F | SYSTOLIC BLOOD PRESSURE: 154 MMHG | WEIGHT: 210.1 LBS

## 2024-12-07 PROCEDURE — 99284 EMERGENCY DEPT VISIT MOD MDM: CPT

## 2024-12-07 RX ORDER — HALOPERIDOL 2 MG
5 TABLET ORAL ONCE
Refills: 0 | Status: DISCONTINUED | OUTPATIENT
Start: 2024-12-07 | End: 2024-12-07

## 2024-12-07 RX ORDER — LORAZEPAM 2 MG/1
2 TABLET ORAL ONCE
Refills: 0 | Status: DISCONTINUED | OUTPATIENT
Start: 2024-12-07 | End: 2024-12-07

## 2024-12-07 RX ORDER — HALOPERIDOL 2 MG
5 TABLET ORAL ONCE
Refills: 0 | Status: COMPLETED | OUTPATIENT
Start: 2024-12-07 | End: 2024-12-07

## 2024-12-07 RX ADMIN — LORAZEPAM 2 MILLIGRAM(S): 2 TABLET ORAL at 21:24

## 2024-12-07 RX ADMIN — Medication 5 MILLIGRAM(S): at 21:23

## 2024-12-07 NOTE — ED PROVIDER NOTE - CLINICAL SUMMARY MEDICAL DECISION MAKING FREE TEXT BOX
43 yo M PMH Schizophrenia from [facility]  p/w increased AH. Admits that the voices are loud telling him to go to the hospital. Denies SI/HI/VH.  Denies falling, punching or kicking any objects.  No evidence of physical injures, broken skin or deformities. Denies fever, headache, dizziness, chills, chest pain, shortness of breath, abdominal pain, sick contact or recent travel. Denies alcohol use or other drugs.   Haldol, Ativan  SW collateral  Dispo as per collateral

## 2024-12-07 NOTE — ED ADULT TRIAGE NOTE - CHIEF COMPLAINT QUOTE
alert oriented from [facility]  was throwing food in cafeteria because he is upset about father  no SI or HI no alcohol or drugs hx schizophrenia HTN

## 2024-12-07 NOTE — ED ADULT NURSE NOTE - NSFALLCONCLUSION_ED_ALL_ED
Universal Safety Interventions Admission Reconciliation is Completed  Discharge Reconciliation is Completed

## 2024-12-07 NOTE — ED PROVIDER NOTE - PSYCHIATRIC [+], MLM
DEPRESSION/ANXIETY/OPPOSITIONAL deep reflexes intact/alert and oriented x 3/normal strength/sensation intact/cranial nerves intact
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
## 2024-12-07 NOTE — ED PROVIDER NOTE - PATIENT PORTAL LINK FT
You can access the FollowMyHealth Patient Portal offered by [facility] by registering at the following website: [email or web address]. By joining [facility]’s FollowMyHealth portal, you will also be able to view your health information using other applications (apps) compatible with our system.

## 2024-12-08 VITALS
HEART RATE: 93 BPM | RESPIRATION RATE: 16 BRPM | OXYGEN SATURATION: 97 % | DIASTOLIC BLOOD PRESSURE: 76 MMHG | SYSTOLIC BLOOD PRESSURE: 113 MMHG | TEMPERATURE: 98 F

## 2024-12-08 NOTE — ED ADULT NURSE REASSESSMENT NOTE - NS ED NURSE REASSESS COMMENT FT1
pt ambulating around unit without difficulty. rr even and unlabored. pt calm and corporative. pending EMS transport for d/c.

## 2025-01-13 ENCOUNTER — EMERGENCY (EMERGENCY)
Facility: HOSPITAL | Age: 45
LOS: 1 days | Discharge: HOME | End: 2025-01-13
Admitting: EMERGENCY MEDICINE

## 2025-01-13 VITALS
HEART RATE: 77 BPM | HEIGHT: 73 IN | SYSTOLIC BLOOD PRESSURE: 140 MMHG | OXYGEN SATURATION: 98 % | TEMPERATURE: 98 F | DIASTOLIC BLOOD PRESSURE: 85 MMHG | RESPIRATION RATE: 18 BRPM | WEIGHT: 264.55 LBS

## 2025-01-13 PROCEDURE — 99284 EMERGENCY DEPT VISIT MOD MDM: CPT

## 2025-01-13 RX ORDER — LORAZEPAM 1 MG/1
2 TABLET ORAL ONCE
Refills: 0 | Status: DISCONTINUED | OUTPATIENT
Start: 2025-01-13 | End: 2025-01-13

## 2025-01-13 RX ORDER — HALOPERIDOL DECANOATE 50 MG/ML
5 INJECTION INTRAMUSCULAR ONCE
Refills: 0 | Status: COMPLETED | OUTPATIENT
Start: 2025-01-13 | End: 2025-01-13

## 2025-01-13 RX ADMIN — LORAZEPAM 2 MILLIGRAM(S): 1 TABLET ORAL at 16:26

## 2025-01-13 RX ADMIN — HALOPERIDOL DECANOATE 5 MILLIGRAM(S): 50 INJECTION INTRAMUSCULAR at 16:26

## 2025-01-13 NOTE — ED PROVIDER NOTE - NSFOLLOWUPINSTRUCTIONS_ED_ALL_ED_FT
Follow up with your PMD within 48-72 hrs. Show copies of your reports given to you.   Worsening, continued or new concerning symptoms return to the emergency department.    You have been given information necessary to follow up with the  [facility] Crisis center & other outpatient  psychiatric clinics within your community    • [facility] walk in Crisis centre  [address] [place] [address]  [phone] [email or web address]  Hours of operation:  Day	                                        Hours  Sunday                                  Closed  Monday                                9am - 3pm  Tuesday                                9am - 3pm  Wednesday                          9am - 3pm  Thursday                               9am - 3pm  Friday                                    9am - 3pm  Saturday                                Closed

## 2025-01-13 NOTE — ED PROVIDER NOTE - CLINICAL SUMMARY MEDICAL DECISION MAKING FREE TEXT BOX
46 yo M PMH Schizophrenia from [facility]  p/w increased AH and HI. Admits that the voices are loud telling him to hurt Mayito Rich. Denies SI/VH.  Denies falling, punching or kicking any objects.  No evidence of physical injures, broken skin or deformities. Denies fever, headache, dizziness, chills, chest pain, shortness of breath, abdominal pain, sick contact or recent travel. Denies alcohol use or other drugs.   Haldol, Ativan  SW collateral  Dispo as per collateral

## 2025-01-13 NOTE — ED BEHAVIORAL HEALTH NOTE - BEHAVIORAL HEALTH NOTE
At the provider's request, the writer contacted the patient's residence, [facility] ([phone]), to obtain collateral information. The writer spoke with Radha at the , who reported that the patient had been complaining of auditory hallucinations. Radha described the patient's current state as neither his best nor his worst, noting that he seemed "off" and had been standing unusually close to others. She recommended contacting the  (CM) for further details.    The writer then contacted the on-call CM, Reyna ([phone]), who provided additional information. The patient is a 45-year-old male residing at the facility, with a history of schizophrenia, hypertension, and obesity. He is the biological child of EMS. Reyna reported that the patient requested transport to the ED due to auditory hallucinations, though he didn't elaborate on their content. He typically requests an ambulance when the hallucinations worsen. Reyna observed him talking to himself, engaging in what appeared to be a full conversation, during medication administration today. She considers these auditory hallucinations a warning sign of potential decompensation. No suicidal or homicidal ideation was reported. The patient's sleeping, eating, and showering habits remain at baseline. He is compliant with his medications and recently received an intramuscular injection, though Reyna was unaware of the specifics. She stated that the patient is not currently engaging in any dangerous behaviors. No drug or alcohol use is reported.    The patient's baseline presentation is described as compliant, keeping to himself, capable of self-advocacy, and free from auditory hallucinations. The writer agreed to keep the staff updated.

## 2025-01-13 NOTE — ED ADULT TRIAGE NOTE - CHIEF COMPLAINT QUOTE
pt sent from [facility] for eval, pt c/o of increased depression, pt denies any SI or HI, appears calm and cooperative @ present

## 2025-01-13 NOTE — ED PROVIDER NOTE - PATIENT PORTAL LINK FT
You can access the FollowMyHealth Patient Portal offered by [facility] by registering at the following website: [email or web address]. By joining [employer]’s FollowMyHealth portal, you will also be able to view your health information using other applications (apps) compatible with our system.

## 2025-01-13 NOTE — ED ADULT NURSE NOTE - OBJECTIVE STATEMENT
pt sent from [facility] for eval, pt c/o of increased depression, pt denies any SI or HI, appears calm and cooperative @ present  Patient brought to  area for above complaints. Patient calm and cooperative with a little anxiety. Evaluated by medical provider. Unable to give complete thoughts. As per patient, there is something (voices) blocking his thoughts. He does state he wants to kill Mayito Rich. Medication given to patient and dinner offered and accepted.  Waiting for collateral.   KIN White

## 2025-01-13 NOTE — ED ADULT NURSE NOTE - CHIEF COMPLAINT QUOTE
pt sent from [facility] for eval, pt c/o of increased depression, pt denies any SI or HI, appears calm and cooperative @ present

## 2025-01-13 NOTE — ED BEHAVIORAL HEALTH NOTE - BEHAVIORAL HEALTH NOTE
Per provider,KARISHMA Hill patient is cleared and is able to return to their previous residence, [address]  [facility]   has spoken on-call Reyna MASON ([phone]) ,  confirmed that patients mode of transportation is [facility] and that patient travels INDEPENDENTLY. Clinical provider is in agreement with [facility] to group home. Verbal huddle regarding coordination of care completed with interdisciplinary team.   Transportation coordinated via mas  Inv# [id number] w/ anupam [phone]

## 2025-01-13 NOTE — ED ADULT TRIAGE NOTE - NS_BH TRG QUESTION7_ED_ALL_ED
Depression (without Suicidality or Psychosis)/Sana (includes Bipolar Disorder)/Anxiety (includes Panic, OCD)/Behavioral Problems (includes ADHD, Oppositionality)

## 2025-01-13 NOTE — ED PROVIDER NOTE - TEMPLATE, MLM
Psych/Behavioral Trilobed Flap Text: The defect edges were debeveled with a #15 scalpel blade.  Given the location of the defect and the proximity to free margins a trilobed flap was deemed most appropriate.  Using a sterile surgical marker, an appropriate trilobed flap drawn around the defect.    The area thus outlined was incised deep to adipose tissue with a #15 scalpel blade.  The skin margins were undermined to an appropriate distance in all directions utilizing iris scissors.

## 2025-01-16 NOTE — ED PROVIDER NOTE - CPE EDP EYES NORM
Identified pt with two pt identifiers (name and ). Reviewed chart in preparation for visit and have obtained necessary documentation.    Malka Talley is a 72 y.o. female  Chief Complaint   Patient presents with    Follow-up      robotic assisted conversion of sleeve     /76 (Site: Right Upper Arm)   Pulse 65   Temp 98 °F (36.7 °C) (Oral)   Resp 16   Ht 1.676 m (5' 6\")   Wt 82.1 kg (181 lb)   LMP  (LMP Unknown)   SpO2 95%   BMI 29.21 kg/m²     1. Have you been to the ER, urgent care clinic since your last visit?  Hospitalized since your last visit?no    2. Have you seen or consulted any other health care providers outside of the [facility] since your last visit?  Include any pap smears or colon screening. no   normal...

## 2025-02-01 ENCOUNTER — EMERGENCY (EMERGENCY)
Facility: HOSPITAL | Age: 45
LOS: 1 days | Discharge: HOME | End: 2025-02-01
Admitting: EMERGENCY MEDICINE
Payer: MEDICAID

## 2025-02-01 VITALS
SYSTOLIC BLOOD PRESSURE: 111 MMHG | DIASTOLIC BLOOD PRESSURE: 76 MMHG | HEART RATE: 83 BPM | RESPIRATION RATE: 15 BRPM | OXYGEN SATURATION: 96 % | TEMPERATURE: 98 F | WEIGHT: 315 LBS | HEIGHT: 73 IN

## 2025-02-01 PROCEDURE — 99284 EMERGENCY DEPT VISIT MOD MDM: CPT

## 2025-02-01 RX ORDER — CHLORPROMAZINE HYDROCHLORIDE 100 MG/1
50 TABLET, SUGAR COATED ORAL ONCE
Refills: 0 | Status: COMPLETED | OUTPATIENT
Start: 2025-02-01 | End: 2025-02-01

## 2025-02-01 RX ADMIN — CHLORPROMAZINE HYDROCHLORIDE 50 MILLIGRAM(S): 100 TABLET, SUGAR COATED ORAL at 19:40

## 2025-02-01 NOTE — ED PROVIDER NOTE - CLINICAL SUMMARY MEDICAL DECISION MAKING FREE TEXT BOX
46 y/o  M  hx Schizophrenia BIBA   from [facility] secondary to agitation . Admits he was upset and threw apples and oranges  at staff. Explicitly denies  SI/HI/AH/VH.   Denies pain, SOB, fever, chills, chest / abdominal discomfort.  Denies use of alcohol or  illicit drugs. Denies falling, punching or kicking any objects. No evidence of physical injuries broken skin or deformities. Admits to medication compliance.    Likely chronic schizophrenia. Medical evaluation performed. There is no clinical evidence of intoxication or any acute medical problem requiring immediate intervention. Discuss case with   . D/C to [facility] via EMS

## 2025-02-01 NOTE — ED PROVIDER NOTE - OBJECTIVE STATEMENT
44 y/o  M  hx Bipolar D/O  BIBA   requesting Invega shot.  Sister - [phone] stated that patient has missed 2 months of his medications  and has been acting  "strange"  since.  Sister states that patient would constantly accused his dad of being the devil. Patient states he's [place] the [place] and cannot stop thinking his father is the devil.  Denies SI/HI/AH/VH.   Denies pain, SOB, fever, chills, chest / abdominal discomfort.  Denies use of alcohol or  illicit drugs. Denies falling, punching or kicking any objects. No evidence of physical injuries broken skin or deformities. Admits to medication noncompliance. 44 y/o  M  hx Schizophrenia BIBA   from [facility] secondary to agitation . Admits he was upset and threw apples and oranges  at staff. Explicitly denies  SI/HI/AH/VH.   Denies pain, SOB, fever, chills, chest / abdominal discomfort.  Denies use of alcohol or  illicit drugs. Denies falling, punching or kicking any objects. No evidence of physical injuries broken skin or deformities. Admits to medication compliance. Spoke to staff - on call case manger Mr CasanovaKeya - [phone]

## 2025-02-01 NOTE — ED ADULT TRIAGE NOTE - CHIEF COMPLAINT QUOTE
from [facility] [address], here psychiatric evaluation. per EMS, facility stated was having aggressive behavior. pt cooperative in triage at this time. denies S/I, H/I, alcohol/drug use, hallucinations. Phx schizophrenia

## 2025-02-01 NOTE — ED BEHAVIORAL HEALTH NOTE - BEHAVIORAL HEALTH NOTE
As per NP Handy, spoke with staff who is aware pt is returning. Writer to follow up for reactivation of EMS transport. [phone]. Writer spoke with Chandana. Informed pt would be returning within the next 2-3 hours. Writer contacted [facility] #[phone]. P/up is active. Writer spoke with Steph of [facility]. RN aware. Verbal huddle occurred with interdisciplinary team.

## 2025-02-01 NOTE — ED ADULT NURSE NOTE - TEMPLATE LIST FOR HEAD TO TOE ASSESSMENT
Detail Level: Simple Add 04366 Cpt? (Important Note: In 2017 The Use Of 38824 Is Being Tracked By Cms To Determine Future Global Period Reimbursement For Global Periods): no Psych/Behavioral

## 2025-02-01 NOTE — ED ADULT NURSE NOTE - OBJECTIVE STATEMENT
A&Ox3. Past medical history of schizophrenia. Presents to the ED with aggressive behavior from [facility].  Pt here psychiatric evaluation. per EMS, facility stated was having aggressive behavior. Denies S/I, H/I, alcohol/drug use, hallucinations. Respirations even and unlabored. Medications given as per current care plan. Belongings placed in LA. Pt awaiting SW and transport back to facility. Safety precautions in place.

## 2025-02-02 ENCOUNTER — EMERGENCY (EMERGENCY)
Facility: HOSPITAL | Age: 45
LOS: 1 days | Discharge: HOME | End: 2025-02-02
Admitting: EMERGENCY MEDICINE
Payer: MEDICAID

## 2025-02-02 VITALS
WEIGHT: 229.94 LBS | HEIGHT: 73 IN | TEMPERATURE: 98 F | HEART RATE: 95 BPM | RESPIRATION RATE: 18 BRPM | SYSTOLIC BLOOD PRESSURE: 105 MMHG | DIASTOLIC BLOOD PRESSURE: 73 MMHG | OXYGEN SATURATION: 97 %

## 2025-02-02 VITALS
RESPIRATION RATE: 16 BRPM | HEART RATE: 78 BPM | OXYGEN SATURATION: 98 % | DIASTOLIC BLOOD PRESSURE: 79 MMHG | SYSTOLIC BLOOD PRESSURE: 117 MMHG | TEMPERATURE: 97 F

## 2025-02-02 VITALS
DIASTOLIC BLOOD PRESSURE: 74 MMHG | HEART RATE: 89 BPM | OXYGEN SATURATION: 98 % | RESPIRATION RATE: 18 BRPM | TEMPERATURE: 98 F | SYSTOLIC BLOOD PRESSURE: 110 MMHG

## 2025-02-02 PROCEDURE — 99284 EMERGENCY DEPT VISIT MOD MDM: CPT

## 2025-02-02 RX ORDER — CHLORPROMAZINE HYDROCHLORIDE 100 MG/1
50 TABLET, SUGAR COATED ORAL ONCE
Refills: 0 | Status: COMPLETED | OUTPATIENT
Start: 2025-02-02 | End: 2025-02-02

## 2025-02-02 RX ORDER — QUETIAPINE FUMARATE 300 MG/1
200 TABLET ORAL ONCE
Refills: 0 | Status: COMPLETED | OUTPATIENT
Start: 2025-02-02 | End: 2025-02-02

## 2025-02-02 RX ORDER — DIVALPROEX SODIUM 500 MG
1000 TABLET, DELAYED RELEASE (ENTERIC COATED) ORAL ONCE
Refills: 0 | Status: COMPLETED | OUTPATIENT
Start: 2025-02-02 | End: 2025-02-02

## 2025-02-02 RX ORDER — HALOPERIDOL 10 MG/1
10 TABLET ORAL ONCE
Refills: 0 | Status: COMPLETED | OUTPATIENT
Start: 2025-02-02 | End: 2025-02-02

## 2025-02-02 RX ADMIN — HALOPERIDOL 10 MILLIGRAM(S): 10 TABLET ORAL at 17:33

## 2025-02-02 RX ADMIN — QUETIAPINE FUMARATE 200 MILLIGRAM(S): 300 TABLET ORAL at 17:33

## 2025-02-02 RX ADMIN — CHLORPROMAZINE HYDROCHLORIDE 50 MILLIGRAM(S): 100 TABLET, SUGAR COATED ORAL at 15:00

## 2025-02-02 RX ADMIN — Medication 1 MILLIGRAM(S): at 15:01

## 2025-02-02 RX ADMIN — Medication 1000 MILLIGRAM(S): at 17:33

## 2025-02-02 NOTE — ED ADULT NURSE NOTE - PAIN: PRESENCE, MLM
denies pain/discomfort (Rating = 0) Fluconazole Counseling:  Patient counseled regarding adverse effects of fluconazole including but not limited to headache, diarrhea, nausea, upset stomach, liver function test abnormalities, taste disturbance, and stomach pain.  There is a rare possibility of liver failure that can occur when taking fluconazole.  The patient understands that monitoring of LFTs and kidney function test may be required, especially at baseline. The patient verbalized understanding of the proper use and possible adverse effects of fluconazole.  All of the patient's questions and concerns were addressed.

## 2025-02-02 NOTE — ED BEHAVIORAL HEALTH NOTE - BEHAVIORAL HEALTH NOTE
SW requested by provider to obtain collateral from pt residence; [place] at [place] [facility] Ph#  [phone].    SW called and spoke with  Jae; he reported that today pt was throwing a tantrum in the kitchen throwing fruits/trashing the kitchen, stating that "they keep following me"; staff spoke with pt to deescalate, pt went to room and began to bang on the door, yelling and running around boxer shorts. Pt then became verbally aggressive towards staff; 911 was called. Pt did not express any SI/HI, based upon pt statements pt seems to be experiencing audio hallucinations and paranoid thoughts.  Rossy stated that pt compliance with medications is on and off, hygiene poor and appetite is fair although pt complains that staff is starving him even though pt has access to foods. Jae stated that upon discharge pt travels via taxi to return to [facility]. SW requested by provider to obtain collateral from pt residence; [place] at [place] [facility] Ph#  [phone].    SW called and spoke with  Jae; he reported that today pt was throwing a tantrum in the kitchen throwing fruits/trashing the kitchen, stating that "they keep following me"; staff spoke with pt to deescalate, pt went to room and began to bang on the door, yelling and running around boxer shorts. Pt then became verbally aggressive towards staff; 911 was called. Pt did not express any SI/HI, based upon pt statements pt seems to be experiencing audio hallucinations and paranoid thoughts.  Rossy stated that pt compliance with medications is on and off, hygiene poor and appetite is fair although pt complains that staff is starving him even though pt has access to foods. Jae stated that upon discharge pt travels via taxi to return to [facility].  Pt Current Medications:  Haldol 20mg 1 tab QHS  Depakote 500mg 3 tab QHS  Seroquel 200mg 1tab QHS  Haldol IM 150mg; every 4 weeks    SW updated provider of this infromation. SW requested by provider to obtain collateral from pt residence; [facility] [place] [facility] Ph[facility]  [phone].    SW called and spoke with  Jae; he reported that today pt was throwing a tantrum in the kitchen throwing fruits/trashing the kitchen, stating that "they keep following me"; staff spoke with pt to deescalate, pt went to room and began to bang on the door, yelling and running around boxer shorts. Pt then became verbally aggressive towards staff; 911 was called. Pt did not express any SI/HI, based upon pt statements pt seems to be experiencing audio hallucinations and paranoid thoughts.  Rossy stated that pt compliance with medications is on and off, hygiene poor and appetite is fair although pt complains that staff is starving him even though pt has access to foods. Jae stated that upon discharge pt travels via taxi to return to [facility].  Pt Current Medications:  Haldol 20mg 1 tab QHS  Depakote 500mg 3 tab QHS  Seroquel 200mg 1tab QHS  Haldol IM 150mg; every 4 weeks    SW updated provider of this information.    SW informed that pt is cleared for discharge and need taxi to return to [place]. Clinical provider in agreement with plan; Verbal huddle completed.  SW contacted [facility] to arrange transport via taxi to return to [place]; Invoice#[id number]. Vendor assigned is [employer] #[phone]. SW requested by provider to obtain collateral from pt residence; [facility] [place] [facility] Ph#  [phone].    SW called and spoke with  Jae; he reported that today pt was throwing a tantrum in the kitchen throwing fruits/trashing the kitchen, stating that "they keep following me"; staff spoke with pt to deescalate, pt went to room and began to bang on the door, yelling and running around boxer shorts. Pt then became verbally aggressive towards staff; 911 was called. Pt did not express any SI/HI, based upon pt statements pt seems to be experiencing audio hallucinations and paranoid thoughts.  Rossy stated that pt compliance with medications is on and off, hygiene poor and appetite is fair although pt complains that staff is starving him even though pt has access to foods. Jae stated that upon discharge pt travels via taxi to return to [facility].  Pt Current Medications:  Haldol 20mg 1 tab QHS  Depakote 500mg 3 tab QHS  Seroquel 200mg 1tab QHS  Haldol IM 150mg; every 4 weeks    SW updated provider of this information.    SW informed that pt is cleared for discharge and need taxi to return to [place]. Clinical provider in agreement with plan; Verbal huddle completed.  PEDRO contacted [facility] to arrange transport via taxi to return to [place]; Invoice#[id number]. Vendor assigned is [employer] #[phone]. PEDRO called [facility], spoke with Parris to confirm  for today; ETA is 6pm.   unit made aware and will escort pt to exit upon taxi arrival.

## 2025-02-02 NOTE — ED PROVIDER NOTE - OBJECTIVE STATEMENT
44 y/o  M  hx Schizophrenia BIBA   from [facility] secondary to agitation . Admits he was upset . Explicitly denies  SI/HI/AH/VH.   Denies pain, SOB, fever, chills, chest / abdominal discomfort.  Denies use of alcohol or  illicit drugs. Denies falling, punching or kicking any objects. No evidence of physical injuries broken skin or deformities. Admits to medication compliance.

## 2025-02-02 NOTE — ED ADULT NURSE NOTE - CHIEF COMPLAINT QUOTE
Pt. brought from [place] for yelling at staff and running around in his underwear. Seen here yesterday for agitation. Pt. calm and cooperative in triage. hx of schizophrenia, compliant with medications. Denies S/I, H/I or hallucinations. Pt. laughing to himself in triage.

## 2025-02-02 NOTE — ED ADULT TRIAGE NOTE - CHIEF COMPLAINT QUOTE
Pt. brought from [place] for yelling at staff and running around in his underwear. Pt. calm and cooperative in triage. hx of schizophrenia, compliant with medications. Denies S/I, H/I or hallucinations. Pt. laughing to himself in triage. Pt. brought from [place] for yelling at staff and running around in his underwear. Seen here yesterday for agitation. Pt. calm and cooperative in triage. hx of schizophrenia, compliant with medications. Denies S/I, H/I or hallucinations. Pt. laughing to himself in triage.

## 2025-02-02 NOTE — ED PROVIDER NOTE - CLINICAL SUMMARY MEDICAL DECISION MAKING FREE TEXT BOX
44 y/o  M  hx Schizophrenia BIBA   from [facility] secondary to agitation . Admits he was upset . Explicitly denies  SI/HI/AH/VH.   Denies pain, SOB, fever, chills, chest / abdominal discomfort.  Denies use of alcohol or  illicit drugs. Denies falling, punching or kicking any objects. No evidence of physical injuries broken skin or deformities. Admits to medication compliance.     Medical evaluation performed. There is no clinical evidence of intoxication or any acute medical problem requiring immediate intervention.  Medication offered. D/C to [facility]

## 2025-02-02 NOTE — ED PROVIDER NOTE - PATIENT PORTAL LINK FT
You can access the FollowMyHealth Patient Portal offered by [facility] by registering at the following website: [email or web address]. By joining [employer]’s FollowMyHealth portal, you will also be able to view your health information using other applications (apps) compatible with our system.

## 2025-02-02 NOTE — ED ADULT NURSE NOTE - OBJECTIVE STATEMENT
Pt. brought from [place] for yelling at staff and running around in his underwear. Seen here yesterday for agitation. Pt. calm and cooperative in triage. hx of schizophrenia, compliant with medications. Denies S/I, H/I or hallucinations. Pt. laughing to himself in triage.  Patient brought to  area for above complaints. Patient interviewed by medical provider and medication given as ordered. Patient offered and accepted. Collateral obtained. Patient waiting for disposition.   KIN White

## 2025-02-02 NOTE — ED ADULT NURSE NOTE - ED STAT RN HANDOFF DETAILS
Patient medicated as ordered. Slept well. Ate dinner. Reevaluated and discharged with belongings to [facility] by taxi arranged by .   KIN White

## 2025-02-11 ENCOUNTER — EMERGENCY (EMERGENCY)
Facility: HOSPITAL | Age: 45
LOS: 1 days | Discharge: HOME | End: 2025-02-11
Admitting: EMERGENCY MEDICINE
Payer: MEDICAID

## 2025-02-11 VITALS
RESPIRATION RATE: 16 BRPM | HEART RATE: 79 BPM | DIASTOLIC BLOOD PRESSURE: 74 MMHG | OXYGEN SATURATION: 100 % | SYSTOLIC BLOOD PRESSURE: 118 MMHG | TEMPERATURE: 98 F

## 2025-02-11 VITALS
DIASTOLIC BLOOD PRESSURE: 78 MMHG | HEIGHT: 73 IN | HEART RATE: 88 BPM | TEMPERATURE: 99 F | RESPIRATION RATE: 16 BRPM | SYSTOLIC BLOOD PRESSURE: 116 MMHG | OXYGEN SATURATION: 100 %

## 2025-02-11 PROCEDURE — 99284 EMERGENCY DEPT VISIT MOD MDM: CPT

## 2025-02-11 RX ORDER — HALOPERIDOL 10 MG/1
5 TABLET ORAL ONCE
Refills: 0 | Status: COMPLETED | OUTPATIENT
Start: 2025-02-11 | End: 2025-02-11

## 2025-02-11 RX ADMIN — Medication 2 MILLIGRAM(S): at 20:15

## 2025-02-11 RX ADMIN — HALOPERIDOL 5 MILLIGRAM(S): 10 TABLET ORAL at 20:15

## 2025-02-11 NOTE — ED PROVIDER NOTE - NSFOLLOWUPINSTRUCTIONS_ED_ALL_ED_FT
Follow up with your PMD within 48-72 hrs. Show copies of your reports given to you.   Worsening, continued or new concerning symptoms return to the emergency department.    You have been given information necessary to follow up with the  [facility] ([facility]) Crisis center & other outpatient  psychiatric clinics within your community    • [facility] walk in Crisis centre  [address] [facility] [place] [address]  [phone] [email or web address]  Hours of operation:  Day	                                        Hours  Sunday                                  Closed  Monday                                9am - 3pm  Tuesday                                9am - 3pm  Wednesday                          9am - 3pm  Thursday                               9am - 3pm  Friday                                    9am - 3pm  Saturday                                Closed

## 2025-02-11 NOTE — ED ADULT TRIAGE NOTE - CHIEF COMPLAINT QUOTE
pt brought by JORI from  [place] [id number] for aggressive behavior. Pt was hitting doors and kicking objects. Pt calm and cooperative in triage. Pt denies SI, HI, hallucinations, chest pain, sob, n+v, headahce, dizziness. RR even, unlabored. hx schizophrenia. KARISHMA Dorman called, plan for BH

## 2025-02-11 NOTE — ED PROVIDER NOTE - CLINICAL SUMMARY MEDICAL DECISION MAKING FREE TEXT BOX
46 yo M PMH Schizophrenia from [facility]  p/w increased agitation. Admits that he was upset with staff and made a mess of his room. Denies SI/HI/AH/VH.  Denies falling, punching or kicking any objects.  No evidence of physical injures, broken skin or deformities. Denies fever, headache, dizziness, chills, chest pain, shortness of breath, abdominal pain, sick contact or recent travel. Denies alcohol use or other drugs.   Haldol, Ativan  SW collateral  Dispo as per collateral

## 2025-02-11 NOTE — ED ADULT NURSE REASSESSMENT NOTE - NS ED NURSE REASSESS COMMENT FT1
[facility] staff Mame [phone] made aware that client is medically cleared for discharge and is accepting patient back to [facility] at this time via ambulance transport arranged by RN.

## 2025-02-11 NOTE — ED PROVIDER NOTE - PATIENT PORTAL LINK FT
You can access the FollowMyHealth Patient Portal offered by [facility] by registering at the following website: [email or web address]. By joining [employer]’s FollowMyHealth portal, you will also be able to view your health information using other applications (apps) compatible with our system.

## 2025-02-11 NOTE — ED ADULT NURSE REASSESSMENT NOTE - NS ED NURSE REASSESS COMMENT FT1
Pt calm, resting in BH6, NAD. Awaiting transport back to [facility]. Safety maintained.

## 2025-02-11 NOTE — ED BEHAVIORAL HEALTH NOTE - BEHAVIORAL HEALTH NOTE
Per the provider's request, contact was made with the patient's residence, [facility], [address] ([phone]), to gather collateral information. A staff member named Radha at the  provided the following details:    The patient is a 45-year-old male resident with a history of schizophrenia, hypertension, and obesity. He was brought to the facility by EMS after being found screaming, seemingly at no one, "Get away from me!" This behavior continued at the facility; the patient approached the , pointed at another resident, and repeated the phrase. After the other resident left, the patient kicked a door.    According to Radha, the patient appears paranoid, believing he is being followed. He can be observed around the residence talking to himself, saying things like, “I paid you and you need to stop following me around.” Radha suspects auditory visual hallucinations and has observed the patient screaming at nothing. This behavior has worsened over the past month, escalating to include kicking doors, writing on the walls, and increased yelling and self-directed speech. While generally compliant with his medications (he requires encouragement to take them), the patient has also become more verbally aggressive with staff. No suicidal or homicidal ideation was reported. These behavioral changes have resulted in multiple psychiatric emergency department visits. The destructive behavior (kicking doors, writing on walls) is recent. The writings on the walls were described as inappropriate.    In addition to his psychiatric history, the patient's medical problems include hypertension and obesity. His appetite appears decreased, his hygiene is poor, and his sleep patterns are uncertain. A medication list was provided, and the outpatient provider is listed on the face sheet. No drug or alcohol use is reported.    Radha described the patient's baseline presentation as compliant, quiet, preferring to listen to music, and generally pleasant. She noted a history of manageable auditory visual hallucinations, which have recently escalated. Radha believes the patient would benefit from admission. Per the provider's request, contact was made with the patient's residence, [facility], [address] ([phone]), to gather collateral information. A staff member named Radha at the  provided the following details:    The patient is a 45-year-old male resident with a history of schizophrenia, hypertension, and obesity. He was brought to the facility by EMS after being found screaming, seemingly at no one, "Get away from me!" This behavior continued at the facility; the patient approached the , pointed at another resident, and repeated the phrase. After the other resident left, the patient kicked a door.    According to Radha, the patient appears paranoid, believing he is being followed. He can be observed around the residence talking to himself, saying things like, “I paid you and you need to stop following me around.” Radha suspects auditory visual hallucinations and has observed the patient screaming at nothing. This behavior has worsened over the past month, escalating to include kicking doors, writing on the walls, and increased yelling and self-directed speech. While generally compliant with his medications (he requires encouragement to take them), the patient has also become more verbally aggressive with staff. No suicidal or homicidal ideation was reported. These behavioral changes have resulted in multiple psychiatric emergency department visits. The destructive behavior (kicking doors, writing on walls) is recent. The writings on the walls were described as inappropriate.    In addition to his psychiatric history, the patient's medical problems include hypertension and obesity. His appetite appears decreased, his hygiene is poor, and his sleep patterns are uncertain. A medication list was provided, and the outpatient provider is listed on the face sheet. No drug or alcohol use is reported.    Radha described the patient's baseline presentation as compliant, quiet, preferring to listen to music, and generally pleasant. She noted a history of manageable auditory visual hallucinations, which have recently escalated. Radha believes the patient would benefit from admission.    Per provider,NP [place] patient is cleared and is able to return to their previous residence, [address] [facility] [address]  [facility]   has spoken to counselavelina smith ([phone]))  ,  confirmed that patients mode of transportation is ambulance and that patient travels w/ supervision. Clinical provider is in agreement with ambulance to group home. Verbal huddle regarding coordination of care completed with interdisciplinary team.   Transportation coordinated via  nursing   [address] [facility] [address] [facility] [address]

## 2025-02-11 NOTE — ED ADULT NURSE NOTE - OBJECTIVE STATEMENT
Patient coming to ED for aggressive behavior towards staff member. As per EMS, patient was hitting doors and kicking objects. Patient calm and cooperative at this time. Denies S/I, H/I, A/H, V/H. Breathing non-labored. No verbal complaints at this time. All belongs removed, changed into hospital gown. Plan of care in progress.

## 2025-02-12 NOTE — ED ADULT NURSE REASSESSMENT NOTE - NS ED NURSE REASSESS COMMENT FT1
Received pt from RN break coverage. Pt is resting comfortably in bed with even unlabored respirations observed. He is easily aroused, calm with no issues or complaints. Vitals as stated. MC for discharge to group residence. DC instructions provided to EMS.

## 2025-02-12 NOTE — ED ADULT NURSE REASSESSMENT NOTE - NS ED NURSE REASSESS COMMENT FT1
break coverage rn. received report from KIN morrison. pt A&Ox4, pt denies pyschiatric complaints at this time. pending EMS . safety maintained

## 2025-02-14 NOTE — BH INPATIENT PSYCHIATRY PROGRESS NOTE - NSBHLEGALSTATUSNEW_PSY_ALL_CORE
9.27 (2PC)
Reason for Encounter Follow-Up    Subjective:       Chief Complaint: Eliseo Lei is a 18 y.o. female student at [employer] ([employer]) who had concerns including Pain of the Right Shoulder.    Athlete came to the athletic training room for rehab of her R shoulder pain.    Athlete has been wearing her sling and needs to start moving her shoulder to prevent stiffness.    Handedness: right-handed  Sport played: volleyball      Level: high school      Position:defensive specialist      Pain        ROS              Objective:       General: Eliseo is well-developed, well-nourished, appears stated age, in no acute distress, alert and oriented to time, place and person.     AT Session          Assessment:     Status: O - Out    Date Seen:  02/13-14/2025    Date of Injury:  02/ 08/2025    Date Out:  02/08/2025    Date Cleared:  N/A        Treatment/Rehab/Maintenance:   02/13/2025    Cane AROM 20 reps shoulder flexion 20 reps adduction  Theraband green 2x10 IR ER extension  Arm bike 5 min  Wall walks 5x  Game Ready 15 min max cold low pressure      02/14/2025    Athlete stated shoulder was a bit sore.    Arm bike 5 min  Game Ready 15 min max cold low pressure        Plan:       1. Athlete tolerated treatment well. Continue to progress as tolerated. Progress out of sling.  2. Physician Referral: no  3. ED Referral:no  4. Parent/Guardian Notified: No  5. All questions were answered, ath. will contact me for questions or concerns in  the interim.  6.         Eligible to use School Insurance: Yes                  
9.27 (2PC)

## 2025-02-19 NOTE — ED PROVIDER NOTE - SKIN, MLM
74F w/ pmh breast CA DM, Yes who presents from home after having a slip and fall.  Patient states her son was visiting her today and she took a couple of his Xanax which she has done in the past.  Patient states she is not prescribed Xanax but is taking it for anxiety.  Patient states tonight she accidentally took 3 bar thinking was acetaminophen around 630.  Approximately an hour or so prior to come to the ER patient states she got out of bed where she felt a little dizzy fell backwards and landed on her buttocks.  Patient presents now because she has significant right hip pain. Denies  medication change, illness, or hospitalization. No fever, n/v/d/c, chest / abd pain, cough, sob, dizziness, dysuria/hematuria. Skin normal color for race, warm, dry and intact. No evidence of rash.

## 2025-03-17 ENCOUNTER — EMERGENCY (EMERGENCY)
Facility: HOSPITAL | Age: 45
LOS: 1 days | Discharge: PSYCHIATRIC OR SUBSTANCE USE FACILITY | End: 2025-03-17
Attending: EMERGENCY MEDICINE
Payer: MEDICAID

## 2025-03-17 VITALS
HEART RATE: 94 BPM | OXYGEN SATURATION: 95 % | DIASTOLIC BLOOD PRESSURE: 85 MMHG | HEIGHT: 73 IN | RESPIRATION RATE: 16 BRPM | SYSTOLIC BLOOD PRESSURE: 126 MMHG | TEMPERATURE: 98 F

## 2025-03-17 PROCEDURE — 93010 ELECTROCARDIOGRAM REPORT: CPT

## 2025-03-17 PROCEDURE — 99285 EMERGENCY DEPT VISIT HI MDM: CPT | Mod: 25

## 2025-03-17 PROCEDURE — 90792 PSYCH DIAG EVAL W/MED SRVCS: CPT

## 2025-03-17 NOTE — ED PROVIDER NOTE - PHYSICAL EXAMINATION
GEN: NAD. A&Ox3. Non-toxic appearing.  HEENT: normocephalic, atraumatic, EOMI, PERRL, moist MM  CARDIAC: RRR, S1, S2, no murmur. Radial pulses present and symmetric b/l  PULM: CTA B/L no wheeze, rhonchi, rales.   ABD: soft NT, ND, no rebound no guarding  MSK: Moving all extremities, no edema  NEURO: gait normal, no focal neurological deficits, CN 2-12 grossly intact  SKIN: warm, dry, no rash.

## 2025-03-17 NOTE — ED ADULT TRIAGE NOTE - HEIGHT IN INCHES
1 Family wish full resuscitative measures at this point, although would not want artificial feeding, should the situation arise.

## 2025-03-17 NOTE — ED ADULT NURSE NOTE - OBJECTIVE STATEMENT
45 y.o M BIB EMS from [facility] p/w c/o disruptive behavior. A+OX4. Pt has hx of schizophrenia, and per EMS has been non-compliant w/ medication regimen. Per creed more staff reports pt was being disruptive, running through hallways and pressing "emergency button" in elevator. Upon initial assessment, pt reports both auditory and visual hallucinations. Unable to explain exact hallucinations at this time, but laughing at wall noted. Denies SI/HI. Unsure last dose of medications. Denies CP, SOB, abd pain, urinary sx, headache, vision changes, n/v/d, f/c. Other medical Hx of HTN. No other complaints at this time, 1:1 in place, comfort and safety maintained.

## 2025-03-17 NOTE — ED ADULT NURSE NOTE - HPI (INCLUDE ILLNESS QUALITY, SEVERITY, DURATION, TIMING, CONTEXT, MODIFYING FACTORS, ASSOCIATED SIGNS AND SYMPTOMS)
45 y.o M BIB EMS from [facility] p/w c/o disruptive behavior. A+OX4. Pt has hx of schizophrenia, and per EMS has been non-compliant w/ medication regimen. Per creed more staff reports pt was being disruptive, running through hallways and pressing "emergency button" in elevator. Upon initial assessment, pt reports both auditory and visual hallucinations. Unable to explain exact hallucinations at this time, but laughing at wall noted. Denies SI/HI. Unsure last dose of medications. Denies CP, SOB, abd pain, urinary sx, headache, vision changes, n/v/d, f/c. Other medical Hx of HTN. No other complaints at this time, 1:1 in place, comfort and safety maintained.

## 2025-03-17 NOTE — ED PROVIDER NOTE - CLINICAL SUMMARY MEDICAL DECISION MAKING FREE TEXT BOX
[facility] staff Reyna Villa [phone]  45-year-old male with history of schizophrenia on Haldol, quetiapine, Depakote, presents from outpatient [facility] for increased auditory hallucinations, not taking his medication, running around naked.  Per patient, states is compliant with his medication, states the voices are telling him to harm himself will not further elaborate.  Denies SI/HI at this time.    Patient unkept appearing, lungs clear, abdomen nontender,.  Will obtain psychiatric labs, Depakote level, discussed with psychiatry regarding worsening auditory hallucinations and noncompliance with medication. [facility] staff Reynavamshi Villa [phone]  45-year-old male with history of schizophrenia on Haldol, quetiapine, Depakote, presents from outpatient [facility] for increased auditory hallucinations, not taking his medication, running around naked.  Per patient, states is compliant with his medication, states the voices are telling him to harm himself will not further elaborate.  Denies SI/HI at this time.    Patient unkept appearing, lungs clear, abdomen nontender,.  Will obtain psychiatric labs, Depakote level, discussed with psychiatry regarding worsening auditory hallucinations and noncompliance with medication.    Jonah: 45 year old male with pmhx of schizohrenia on Haldol, quetiapine, depakote here from outpatient [facility] for increased auditory hallucinations not taking medication, running around naked. states voices are telling him to harm himself. no SI/HI. PE: att exam: patient awake alert NAD. alert an dorietned x 3, non toxic appearing,  LUNGSnonlabored respirations, + s1s2, EXT. neuro no focal deficits. gait normal.  plan: will get  labs, medically clear, psych evaluation, reassess

## 2025-03-17 NOTE — ED ADULT TRIAGE NOTE - HOW PATIENT ADDRESSED, PROFILE
Call made to patient to discuss.   Message left on machine for patient to call back  Mychart sent.            Ishan

## 2025-03-17 NOTE — ED ADULT NURSE REASSESSMENT NOTE - NS ED NURSE REASSESS COMMENT FT1
Pt refusing lab work and EKG at this time, sandwich and gingerale provided, will continue to monitor.

## 2025-03-17 NOTE — ED ADULT NURSE NOTE - PATIENT'S PREFERRED PRONOUN
Problem: Falls - Risk of  Goal: *Absence of Falls  Description: Document Martha Tang Fall Risk and appropriate interventions in the flowsheet.   Outcome: Progressing Towards Goal  Note: Fall Risk Interventions:            Medication Interventions: Patient to call before getting OOB Him/He

## 2025-03-17 NOTE — ED PROVIDER NOTE - PROGRESS NOTE DETAILS
S/o at 11PM pending workup, psychiatry evaluation. Unable to get labs initially as patient was a hard stick. Reassessed, now shouting/cursing, slamming his fist into the door multiple times, unable to verbally de-escalate. Will medicate for agitated delirium. -Veronica Lofton MD (Attending) Alice Yepez MD (PGY-3 EM): received signout on patient pending daytime psych evaluation. patient received 5 haldol, 2 ativan around 230am 2/ agitation. pending psych eval/ [facility]. Merly SMITH: Patient signed out pending psych evaluation. Patient is calm at this time. Alice Yepez MD (PGY-3 EM): psych evaluated patient, will 2PC for auditory hallucinations, psychosis Merly SMITH: Per psych, patient to be admitted. 2 PC paperwork completed. Merly SMITH: Per PEDRO, admit to [facility] , Dr. Feldman, [phone]

## 2025-03-17 NOTE — ED ADULT NURSE REASSESSMENT NOTE - NS ED NURSE REASSESS COMMENT FT1
Pt changed into gown, wanded and belongings secured w/ security. No valuables. 1:1 in place at this time for safety of pt, comfort and safety maintained.

## 2025-03-18 ENCOUNTER — INPATIENT (INPATIENT)
Facility: HOSPITAL | Age: 45
LOS: 29 days | Discharge: HOME | End: 2025-04-17
Attending: PSYCHIATRY & NEUROLOGY | Admitting: PSYCHIATRY & NEUROLOGY
Payer: MEDICAID

## 2025-03-18 VITALS — RESPIRATION RATE: 16 BRPM | HEIGHT: 73.62 IN | OXYGEN SATURATION: 99 % | WEIGHT: 229.28 LBS

## 2025-03-18 VITALS
HEART RATE: 90 BPM | RESPIRATION RATE: 18 BRPM | DIASTOLIC BLOOD PRESSURE: 74 MMHG | TEMPERATURE: 98 F | OXYGEN SATURATION: 97 % | SYSTOLIC BLOOD PRESSURE: 118 MMHG

## 2025-03-18 DIAGNOSIS — F20.0 PARANOID SCHIZOPHRENIA: ICD-10-CM

## 2025-03-18 LAB
ALBUMIN SERPL ELPH-MCNC: 3.9 G/DL — SIGNIFICANT CHANGE UP (ref 3.3–5)
ALP SERPL-CCNC: 53 U/L — SIGNIFICANT CHANGE UP (ref 40–120)
ALT FLD-CCNC: 16 U/L — SIGNIFICANT CHANGE UP (ref 10–45)
AMPHET UR-MCNC: NEGATIVE — SIGNIFICANT CHANGE UP
ANION GAP SERPL CALC-SCNC: 13 MMOL/L — SIGNIFICANT CHANGE UP (ref 5–17)
APAP SERPL-MCNC: <15 UG/ML — SIGNIFICANT CHANGE UP (ref 10–30)
APPEARANCE UR: CLEAR — SIGNIFICANT CHANGE UP
AST SERPL-CCNC: 17 U/L — SIGNIFICANT CHANGE UP (ref 10–40)
BARBITURATES UR SCN-MCNC: NEGATIVE — SIGNIFICANT CHANGE UP
BASOPHILS # BLD AUTO: 0.05 K/UL — SIGNIFICANT CHANGE UP (ref 0–0.2)
BASOPHILS NFR BLD AUTO: 0.9 % — SIGNIFICANT CHANGE UP (ref 0–2)
BENZODIAZ UR-MCNC: POSITIVE
BILIRUB SERPL-MCNC: 0.2 MG/DL — SIGNIFICANT CHANGE UP (ref 0.2–1.2)
BILIRUB UR-MCNC: NEGATIVE — SIGNIFICANT CHANGE UP
BUN SERPL-MCNC: 8 MG/DL — SIGNIFICANT CHANGE UP (ref 7–23)
CALCIUM SERPL-MCNC: 9.1 MG/DL — SIGNIFICANT CHANGE UP (ref 8.4–10.5)
CHLORIDE SERPL-SCNC: 104 MMOL/L — SIGNIFICANT CHANGE UP (ref 96–108)
CO2 SERPL-SCNC: 21 MMOL/L — LOW (ref 22–31)
COCAINE METAB.OTHER UR-MCNC: NEGATIVE — SIGNIFICANT CHANGE UP
COLOR SPEC: YELLOW — SIGNIFICANT CHANGE UP
CREAT SERPL-MCNC: 0.99 MG/DL — SIGNIFICANT CHANGE UP (ref 0.5–1.3)
DIFF PNL FLD: NEGATIVE — SIGNIFICANT CHANGE UP
EGFR: 97 ML/MIN/1.73M2 — SIGNIFICANT CHANGE UP
EGFR: 97 ML/MIN/1.73M2 — SIGNIFICANT CHANGE UP
EOSINOPHIL # BLD AUTO: 0.14 K/UL — SIGNIFICANT CHANGE UP (ref 0–0.5)
EOSINOPHIL NFR BLD AUTO: 2.4 % — SIGNIFICANT CHANGE UP (ref 0–6)
ETHANOL SERPL-MCNC: <10 MG/DL — SIGNIFICANT CHANGE UP (ref 0–10)
FLUAV AG NPH QL: SIGNIFICANT CHANGE UP
FLUBV AG NPH QL: SIGNIFICANT CHANGE UP
GLUCOSE SERPL-MCNC: 117 MG/DL — HIGH (ref 70–99)
GLUCOSE UR QL: NEGATIVE MG/DL — SIGNIFICANT CHANGE UP
HCT VFR BLD CALC: 42.2 % — SIGNIFICANT CHANGE UP (ref 39–50)
HGB BLD-MCNC: 14 G/DL — SIGNIFICANT CHANGE UP (ref 13–17)
IMM GRANULOCYTES NFR BLD AUTO: 0.2 % — SIGNIFICANT CHANGE UP (ref 0–0.9)
KETONES UR-MCNC: NEGATIVE MG/DL — SIGNIFICANT CHANGE UP
LEUKOCYTE ESTERASE UR-ACNC: NEGATIVE — SIGNIFICANT CHANGE UP
LYMPHOCYTES # BLD AUTO: 2.16 K/UL — SIGNIFICANT CHANGE UP (ref 1–3.3)
LYMPHOCYTES # BLD AUTO: 37.7 % — SIGNIFICANT CHANGE UP (ref 13–44)
MCHC RBC-ENTMCNC: 29.4 PG — SIGNIFICANT CHANGE UP (ref 27–34)
MCHC RBC-ENTMCNC: 33.2 G/DL — SIGNIFICANT CHANGE UP (ref 32–36)
MCV RBC AUTO: 88.7 FL — SIGNIFICANT CHANGE UP (ref 80–100)
METHADONE UR-MCNC: NEGATIVE — SIGNIFICANT CHANGE UP
MONOCYTES # BLD AUTO: 0.54 K/UL — SIGNIFICANT CHANGE UP (ref 0–0.9)
MONOCYTES NFR BLD AUTO: 9.4 % — SIGNIFICANT CHANGE UP (ref 2–14)
NEUTROPHILS # BLD AUTO: 2.83 K/UL — SIGNIFICANT CHANGE UP (ref 1.8–7.4)
NEUTROPHILS NFR BLD AUTO: 49.4 % — SIGNIFICANT CHANGE UP (ref 43–77)
NITRITE UR-MCNC: NEGATIVE — SIGNIFICANT CHANGE UP
NRBC BLD AUTO-RTO: 0 /100 WBCS — SIGNIFICANT CHANGE UP (ref 0–0)
OPIATES UR-MCNC: NEGATIVE — SIGNIFICANT CHANGE UP
OXYCODONE UR-MCNC: NEGATIVE — SIGNIFICANT CHANGE UP
PCP SPEC-MCNC: SIGNIFICANT CHANGE UP
PCP UR-MCNC: NEGATIVE — SIGNIFICANT CHANGE UP
PH UR: 6 — SIGNIFICANT CHANGE UP (ref 5–8)
PLATELET # BLD AUTO: 240 K/UL — SIGNIFICANT CHANGE UP (ref 150–400)
POTASSIUM SERPL-MCNC: 4 MMOL/L — SIGNIFICANT CHANGE UP (ref 3.5–5.3)
POTASSIUM SERPL-SCNC: 4 MMOL/L — SIGNIFICANT CHANGE UP (ref 3.5–5.3)
PROT SERPL-MCNC: 7 G/DL — SIGNIFICANT CHANGE UP (ref 6–8.3)
PROT UR-MCNC: NEGATIVE MG/DL — SIGNIFICANT CHANGE UP
RBC # BLD: 4.76 M/UL — SIGNIFICANT CHANGE UP (ref 4.2–5.8)
RBC # FLD: 12.9 % — SIGNIFICANT CHANGE UP (ref 10.3–14.5)
RSV RNA NPH QL NAA+NON-PROBE: SIGNIFICANT CHANGE UP
SALICYLATES SERPL-MCNC: <2 MG/DL — LOW (ref 15–30)
SARS-COV-2 RNA SPEC QL NAA+PROBE: SIGNIFICANT CHANGE UP
SODIUM SERPL-SCNC: 138 MMOL/L — SIGNIFICANT CHANGE UP (ref 135–145)
SP GR SPEC: 1.01 — SIGNIFICANT CHANGE UP (ref 1–1.03)
THC UR QL: NEGATIVE — SIGNIFICANT CHANGE UP
UROBILINOGEN FLD QL: 0.2 MG/DL — SIGNIFICANT CHANGE UP (ref 0.2–1)
VALPROATE SERPL-MCNC: <5 UG/ML — LOW (ref 50–100)
WBC # BLD: 5.73 K/UL — SIGNIFICANT CHANGE UP (ref 3.8–10.5)
WBC # FLD AUTO: 5.73 K/UL — SIGNIFICANT CHANGE UP (ref 3.8–10.5)

## 2025-03-18 PROCEDURE — 99222 1ST HOSP IP/OBS MODERATE 55: CPT

## 2025-03-18 PROCEDURE — 80307 DRUG TEST PRSMV CHEM ANLYZR: CPT

## 2025-03-18 PROCEDURE — 96372 THER/PROPH/DIAG INJ SC/IM: CPT

## 2025-03-18 PROCEDURE — 80053 COMPREHEN METABOLIC PANEL: CPT

## 2025-03-18 PROCEDURE — 81003 URINALYSIS AUTO W/O SCOPE: CPT

## 2025-03-18 PROCEDURE — 80164 ASSAY DIPROPYLACETIC ACD TOT: CPT

## 2025-03-18 PROCEDURE — 99285 EMERGENCY DEPT VISIT HI MDM: CPT | Mod: 25

## 2025-03-18 PROCEDURE — 93005 ELECTROCARDIOGRAM TRACING: CPT

## 2025-03-18 PROCEDURE — 85025 COMPLETE CBC W/AUTO DIFF WBC: CPT

## 2025-03-18 PROCEDURE — 87637 SARSCOV2&INF A&B&RSV AMP PRB: CPT

## 2025-03-18 RX ORDER — LORAZEPAM 4 MG/ML
2 VIAL (ML) INJECTION ONCE
Refills: 0 | Status: DISCONTINUED | OUTPATIENT
Start: 2025-03-18 | End: 2025-03-19

## 2025-03-18 RX ORDER — HALOPERIDOL 10 MG/1
5 TABLET ORAL
Refills: 0 | Status: ACTIVE | OUTPATIENT
Start: 2025-03-18 | End: 2026-02-14

## 2025-03-18 RX ORDER — LORAZEPAM 4 MG/ML
1 VIAL (ML) INJECTION
Refills: 0 | Status: COMPLETED | OUTPATIENT
Start: 2025-03-18 | End: 2025-03-25

## 2025-03-18 RX ORDER — MIDAZOLAM IN 0.9 % SOD.CHLORID 1 MG/ML
5 PLASTIC BAG, INJECTION (ML) INTRAVENOUS ONCE
Refills: 0 | Status: DISCONTINUED | OUTPATIENT
Start: 2025-03-18 | End: 2025-03-18

## 2025-03-18 RX ORDER — LORAZEPAM 4 MG/ML
1 VIAL (ML) INJECTION ONCE
Refills: 0 | Status: DISCONTINUED | OUTPATIENT
Start: 2025-03-18 | End: 2025-03-18

## 2025-03-18 RX ORDER — HALOPERIDOL 10 MG/1
5 TABLET ORAL ONCE
Refills: 0 | Status: COMPLETED | OUTPATIENT
Start: 2025-03-18 | End: 2025-03-18

## 2025-03-18 RX ORDER — HALOPERIDOL 10 MG/1
5 TABLET ORAL ONCE
Refills: 0 | Status: DISCONTINUED | OUTPATIENT
Start: 2025-03-18 | End: 2025-04-17

## 2025-03-18 RX ORDER — HALOPERIDOL 10 MG/1
5 TABLET ORAL EVERY 6 HOURS
Refills: 0 | Status: DISCONTINUED | OUTPATIENT
Start: 2025-03-18 | End: 2025-04-17

## 2025-03-18 RX ORDER — LORAZEPAM 4 MG/ML
2 VIAL (ML) INJECTION EVERY 6 HOURS
Refills: 0 | Status: DISCONTINUED | OUTPATIENT
Start: 2025-03-18 | End: 2025-03-19

## 2025-03-18 RX ORDER — HALOPERIDOL 10 MG/1
5 TABLET ORAL
Refills: 0 | Status: DISCONTINUED | OUTPATIENT
Start: 2025-03-18 | End: 2025-04-17

## 2025-03-18 RX ORDER — CLOZAPINE 100 MG
25 TABLET ORAL AT BEDTIME
Refills: 0 | Status: DISCONTINUED | OUTPATIENT
Start: 2025-03-18 | End: 2025-03-22

## 2025-03-18 RX ADMIN — Medication 1 MILLIGRAM(S): at 10:24

## 2025-03-18 RX ADMIN — HALOPERIDOL 5 MILLIGRAM(S): 10 TABLET ORAL at 20:48

## 2025-03-18 RX ADMIN — Medication 500 MILLIGRAM(S): at 10:24

## 2025-03-18 RX ADMIN — Medication 25 MILLIGRAM(S): at 20:48

## 2025-03-18 RX ADMIN — Medication 5 MILLIGRAM(S): at 02:30

## 2025-03-18 RX ADMIN — HALOPERIDOL 5 MILLIGRAM(S): 10 TABLET ORAL at 02:30

## 2025-03-18 RX ADMIN — Medication 500 MILLIGRAM(S): at 20:48

## 2025-03-18 RX ADMIN — HALOPERIDOL 5 MILLIGRAM(S): 10 TABLET ORAL at 10:32

## 2025-03-18 NOTE — BH PATIENT PROFILE - BRADEN ACTIVITY
Epidural Steroid Injection Operative Report    Indications: This is a 82 y.o. male who presents with low back pain. He was positive for LS DDD. The patient was admitted for surgery as conservative measures have failed. Date of Surgery: 11/9/2021    Preoperative Diagnosis: LS DDD    Postoperative Diagnosis: LS DDD    Surgeon(s) and Role:     * Ashia Hidalgo MD - Primary     Procedure:  Procedure(s):  SIMONE L4 TRANSFORAMINAL EPIDURAL STEROID INJECTION    Procedure in Detail:  After appropriate informed consent was obtained, the patient was taken to the operating suite and placed in the prone position on the operating table on appropriate padding. The LS region was prepped and draped in the usual sterile fashion. Intraoperative fluoroscopy was used to localize the LS spine. The skin was infiltrated with 2% lidocaine. An 22-g needle was advanced into the Simone L4 neuroforamen under fluoroscopic guidance. A small amount of contrast was injected into the epidural space, confirming appropriate needle placement on fluoroscopy. Next, 2ml of 2% lidocaine and 80mg of Depo-Medrol were injected. The needle was removed from the patient. The patient was then turned back into the supine position on the stretcher and was taken to the Recovery Room in stable condition.     Estimated Blood Loss:  none     Specimens: None       Drains: None          Complications:  None    Signed By: San Hodgkins, MD                        November 9, 2021 (4) walks frequently

## 2025-03-18 NOTE — ED BEHAVIORAL HEALTH ASSESSMENT NOTE - PSYCHIATRIC ISSUES AND PLAN (INCLUDE STANDING AND PRN MEDICATION)
clarify when Aristada due, consider increasing. Continue Haldol 20mg QHS, Depakote ER 1500mg QHS, Seroquel 200mg QHS. PRN Haldol 5 Ativan 2 PO/IM agitation

## 2025-03-18 NOTE — ED BEHAVIORAL HEALTH ASSESSMENT NOTE - PAST PSYCHOTROPIC MEDICATION
Per PSYCKES: Abilify, Haldol Dec, Hydroxyzine, Lexapro, Zyprexa, Klonopin Abilify, Haldol Dec, Hydroxyzine, Lexapro, Zyprexa, Klonopin

## 2025-03-18 NOTE — ED BEHAVIORAL HEALTH ASSESSMENT NOTE - SUMMARY
Pt is a 44 yo M, single, non-caregiver, domiciled at [facility], psych hx of  Schizophrenia vs Schizoaffective Disorder, multiple prior admissions including state (last inpt  [facility] April 2024, [facility] 2009 and [phone]), per chart cannabis use disorder (residence staff denies recent use), no known hx suicide attempt or violence, follows at [facility] on [place] grounds, no known legal issues, PMH HTN (not on meds) and anemia per psyckes, brought in by EMS called by residence staff after pt reportedly threw out all his clothes yesterday in response to CAH. Patient is actively responding to internal stimuli, reporting CAH and thought manipulation delusions.  Patient is psychotic and unable to care for self needing inpatient stabilization. Admit 2pc. Pt is a 46 yo M, single, non-caregiver, domiciled at [facility], psych hx of  Schizophrenia vs Schizoaffective Disorder, multiple prior admissions including state (last inpt  [facility] August 26-Sept 24, 2024, and an admission to [facility] in April of 2024,  [facility] 2009 and [phone]), per chart cannabis use disorder (residence staff denies recent use), no known hx suicide attempt or violence, follows at [facility] on [place] grounds, no known legal issues, PMH HTN (not on meds) and anemia per psyckes on a previous admission, brought in by EMS called by [facility] staff after pt was found walking around naked and cheeking his meds and not taking the Clozapine 100mg, Seroquel 200mg, or Haldol 20mg, Depakote 1000mg at bedtime as scheduled. He is also on Aristada monthly.   Upon assessment, pt was observed to be actively responding to internal stimuli. Patient states he cannot answer questions because he is hearing voices telling him that he is being investigated .  He states that he is hearing voices telling him "demonic and perverted" things, and upset because he does not want to be "perverted."  Pt admits he has a history of suicidal ideation and has felt that life was not worth living.  He is too distracted by the AHs and is smiling and pointing to the door as if listening.  He is unable to  confirm that he is not suicidal..  Patient is psychotic and unable to care for self needing inpatient stabilization. Admit 2pc.

## 2025-03-18 NOTE — BH INPATIENT PSYCHIATRY ASSESSMENT NOTE - NSBHMETABOLIC_PSY_ALL_CORE_FT
BMI: BMI (kg/m2): 29.7 (03-18-25 @ 15:05)  HbA1c: A1C with Estimated Average Glucose Result: 5.3 % (09-06-24 @ 08:00)    Glucose:   BP: --Vital Signs Last 24 Hrs  T(C): 36.6 (03-18-25 @ 14:00), Max: 36.9 (03-17-25 @ 22:46)  T(F): 97.8 (03-18-25 @ 14:00), Max: 98.5 (03-17-25 @ 22:46)  HR: 90 (03-18-25 @ 14:00) (78 - 94)  BP: 118/74 (03-18-25 @ 14:00) (105/70 - 126/85)  BP(mean): 88 (03-18-25 @ 06:48) (88 - 90)  RR: 16 (03-18-25 @ 15:05) (16 - 18)  SpO2: 99% (03-18-25 @ 15:05) (95% - 99%)    Orthostatic VS  03-18-25 @ 15:05  Lying BP: --/-- HR: --  Sitting BP: 124/75 HR: 90  Standing BP: 130/80 HR: 99  Site: --  Mode: --    Lipid Panel: Date/Time: 09-06-24 @ 08:00  Cholesterol, Serum: 170  LDL Cholesterol Calculated: 108  HDL Cholesterol, Serum: 43  Total Cholesterol/HDL Ration Measurement: --  Triglycerides, Serum: 97

## 2025-03-18 NOTE — ED BEHAVIORAL HEALTH ASSESSMENT NOTE - NSBHATTESTBILLING_PSY_A_CORE
99285-Emergency department visit - high complexity [id number] diagnostic evaluation with medical services

## 2025-03-18 NOTE — ED BEHAVIORAL HEALTH ASSESSMENT NOTE - OTHER PAST PSYCHIATRIC HISTORY (INCLUDE DETAILS REGARDING ONSET, COURSE OF ILLNESS, INPATIENT/OUTPATIENT TREATMENT)
Schizophrenia vs Schizoaffective Disorder, multiple prior admissions including state (last inpt  [facility] April 2024, [facility] 2009 and [phone]), per chart cannabis use disorder (residence staff denies recent use), no known hx suicide attempt or violence, [facility] [place] [facility] with Dr. Metz

## 2025-03-18 NOTE — ED BEHAVIORAL HEALTH ASSESSMENT NOTE - NSBHATTESTCOMMENTATTENDFT_PSY_A_CORE
Pt is a 46 yo M, single, non-caregiver, domiciled at [facility], psych hx of  Schizophrenia vs Schizoaffective Disorder, multiple prior admissions including state (last inpt  [facility] August 26-Sept 24, 2024, and an admission to [facility] in April of 2024,  [facility] 2009 and [phone]), per chart cannabis use disorder (residence staff denies recent use), no known hx suicide attempt or violence, follows at [facility] on [place] grounds, no known legal issues, PMH HTN (not on meds) and anemia per psyckes on a previous admission, brought in by EMS called by residence staff after pt was found walking around naked and cheeking his meds and not taking the Clozapine 100mg, Seroquel 200mg, or Haldol 20mg, Depakote 1000mg at bedtime as scheduled. He is also on Aristada monthly.   Upon assessment, pt was observed to be actively responding to internal stimuli. Patient states he cannot answer questions because he is hearing voices telling him that he is being investigated .   He is too distracted by the AHs and is smiling and pointing to the door as if listening.  He is unable to  confirm that he is not suicidal..  Patient is psychotic and unable to care for self needing inpatient stabilization. Admit 2pc.

## 2025-03-18 NOTE — BH INPATIENT PSYCHIATRY ASSESSMENT NOTE - OTHER PAST PSYCHIATRIC HISTORY (INCLUDE DETAILS REGARDING ONSET, COURSE OF ILLNESS, INPATIENT/OUTPATIENT TREATMENT)
Per chart, Schizophrenia vs Schizoaffective Disorder, multiple prior admissions including state (last inpt  [facility] April 2024, [facility] 2009 and [phone]), per chart cannabis use disorder (residence staff denies recent use), no known hx suicide attempt or violence, [facility] [place] [facility] with Dr. Metz

## 2025-03-18 NOTE — ED ADULT NURSE REASSESSMENT NOTE - STATUS
[facility] Low 6/awaiting transfer, no change
2pc admission/awaiting bed, no change
awaiting transfer, no change

## 2025-03-18 NOTE — BH PATIENT PROFILE - STATED REASON FOR ADMISSION
pt brought in by residence as patient was stripping in his housing and being noncompliant with medication .

## 2025-03-18 NOTE — ED ADULT NURSE REASSESSMENT NOTE - DESCRIPTION
PT was evaluated by psychiatrist Dr Ramirez. Pt is to be admitted as 2PC for inpt psych. Pt is aware, presented no psrblem or issues with inpt admission. Pt received ativan 1 mg with haldol 5 mg po stat. Standing Depakote 500 mg ER also administered. maintain pt on CO for safety
Pt is to be transferred to [place] [id number], ambulance transport arranged
Pt requested to move back to [facility]. Pt gave urine sample-results available. Pt maintained on Co for safety
Pt continues to wait for ambulance transport

## 2025-03-18 NOTE — ED ADULT NURSE REASSESSMENT NOTE - NS ED NURSE REASSESS COMMENT FT1
Pt verbally aggressive w/ staff, attempted redirection via verbal communication, unsuccessful. 5mg haldol and 5mg versed administered IM. 1:1 maintained for safety, sandwich and apple juice provided, safety maintained.

## 2025-03-18 NOTE — CHART NOTE - NSCHARTNOTEFT_GEN_A_CORE
EMERGENCY : SW consulted by psychiatry as patient requiring inpatient involuntary psychiatric placement. LCSW reviewed patient's chart. As per chart review patient is a "45-year-old male with history of schizophrenia on Haldol, quetiapine, Depakote, presents from outpatient [facility] for increased auditory hallucinations, not taking his medication, running around naked." As per ED MD, patient is medically cleared for inpatient psychiatric placement. 2PC completed and present in chart.     LCSW spoke with Neftali from Central Intake at [facility] who indicates bed for patient. Legals and ekg sent as requested. As per Neftali, patient accepted to [facility] [address] with Dr. Feldman as the accepting doctor. Patient, ED MD, ED RN and psychiatry made aware. All parties in agreement. LCSW created transfer packet with legals, ekg, nonemergent, BH assessment, face sheet and transportation forms. Packet placed in chart, RN aware. Patient's RN provided handoff to [facility] and arranged ambulance transportation with [facility]. Transfer email sent. Transfer dispo completed by ED MD in [place]. Patient with [facility], auth to follow. SW continues to remain available as needed.

## 2025-03-18 NOTE — ED ADULT NURSE REASSESSMENT NOTE - NS ED NURSE REASSESS COMMENT FT1
Attempted to obtain blood work, x2 butterfly sticks attempted, unsuccessful. MD aware, will continue to monitor. 1:1 in place, comfort and safety maintained.

## 2025-03-18 NOTE — BH INPATIENT PSYCHIATRY ASSESSMENT NOTE - PAST PSYCHOTROPIC MEDICATION
Abilify, Haldol Dec, Hydroxyzine, Lexapro, Zyprexa, Klonopin, Aristada, Seroquel, Depakote, Clozaril, Ativan

## 2025-03-18 NOTE — ED ADULT NURSE REASSESSMENT NOTE - REASSESS COMMUNICATION
social work called
Elodia SANDERSON.EMS transport arranged/EMS transport called/inpatient nurse report called

## 2025-03-18 NOTE — BH INPATIENT PSYCHIATRY ASSESSMENT NOTE - NSBHATTESTAPPBILLTIME_PSY_A_CORE
I attest my time as CHAN is greater than 50% of the total combined time spent on qualifying patient care activities. I have reviewed and verified the documentation.

## 2025-03-18 NOTE — BH INPATIENT PSYCHIATRY ASSESSMENT NOTE - CURRENT MEDICATION
MEDICATIONS  (STANDING):  cloZAPine 25 milliGRAM(s) Oral at bedtime  divalproex  milliGRAM(s) Oral two times a day  haloperidol     Tablet 5 milliGRAM(s) Oral two times a day    MEDICATIONS  (PRN):  haloperidol     Tablet 5 milliGRAM(s) Oral every 6 hours PRN agitation  haloperidol    Injectable 5 milliGRAM(s) IntraMuscular once PRN agitation  LORazepam     Tablet 2 milliGRAM(s) Oral every 6 hours PRN agitation  LORazepam   Injectable 2 milliGRAM(s) IntraMuscular once PRN agitation

## 2025-03-18 NOTE — BH INPATIENT PSYCHIATRY ASSESSMENT NOTE - RISK ASSESSMENT
acute risks include recent CAH, poor adherence, delusions. chronic risks include serious and persistent psychotic illness, prior admissions including state, hx substance use. Protective factors include supportive housing, no hx suicide attempt, no hx violence, no access to weapons, no active substance use.

## 2025-03-18 NOTE — BH INPATIENT PSYCHIATRY ASSESSMENT NOTE - ATTENDING COMMENTS
Patient seen by me, chart reviewed, and case discussed with treatment team.  Reviewed and agree with above assessment and plan; corrections and modification made where appropriate.  The patient presents with significant psychotic symptoms, with AH and VH in the context of noncompliance with medications.  He is internally preoccupied, but makes attempts to cooperative.  Calm on the unit thus far.  Will restart medications.  No indication for 1:1.

## 2025-03-18 NOTE — ED BEHAVIORAL HEALTH ASSESSMENT NOTE - DESCRIPTION
resides at [facility] cooperative but appears suspicious, responding to internal stimuli.  Vital Signs Last 24 Hrs  T(C): 36.8 (26 Aug 2024 12:20), Max: 36.8 (26 Aug 2024 12:20)  T(F): 98.2 (26 Aug 2024 12:20), Max: 98.2 (26 Aug 2024 12:20)  HR: 99 (26 Aug 2024 12:20) (92 - 99)  BP: 127/84 (26 Aug 2024 12:20) (126/78 - 127/84)  BP(mean): --  RR: 16 (26 Aug 2024 12:20) (16 - 18)  SpO2: 99% (26 Aug 2024 12:20) (97% - 99%)    Parameters below as of 26 Aug 2024 12:20  Patient On (Oxygen Delivery Method): room air past history of anemia as per previous notes PT required prns of Haldol and Versed overnight..  He continues to be distracted by auditory hallucinations.

## 2025-03-18 NOTE — ED BEHAVIORAL HEALTH ASSESSMENT NOTE - HPI (INCLUDE ILLNESS QUALITY, SEVERITY, DURATION, TIMING, CONTEXT, MODIFYING FACTORS, ASSOCIATED SIGNS AND SYMPTOMS)
Pt is a 46 yo M, single, non-caregiver, domiciled at [facility], psych hx of  Schizophrenia vs Schizoaffective Disorder, multiple prior admissions including state (last inpt  [facility] April 2024, [facility] 2009 and [id number]), per chart cannabis use disorder (residence staff denies recent use), no known hx suicide attempt or violence, follows at [facility] on [place] grounds, no known legal issues, PMH HTN (not on meds) and anemia per psyckes, brought in by EMS called by residence staff after pt reportedly threw out all his clothes yesterday in response to CAH.   Patient initially appeared suspicious of staff, did not want to speak knowing staff was standing outside his room, then went to sit in the hallway and asked to be interviewed there. Patient appeared thought blocked and unable to answer most questions. Stated he felt he "couldn't breathe" and threw out his clothes. Patient then stopped answering questions, was offered a PRN for distress but declined.  Upon reassessment, pt was a bit more engaged, but observed to be actively responding to internal stimuli. Patient states he cannot answer questions because his "thoughts are blocked". He states that he is hearing voices telling him "demonic and perverted" things . He states he does not want to engage with the voices. he states "I want to be a good citizen and be happy". He denies wanting to hurt himself or hurt others at this time. Unable to elicit further history due to active psychosis.    See  note for collateral.      PSYCKES lists a "nonspecific reaction to tuberculin skin test" in May 2022, and a script for Isoniazid last picked up May 2024 - confirmed with [place] this medication was discontinued in May 2024. Pt is a 44 yo M, single, non-caregiver, domiciled at [facility], psych hx of  Schizophrenia vs Schizoaffective Disorder, multiple prior admissions including state (last inpt  [facility] August 26-Sept 24, 2024, and an admission to [facility] in April of 2024,  [facility] 2009 and [id number]), per chart cannabis use disorder (residence staff denies recent use), no known hx suicide attempt or violence, follows at [facility] on [place] grounds, no known legal issues, PMH HTN (not on meds) and anemia per psyckes on a previous admission, brought in by EMS called by [facility] staff after pt was found walking around naked and cheeking his meds and not taking the Clozapine 100mg, Seroquel 200mg, or Haldol 20mg, Depakote 1000mg at bedtime as scheduled. He is also on Aristada monthly.   Upon assessment, pt was observed to be actively responding to internal stimuli. Patient states he cannot answer questions because he is hearing voices telling him that he is being investigated .  He states that he is hearing voices telling him "demonic and perverted" things, and upset because he does not want to be "perverted."  Pt admits he has a history of suicidal ideation and has felt that life was not worth living.  He is too distracted by the s and is smiling and pointing to the door as if listening.  He is unable to  confirm that he is not suicidal.

## 2025-03-18 NOTE — BH PATIENT PROFILE - HOME MEDICATIONS
atropine 1% ophthalmic solution , 2 application sublingual once a day (at bedtime) 2 drops sublingual at bedtime  senna leaf extract oral tablet , 2 tab(s) orally once a day (at bedtime) as needed for constipation  polyethylene glycol 3350 oral powder for reconstitution , 17 gram(s) orally once a day  cloZAPine 100 mg oral tablet , 3 tab(s) orally once a day (at bedtime)  divalproex sodium 500 mg oral tablet, extended release , 2 tab(s) orally once a day (at bedtime)

## 2025-03-18 NOTE — BH INPATIENT PSYCHIATRY ASSESSMENT NOTE - NSBHASSESSSUMMFT_PSY_ALL_CORE
Pt is a 46 yo M, single, non-caregiver, domiciled at [facility], psych hx of  Schizophrenia vs Schizoaffective Disorder, multiple prior admissions including state (last inpt  [facility] August 26-Sept 24, 2024, and an admission to [facility] in April of 2024,  [facility] 2009 and [id number]), per chart cannabis use disorder (residence staff denies recent use), no known hx suicide attempt or violence, follows at [facility] on [place] grounds, no known legal issues, PMH HTN (not on meds) and anemia per psyckes on a previous admission, brought in by EMS called by [facility] staff after pt was found walking around naked and cheeking his meds and not taking the Clozapine 100mg, Seroquel 200mg, or Haldol 20mg, Depakote 1000mg at bedtime as scheduled. He is also on Aristada monthly.     >Legal: 9.27  >Obs: Routine, no need for CO, patient not expected to pose risk to self or others in controlled inpatient setting  >Psychiatric Meds: Restart Clozaril at 25mg PO at bedtime (psychosis), Haldol 5mg PO BID (psychosis) and Depakote 500mg PO BID (mood)   >Labs: Admission labs reviewed, no acute findings.   >Medical:  No acute concerns. No consultations needed at this time.  >Social: milieu/structured therapy  >Treatment Interventions: Groups and Individual Therapy/CBT   >Dispo: pending remission of sx

## 2025-03-18 NOTE — BH INPATIENT PSYCHIATRY ASSESSMENT NOTE - NSBHCHARTREVIEWVS_PSY_A_CORE FT
Vital Signs Last 24 Hrs  T(C): 36.6 (03-18-25 @ 14:00), Max: 36.9 (03-17-25 @ 22:46)  T(F): 97.8 (03-18-25 @ 14:00), Max: 98.5 (03-17-25 @ 22:46)  HR: 90 (03-18-25 @ 14:00) (78 - 94)  BP: 118/74 (03-18-25 @ 14:00) (105/70 - 126/85)  BP(mean): 88 (03-18-25 @ 06:48) (88 - 90)  RR: 16 (03-18-25 @ 15:05) (16 - 18)  SpO2: 99% (03-18-25 @ 15:05) (95% - 99%)    Orthostatic VS  03-18-25 @ 15:05  Lying BP: --/-- HR: --  Sitting BP: 124/75 HR: 90  Standing BP: 130/80 HR: 99  Site: --  Mode: --

## 2025-03-19 LAB
A1C WITH ESTIMATED AVERAGE GLUCOSE RESULT: 5.2 % — SIGNIFICANT CHANGE UP (ref 4–5.6)
CHOLEST SERPL-MCNC: 167 MG/DL — SIGNIFICANT CHANGE UP
ESTIMATED AVERAGE GLUCOSE: 103 — SIGNIFICANT CHANGE UP
HDLC SERPL-MCNC: 45 MG/DL — SIGNIFICANT CHANGE UP
LDLC SERPL-MCNC: 101 MG/DL — HIGH
LIPID PNL WITH DIRECT LDL SERPL: 101 MG/DL — HIGH
NONHDLC SERPL-MCNC: 122 MG/DL — SIGNIFICANT CHANGE UP
TRIGL SERPL-MCNC: 115 MG/DL — SIGNIFICANT CHANGE UP

## 2025-03-19 PROCEDURE — 99232 SBSQ HOSP IP/OBS MODERATE 35: CPT

## 2025-03-19 RX ORDER — LORAZEPAM 4 MG/ML
2 VIAL (ML) INJECTION ONCE
Refills: 0 | Status: DISCONTINUED | OUTPATIENT
Start: 2025-03-19 | End: 2025-03-26

## 2025-03-19 RX ORDER — LORAZEPAM 4 MG/ML
2 VIAL (ML) INJECTION EVERY 6 HOURS
Refills: 0 | Status: DISCONTINUED | OUTPATIENT
Start: 2025-03-19 | End: 2025-03-26

## 2025-03-19 RX ADMIN — Medication 500 MILLIGRAM(S): at 08:14

## 2025-03-19 RX ADMIN — Medication 2 MILLIGRAM(S): at 08:30

## 2025-03-19 RX ADMIN — HALOPERIDOL 5 MILLIGRAM(S): 10 TABLET ORAL at 08:13

## 2025-03-19 NOTE — PSYCHIATRIC REHAB INITIAL EVALUATION - NSBHPRRECOMMEND_PSY_ALL_CORE
Writer met with patient to orient to unit and to introduce self and psychiatric rehabilitation staff and functions. Writer tried to meet with patient multiple times today, however patient was asleep for most of the day and declined to speak, therefore this note will be done per chart and assessed at a further time. Patient is a 45-year-old male with past psych history of schizophrenia vs schizoaffective disorder and multiple prior admissions. Patient here at hospital for disrobing at residence and medication noncompliance. Per observation patient is internally preoccupied and hearing voices per team. Over the next few days Psych rehab staff will continue to engage and support patient throughout.

## 2025-03-19 NOTE — BH INPATIENT PSYCHIATRY PROGRESS NOTE - NSBHCHARTREVIEWVS_PSY_A_CORE FT
Vital Signs Last 24 Hrs  T(C): 36.8 (03-19-25 @ 07:54), Max: 36.8 (03-19-25 @ 07:54)  T(F): 98.2 (03-19-25 @ 07:54), Max: 98.2 (03-19-25 @ 07:54)  HR: 90 (03-18-25 @ 14:00) (90 - 90)  BP: 118/74 (03-18-25 @ 14:00) (118/74 - 118/74)  BP(mean): --  RR: 16 (03-18-25 @ 15:05) (16 - 18)  SpO2: 99% (03-18-25 @ 15:05) (97% - 99%)    Orthostatic VS  03-19-25 @ 07:54  Lying BP: --/-- HR: --  Sitting BP: 109/73 HR: 101  Standing BP: 102/90 HR: 119  Site: --  Mode: --  Orthostatic VS  03-18-25 @ 19:56  Lying BP: --/-- HR: --  Sitting BP: 123/78 HR: 93  Standing BP: 139/69 HR: 108  Site: upper left arm  Mode: --  Orthostatic VS  03-18-25 @ 15:05  Lying BP: --/-- HR: --  Sitting BP: 124/75 HR: 90  Standing BP: 130/80 HR: 99  Site: --  Mode: --

## 2025-03-19 NOTE — BH SOCIAL WORK INITIAL PSYCHOSOCIAL EVALUATION - OTHER PAST PSYCHIATRIC HISTORY (INCLUDE DETAILS REGARDING ONSET, COURSE OF ILLNESS, INPATIENT/OUTPATIENT TREATMENT)
Pt is a 46 yo M, single, non-caregiver, domiciled at [facility], psych hx of  Schizophrenia vs Schizoaffective Disorder, multiple prior admissions including state (last inpt  [facility] August 26-Sept 24, 2024, and an admission to [facility] in April of 2024,  [facility] 2009 and [id number]), per chart cannabis use disorder (residence staff denies recent use), no known hx suicide attempt or violence, follows at [facility] on [place] grounds, no known legal issues, PMH HTN (not on meds) and anemia per psyckes on a previous admission, brought in by EMS called by residence staff after pt was found walking around naked and cheeking his meds and not taking the Clozapine 100mg, Seroquel 200mg, or Haldol 20mg, Depakote 1000mg at bedtime as scheduled. He is also on Aristada monthly.      Upon assessment, pt was observed to be actively responding to internal stimuli. Patient states he cannot answer questions because he is hearing voices telling him that he is being investigated .  He states that he is hearing voices telling him "demonic and perverted" things, and upset because he does not want to be "perverted."  Pt admits he has a history of suicidal ideation and has felt that life was not worth living.  He is too distracted by the AHs and is smiling and pointing to the door as if listening.  He is unable to  confirm that he is not suicidal.     As per ED SW, [facility] ([phone]), to gather collateral information. A staff member named Radha at the  provided the following details:     The patient is a 45-year-old male resident with a history of schizophrenia, hypertension, and obesity. He was brought to the facility by EMS after being found screaming, seemingly at no one, "Get away from me!" This behavior continued at the facility; the patient approached the , pointed at another resident, and repeated the phrase. After the other resident left, the patient kicked a door.     According to Radha, the patient appears paranoid, believing he is being followed. He can be observed around the residence talking to himself, saying things like, “I paid you and you need to stop following me around.” Radha suspects auditory visual hallucinations and has observed the patient screaming at nothing. This behavior has worsened over the past month, escalating to include kicking doors, writing on the walls, and increased yelling and self-directed speech. While generally compliant with his medications (he requires encouragement to take them), the patient has also become more verbally aggressive with staff. No suicidal or homicidal ideation was reported. These behavioral changes have resulted in multiple psychiatric emergency department visits. The destructive behavior (kicking doors, writing on walls) is recent. The writings on the walls were described as inappropriate.           In addition to his psychiatric history, the patient's medical problems include hypertension and obesity. His appetite appears decreased, his hygiene is poor, and his sleep patterns are uncertain. A medication list was provided, and the outpatient provider is listed on the face sheet. No drug or alcohol use is reported.     Radha described the patient's baseline presentation as compliant, quiet, preferring to listen to music, and generally pleasant. She noted a history of manageable auditory visual hallucinations, which have recently escalated. Radha believes the patient would benefit from admission.      The patient's baseline presentation is described as compliant, keeping to himself, capable of self-advocacy, and free from auditory hallucinations. The writer agreed to keep the staff updated.     Per provider,KARISHMA Dorman patient is cleared and is able to return to their previous residence, [address] [facility] [id number]  [facility]   has spoken to counselor luis ([phone]))  ,  confirmed that patients mode of transportation is ambulance and that patient travels w/ supervision. Clinical provider is in agreement with ambulance to group home. Verbal huddle regarding coordination of care completed with interdisciplinary team.      The writer then contacted the on-call CM, Reyna ([phone]), who provided additional information. The patient is a 45-year-old male residing at the facility, with a history of schizophrenia, hypertension, and obesity. BIB EMS. Reyna reported that the patient requested transport to the ED due to auditory hallucinations, though he didn't elaborate on their content. He typically requests an ambulance when the hallucinations worsen. Reyna observed him talking to himself, engaging in what appeared to be a full conversation, during medication administration today. She considers these auditory hallucinations a warning sign of potential decompensation. No suicidal or homicidal ideation was reported. The patient's sleeping, eating, and showering habits remain at baseline. He is compliant with his medications and recently received an intramuscular injection, though Reyna was unaware of the specifics. She stated that the patient is not currently engaging in any dangerous behaviors. No drug or alcohol use is reported.      The patient's baseline presentation is described as compliant, keeping to himself, capable of self-advocacy, and free from auditory hallucinations. The writer agreed to keep the staff updated.     On ML6, SW and NP met with the patient.  He was given a PRN for agitation prior and presented incoherent and sleepy during the interview. Pt was not able to engage in meaningful conversation therefore, interview was terminated.

## 2025-03-19 NOTE — BH INPATIENT PSYCHIATRY PROGRESS NOTE - NSBHMETABOLIC_PSY_ALL_CORE_FT
BMI: BMI (kg/m2): 29.7 (03-18-25 @ 15:05)  HbA1c: A1C with Estimated Average Glucose Result: 5.2 % (03-19-25 @ 10:24)    Glucose:   BP: --Vital Signs Last 24 Hrs  T(C): 36.8 (03-19-25 @ 07:54), Max: 36.8 (03-19-25 @ 07:54)  T(F): 98.2 (03-19-25 @ 07:54), Max: 98.2 (03-19-25 @ 07:54)  HR: 90 (03-18-25 @ 14:00) (90 - 90)  BP: 118/74 (03-18-25 @ 14:00) (118/74 - 118/74)  BP(mean): --  RR: 16 (03-18-25 @ 15:05) (16 - 18)  SpO2: 99% (03-18-25 @ 15:05) (97% - 99%)    Orthostatic VS  03-19-25 @ 07:54  Lying BP: --/-- HR: --  Sitting BP: 109/73 HR: 101  Standing BP: 102/90 HR: 119  Site: --  Mode: --  Orthostatic VS  03-18-25 @ 19:56  Lying BP: --/-- HR: --  Sitting BP: 123/78 HR: 93  Standing BP: 139/69 HR: 108  Site: upper left arm  Mode: --  Orthostatic VS  03-18-25 @ 15:05  Lying BP: --/-- HR: --  Sitting BP: 124/75 HR: 90  Standing BP: 130/80 HR: 99  Site: --  Mode: --    Lipid Panel: Date/Time: 03-19-25 @ 10:24  Cholesterol, Serum: 167  LDL Cholesterol Calculated: 101  HDL Cholesterol, Serum: 45  Total Cholesterol/HDL Ration Measurement: --  Triglycerides, Serum: 115

## 2025-03-19 NOTE — BH INPATIENT PSYCHIATRY PROGRESS NOTE - NSBHFUPINTERVALHXFT_PSY_A_CORE
Pt compliant with medication and tolerating it well.  Chart reviewed and case discussed with treatment team.  Per report, patient received PO PRN medication this AM for agitation.  Pt minimally cooperative with interview due to lethargy from the PRN medication.  Pt denies SI/HI/I/P.  Pt reports intense AH, but cannot elaborate on what they are saying.  Pt asked about CAH to harm himself or others and he reports he has those.  Pt then reports, "I feel your voice is going through me."  Pt unable to answer other interview questions due to being tired.

## 2025-03-19 NOTE — BH INPATIENT PSYCHIATRY PROGRESS NOTE - NSBHASSESSSUMMFT_PSY_ALL_CORE
Pt is a 44 yo M, single, non-caregiver, domiciled at [facility], psych hx of  Schizophrenia vs Schizoaffective Disorder, multiple prior admissions including state (last inpt  [facility] August 26-Sept 24, 2024, and an admission to [facility] in April of 2024,  [facility] 2009 and [id number]), per chart cannabis use disorder (residence staff denies recent use), no known hx suicide attempt or violence, follows at [facility] on [place] grounds, no known legal issues, PMH HTN (not on meds) and anemia per psyckes on a previous admission, brought in by EMS called by [facility] staff after pt was found walking around naked and cheeking his meds and not taking the Clozapine 100mg, Seroquel 200mg, or Haldol 20mg, Depakote 1000mg at bedtime as scheduled. He is also on Aristada monthly.     >Legal: 9.27  >Obs: Routine, no need for CO, patient not expected to pose risk to self or others in controlled inpatient setting  >Psychiatric Meds: Restart Clozaril at 25mg PO at bedtime (psychosis), Haldol 5mg PO BID (psychosis) and Depakote 500mg PO BID (mood)   >Labs: Admission labs reviewed, no acute findings.   >Medical:  No acute concerns. No consultations needed at this time.  >Social: milieu/structured therapy  >Treatment Interventions: Groups and Individual Therapy/CBT   >Dispo: pending remission of sx

## 2025-03-20 PROCEDURE — 99232 SBSQ HOSP IP/OBS MODERATE 35: CPT

## 2025-03-20 RX ADMIN — Medication 2 MILLIGRAM(S): at 08:04

## 2025-03-20 RX ADMIN — Medication 500 MILLIGRAM(S): at 08:04

## 2025-03-20 RX ADMIN — Medication 25 MILLIGRAM(S): at 20:38

## 2025-03-20 RX ADMIN — HALOPERIDOL 5 MILLIGRAM(S): 10 TABLET ORAL at 20:38

## 2025-03-20 RX ADMIN — HALOPERIDOL 5 MILLIGRAM(S): 10 TABLET ORAL at 08:04

## 2025-03-20 RX ADMIN — Medication 500 MILLIGRAM(S): at 20:38

## 2025-03-20 NOTE — BH INPATIENT PSYCHIATRY PROGRESS NOTE - NSBHCHARTREVIEWVS_PSY_A_CORE FT
Vital Signs Last 24 Hrs  T(C): 36.5 (03-20-25 @ 08:24), Max: 36.5 (03-20-25 @ 08:24)  T(F): 97.7 (03-20-25 @ 08:24), Max: 97.7 (03-20-25 @ 08:24)  HR: --  BP: --  BP(mean): --  RR: 18 (03-20-25 @ 08:24) (18 - 18)  SpO2: --    Orthostatic VS  03-20-25 @ 08:24  Lying BP: --/-- HR: --  Sitting BP: 113/75 HR: 87  Standing BP: 131/76 HR: 94  Site: --  Mode: --  Orthostatic VS  03-19-25 @ 20:51  Lying BP: --/-- HR: --  Sitting BP: 110/57 HR: 110  Standing BP: 106/69 HR: 114  Site: --  Mode: --  Orthostatic VS  03-19-25 @ 07:54  Lying BP: --/-- HR: --  Sitting BP: 109/73 HR: 101  Standing BP: 102/90 HR: 119  Site: --  Mode: --  Orthostatic VS  03-18-25 @ 19:56  Lying BP: --/-- HR: --  Sitting BP: 123/78 HR: 93  Standing BP: 139/69 HR: 108  Site: upper left arm  Mode: --  Orthostatic VS  03-18-25 @ 15:05  Lying BP: --/-- HR: --  Sitting BP: 124/75 HR: 90  Standing BP: 130/80 HR: 99  Site: --  Mode: --

## 2025-03-20 NOTE — BH INPATIENT PSYCHIATRY PROGRESS NOTE - NSBHFUPINTERVALHXFT_PSY_A_CORE
Pt compliant with medication and tolerating it well.  Chart reviewed and case discussed with treatment team.  Irritable last night/yesterday but better control today.  Per report, patient received PO PRN medication this refuses clozapine haldol and depakote but accepted a po ativan prn.  He was compliant with medications this am.  We will contineu clozapine 25mg daily at bedtime.

## 2025-03-20 NOTE — BH INPATIENT PSYCHIATRY PROGRESS NOTE - NSBHASSESSSUMMFT_PSY_ALL_CORE
Pt is a 44 yo M, single, non-caregiver, domiciled at [facility], psych hx of  Schizophrenia vs Schizoaffective Disorder, multiple prior admissions including state (last inpt  [facility] August 26-Sept 24, 2024, and an admission to [facility] in April of 2024,  [facility] 2009 and [id number]), per chart cannabis use disorder (residence staff denies recent use), no known hx suicide attempt or violence, follows at [facility] on [place] grounds, no known legal issues, PMH HTN (not on meds) and anemia per psyckes on a previous admission, brought in by EMS called by [facility] staff after pt was found walking around naked and cheeking his meds and not taking the Clozapine 100mg, Seroquel 200mg, or Haldol 20mg, Depakote 1000mg at bedtime as scheduled. He is also on Aristada monthly.     3/20 bizarre, refused meds last night but compliant with meds this am.    >Legal: 9.27  >Obs: Routine, no need for CO, patient not expected to pose risk to self or others in controlled inpatient setting  >Psychiatric Meds: continue Clozaril at 25mg PO at bedtime (psychosis), Haldol 5mg PO BID (psychosis) and Depakote 500mg PO BID (mood)   >Labs: Admission labs reviewed, no acute findings.   >Medical:  No acute concerns. No consultations needed at this time.  >Social: milieu/structured therapy  >Treatment Interventions: Groups and Individual Therapy/CBT   >Dispo: pending remission of sx

## 2025-03-21 PROCEDURE — 99232 SBSQ HOSP IP/OBS MODERATE 35: CPT

## 2025-03-21 RX ADMIN — HALOPERIDOL 5 MILLIGRAM(S): 10 TABLET ORAL at 08:02

## 2025-03-21 RX ADMIN — HALOPERIDOL 5 MILLIGRAM(S): 10 TABLET ORAL at 20:05

## 2025-03-21 RX ADMIN — Medication 25 MILLIGRAM(S): at 20:05

## 2025-03-21 RX ADMIN — Medication 500 MILLIGRAM(S): at 20:05

## 2025-03-21 RX ADMIN — Medication 500 MILLIGRAM(S): at 08:02

## 2025-03-21 RX ADMIN — Medication 2 MILLIGRAM(S): at 08:02

## 2025-03-21 NOTE — BH INPATIENT PSYCHIATRY PROGRESS NOTE - NSBHCHARTREVIEWVS_PSY_A_CORE FT
Vital Signs Last 24 Hrs  T(C): 36.3 (03-21-25 @ 07:54), Max: 37.1 (03-20-25 @ 20:14)  T(F): 97.3 (03-21-25 @ 07:54), Max: 98.7 (03-20-25 @ 20:14)  HR: --  BP: --  BP(mean): --  RR: --  SpO2: --    Orthostatic VS  03-21-25 @ 07:54  Lying BP: --/-- HR: --  Sitting BP: 106/70 HR: 91  Standing BP: 109/91 HR: 101  Site: upper left arm  Mode: electronic  Orthostatic VS  03-20-25 @ 20:14  Lying BP: --/-- HR: --  Sitting BP: 131/82 HR: 100  Standing BP: 140/78 HR: 103  Site: --  Mode: --  Orthostatic VS  03-20-25 @ 08:24  Lying BP: --/-- HR: --  Sitting BP: 113/75 HR: 87  Standing BP: 131/76 HR: 94  Site: --  Mode: --  Orthostatic VS  03-19-25 @ 20:51  Lying BP: --/-- HR: --  Sitting BP: 110/57 HR: 110  Standing BP: 106/69 HR: 114  Site: --  Mode: --

## 2025-03-21 NOTE — BH INPATIENT PSYCHIATRY PROGRESS NOTE - NSBHASSESSSUMMFT_PSY_ALL_CORE
Pt is a 46 yo M, single, non-caregiver, domiciled at [facility], psych hx of  Schizophrenia vs Schizoaffective Disorder, multiple prior admissions including state (last inpt  [facility] August 26-Sept 24, 2024, and an admission to [facility] in April of 2024,  [facility] 2009 and [id number]), per chart cannabis use disorder (residence staff denies recent use), no known hx suicide attempt or violence, follows at [facility] on [place] grounds, no known legal issues, PMH HTN (not on meds) and anemia per psyckes on a previous admission, brought in by EMS called by [facility] staff after pt was found walking around naked and cheeking his meds and not taking the Clozapine 100mg, Seroquel 200mg, or Haldol 20mg, Depakote 1000mg at bedtime as scheduled. He is also on Aristada monthly.     3/21 compliant with meds, disroganized, childlike, better related, slow progress    >Legal: 9.27  >Obs: Routine, no need for CO, patient not expected to pose risk to self or others in controlled inpatient setting  >Psychiatric Meds: continue Clozaril at 25mg PO at bedtime (psychosis), Haldol 5mg PO BID (psychosis) and Depakote 500mg PO BID (mood)   >Labs: Admission labs reviewed, no acute findings.   >Medical:  No acute concerns. No consultations needed at this time.  >Social: milieu/structured therapy  >Treatment Interventions: Groups and Individual Therapy/CBT   >Dispo: pending remission of sx

## 2025-03-21 NOTE — BH INPATIENT PSYCHIATRY PROGRESS NOTE - NSBHFUPINTERVALHXFT_PSY_A_CORE
Pt compliant with medication and tolerating it well. Described as isolative and disorganized.  He slept last night.  Ativan po prn this am.  He is found in his bed this am.  He sits up and talks with me about desiring to return to his housing on [facility] grounds.  He becomes somewhat disorganized but is pleasant during this interaction.

## 2025-03-21 NOTE — BH INPATIENT PSYCHIATRY PROGRESS NOTE - NSBHMETABOLIC_PSY_ALL_CORE_FT
BMI: BMI (kg/m2): 29.7 (03-18-25 @ 15:05)  HbA1c: A1C with Estimated Average Glucose Result: 5.2 % (03-19-25 @ 10:24)    Glucose:   BP: --Vital Signs Last 24 Hrs  T(C): 36.3 (03-21-25 @ 07:54), Max: 37.1 (03-20-25 @ 20:14)  T(F): 97.3 (03-21-25 @ 07:54), Max: 98.7 (03-20-25 @ 20:14)  HR: --  BP: --  BP(mean): --  RR: --  SpO2: --    Orthostatic VS  03-21-25 @ 07:54  Lying BP: --/-- HR: --  Sitting BP: 106/70 HR: 91  Standing BP: 109/91 HR: 101  Site: upper left arm  Mode: electronic  Orthostatic VS  03-20-25 @ 20:14  Lying BP: --/-- HR: --  Sitting BP: 131/82 HR: 100  Standing BP: 140/78 HR: 103  Site: --  Mode: --  Orthostatic VS  03-20-25 @ 08:24  Lying BP: --/-- HR: --  Sitting BP: 113/75 HR: 87  Standing BP: 131/76 HR: 94  Site: --  Mode: --  Orthostatic VS  03-19-25 @ 20:51  Lying BP: --/-- HR: --  Sitting BP: 110/57 HR: 110  Standing BP: 106/69 HR: 114  Site: --  Mode: --    Lipid Panel: Date/Time: 03-19-25 @ 10:24  Cholesterol, Serum: 167  LDL Cholesterol Calculated: 101  HDL Cholesterol, Serum: 45  Total Cholesterol/HDL Ration Measurement: --  Triglycerides, Serum: 115

## 2025-03-22 PROCEDURE — 99232 SBSQ HOSP IP/OBS MODERATE 35: CPT

## 2025-03-22 RX ORDER — CLOZAPINE 100 MG
50 TABLET ORAL AT BEDTIME
Refills: 0 | Status: DISCONTINUED | OUTPATIENT
Start: 2025-03-22 | End: 2025-03-24

## 2025-03-22 RX ADMIN — HALOPERIDOL 5 MILLIGRAM(S): 10 TABLET ORAL at 08:13

## 2025-03-22 RX ADMIN — Medication 50 MILLIGRAM(S): at 20:25

## 2025-03-22 RX ADMIN — HALOPERIDOL 5 MILLIGRAM(S): 10 TABLET ORAL at 20:25

## 2025-03-22 RX ADMIN — Medication 500 MILLIGRAM(S): at 20:25

## 2025-03-22 RX ADMIN — Medication 500 MILLIGRAM(S): at 08:12

## 2025-03-22 RX ADMIN — Medication 2 MILLIGRAM(S): at 08:13

## 2025-03-22 NOTE — BH INPATIENT PSYCHIATRY PROGRESS NOTE - NSBHFUPINTERVALHXFT_PSY_A_CORE
Chart reviewed and case discussed with treatment team. No events reported overnight. Sleep and appetite is fair. Patient stated that he feels "up and down" and endorses +AH that are "mean". Patient denies any CAH or HI, but endorsed passive SI.  Patient is compliant with medications, no adverse effects reported.

## 2025-03-22 NOTE — BH INPATIENT PSYCHIATRY PROGRESS NOTE - NSBHASSESSSUMMFT_PSY_ALL_CORE
Pt is a 46 yo M, single, non-caregiver, domiciled at [facility], psych hx of  Schizophrenia vs Schizoaffective Disorder, multiple prior admissions including state (last inpt  [facility] August 26-Sept 24, 2024, and an admission to [facility] in April of 2024,  [facility] 2009 and [id number]), per chart cannabis use disorder (residence staff denies recent use), no known hx suicide attempt or violence, follows at [facility] on [place] grounds, no known legal issues, PMH HTN (not on meds) and anemia per psyckes on a previous admission, brought in by EMS called by [facility] staff after pt was found walking around naked and cheeking his meds and not taking the Clozapine 100mg, Seroquel 200mg, or Haldol 20mg, Depakote 1000mg at bedtime as scheduled. He is also on Aristada monthly.     On assessment, patient endorses feeling "so,so". He endorses +AH and passive SI.    >Legal: 9.27  >Obs: Routine, no need for CO, patient not expected to pose risk to self or others in controlled inpatient setting  >Psychiatric Meds: titrate Clozaril to 50mg PO at bedtime (psychosis), Haldol 5mg PO BID (psychosis) and Depakote 500mg PO BID (mood)   >Labs: Admission labs reviewed, no acute findings.   >Medical:  No acute concerns. No consultations needed at this time.  >Social: milieu/structured therapy  >Treatment Interventions: Groups and Individual Therapy/CBT   >Dispo: pending remission of sx

## 2025-03-22 NOTE — BH INPATIENT PSYCHIATRY PROGRESS NOTE - NSBHMETABOLIC_PSY_ALL_CORE_FT
BMI: BMI (kg/m2): 29.7 (03-18-25 @ 15:05)  HbA1c: A1C with Estimated Average Glucose Result: 5.2 % (03-19-25 @ 10:24)    Glucose:   BP: --Vital Signs Last 24 Hrs  T(C): 36.3 (03-22-25 @ 08:01), Max: 36.4 (03-21-25 @ 20:08)  T(F): 97.3 (03-22-25 @ 08:01), Max: 97.5 (03-21-25 @ 20:08)  HR: --  BP: --  BP(mean): --  RR: --  SpO2: --    Orthostatic VS  03-22-25 @ 08:01  Lying BP: --/-- HR: --  Sitting BP: 116/71 HR: 70  Standing BP: 110/74 HR: 101  Site: --  Mode: --  Orthostatic VS  03-21-25 @ 20:08  Lying BP: --/-- HR: --  Sitting BP: 119/63 HR: 97  Standing BP: 110/76 HR: 114  Site: upper left arm  Mode: --  Orthostatic VS  03-21-25 @ 07:54  Lying BP: --/-- HR: --  Sitting BP: 106/70 HR: 91  Standing BP: 109/91 HR: 101  Site: upper left arm  Mode: electronic  Orthostatic VS  03-20-25 @ 20:14  Lying BP: --/-- HR: --  Sitting BP: 131/82 HR: 100  Standing BP: 140/78 HR: 103  Site: --  Mode: --    Lipid Panel: Date/Time: 03-19-25 @ 10:24  Cholesterol, Serum: 167  LDL Cholesterol Calculated: 101  HDL Cholesterol, Serum: 45  Total Cholesterol/HDL Ration Measurement: --  Triglycerides, Serum: 115

## 2025-03-22 NOTE — BH INPATIENT PSYCHIATRY PROGRESS NOTE - NSBHCHARTREVIEWVS_PSY_A_CORE FT
Vital Signs Last 24 Hrs  T(C): 36.3 (03-22-25 @ 08:01), Max: 36.4 (03-21-25 @ 20:08)  T(F): 97.3 (03-22-25 @ 08:01), Max: 97.5 (03-21-25 @ 20:08)  HR: --  BP: --  BP(mean): --  RR: --  SpO2: --    Orthostatic VS  03-22-25 @ 08:01  Lying BP: --/-- HR: --  Sitting BP: 116/71 HR: 70  Standing BP: 110/74 HR: 101  Site: --  Mode: --  Orthostatic VS  03-21-25 @ 20:08  Lying BP: --/-- HR: --  Sitting BP: 119/63 HR: 97  Standing BP: 110/76 HR: 114  Site: upper left arm  Mode: --  Orthostatic VS  03-21-25 @ 07:54  Lying BP: --/-- HR: --  Sitting BP: 106/70 HR: 91  Standing BP: 109/91 HR: 101  Site: upper left arm  Mode: electronic  Orthostatic VS  03-20-25 @ 20:14  Lying BP: --/-- HR: --  Sitting BP: 131/82 HR: 100  Standing BP: 140/78 HR: 103  Site: --  Mode: --

## 2025-03-23 PROCEDURE — 99232 SBSQ HOSP IP/OBS MODERATE 35: CPT

## 2025-03-23 RX ADMIN — Medication 500 MILLIGRAM(S): at 08:16

## 2025-03-23 RX ADMIN — Medication 50 MILLIGRAM(S): at 20:29

## 2025-03-23 RX ADMIN — HALOPERIDOL 5 MILLIGRAM(S): 10 TABLET ORAL at 08:16

## 2025-03-23 RX ADMIN — Medication 2 MILLIGRAM(S): at 08:16

## 2025-03-23 RX ADMIN — Medication 500 MILLIGRAM(S): at 20:29

## 2025-03-23 RX ADMIN — HALOPERIDOL 5 MILLIGRAM(S): 10 TABLET ORAL at 20:29

## 2025-03-23 NOTE — BH INPATIENT PSYCHIATRY PROGRESS NOTE - NSBHASSESSSUMMFT_PSY_ALL_CORE
Pt is a 46 yo M, single, non-caregiver, domiciled at [facility], psych hx of  Schizophrenia vs Schizoaffective Disorder, multiple prior admissions including state (last inpt  [facility] August 26-Sept 24, 2024, and an admission to [facility] in April of 2024,  [facility] 2009 and [id number]), per chart cannabis use disorder (residence staff denies recent use), no known hx suicide attempt or violence, follows at [facility] on [place] grounds, no known legal issues, PMH HTN (not on meds) and anemia per psyckes on a previous admission, brought in by EMS called by [facility] staff after pt was found walking around naked and cheeking his meds and not taking the Clozapine 100mg, Seroquel 200mg, or Haldol 20mg, Depakote 1000mg at bedtime as scheduled. He is also on Aristada monthly.     On assessment, patient endorses feeling "ok" and +AH.    >Legal: 9.27  >Obs: Routine, no need for CO, patient not expected to pose risk to self or others in controlled inpatient setting  >Psychiatric Meds: titrate Clozaril to 50mg PO at bedtime (psychosis), Haldol 5mg PO BID (psychosis) and Depakote 500mg PO BID (mood)   >Labs: Admission labs reviewed, no acute findings.   >Medical:  No acute concerns. No consultations needed at this time.  >Social: milieu/structured therapy  >Treatment Interventions: Groups and Individual Therapy/CBT   >Dispo: pending remission of sx

## 2025-03-23 NOTE — BH INPATIENT PSYCHIATRY PROGRESS NOTE - NSBHCHARTREVIEWVS_PSY_A_CORE FT
Vital Signs Last 24 Hrs  T(C): 36.7 (03-23-25 @ 08:08), Max: 36.7 (03-23-25 @ 08:08)  T(F): 98.1 (03-23-25 @ 08:08), Max: 98.1 (03-23-25 @ 08:08)  HR: --  BP: --  BP(mean): --  RR: --  SpO2: --    Orthostatic VS  03-23-25 @ 08:08  Lying BP: --/-- HR: --  Sitting BP: 108/71 HR: 98  Standing BP: 114/79 HR: 113  Site: --  Mode: --  Orthostatic VS  03-22-25 @ 20:33  Lying BP: --/-- HR: --  Sitting BP: 110/68 HR: 71  Standing BP: 113/72 HR: 79  Site: --  Mode: --  Orthostatic VS  03-22-25 @ 08:01  Lying BP: --/-- HR: --  Sitting BP: 116/71 HR: 70  Standing BP: 110/74 HR: 101  Site: --  Mode: --  Orthostatic VS  03-21-25 @ 20:08  Lying BP: --/-- HR: --  Sitting BP: 119/63 HR: 97  Standing BP: 110/76 HR: 114  Site: upper left arm  Mode: --

## 2025-03-23 NOTE — BH INPATIENT PSYCHIATRY PROGRESS NOTE - NSBHMETABOLIC_PSY_ALL_CORE_FT
BMI: BMI (kg/m2): 29.7 (03-18-25 @ 15:05)  HbA1c: A1C with Estimated Average Glucose Result: 5.2 % (03-19-25 @ 10:24)    Glucose:   BP: --Vital Signs Last 24 Hrs  T(C): 36.7 (03-23-25 @ 08:08), Max: 36.7 (03-23-25 @ 08:08)  T(F): 98.1 (03-23-25 @ 08:08), Max: 98.1 (03-23-25 @ 08:08)  HR: --  BP: --  BP(mean): --  RR: --  SpO2: --    Orthostatic VS  03-23-25 @ 08:08  Lying BP: --/-- HR: --  Sitting BP: 108/71 HR: 98  Standing BP: 114/79 HR: 113  Site: --  Mode: --  Orthostatic VS  03-22-25 @ 20:33  Lying BP: --/-- HR: --  Sitting BP: 110/68 HR: 71  Standing BP: 113/72 HR: 79  Site: --  Mode: --  Orthostatic VS  03-22-25 @ 08:01  Lying BP: --/-- HR: --  Sitting BP: 116/71 HR: 70  Standing BP: 110/74 HR: 101  Site: --  Mode: --  Orthostatic VS  03-21-25 @ 20:08  Lying BP: --/-- HR: --  Sitting BP: 119/63 HR: 97  Standing BP: 110/76 HR: 114  Site: upper left arm  Mode: --    Lipid Panel: Date/Time: 03-19-25 @ 10:24  Cholesterol, Serum: 167  LDL Cholesterol Calculated: 101  HDL Cholesterol, Serum: 45  Total Cholesterol/HDL Ration Measurement: --  Triglycerides, Serum: 115

## 2025-03-23 NOTE — BH INPATIENT PSYCHIATRY PROGRESS NOTE - NSBHFUPINTERVALHXFT_PSY_A_CORE
Chart reviewed and case discussed with treatment team. No events reported overnight. Sleep and appetite is fair. Patient endorses +AH that can be "provoking" at times, denies any CAH. Denies any SI/HI.  Patient is compliant with medications, no adverse effects reported.

## 2025-03-24 PROCEDURE — 99232 SBSQ HOSP IP/OBS MODERATE 35: CPT

## 2025-03-24 RX ORDER — POLYETHYLENE GLYCOL 3350 17 G/17G
17 POWDER, FOR SOLUTION ORAL DAILY
Refills: 0 | Status: DISCONTINUED | OUTPATIENT
Start: 2025-03-24 | End: 2025-04-17

## 2025-03-24 RX ORDER — CLOZAPINE 100 MG
75 TABLET ORAL AT BEDTIME
Refills: 0 | Status: DISCONTINUED | OUTPATIENT
Start: 2025-03-24 | End: 2025-03-26

## 2025-03-24 RX ADMIN — Medication 75 MILLIGRAM(S): at 20:36

## 2025-03-24 RX ADMIN — Medication 500 MILLIGRAM(S): at 20:36

## 2025-03-24 RX ADMIN — Medication 500 MILLIGRAM(S): at 09:11

## 2025-03-24 RX ADMIN — HALOPERIDOL 5 MILLIGRAM(S): 10 TABLET ORAL at 20:36

## 2025-03-24 RX ADMIN — HALOPERIDOL 5 MILLIGRAM(S): 10 TABLET ORAL at 09:12

## 2025-03-24 NOTE — BH INPATIENT PSYCHIATRY PROGRESS NOTE - NSBHCHARTREVIEWVS_PSY_A_CORE FT
Vital Signs Last 24 Hrs  T(C): 36.3 (03-24-25 @ 07:42), Max: 36.4 (03-23-25 @ 20:25)  T(F): 97.3 (03-24-25 @ 07:42), Max: 97.6 (03-23-25 @ 20:25)  HR: --  BP: --  BP(mean): --  RR: --  SpO2: --    Orthostatic VS  03-24-25 @ 07:42  Lying BP: --/-- HR: --  Sitting BP: 113/67 HR: 92  Standing BP: 106/76 HR: 97  Site: --  Mode: --  Orthostatic VS  03-23-25 @ 20:25  Lying BP: --/-- HR: --  Sitting BP: 113/74 HR: 105  Standing BP: 109/78 HR: 106  Site: --  Mode: --  Orthostatic VS  03-23-25 @ 08:08  Lying BP: --/-- HR: --  Sitting BP: 108/71 HR: 98  Standing BP: 114/79 HR: 113  Site: --  Mode: --  Orthostatic VS  03-22-25 @ 20:33  Lying BP: --/-- HR: --  Sitting BP: 110/68 HR: 71  Standing BP: 113/72 HR: 79  Site: --  Mode: --

## 2025-03-24 NOTE — BH INPATIENT PSYCHIATRY PROGRESS NOTE - NSBHASSESSSUMMFT_PSY_ALL_CORE
Pt is a 44 yo M, single, non-caregiver, domiciled at [facility], psych hx of  Schizophrenia vs Schizoaffective Disorder, multiple prior admissions including state (last inpt  [facility] August 26-Sept 24, 2024, and an admission to [facility] in April of 2024,  [facility] 2009 and [id number]), per chart cannabis use disorder (residence staff denies recent use), no known hx suicide attempt or violence, follows at [facility] on [place] grounds, no known legal issues, PMH HTN (not on meds) and anemia per psyckes on a previous admission, brought in by EMS called by [facility] staff after pt was found walking around naked and cheeking his meds and not taking the Clozapine 100mg, Seroquel 200mg, or Haldol 20mg, Depakote 1000mg at bedtime as scheduled. He is also on Aristada monthly.     3/24: On assessment, pt remains disorganized and internally preoccupied. Titrating Clozapine tonight to 75mg    >Legal: 9.27  >Obs: Routine, no need for CO, patient not expected to pose risk to self or others in controlled inpatient setting  >Psychiatric Meds: titrate Clozaril to 75mg PO at bedtime (psychosis), Haldol 5mg PO BID (psychosis) and Depakote 500mg PO BID (mood)   >Labs: Admission labs reviewed, no acute findings.   >Medical:  No acute concerns. No consultations needed at this time.  >Social: milieu/structured therapy  >Treatment Interventions: Groups and Individual Therapy/CBT   >Dispo: pending remission of sx

## 2025-03-24 NOTE — BH INPATIENT PSYCHIATRY PROGRESS NOTE - NSBHFUPINTERVALHXFT_PSY_A_CORE
Patient is followed up for psychosis. Chart, medications and labs reviewed. Case discussed with treatment team. Patient had no overnight events, has been in fair behavioral control.   Patient was seen and is evaluated on the unit. Seen spinning around in circles and looking at the TV prior to speaking w/ writer. He states that he was admitted from [facility] after he was walking around without a shirt, says he wasn't completely naked and was wearing pants. Reports that he wasn't wearing a shirt because his shirts were "dirty". Does admit to not taking medications on certain days, although has been compliant with standing medications on the unit, denies SE. Agreeable to titration of Clozapine to 75mg tonight. He denies AVH or paranoia, appears internally and disorganized. Denies SHIIP.

## 2025-03-24 NOTE — BH INPATIENT PSYCHIATRY PROGRESS NOTE - NSBHMETABOLIC_PSY_ALL_CORE_FT
BMI: BMI (kg/m2): 29.7 (03-18-25 @ 15:05)  HbA1c: A1C with Estimated Average Glucose Result: 5.2 % (03-19-25 @ 10:24)    Glucose:   BP: --Vital Signs Last 24 Hrs  T(C): 36.3 (03-24-25 @ 07:42), Max: 36.4 (03-23-25 @ 20:25)  T(F): 97.3 (03-24-25 @ 07:42), Max: 97.6 (03-23-25 @ 20:25)  HR: --  BP: --  BP(mean): --  RR: --  SpO2: --    Orthostatic VS  03-24-25 @ 07:42  Lying BP: --/-- HR: --  Sitting BP: 113/67 HR: 92  Standing BP: 106/76 HR: 97  Site: --  Mode: --  Orthostatic VS  03-23-25 @ 20:25  Lying BP: --/-- HR: --  Sitting BP: 113/74 HR: 105  Standing BP: 109/78 HR: 106  Site: --  Mode: --  Orthostatic VS  03-23-25 @ 08:08  Lying BP: --/-- HR: --  Sitting BP: 108/71 HR: 98  Standing BP: 114/79 HR: 113  Site: --  Mode: --  Orthostatic VS  03-22-25 @ 20:33  Lying BP: --/-- HR: --  Sitting BP: 110/68 HR: 71  Standing BP: 113/72 HR: 79  Site: --  Mode: --    Lipid Panel: Date/Time: 03-19-25 @ 10:24  Cholesterol, Serum: 167  LDL Cholesterol Calculated: 101  HDL Cholesterol, Serum: 45  Total Cholesterol/HDL Ration Measurement: --  Triglycerides, Serum: 115

## 2025-03-25 PROCEDURE — 99232 SBSQ HOSP IP/OBS MODERATE 35: CPT

## 2025-03-25 RX ADMIN — Medication 500 MILLIGRAM(S): at 08:39

## 2025-03-25 RX ADMIN — HALOPERIDOL 5 MILLIGRAM(S): 10 TABLET ORAL at 20:15

## 2025-03-25 RX ADMIN — HALOPERIDOL 5 MILLIGRAM(S): 10 TABLET ORAL at 08:40

## 2025-03-25 RX ADMIN — Medication 75 MILLIGRAM(S): at 20:15

## 2025-03-25 RX ADMIN — Medication 500 MILLIGRAM(S): at 20:15

## 2025-03-25 NOTE — BH INPATIENT PSYCHIATRY PROGRESS NOTE - NSBHASSESSSUMMFT_PSY_ALL_CORE
Pt is a 46 yo M, single, non-caregiver, domiciled at [facility], psych hx of  Schizophrenia vs Schizoaffective Disorder, multiple prior admissions including state (last inpt  [facility] August 26-Sept 24, 2024, and an admission to [facility] in April of 2024,  [facility] 2009 and [id number]), per chart cannabis use disorder (residence staff denies recent use), no known hx suicide attempt or violence, follows at [facility] on [place] grounds, no known legal issues, PMH HTN (not on meds) and anemia per psyckes on a previous admission, brought in by EMS called by [facility] staff after pt was found walking around naked and cheeking his meds and not taking the Clozapine 100mg, Seroquel 200mg, or Haldol 20mg, Depakote 1000mg at bedtime as scheduled. He is also on Aristada monthly.     3/25: On assessment, pt remains disorganized. Denying AVH or delusional thoughts. Tolerating Clozapine titration.    >Legal: 9.27  >Obs: Routine, no need for CO, patient not expected to pose risk to self or others in controlled inpatient setting  >Psychiatric Meds: titrate Clozaril to 75mg PO at bedtime (psychosis), Haldol 5mg PO BID (psychosis) and Depakote 500mg PO BID (mood)   >Labs: Admission labs reviewed, no acute findings.   >Medical:  No acute concerns. No consultations needed at this time.  >Social: milieu/structured therapy  >Treatment Interventions: Groups and Individual Therapy/CBT   >Dispo: pending remission of sx

## 2025-03-25 NOTE — BH INPATIENT PSYCHIATRY PROGRESS NOTE - NSBHMETABOLIC_PSY_ALL_CORE_FT
BMI: BMI (kg/m2): 29.7 (03-18-25 @ 15:05)  HbA1c: A1C with Estimated Average Glucose Result: 5.2 % (03-19-25 @ 10:24)    Glucose:   BP: --Vital Signs Last 24 Hrs  T(C): 36.6 (03-25-25 @ 07:53), Max: 36.6 (03-25-25 @ 07:53)  T(F): 97.9 (03-25-25 @ 07:53), Max: 97.9 (03-25-25 @ 07:53)  HR: --  BP: --  BP(mean): --  RR: --  SpO2: --    Orthostatic VS  03-25-25 @ 07:53  Lying BP: --/-- HR: --  Sitting BP: 126/70 HR: 99  Standing BP: 112/78 HR: 112  Site: --  Mode: --  Orthostatic VS  03-24-25 @ 20:18  Lying BP: --/-- HR: --  Sitting BP: 116/82 HR: 112  Standing BP: 115/79 HR: 108  Site: upper left arm  Mode: --  Orthostatic VS  03-24-25 @ 07:42  Lying BP: --/-- HR: --  Sitting BP: 113/67 HR: 92  Standing BP: 106/76 HR: 97  Site: --  Mode: --  Orthostatic VS  03-23-25 @ 20:25  Lying BP: --/-- HR: --  Sitting BP: 113/74 HR: 105  Standing BP: 109/78 HR: 106  Site: --  Mode: --    Lipid Panel: Date/Time: 03-19-25 @ 10:24  Cholesterol, Serum: 167  LDL Cholesterol Calculated: 101  HDL Cholesterol, Serum: 45  Total Cholesterol/HDL Ration Measurement: --  Triglycerides, Serum: 115

## 2025-03-25 NOTE — BH INPATIENT PSYCHIATRY PROGRESS NOTE - NSBHCHARTREVIEWVS_PSY_A_CORE FT
Vital Signs Last 24 Hrs  T(C): 36.6 (03-25-25 @ 07:53), Max: 36.6 (03-25-25 @ 07:53)  T(F): 97.9 (03-25-25 @ 07:53), Max: 97.9 (03-25-25 @ 07:53)  HR: --  BP: --  BP(mean): --  RR: --  SpO2: --    Orthostatic VS  03-25-25 @ 07:53  Lying BP: --/-- HR: --  Sitting BP: 126/70 HR: 99  Standing BP: 112/78 HR: 112  Site: --  Mode: --  Orthostatic VS  03-24-25 @ 20:18  Lying BP: --/-- HR: --  Sitting BP: 116/82 HR: 112  Standing BP: 115/79 HR: 108  Site: upper left arm  Mode: --  Orthostatic VS  03-24-25 @ 07:42  Lying BP: --/-- HR: --  Sitting BP: 113/67 HR: 92  Standing BP: 106/76 HR: 97  Site: --  Mode: --  Orthostatic VS  03-23-25 @ 20:25  Lying BP: --/-- HR: --  Sitting BP: 113/74 HR: 105  Standing BP: 109/78 HR: 106  Site: --  Mode: --

## 2025-03-25 NOTE — BH INPATIENT PSYCHIATRY PROGRESS NOTE - NSBHFUPINTERVALHXFT_PSY_A_CORE
Patient is followed up for psychosis. Chart, medications and labs reviewed. Case discussed with treatment team. Patient had no overnight events, has been in fair behavioral control.   Patient was seen and is evaluated on the unit. Seen lying down in bed, able to sit up to engage in interview. He states that he is "feeling better" and was able to speak with a care coordinator that he had previously met on another unit. Has been compliant with standing medications on the unit. Tolerating titration of Clozapine. He denies AVH or paranoia, appears disorganized. Denies SHIIP.

## 2025-03-26 PROCEDURE — 99232 SBSQ HOSP IP/OBS MODERATE 35: CPT

## 2025-03-26 RX ORDER — LORAZEPAM 4 MG/ML
2 VIAL (ML) INJECTION ONCE
Refills: 0 | Status: DISCONTINUED | OUTPATIENT
Start: 2025-03-26 | End: 2025-04-02

## 2025-03-26 RX ORDER — CLOZAPINE 100 MG
100 TABLET ORAL AT BEDTIME
Refills: 0 | Status: DISCONTINUED | OUTPATIENT
Start: 2025-03-26 | End: 2025-03-28

## 2025-03-26 RX ORDER — LORAZEPAM 4 MG/ML
2 VIAL (ML) INJECTION EVERY 6 HOURS
Refills: 0 | Status: DISCONTINUED | OUTPATIENT
Start: 2025-03-26 | End: 2025-04-02

## 2025-03-26 RX ADMIN — Medication 100 MILLIGRAM(S): at 20:04

## 2025-03-26 RX ADMIN — Medication 500 MILLIGRAM(S): at 08:07

## 2025-03-26 RX ADMIN — Medication 500 MILLIGRAM(S): at 20:05

## 2025-03-26 RX ADMIN — HALOPERIDOL 5 MILLIGRAM(S): 10 TABLET ORAL at 20:05

## 2025-03-26 RX ADMIN — HALOPERIDOL 5 MILLIGRAM(S): 10 TABLET ORAL at 08:07

## 2025-03-26 NOTE — BH INPATIENT PSYCHIATRY PROGRESS NOTE - ADDITIONAL DETAILS / COMMENTS
PT became too distracted by AHs and could not respond to most questions.

## 2025-03-26 NOTE — BH INPATIENT PSYCHIATRY PROGRESS NOTE - NSBHASSESSSUMMFT_PSY_ALL_CORE
Pt is a 44 yo M, single, non-caregiver, domiciled at [facility], psych hx of  Schizophrenia vs Schizoaffective Disorder, multiple prior admissions including state (last inpt  [facility] August 26-Sept 24, 2024, and an admission to [facility] in April of 2024,  [facility] 2009 and [id number]), per chart cannabis use disorder (residence staff denies recent use), no known hx suicide attempt or violence, follows at [facility] on [place] grounds, no known legal issues, PMH HTN (not on meds) and anemia per psyckes on a previous admission, brought in by EMS called by [facility] staff after pt was found walking around naked and cheeking his meds and not taking the Clozapine 100mg, Seroquel 200mg, or Haldol 20mg, Depakote 1000mg at bedtime as scheduled. He is also on Aristada monthly.       >Legal: 9.27  >Obs: Routine, no need for CO, patient not expected to pose risk to self or others in controlled inpatient setting  >Psychiatric Meds: titrate Clozaril to 100mg PO at bedtime (psychosis), Haldol 5mg PO BID (psychosis) and Depakote 500mg PO BID (mood)   >Labs: Admission labs reviewed, no acute findings.   >Medical:  No acute concerns. No consultations needed at this time.  >Social: milieu/structured therapy  >Treatment Interventions: Groups and Individual Therapy/CBT   >Dispo: pending remission of sx

## 2025-03-26 NOTE — BH INPATIENT PSYCHIATRY PROGRESS NOTE - NSBHCHARTREVIEWVS_PSY_A_CORE FT
Vital Signs Last 24 Hrs  T(C): 36.9 (03-26-25 @ 07:59), Max: 36.9 (03-26-25 @ 07:59)  T(F): 98.4 (03-26-25 @ 07:59), Max: 98.4 (03-26-25 @ 07:59)  HR: --  BP: --  BP(mean): --  RR: 16 (03-26-25 @ 07:59) (16 - 16)  SpO2: --    Orthostatic VS  03-26-25 @ 07:59  Lying BP: 109/71 HR: 101  Sitting BP: 112/74 HR: 114  Standing BP: --/-- HR: --  Site: --  Mode: --  Orthostatic VS  03-25-25 @ 20:21  Lying BP: --/-- HR: --  Sitting BP: 123/74 HR: 103  Standing BP: 118/73 HR: 113  Site: --  Mode: --  Orthostatic VS  03-25-25 @ 07:53  Lying BP: --/-- HR: --  Sitting BP: 126/70 HR: 99  Standing BP: 112/78 HR: 112  Site: --  Mode: --  Orthostatic VS  03-24-25 @ 20:18  Lying BP: --/-- HR: --  Sitting BP: 116/82 HR: 112  Standing BP: 115/79 HR: 108  Site: upper left arm  Mode: --

## 2025-03-26 NOTE — BH INPATIENT PSYCHIATRY PROGRESS NOTE - NSBHFUPINTERVALHXFT_PSY_A_CORE
Pt compliant with medication and tolerating it well.  Chart reviewed and case discussed with treatment team.  No events reported overnight.  Pt reports he is not doing well today because he has so many problems.  Pt reports AH saying "anything."  Pt reports the voices treat him with respect and disrespect and he has not been disrespectful.  Pt reports CAH at times to harm himself and others.  Pt denies SI/HI/I/P.  Pt reports VH or ghosts and paranoia that everyone is trying to hurt him.  Pt eating and sleeping well.   Pt reports he needs money, clothes, food and shelter.

## 2025-03-26 NOTE — BH INPATIENT PSYCHIATRY PROGRESS NOTE - NSBHMETABOLIC_PSY_ALL_CORE_FT
BMI: BMI (kg/m2): 29.7 (03-18-25 @ 15:05)  HbA1c: A1C with Estimated Average Glucose Result: 5.2 % (03-19-25 @ 10:24)    Glucose:   BP: --Vital Signs Last 24 Hrs  T(C): 36.9 (03-26-25 @ 07:59), Max: 36.9 (03-26-25 @ 07:59)  T(F): 98.4 (03-26-25 @ 07:59), Max: 98.4 (03-26-25 @ 07:59)  HR: --  BP: --  BP(mean): --  RR: 16 (03-26-25 @ 07:59) (16 - 16)  SpO2: --    Orthostatic VS  03-26-25 @ 07:59  Lying BP: 109/71 HR: 101  Sitting BP: 112/74 HR: 114  Standing BP: --/-- HR: --  Site: --  Mode: --  Orthostatic VS  03-25-25 @ 20:21  Lying BP: --/-- HR: --  Sitting BP: 123/74 HR: 103  Standing BP: 118/73 HR: 113  Site: --  Mode: --  Orthostatic VS  03-25-25 @ 07:53  Lying BP: --/-- HR: --  Sitting BP: 126/70 HR: 99  Standing BP: 112/78 HR: 112  Site: --  Mode: --  Orthostatic VS  03-24-25 @ 20:18  Lying BP: --/-- HR: --  Sitting BP: 116/82 HR: 112  Standing BP: 115/79 HR: 108  Site: upper left arm  Mode: --    Lipid Panel: Date/Time: 03-19-25 @ 10:24  Cholesterol, Serum: 167  LDL Cholesterol Calculated: 101  HDL Cholesterol, Serum: 45  Total Cholesterol/HDL Ration Measurement: --  Triglycerides, Serum: 115

## 2025-03-27 LAB
BASOPHILS # BLD AUTO: 0.04 K/UL — SIGNIFICANT CHANGE UP (ref 0–0.2)
BASOPHILS NFR BLD AUTO: 0.8 % — SIGNIFICANT CHANGE UP (ref 0–2)
EOSINOPHIL # BLD AUTO: 0.18 K/UL — SIGNIFICANT CHANGE UP (ref 0–0.5)
EOSINOPHIL NFR BLD AUTO: 3.5 % — SIGNIFICANT CHANGE UP (ref 0–6)
HCT VFR BLD CALC: 44.4 % — SIGNIFICANT CHANGE UP (ref 39–50)
HGB BLD-MCNC: 14.5 G/DL — SIGNIFICANT CHANGE UP (ref 13–17)
IANC: 2.06 K/UL — SIGNIFICANT CHANGE UP (ref 1.8–7.4)
IMM GRANULOCYTES NFR BLD AUTO: 0.2 % — SIGNIFICANT CHANGE UP (ref 0–0.9)
LYMPHOCYTES # BLD AUTO: 2.34 K/UL — SIGNIFICANT CHANGE UP (ref 1–3.3)
LYMPHOCYTES # BLD AUTO: 45.7 % — HIGH (ref 13–44)
MCHC RBC-ENTMCNC: 28.8 PG — SIGNIFICANT CHANGE UP (ref 27–34)
MCHC RBC-ENTMCNC: 32.7 G/DL — SIGNIFICANT CHANGE UP (ref 32–36)
MCV RBC AUTO: 88.3 FL — SIGNIFICANT CHANGE UP (ref 80–100)
MONOCYTES # BLD AUTO: 0.49 K/UL — SIGNIFICANT CHANGE UP (ref 0–0.9)
MONOCYTES NFR BLD AUTO: 9.6 % — SIGNIFICANT CHANGE UP (ref 2–14)
NEUTROPHILS # BLD AUTO: 2.06 K/UL — SIGNIFICANT CHANGE UP (ref 1.8–7.4)
NEUTROPHILS NFR BLD AUTO: 40.2 % — LOW (ref 43–77)
NRBC # BLD AUTO: 0 K/UL — SIGNIFICANT CHANGE UP (ref 0–0)
NRBC # FLD: 0 K/UL — SIGNIFICANT CHANGE UP (ref 0–0)
NRBC BLD AUTO-RTO: 0 /100 WBCS — SIGNIFICANT CHANGE UP (ref 0–0)
PLATELET # BLD AUTO: 258 K/UL — SIGNIFICANT CHANGE UP (ref 150–400)
RBC # BLD: 5.03 M/UL — SIGNIFICANT CHANGE UP (ref 4.2–5.8)
RBC # FLD: 13.2 % — SIGNIFICANT CHANGE UP (ref 10.3–14.5)
VALPROATE SERPL-MCNC: 52.3 UG/ML — SIGNIFICANT CHANGE UP (ref 50–100)
WBC # BLD: 5.12 K/UL — SIGNIFICANT CHANGE UP (ref 3.8–10.5)
WBC # FLD AUTO: 5.12 K/UL — SIGNIFICANT CHANGE UP (ref 3.8–10.5)

## 2025-03-27 PROCEDURE — 99232 SBSQ HOSP IP/OBS MODERATE 35: CPT

## 2025-03-27 RX ADMIN — HALOPERIDOL 5 MILLIGRAM(S): 10 TABLET ORAL at 08:38

## 2025-03-27 RX ADMIN — Medication 500 MILLIGRAM(S): at 08:38

## 2025-03-27 RX ADMIN — Medication 100 MILLIGRAM(S): at 20:50

## 2025-03-27 RX ADMIN — Medication 1000 MILLIGRAM(S): at 20:50

## 2025-03-27 RX ADMIN — HALOPERIDOL 5 MILLIGRAM(S): 10 TABLET ORAL at 20:50

## 2025-03-27 NOTE — BH INPATIENT PSYCHIATRY PROGRESS NOTE - NSBHMETABOLIC_PSY_ALL_CORE_FT
BMI: BMI (kg/m2): 29.7 (03-18-25 @ 15:05)  HbA1c: A1C with Estimated Average Glucose Result: 5.2 % (03-19-25 @ 10:24)    Glucose:   BP: --Vital Signs Last 24 Hrs  T(C): 36.5 (03-27-25 @ 07:40), Max: 36.7 (03-26-25 @ 19:44)  T(F): 97.7 (03-27-25 @ 07:40), Max: 98.1 (03-26-25 @ 19:44)  HR: --  BP: --  BP(mean): --  RR: --  SpO2: --    Orthostatic VS  03-27-25 @ 07:40  Lying BP: --/-- HR: --  Sitting BP: 119/87 HR: 103  Standing BP: 108/95 HR: 115  Site: --  Mode: --  Orthostatic VS  03-26-25 @ 19:44  Lying BP: --/-- HR: --  Sitting BP: 129/77 HR: 102  Standing BP: 122/78 HR: 109  Site: upper left arm  Mode: --  Orthostatic VS  03-26-25 @ 07:59  Lying BP: 109/71 HR: 101  Sitting BP: 112/74 HR: 114  Standing BP: --/-- HR: --  Site: --  Mode: --  Orthostatic VS  03-25-25 @ 20:21  Lying BP: --/-- HR: --  Sitting BP: 123/74 HR: 103  Standing BP: 118/73 HR: 113  Site: --  Mode: --    Lipid Panel: Date/Time: 03-19-25 @ 10:24  Cholesterol, Serum: 167  LDL Cholesterol Calculated: 101  HDL Cholesterol, Serum: 45  Total Cholesterol/HDL Ration Measurement: --  Triglycerides, Serum: 115

## 2025-03-27 NOTE — BH INPATIENT PSYCHIATRY PROGRESS NOTE - NSBHASSESSSUMMFT_PSY_ALL_CORE
Pt is a 46 yo M, single, non-caregiver, domiciled at [facility], psych hx of  Schizophrenia vs Schizoaffective Disorder, multiple prior admissions including state (last inpt  [facility] August 26-Sept 24, 2024, and an admission to [facility] in April of 2024,  [facility] 2009 and [id number]), per chart cannabis use disorder (residence staff denies recent use), no known hx suicide attempt or violence, follows at [facility] on [place] grounds, no known legal issues, PMH HTN (not on meds) and anemia per psyckes on a previous admission, brought in by EMS called by [facility] staff after pt was found walking around naked and cheeking his meds and not taking the Clozapine 100mg, Seroquel 200mg, or Haldol 20mg, Depakote 1000mg at bedtime as scheduled. He is also on Aristada monthly.       >Legal: 9.27  >Obs: Routine, no need for CO, patient not expected to pose risk to self or others in controlled inpatient setting  >Psychiatric Meds: titrate Clozaril to 100mg PO at bedtime (psychosis), Haldol 5mg PO BID (psychosis) and Depakote titrated to 500mg PO daily and 1000mg PO at bedtime (mood)   >Labs: Admission labs reviewed, no acute findings.   >Medical:  No acute concerns. No consultations needed at this time.  >Social: milieu/structured therapy  >Treatment Interventions: Groups and Individual Therapy/CBT   >Dispo: pending remission of sx

## 2025-03-27 NOTE — BH TREATMENT PLAN - NSTXPLANTHERAPYSESSIONSFT_PSY_ALL_CORE
03-24-25  Type of therapy: Coping skills, Medication management, Psychoeducation, Relapse prevention  --  --  --  --  --

## 2025-03-27 NOTE — BH INPATIENT PSYCHIATRY PROGRESS NOTE - NSBHCHARTREVIEWVS_PSY_A_CORE FT
Vital Signs Last 24 Hrs  T(C): 36.5 (03-27-25 @ 07:40), Max: 36.7 (03-26-25 @ 19:44)  T(F): 97.7 (03-27-25 @ 07:40), Max: 98.1 (03-26-25 @ 19:44)  HR: --  BP: --  BP(mean): --  RR: --  SpO2: --    Orthostatic VS  03-27-25 @ 07:40  Lying BP: --/-- HR: --  Sitting BP: 119/87 HR: 103  Standing BP: 108/95 HR: 115  Site: --  Mode: --  Orthostatic VS  03-26-25 @ 19:44  Lying BP: --/-- HR: --  Sitting BP: 129/77 HR: 102  Standing BP: 122/78 HR: 109  Site: upper left arm  Mode: --  Orthostatic VS  03-26-25 @ 07:59  Lying BP: 109/71 HR: 101  Sitting BP: 112/74 HR: 114  Standing BP: --/-- HR: --  Site: --  Mode: --  Orthostatic VS  03-25-25 @ 20:21  Lying BP: --/-- HR: --  Sitting BP: 123/74 HR: 103  Standing BP: 118/73 HR: 113  Site: --  Mode: --

## 2025-03-27 NOTE — BH INPATIENT PSYCHIATRY PROGRESS NOTE - NSBHFUPINTERVALHXFT_PSY_A_CORE
Pt compliant with medication and tolerating it well.  Chart reviewed and case discussed with treatment team.  No events reported overnight.  Pt observed sitting in the day area talking to himself.  Pt approached for interview and is irritable upon approach and reports he does not want to talk despite several attempts.  Pt later approaches writer reporting he wants to be discharged and the staff is not real.  Pt remains uncooperative with interview.

## 2025-03-28 PROCEDURE — 99232 SBSQ HOSP IP/OBS MODERATE 35: CPT

## 2025-03-28 RX ORDER — CLOZAPINE 100 MG
150 TABLET ORAL AT BEDTIME
Refills: 0 | Status: DISCONTINUED | OUTPATIENT
Start: 2025-03-28 | End: 2025-03-31

## 2025-03-28 RX ADMIN — HALOPERIDOL 5 MILLIGRAM(S): 10 TABLET ORAL at 16:29

## 2025-03-28 RX ADMIN — Medication 500 MILLIGRAM(S): at 09:11

## 2025-03-28 RX ADMIN — HALOPERIDOL 5 MILLIGRAM(S): 10 TABLET ORAL at 09:11

## 2025-03-28 RX ADMIN — Medication 2 MILLIGRAM(S): at 16:29

## 2025-03-28 NOTE — BH INPATIENT PSYCHIATRY PROGRESS NOTE - NSBHCHARTREVIEWVS_PSY_A_CORE FT
Vital Signs Last 24 Hrs  T(C): 37.2 (03-28-25 @ 08:11), Max: 37.2 (03-28-25 @ 08:11)  T(F): 98.9 (03-28-25 @ 08:11), Max: 98.9 (03-28-25 @ 08:11)  HR: --  BP: --  BP(mean): --  RR: --  SpO2: --    Orthostatic VS  03-28-25 @ 08:11  Lying BP: --/-- HR: --  Sitting BP: 125/81 HR: 97  Standing BP: 121/76 HR: 100  Site: --  Mode: --  Orthostatic VS  03-27-25 @ 20:03  Lying BP: --/-- HR: --  Sitting BP: 132/98 HR: 97  Standing BP: 116/79 HR: 105  Site: --  Mode: --  Orthostatic VS  03-27-25 @ 07:40  Lying BP: --/-- HR: --  Sitting BP: 119/87 HR: 103  Standing BP: 108/95 HR: 115  Site: --  Mode: --  Orthostatic VS  03-26-25 @ 19:44  Lying BP: --/-- HR: --  Sitting BP: 129/77 HR: 102  Standing BP: 122/78 HR: 109  Site: upper left arm  Mode: --

## 2025-03-28 NOTE — BH INPATIENT PSYCHIATRY PROGRESS NOTE - NSBHMETABOLIC_PSY_ALL_CORE_FT
BMI: BMI (kg/m2): 29.7 (03-18-25 @ 15:05)  HbA1c: A1C with Estimated Average Glucose Result: 5.2 % (03-19-25 @ 10:24)    Glucose:   BP: --Vital Signs Last 24 Hrs  T(C): 37.2 (03-28-25 @ 08:11), Max: 37.2 (03-28-25 @ 08:11)  T(F): 98.9 (03-28-25 @ 08:11), Max: 98.9 (03-28-25 @ 08:11)  HR: --  BP: --  BP(mean): --  RR: --  SpO2: --    Orthostatic VS  03-28-25 @ 08:11  Lying BP: --/-- HR: --  Sitting BP: 125/81 HR: 97  Standing BP: 121/76 HR: 100  Site: --  Mode: --  Orthostatic VS  03-27-25 @ 20:03  Lying BP: --/-- HR: --  Sitting BP: 132/98 HR: 97  Standing BP: 116/79 HR: 105  Site: --  Mode: --  Orthostatic VS  03-27-25 @ 07:40  Lying BP: --/-- HR: --  Sitting BP: 119/87 HR: 103  Standing BP: 108/95 HR: 115  Site: --  Mode: --  Orthostatic VS  03-26-25 @ 19:44  Lying BP: --/-- HR: --  Sitting BP: 129/77 HR: 102  Standing BP: 122/78 HR: 109  Site: upper left arm  Mode: --    Lipid Panel: Date/Time: 03-19-25 @ 10:24  Cholesterol, Serum: 167  LDL Cholesterol Calculated: 101  HDL Cholesterol, Serum: 45  Total Cholesterol/HDL Ration Measurement: --  Triglycerides, Serum: 115

## 2025-03-28 NOTE — BH INPATIENT PSYCHIATRY PROGRESS NOTE - NSBHASSESSSUMMFT_PSY_ALL_CORE
Pt is a 44 yo M, single, non-caregiver, domiciled at [facility], psych hx of  Schizophrenia vs Schizoaffective Disorder, multiple prior admissions including state (last inpt  [facility] August 26-Sept 24, 2024, and an admission to [facility] in April of 2024,  [facility] 2009 and [id number]), per chart cannabis use disorder (residence staff denies recent use), no known hx suicide attempt or violence, follows at [facility] on [place] grounds, no known legal issues, PMH HTN (not on meds) and anemia per psyckes on a previous admission, brought in by EMS called by [facility] staff after pt was found walking around naked and cheeking his meds and not taking the Clozapine 100mg, Seroquel 200mg, or Haldol 20mg, Depakote 1000mg at bedtime as scheduled. He is also on Aristada monthly.       >Legal: 9.27  >Obs: Routine, no need for CO, patient not expected to pose risk to self or others in controlled inpatient setting  >Psychiatric Meds: titrate Clozaril to 150mg PO at bedtime (psychosis), Haldol 5mg PO BID (psychosis) and Depakote 500mg PO BID (mood)   >Labs: Admission labs reviewed, no acute findings.   >Medical:  No acute concerns. No consultations needed at this time.  >Social: milieu/structured therapy  >Treatment Interventions: Groups and Individual Therapy/CBT   >Dispo: pending remission of sx

## 2025-03-28 NOTE — BH INPATIENT PSYCHIATRY PROGRESS NOTE - NSBHFUPINTERVALHXFT_PSY_A_CORE
Pt compliant with medication and tolerating it well.  Chart reviewed and case discussed with treatment team.  No events reported overnight.  Pt pleasant and more cooperative with interview today.  Pt denies SI/HI/I/P or AH/VH or paranoia.  Pt eating and sleeping well.  Pt appears internally preoccupied.

## 2025-03-29 RX ADMIN — HALOPERIDOL 5 MILLIGRAM(S): 10 TABLET ORAL at 08:15

## 2025-03-29 RX ADMIN — Medication 2 MILLIGRAM(S): at 08:14

## 2025-03-29 RX ADMIN — Medication 1000 MILLIGRAM(S): at 20:21

## 2025-03-29 RX ADMIN — Medication 150 MILLIGRAM(S): at 20:22

## 2025-03-29 RX ADMIN — HALOPERIDOL 5 MILLIGRAM(S): 10 TABLET ORAL at 20:22

## 2025-03-29 RX ADMIN — Medication 500 MILLIGRAM(S): at 08:14

## 2025-03-30 RX ADMIN — Medication 500 MILLIGRAM(S): at 08:23

## 2025-03-30 RX ADMIN — HALOPERIDOL 5 MILLIGRAM(S): 10 TABLET ORAL at 20:25

## 2025-03-30 RX ADMIN — HALOPERIDOL 5 MILLIGRAM(S): 10 TABLET ORAL at 08:22

## 2025-03-30 RX ADMIN — Medication 150 MILLIGRAM(S): at 20:25

## 2025-03-30 RX ADMIN — Medication 1000 MILLIGRAM(S): at 20:25

## 2025-03-31 PROCEDURE — 99232 SBSQ HOSP IP/OBS MODERATE 35: CPT

## 2025-03-31 RX ORDER — CLOZAPINE 100 MG
200 TABLET ORAL AT BEDTIME
Refills: 0 | Status: DISCONTINUED | OUTPATIENT
Start: 2025-03-31 | End: 2025-04-02

## 2025-03-31 RX ADMIN — HALOPERIDOL 5 MILLIGRAM(S): 10 TABLET ORAL at 08:38

## 2025-03-31 RX ADMIN — Medication 200 MILLIGRAM(S): at 20:17

## 2025-03-31 RX ADMIN — Medication 500 MILLIGRAM(S): at 08:39

## 2025-03-31 RX ADMIN — Medication 1000 MILLIGRAM(S): at 20:16

## 2025-03-31 RX ADMIN — HALOPERIDOL 5 MILLIGRAM(S): 10 TABLET ORAL at 20:16

## 2025-03-31 NOTE — BH INPATIENT PSYCHIATRY PROGRESS NOTE - NSBHCHARTREVIEWVS_PSY_A_CORE FT
Vital Signs Last 24 Hrs  T(C): 36.2 (03-31-25 @ 07:35), Max: 36.9 (03-30-25 @ 19:31)  T(F): 97.1 (03-31-25 @ 07:35), Max: 98.4 (03-30-25 @ 19:31)  HR: --  BP: --  BP(mean): --  RR: --  SpO2: --    Orthostatic VS  03-31-25 @ 07:35  Lying BP: --/-- HR: --  Sitting BP: 105/71 HR: 80  Standing BP: --/-- HR: --  Site: --  Mode: --  Orthostatic VS  03-30-25 @ 19:31  Lying BP: --/-- HR: --  Sitting BP: 105/67 HR: 93  Standing BP: 101/71 HR: 102  Site: --  Mode: --  Orthostatic VS  03-30-25 @ 07:56  Lying BP: --/-- HR: --  Sitting BP: 121/83 HR: 93  Standing BP: 113/74 HR: 98  Site: --  Mode: --  Orthostatic VS  03-29-25 @ 20:49  Lying BP: --/-- HR: --  Sitting BP: 110/87 HR: 87  Standing BP: 124/80 HR: 102  Site: --  Mode: --

## 2025-03-31 NOTE — BH INPATIENT PSYCHIATRY PROGRESS NOTE - NSBHFUPINTERVALHXFT_PSY_A_CORE
Pt compliant with medication and tolerating it well.  Chart reviewed and case discussed with treatment team.  No events reported overnight.  Pt reports he is still hearing voices, some are negative and some are positive.  Pt denies SI/HI/I/P or VH.  Pt reports he is paranoid that others are out to get him.  Pt eating and sleeping well.

## 2025-03-31 NOTE — BH INPATIENT PSYCHIATRY PROGRESS NOTE - NSBHASSESSSUMMFT_PSY_ALL_CORE
Pt is a 44 yo M, single, non-caregiver, domiciled at [facility], psych hx of  Schizophrenia vs Schizoaffective Disorder, multiple prior admissions including state (last inpt  [facility] August 26-Sept 24, 2024, and an admission to [facility] in April of 2024,  [facility] 2009 and [id number]), per chart cannabis use disorder (residence staff denies recent use), no known hx suicide attempt or violence, follows at [facility] on [place] grounds, no known legal issues, PMH HTN (not on meds) and anemia per psyckes on a previous admission, brought in by EMS called by [facility] staff after pt was found walking around naked and cheeking his meds and not taking the Clozapine 100mg, Seroquel 200mg, or Haldol 20mg, Depakote 1000mg at bedtime as scheduled. He is also on Aristada monthly.       >Legal: 9.27  >Obs: Routine, no need for CO, patient not expected to pose risk to self or others in controlled inpatient setting  >Psychiatric Meds: titrate Clozaril to 200mg PO at bedtime (psychosis), Haldol 5mg PO BID (psychosis) and Depakote 500mg PO BID (mood)   >Labs: Admission labs reviewed, no acute findings.   >Medical:  No acute concerns. No consultations needed at this time.  >Social: milieu/structured therapy  >Treatment Interventions: Groups and Individual Therapy/CBT   >Dispo: pending remission of sx

## 2025-03-31 NOTE — BH INPATIENT PSYCHIATRY PROGRESS NOTE - NSBHMETABOLIC_PSY_ALL_CORE_FT
BMI: BMI (kg/m2): 29.7 (03-18-25 @ 15:05)  HbA1c: A1C with Estimated Average Glucose Result: 5.2 % (03-19-25 @ 10:24)    Glucose:   BP: --Vital Signs Last 24 Hrs  T(C): 36.2 (03-31-25 @ 07:35), Max: 36.9 (03-30-25 @ 19:31)  T(F): 97.1 (03-31-25 @ 07:35), Max: 98.4 (03-30-25 @ 19:31)  HR: --  BP: --  BP(mean): --  RR: --  SpO2: --    Orthostatic VS  03-31-25 @ 07:35  Lying BP: --/-- HR: --  Sitting BP: 105/71 HR: 80  Standing BP: --/-- HR: --  Site: --  Mode: --  Orthostatic VS  03-30-25 @ 19:31  Lying BP: --/-- HR: --  Sitting BP: 105/67 HR: 93  Standing BP: 101/71 HR: 102  Site: --  Mode: --  Orthostatic VS  03-30-25 @ 07:56  Lying BP: --/-- HR: --  Sitting BP: 121/83 HR: 93  Standing BP: 113/74 HR: 98  Site: --  Mode: --  Orthostatic VS  03-29-25 @ 20:49  Lying BP: --/-- HR: --  Sitting BP: 110/87 HR: 87  Standing BP: 124/80 HR: 102  Site: --  Mode: --    Lipid Panel: Date/Time: 03-19-25 @ 10:24  Cholesterol, Serum: 167  LDL Cholesterol Calculated: 101  HDL Cholesterol, Serum: 45  Total Cholesterol/HDL Ration Measurement: --  Triglycerides, Serum: 115

## 2025-04-01 PROCEDURE — 99232 SBSQ HOSP IP/OBS MODERATE 35: CPT

## 2025-04-01 RX ADMIN — Medication 500 MILLIGRAM(S): at 08:03

## 2025-04-01 RX ADMIN — HALOPERIDOL 5 MILLIGRAM(S): 10 TABLET ORAL at 20:14

## 2025-04-01 RX ADMIN — Medication 200 MILLIGRAM(S): at 20:14

## 2025-04-01 RX ADMIN — HALOPERIDOL 5 MILLIGRAM(S): 10 TABLET ORAL at 08:04

## 2025-04-01 RX ADMIN — Medication 1000 MILLIGRAM(S): at 20:15

## 2025-04-01 NOTE — BH INPATIENT PSYCHIATRY PROGRESS NOTE - NSBHFUPINTERVALHXFT_PSY_A_CORE
Pt compliant with medication and tolerating it well.  Chart reviewed and case discussed with treatment team.  No events reported overnight.  Pt reports he always hears voices and some of them are CAH to hurt himself.  Pt denies SI/HI/I/P or VH.  Pt remains with paranoia that others are "roughing me up sometimes."  Pt eating and sleeping well.

## 2025-04-01 NOTE — DIETITIAN INITIAL EVALUATION ADULT - PERTINENT LABORATORY DATA
Glucose: 117 mg/dL (03.18.25 @ 03:04)     A1C with Estimated Average Glucose Result: 5.2 % (03-19-25 @ 10:24)  A1C with Estimated Average Glucose Result: 5.3 % (09-06-24 @ 08:00)    Lipid Panel: Date/Time: 03-19-25 @ 10:24  Cholesterol, Serum: 167  LDL Cholesterol Calculated: 101  HDL Cholesterol, Serum: 45  Total Cholesterol/HDL Ration Measurement: --  Triglycerides, Serum: 115

## 2025-04-01 NOTE — BH INPATIENT PSYCHIATRY PROGRESS NOTE - NSBHMETABOLIC_PSY_ALL_CORE_FT
BMI: BMI (kg/m2): 29.7 (03-18-25 @ 15:05)  HbA1c: A1C with Estimated Average Glucose Result: 5.2 % (03-19-25 @ 10:24)    Glucose:   BP: --Vital Signs Last 24 Hrs  T(C): 36.9 (04-01-25 @ 08:07), Max: 36.9 (04-01-25 @ 08:07)  T(F): 98.4 (04-01-25 @ 08:07), Max: 98.4 (04-01-25 @ 08:07)  HR: --  BP: --  BP(mean): --  RR: --  SpO2: --    Orthostatic VS  04-01-25 @ 08:07  Lying BP: --/-- HR: --  Sitting BP: 123/81 HR: 115  Standing BP: 117/75 HR: 95  Site: upper left arm  Mode: --  Orthostatic VS  03-31-25 @ 20:37  Lying BP: --/-- HR: --  Sitting BP: 133/79 HR: 100  Standing BP: 125/84 HR: 105  Site: --  Mode: --  Orthostatic VS  03-31-25 @ 07:35  Lying BP: --/-- HR: --  Sitting BP: 105/71 HR: 80  Standing BP: --/-- HR: --  Site: --  Mode: --  Orthostatic VS  03-30-25 @ 19:31  Lying BP: --/-- HR: --  Sitting BP: 105/67 HR: 93  Standing BP: 101/71 HR: 102  Site: --  Mode: --    Lipid Panel: Date/Time: 03-19-25 @ 10:24  Cholesterol, Serum: 167  LDL Cholesterol Calculated: 101  HDL Cholesterol, Serum: 45  Total Cholesterol/HDL Ration Measurement: --  Triglycerides, Serum: 115

## 2025-04-01 NOTE — BH INPATIENT PSYCHIATRY PROGRESS NOTE - NSBHASSESSSUMMFT_PSY_ALL_CORE
Pt is a 44 yo M, single, non-caregiver, domiciled at [facility], psych hx of  Schizophrenia vs Schizoaffective Disorder, multiple prior admissions including state (last inpt  [facility] August 26-Sept 24, 2024, and an admission to [facility] in April of 2024,  [facility] 2009 and [id number]), per chart cannabis use disorder (residence staff denies recent use), no known hx suicide attempt or violence, follows at [facility] on [place] grounds, no known legal issues, PMH HTN (not on meds) and anemia per psyckes on a previous admission, brought in by EMS called by [facility] staff after pt was found walking around naked and cheeking his meds and not taking the Clozapine 100mg, Seroquel 200mg, or Haldol 20mg, Depakote 1000mg at bedtime as scheduled. He is also on Aristada monthly.       >Legal: 9.27  >Obs: Routine, no need for CO, patient not expected to pose risk to self or others in controlled inpatient setting  >Psychiatric Meds: titrate Clozaril to 200mg PO at bedtime (psychosis), Haldol 5mg PO BID (psychosis) and Depakote 500mg PO BID (mood)   >Labs: Admission labs reviewed, no acute findings.   >Medical:  No acute concerns. No consultations needed at this time.  >Social: milieu/structured therapy  >Treatment Interventions: Groups and Individual Therapy/CBT   >Dispo: pending remission of sx

## 2025-04-01 NOTE — DIETITIAN INITIAL EVALUATION ADULT - OTHER INFO
Patient is a 44 y/o male, single, non-caregiver, domiciled at [facility], psych hx of  Schizophrenia vs Schizoaffective Disorder, multiple prior admissions including state (last inpt  [facility] August 26-Sept 24, 2024, and an admission to [facility] in April of 2024,  [facility] 2009 and [id number]), per chart cannabis use disorder (residence staff denies recent use), no known hx suicide attempt or violence, follows at [facility] on [place] grounds, no known legal issues, PMH HTN (not on meds) and anemia per psyckes on a previous admission, brought in by EMS called by residence staff after pt was found walking around naked and cheeking his meds and not taking his medications. Admitted to [facility] on 9.27 legal status on 3/18/25.    Patient continues to have good appetite and PO intake on in-house meals, confirmed with nursing staff. No reports of chewing/swallowing difficulties on current diet. NKFA reported. Has no specific food preferences obtained today. No GI distress (nausea/vomiting/diarrhea/ constipation) reported at this time. Current wt per flowsheet: 234lb (3/22), 229lb (3/18) -- gained 5lb/2.2% x 4 days, ? wt accuracy vs excessive energy intake. Will cont to monitor wt trend. Unable to provide diet education to patient for wt mgmt at this time due to acuteness of illness and lack of interest in learning. RN will cont to encourage healthy food choices and portion control at meals on the unit.

## 2025-04-01 NOTE — BH INPATIENT PSYCHIATRY PROGRESS NOTE - NSBHCHARTREVIEWVS_PSY_A_CORE FT
Vital Signs Last 24 Hrs  T(C): 36.9 (04-01-25 @ 08:07), Max: 36.9 (04-01-25 @ 08:07)  T(F): 98.4 (04-01-25 @ 08:07), Max: 98.4 (04-01-25 @ 08:07)  HR: --  BP: --  BP(mean): --  RR: --  SpO2: --    Orthostatic VS  04-01-25 @ 08:07  Lying BP: --/-- HR: --  Sitting BP: 123/81 HR: 115  Standing BP: 117/75 HR: 95  Site: upper left arm  Mode: --  Orthostatic VS  03-31-25 @ 20:37  Lying BP: --/-- HR: --  Sitting BP: 133/79 HR: 100  Standing BP: 125/84 HR: 105  Site: --  Mode: --  Orthostatic VS  03-31-25 @ 07:35  Lying BP: --/-- HR: --  Sitting BP: 105/71 HR: 80  Standing BP: --/-- HR: --  Site: --  Mode: --  Orthostatic VS  03-30-25 @ 19:31  Lying BP: --/-- HR: --  Sitting BP: 105/67 HR: 93  Standing BP: 101/71 HR: 102  Site: --  Mode: --

## 2025-04-01 NOTE — DIETITIAN INITIAL EVALUATION ADULT - PERTINENT MEDS FT
MEDICATIONS  (STANDING):  cloZAPine 200 milliGRAM(s) Oral at bedtime  divalproex  milliGRAM(s) Oral daily  divalproex DR 1000 milliGRAM(s) Oral at bedtime  haloperidol     Tablet 5 milliGRAM(s) Oral two times a day    MEDICATIONS  (PRN):  haloperidol     Tablet 5 milliGRAM(s) Oral every 6 hours PRN agitation  haloperidol    Injectable 5 milliGRAM(s) IntraMuscular once PRN agitation  LORazepam     Tablet 2 milliGRAM(s) Oral every 6 hours PRN agitation  LORazepam   Injectable 2 milliGRAM(s) IntraMuscular once PRN agitation  polyethylene glycol 3350 17 Gram(s) Oral daily PRN constipation

## 2025-04-02 LAB
BASOPHILS # BLD AUTO: 0.06 K/UL — SIGNIFICANT CHANGE UP (ref 0–0.2)
BASOPHILS NFR BLD AUTO: 1 % — SIGNIFICANT CHANGE UP (ref 0–2)
CLOZAPINE SERPL-MCNC: 194 NG/ML — LOW (ref 350–600)
EOSINOPHIL # BLD AUTO: 0.2 K/UL — SIGNIFICANT CHANGE UP (ref 0–0.5)
EOSINOPHIL NFR BLD AUTO: 3.4 % — SIGNIFICANT CHANGE UP (ref 0–6)
HCT VFR BLD CALC: 44.2 % — SIGNIFICANT CHANGE UP (ref 39–50)
HGB BLD-MCNC: 14.4 G/DL — SIGNIFICANT CHANGE UP (ref 13–17)
IANC: 2.98 K/UL — SIGNIFICANT CHANGE UP (ref 1.8–7.4)
IMM GRANULOCYTES NFR BLD AUTO: 0.3 % — SIGNIFICANT CHANGE UP (ref 0–0.9)
LYMPHOCYTES # BLD AUTO: 2.34 K/UL — SIGNIFICANT CHANGE UP (ref 1–3.3)
LYMPHOCYTES # BLD AUTO: 40.1 % — SIGNIFICANT CHANGE UP (ref 13–44)
MCHC RBC-ENTMCNC: 29.2 PG — SIGNIFICANT CHANGE UP (ref 27–34)
MCHC RBC-ENTMCNC: 32.6 G/DL — SIGNIFICANT CHANGE UP (ref 32–36)
MCV RBC AUTO: 89.7 FL — SIGNIFICANT CHANGE UP (ref 80–100)
MONOCYTES # BLD AUTO: 0.24 K/UL — SIGNIFICANT CHANGE UP (ref 0–0.9)
MONOCYTES NFR BLD AUTO: 4.1 % — SIGNIFICANT CHANGE UP (ref 2–14)
NEUTROPHILS # BLD AUTO: 2.98 K/UL — SIGNIFICANT CHANGE UP (ref 1.8–7.4)
NEUTROPHILS NFR BLD AUTO: 51.1 % — SIGNIFICANT CHANGE UP (ref 43–77)
NRBC # BLD AUTO: 0 K/UL — SIGNIFICANT CHANGE UP (ref 0–0)
NRBC # FLD: 0 K/UL — SIGNIFICANT CHANGE UP (ref 0–0)
NRBC BLD AUTO-RTO: 0 /100 WBCS — SIGNIFICANT CHANGE UP (ref 0–0)
PLATELET # BLD AUTO: 240 K/UL — SIGNIFICANT CHANGE UP (ref 150–400)
RBC # BLD: 4.93 M/UL — SIGNIFICANT CHANGE UP (ref 4.2–5.8)
RBC # FLD: 13.7 % — SIGNIFICANT CHANGE UP (ref 10.3–14.5)
VALPROATE SERPL-MCNC: 56.8 UG/ML — SIGNIFICANT CHANGE UP (ref 50–100)
WBC # BLD: 5.84 K/UL — SIGNIFICANT CHANGE UP (ref 3.8–10.5)
WBC # FLD AUTO: 5.84 K/UL — SIGNIFICANT CHANGE UP (ref 3.8–10.5)

## 2025-04-02 PROCEDURE — 99232 SBSQ HOSP IP/OBS MODERATE 35: CPT

## 2025-04-02 RX ORDER — CLOZAPINE 100 MG
250 TABLET ORAL AT BEDTIME
Refills: 0 | Status: DISCONTINUED | OUTPATIENT
Start: 2025-04-02 | End: 2025-04-04

## 2025-04-02 RX ORDER — LORAZEPAM 4 MG/ML
2 VIAL (ML) INJECTION EVERY 6 HOURS
Refills: 0 | Status: DISCONTINUED | OUTPATIENT
Start: 2025-04-02 | End: 2025-04-09

## 2025-04-02 RX ORDER — LORAZEPAM 4 MG/ML
2 VIAL (ML) INJECTION ONCE
Refills: 0 | Status: DISCONTINUED | OUTPATIENT
Start: 2025-04-02 | End: 2025-04-09

## 2025-04-02 RX ADMIN — Medication 1000 MILLIGRAM(S): at 20:27

## 2025-04-02 RX ADMIN — HALOPERIDOL 5 MILLIGRAM(S): 10 TABLET ORAL at 08:50

## 2025-04-02 RX ADMIN — Medication 500 MILLIGRAM(S): at 08:49

## 2025-04-02 RX ADMIN — Medication 250 MILLIGRAM(S): at 20:27

## 2025-04-02 RX ADMIN — HALOPERIDOL 5 MILLIGRAM(S): 10 TABLET ORAL at 20:27

## 2025-04-02 NOTE — BH INPATIENT PSYCHIATRY PROGRESS NOTE - NSBHASSESSSUMMFT_PSY_ALL_CORE
Pt is a 46 yo M, single, non-caregiver, domiciled at [facility], psych hx of  Schizophrenia vs Schizoaffective Disorder, multiple prior admissions including state (last inpt  [facility] August 26-Sept 24, 2024, and an admission to [facility] in April of 2024,  [facility] 2009 and [id number]), per chart cannabis use disorder (residence staff denies recent use), no known hx suicide attempt or violence, follows at [facility] on [place] grounds, no known legal issues, PMH HTN (not on meds) and anemia per psyckes on a previous admission, brought in by EMS called by [facility] staff after pt was found walking around naked and cheeking his meds and not taking the Clozapine 100mg, Seroquel 200mg, or Haldol 20mg, Depakote 1000mg at bedtime as scheduled. He is also on Aristada monthly.       >Legal: 9.27  >Obs: Routine, no need for CO, patient not expected to pose risk to self or others in controlled inpatient setting  >Psychiatric Meds: titrate Clozaril to 250mg PO at bedtime (psychosis), Haldol 5mg PO BID (psychosis) and Depakote 500mg PO BID (mood)   >Labs: Admission labs reviewed, no acute findings.   >Medical:  No acute concerns. No consultations needed at this time.  >Social: milieu/structured therapy  >Treatment Interventions: Groups and Individual Therapy/CBT   >Dispo: pending remission of sx

## 2025-04-02 NOTE — BH INPATIENT PSYCHIATRY PROGRESS NOTE - NSBHFUPINTERVALHXFT_PSY_A_CORE
Pt compliant with medication and tolerating it well.  Chart reviewed and case discussed with treatment team.  No events reported overnight.  Pt reports he is trying to get his voices to switch and wants the boy and girl voices to switch.  Pt reports the voices tell him to keep confessing and he feels they are trying to hurt him.  Pt denies SI/HI/I/P or VH.  Pt eating and sleeping well.

## 2025-04-02 NOTE — BH INPATIENT PSYCHIATRY PROGRESS NOTE - NSBHCHARTREVIEWVS_PSY_A_CORE FT
Vital Signs Last 24 Hrs  T(C): 36.2 (04-02-25 @ 07:43), Max: 36.3 (04-01-25 @ 20:40)  T(F): 97.2 (04-02-25 @ 07:43), Max: 97.3 (04-01-25 @ 20:40)  HR: --  BP: --  BP(mean): --  RR: --  SpO2: --    Orthostatic VS  04-02-25 @ 07:43  Lying BP: --/-- HR: --  Sitting BP: 133/81 HR: 101  Standing BP: 135/77 HR: 105  Site: --  Mode: --  Orthostatic VS  04-01-25 @ 20:40  Lying BP: --/-- HR: --  Sitting BP: 131/89 HR: 120  Standing BP: 113/87 HR: 126  Site: --  Mode: --  Orthostatic VS  04-01-25 @ 08:07  Lying BP: --/-- HR: --  Sitting BP: 123/81 HR: 115  Standing BP: 117/75 HR: 95  Site: upper left arm  Mode: --  Orthostatic VS  03-31-25 @ 20:37  Lying BP: --/-- HR: --  Sitting BP: 133/79 HR: 100  Standing BP: 125/84 HR: 105  Site: --  Mode: --

## 2025-04-02 NOTE — BH INPATIENT PSYCHIATRY PROGRESS NOTE - NSBHMETABOLIC_PSY_ALL_CORE_FT
BMI: BMI (kg/m2): 29.7 (03-18-25 @ 15:05)  HbA1c: A1C with Estimated Average Glucose Result: 5.2 % (03-19-25 @ 10:24)    Glucose:   BP: --Vital Signs Last 24 Hrs  T(C): 36.2 (04-02-25 @ 07:43), Max: 36.3 (04-01-25 @ 20:40)  T(F): 97.2 (04-02-25 @ 07:43), Max: 97.3 (04-01-25 @ 20:40)  HR: --  BP: --  BP(mean): --  RR: --  SpO2: --    Orthostatic VS  04-02-25 @ 07:43  Lying BP: --/-- HR: --  Sitting BP: 133/81 HR: 101  Standing BP: 135/77 HR: 105  Site: --  Mode: --  Orthostatic VS  04-01-25 @ 20:40  Lying BP: --/-- HR: --  Sitting BP: 131/89 HR: 120  Standing BP: 113/87 HR: 126  Site: --  Mode: --  Orthostatic VS  04-01-25 @ 08:07  Lying BP: --/-- HR: --  Sitting BP: 123/81 HR: 115  Standing BP: 117/75 HR: 95  Site: upper left arm  Mode: --  Orthostatic VS  03-31-25 @ 20:37  Lying BP: --/-- HR: --  Sitting BP: 133/79 HR: 100  Standing BP: 125/84 HR: 105  Site: --  Mode: --    Lipid Panel: Date/Time: 03-19-25 @ 10:24  Cholesterol, Serum: 167  LDL Cholesterol Calculated: 101  HDL Cholesterol, Serum: 45  Total Cholesterol/HDL Ration Measurement: --  Triglycerides, Serum: 115

## 2025-04-03 PROCEDURE — 99232 SBSQ HOSP IP/OBS MODERATE 35: CPT

## 2025-04-03 RX ADMIN — Medication 1000 MILLIGRAM(S): at 20:25

## 2025-04-03 RX ADMIN — Medication 500 MILLIGRAM(S): at 08:21

## 2025-04-03 RX ADMIN — HALOPERIDOL 5 MILLIGRAM(S): 10 TABLET ORAL at 20:25

## 2025-04-03 RX ADMIN — HALOPERIDOL 5 MILLIGRAM(S): 10 TABLET ORAL at 08:22

## 2025-04-03 RX ADMIN — Medication 250 MILLIGRAM(S): at 20:25

## 2025-04-03 NOTE — BH TREATMENT PLAN - NSTXPSYCHOINTERPR_PSY_ALL_CORE
Writer created psych rehab goal for patient to be able engage in 15 mintue conversation with no irrational content. Over the next few days Psych rehab staff will continue to engage and support patient throughout.
Writer met with patient to discuss their progress towards their goal. During the meeting patient was calm, attentive and appropriate. Patient reported he is doing 'fine and can't complain'. On the unit patient is visible on the milieu, avoids social contact and has maintain their behavioral control. During the week patient has shown fair progress towards their goal, by being able to engage longer than 5 minutes without irrational content. Patient has not attended Psychiatric Rehabilitation groups. Writer will engage pt regularly to determine progress towards goals.
Writer met with patient to discuss progress towards their goal. During the meeting patient was unkempt (malodorous), calm and disorganized. Patient reported he is as "good as its going to get". On the unit patient is visible, avoids social contact, and sometimes loud. Patient has shown minimal progress towards their goal, due to not being able to engage in a 15-minute conversation without irrational content. Throughout the week patient has not attended any Psychiatric Rehabilitation groups. Writer will engage pt regularly to determine progress towards goals.

## 2025-04-03 NOTE — BH TREATMENT PLAN - NSCMSPTSTRENGTHS_PSY_ALL_CORE
Physically healthy/Strong support system
Strong support system

## 2025-04-03 NOTE — BH TREATMENT PLAN - NSTXHYPERGOAL_PSY_ALL_CORE
Be able to self-monitor blood pressure using a BP machine with demonstration of the technique to the RN

## 2025-04-03 NOTE — BH TREATMENT PLAN - NSTXDCOPNOINTERSW_PSY_ALL_CORE
SW will encourage pt to take medications as perscribed to better the chances of psychiatric stability in the community.
SW will encourage pt to take medications as perscribed to better the chances of psychiatric stability in the community.
SW will encourage pt to take medications as perscribed to better the chances of psychiatric stability in the commnity.

## 2025-04-03 NOTE — BH INPATIENT PSYCHIATRY PROGRESS NOTE - NSBHFUPINTERVALHXFT_PSY_A_CORE
Pt compliant with medication and tolerating it well.  Chart reviewed and case discussed with treatment team.  Per report, patient had an issue with his roomate last night and his roomate was changed.  Pt asked about this and he reports his roomate is a pervert and when asked why he thinks this he reports he cannot remember the truth.  Pt continues to have AH and reports they are CAH today, telling to hurt himself and others.  Pt reports VH of images from hell.  Pt reports good sleep and appetite.

## 2025-04-03 NOTE — BH TREATMENT PLAN - NSTXPATIENTPARTICIPATE_PSY_ALL_CORE
No, patient unwilling to participate
Patient participated in identification of needs/problems/goals for treatment/Patient participated in defining interventions
No, patient unwilling to participate

## 2025-04-03 NOTE — BH INPATIENT PSYCHIATRY PROGRESS NOTE - NSBHMETABOLIC_PSY_ALL_CORE_FT
BMI: BMI (kg/m2): 29.7 (03-18-25 @ 15:05)  HbA1c: A1C with Estimated Average Glucose Result: 5.2 % (03-19-25 @ 10:24)    Glucose:   BP: --Vital Signs Last 24 Hrs  T(C): 36.7 (04-02-25 @ 20:45), Max: 36.7 (04-02-25 @ 20:45)  T(F): 98.1 (04-02-25 @ 20:45), Max: 98.1 (04-02-25 @ 20:45)  HR: --  BP: --  BP(mean): --  RR: --  SpO2: --    Orthostatic VS  04-02-25 @ 20:45  Lying BP: --/-- HR: --  Sitting BP: 124/91 HR: 101  Standing BP: 124/79 HR: 105  Site: upper left arm  Mode: --  Orthostatic VS  04-02-25 @ 07:43  Lying BP: --/-- HR: --  Sitting BP: 133/81 HR: 101  Standing BP: 135/77 HR: 105  Site: --  Mode: --  Orthostatic VS  04-01-25 @ 20:40  Lying BP: --/-- HR: --  Sitting BP: 131/89 HR: 120  Standing BP: 113/87 HR: 126  Site: --  Mode: --    Lipid Panel: Date/Time: 03-19-25 @ 10:24  Cholesterol, Serum: 167  LDL Cholesterol Calculated: 101  HDL Cholesterol, Serum: 45  Total Cholesterol/HDL Ration Measurement: --  Triglycerides, Serum: 115

## 2025-04-03 NOTE — BH TREATMENT PLAN - NSTXCONDUCGOAL_PSY_ALL_CORE
Will develop more adaptive coping strategies to manage stress

## 2025-04-03 NOTE — BH INPATIENT PSYCHIATRY PROGRESS NOTE - NSBHCHARTREVIEWVS_PSY_A_CORE FT
Vital Signs Last 24 Hrs  T(C): 36.7 (04-02-25 @ 20:45), Max: 36.7 (04-02-25 @ 20:45)  T(F): 98.1 (04-02-25 @ 20:45), Max: 98.1 (04-02-25 @ 20:45)  HR: --  BP: --  BP(mean): --  RR: --  SpO2: --    Orthostatic VS  04-02-25 @ 20:45  Lying BP: --/-- HR: --  Sitting BP: 124/91 HR: 101  Standing BP: 124/79 HR: 105  Site: upper left arm  Mode: --  Orthostatic VS  04-02-25 @ 07:43  Lying BP: --/-- HR: --  Sitting BP: 133/81 HR: 101  Standing BP: 135/77 HR: 105  Site: --  Mode: --  Orthostatic VS  04-01-25 @ 20:40  Lying BP: --/-- HR: --  Sitting BP: 131/89 HR: 120  Standing BP: 113/87 HR: 126  Site: --  Mode: --

## 2025-04-03 NOTE — BH INPATIENT PSYCHIATRY PROGRESS NOTE - NSBHASSESSSUMMFT_PSY_ALL_CORE
Pt is a 46 yo M, single, non-caregiver, domiciled at [facility], psych hx of  Schizophrenia vs Schizoaffective Disorder, multiple prior admissions including state (last inpt  [facility] August 26-Sept 24, 2024, and an admission to [facility] in April of 2024,  [facility] 2009 and [id number]), per chart cannabis use disorder (residence staff denies recent use), no known hx suicide attempt or violence, follows at [facility] on [place] grounds, no known legal issues, PMH HTN (not on meds) and anemia per psyckes on a previous admission, brought in by EMS called by [facility] staff after pt was found walking around naked and cheeking his meds and not taking the Clozapine 100mg, Seroquel 200mg, or Haldol 20mg, Depakote 1000mg at bedtime as scheduled. He is also on Aristada monthly.       >Legal: 9.27  >Obs: Routine, no need for CO, patient not expected to pose risk to self or others in controlled inpatient setting  >Psychiatric Meds: titrate Clozaril to 250mg PO at bedtime (psychosis), Haldol 5mg PO BID (psychosis) and Depakote 500mg PO BID (mood)   >Labs: Admission labs reviewed, no acute findings.   >Medical:  No acute concerns. No consultations needed at this time.  >Social: milieu/structured therapy  >Treatment Interventions: Groups and Individual Therapy/CBT   >Dispo: pending remission of sx

## 2025-04-03 NOTE — BH PATIENT PROFILE - NSPROMEDSHERBAL_GEN_A_NUR
Render Note In Bullet Format When Appropriate: No Post-Care Instructions: I reviewed with the patient in detail post-care instructions. Patient is to wear sunprotection, and avoid picking at any of the treated lesions. Pt may apply Vaseline to crusted or scabbing areas. Duration Of Freeze Thaw-Cycle (Seconds): 5 Render Post-Care Instructions In Note?: yes Consent: The patient's consent was obtained including but not limited to risks of crusting, scabbing, blistering, scarring, darker or lighter pigmentary change, recurrence, incomplete removal and infection. Number Of Freeze-Thaw Cycles: 2 freeze-thaw cycles Detail Level: Zone Spray Paint Text: The liquid nitrogen was applied to the skin utilizing a spray paint frosting technique. Medical Necessity Clause: This procedure was medically necessary because the lesions that were treated were: Number Of Freeze-Thaw Cycles: 1 freeze-thaw cycle Medical Necessity Information: It is in your best interest to select a reason for this procedure from the list below. All of these items fulfill various CMS LCD requirements except the new and changing color options. no

## 2025-04-03 NOTE — BH TREATMENT PLAN - NSTXCOPEGOAL_PSY_ALL_CORE
Mark      Pt needs a rx for breast pump sent to her insurance:     Send to:  Fx# [phone]    
Patient advised form was received from her insurance and faxed on 11/12/2020.  We will refax.  Patient states she will call insurance company also.   
Identify and utilize 2 coping skills that meet their needs

## 2025-04-04 PROCEDURE — 99232 SBSQ HOSP IP/OBS MODERATE 35: CPT | Mod: GC

## 2025-04-04 RX ORDER — CLOZAPINE 100 MG
300 TABLET ORAL AT BEDTIME
Refills: 0 | Status: DISCONTINUED | OUTPATIENT
Start: 2025-04-04 | End: 2025-04-17

## 2025-04-04 RX ADMIN — Medication 300 MILLIGRAM(S): at 20:15

## 2025-04-04 RX ADMIN — HALOPERIDOL 5 MILLIGRAM(S): 10 TABLET ORAL at 08:41

## 2025-04-04 RX ADMIN — Medication 1000 MILLIGRAM(S): at 20:15

## 2025-04-04 RX ADMIN — HALOPERIDOL 5 MILLIGRAM(S): 10 TABLET ORAL at 20:15

## 2025-04-04 RX ADMIN — Medication 500 MILLIGRAM(S): at 08:41

## 2025-04-04 NOTE — BH INPATIENT PSYCHIATRY PROGRESS NOTE - NSBHFUPINTERVALHXFT_PSY_A_CORE
46 yo M seen for Schizophrenia. Chart Reviewed and discussed with Team did not reveal any overnight events.   Pt remains psychotic with persistent Auditory Hallucinations which he says he can cope with now. He is Isolative, appears less internally pre-occupied, but fixated on sports stats. He reports eating and sleeping well with no known side effects to his meds. He is comfortable with his new roommate.

## 2025-04-04 NOTE — BH INPATIENT PSYCHIATRY PROGRESS NOTE - NSBHCHARTREVIEWVS_PSY_A_CORE FT
Vital Signs Last 24 Hrs  T(C): 36.4 (04-04-25 @ 07:59), Max: 36.4 (04-04-25 @ 07:59)  T(F): 97.5 (04-04-25 @ 07:59), Max: 97.5 (04-04-25 @ 07:59)  HR: --  BP: --  BP(mean): --  RR: --  SpO2: --    Orthostatic VS  04-04-25 @ 07:59  Lying BP: --/-- HR: --  Sitting BP: 126/83 HR: 102  Standing BP: 124/83 HR: 102  Site: --  Mode: --  Orthostatic VS  04-03-25 @ 19:41  Lying BP: --/-- HR: --  Sitting BP: 137/77 HR: 104  Standing BP: 121/79 HR: 115  Site: --  Mode: --  Orthostatic VS  04-02-25 @ 20:45  Lying BP: --/-- HR: --  Sitting BP: 124/91 HR: 101  Standing BP: 124/79 HR: 105  Site: upper left arm  Mode: --

## 2025-04-04 NOTE — BH INPATIENT PSYCHIATRY PROGRESS NOTE - NSBHASSESSSUMMFT_PSY_ALL_CORE
Pt is a 44 yo M, single, non-caregiver, domiciled at [facility], psych hx of  Schizophrenia vs Schizoaffective Disorder, multiple prior admissions including state (last inpt  [facility] August 26-Sept 24, 2024, and an admission to [facility] in April of 2024,  [facility] 2009 and [id number]), per chart cannabis use disorder (residence staff denies recent use), no known hx suicide attempt or violence, follows at [facility] on [place], no known legal issues, PMH HTN (not on meds) and anemia per psyckes on a previous admission, brought in by EMS called by [facility] staff after pt was found walking around naked and cheeking his meds and not taking the Clozapine 100mg, Seroquel 200mg, or Haldol 20mg, Depakote 1000mg at bedtime as scheduled. He is also on Aristada monthly.     Pt remains psychotic with persistent Auditory Hallucinations which he says he can cope with now. He is Isolative and fixated on sports stats. Will titrate Clozapine up, to 300mg, and Continue Haldol 5mg BID and Depakote 500mg BID    >Legal: 9.27  >Obs: Routine, no need for CO, patient not expected to pose risk to self or others in controlled inpatient setting  >Psychiatric Meds: titrate Clozaril to 250mg PO at bedtime (psychosis), Haldol 5mg PO BID (psychosis) and Depakote 500mg PO BID (mood)   >Labs: Admission labs reviewed, no acute findings.   >Medical:  No acute concerns. No consultations needed at this time.  >Social: milieu/structured therapy  >Treatment Interventions: Groups and Individual Therapy/CBT   >Dispo: pending remission of sx Pt is a 44 yo M, single, non-caregiver, domiciled at [facility], psych hx of  Schizophrenia vs Schizoaffective Disorder, multiple prior admissions including state (last inpt  [facility] August 26-Sept 24, 2024, and an admission to [facility] in April of 2024,  [facility] 2009 and [id number]), per chart cannabis use disorder (residence staff denies recent use), no known hx suicide attempt or violence, follows at [facility] [place] grounds, no known legal issues, PMH HTN (not on meds) and anemia per psyckes on a previous admission, brought in by EMS called by residence staff after pt was found walking around naked and cheeking his meds and not taking the Clozapine 100mg, Seroquel 200mg, or Haldol 20mg, Depakote 1000mg at bedtime as scheduled. He is also on Aristada monthly.     Pt remains psychotic with persistent Auditory Hallucinations which he says he can better cope with now. He is Isolative and fixated on sports stats. Will titrate Clozapine up, to 300mg, and Continue Haldol 5mg BID and Depakote 500mg BID    >Legal: 9.27  >Obs: Routine, no need for CO, patient not expected to pose risk to self or others in controlled inpatient setting  >Psychiatric Meds: titrate Clozaril to 250mg PO at bedtime (psychosis), Haldol 5mg PO BID (psychosis) and Depakote 500mg PO BID (mood)   >Labs: Admission labs reviewed, no acute findings.   >Medical:  No acute concerns. No consultations needed at this time.  >Social: milieu/structured therapy  >Treatment Interventions: Groups and Individual Therapy/CBT   >Dispo: pending remission of sx

## 2025-04-04 NOTE — BH INPATIENT PSYCHIATRY PROGRESS NOTE - NSBHMETABOLIC_PSY_ALL_CORE_FT
BMI: BMI (kg/m2): 29.7 (03-18-25 @ 15:05)  HbA1c: A1C with Estimated Average Glucose Result: 5.2 % (03-19-25 @ 10:24)    Glucose:   BP: --Vital Signs Last 24 Hrs  T(C): 36.4 (04-04-25 @ 07:59), Max: 36.4 (04-04-25 @ 07:59)  T(F): 97.5 (04-04-25 @ 07:59), Max: 97.5 (04-04-25 @ 07:59)  HR: --  BP: --  BP(mean): --  RR: --  SpO2: --    Orthostatic VS  04-04-25 @ 07:59  Lying BP: --/-- HR: --  Sitting BP: 126/83 HR: 102  Standing BP: 124/83 HR: 102  Site: --  Mode: --  Orthostatic VS  04-03-25 @ 19:41  Lying BP: --/-- HR: --  Sitting BP: 137/77 HR: 104  Standing BP: 121/79 HR: 115  Site: --  Mode: --  Orthostatic VS  04-02-25 @ 20:45  Lying BP: --/-- HR: --  Sitting BP: 124/91 HR: 101  Standing BP: 124/79 HR: 105  Site: upper left arm  Mode: --    Lipid Panel: Date/Time: 03-19-25 @ 10:24  Cholesterol, Serum: 167  LDL Cholesterol Calculated: 101  HDL Cholesterol, Serum: 45  Total Cholesterol/HDL Ration Measurement: --  Triglycerides, Serum: 115

## 2025-04-04 NOTE — BH INPATIENT PSYCHIATRY PROGRESS NOTE - NSBHATTESTCOMMENTATTENDFT_PSY_A_CORE
Patient seen by me, chart reviewed, and case discussed with treatment team.  Reviewed and agree with above progress note, assessment and plan; corrections and modification made where appropriate.  The patient is reporting some improvement in AHs.  Although they continue, he states they are overall more tolerable.  His behavior has been well controlled.  Tolerating clozapine well, will increase to 300mg hs.

## 2025-04-05 RX ADMIN — Medication 500 MILLIGRAM(S): at 08:35

## 2025-04-05 RX ADMIN — HALOPERIDOL 5 MILLIGRAM(S): 10 TABLET ORAL at 20:21

## 2025-04-05 RX ADMIN — HALOPERIDOL 5 MILLIGRAM(S): 10 TABLET ORAL at 08:36

## 2025-04-05 RX ADMIN — Medication 1000 MILLIGRAM(S): at 20:21

## 2025-04-05 RX ADMIN — Medication 300 MILLIGRAM(S): at 20:21

## 2025-04-06 RX ADMIN — Medication 500 MILLIGRAM(S): at 08:08

## 2025-04-06 RX ADMIN — HALOPERIDOL 5 MILLIGRAM(S): 10 TABLET ORAL at 08:08

## 2025-04-06 RX ADMIN — Medication 1000 MILLIGRAM(S): at 20:17

## 2025-04-06 RX ADMIN — HALOPERIDOL 5 MILLIGRAM(S): 10 TABLET ORAL at 20:17

## 2025-04-06 RX ADMIN — Medication 300 MILLIGRAM(S): at 20:17

## 2025-04-07 RX ADMIN — Medication 500 MILLIGRAM(S): at 08:45

## 2025-04-07 RX ADMIN — HALOPERIDOL 5 MILLIGRAM(S): 10 TABLET ORAL at 08:46

## 2025-04-07 RX ADMIN — Medication 300 MILLIGRAM(S): at 20:04

## 2025-04-07 RX ADMIN — Medication 1000 MILLIGRAM(S): at 20:04

## 2025-04-07 RX ADMIN — HALOPERIDOL 5 MILLIGRAM(S): 10 TABLET ORAL at 20:04

## 2025-04-07 NOTE — BH INPATIENT PSYCHIATRY PROGRESS NOTE - NSBHFUPINTERVALHXFT_PSY_A_CORE
44 yo M seen for Schizophrenia. Chart Reviewed and discussed with Team did not reveal any overnight events.   Pt remains psychotic with persistent Auditory Hallucinations which he says he can cope with now. He is Isolative, appears less internally pre-occupied, but fixated on sports stats. He reports eating and sleeping well with no known side effects to his meds. He is comfortable with his new roommate.   44 yo M seen for Schizophrenia. Chart Reviewed and discussed with Team did not reveal any overnight events.   Pt remains psychotic with persistent Auditory Hallucinations which he says he can cope with now. He is Isolative, appears less internally pre-occupied. He reports eating and sleeping well with no known side effects to his meds. He is comfortable with his new roommate.  No agitation.  No somatic complaints, no GI complaints, having regular bowel movements reported.

## 2025-04-07 NOTE — BH INPATIENT PSYCHIATRY PROGRESS NOTE - NSBHCHARTREVIEWVS_PSY_A_CORE FT
Vital Signs Last 24 Hrs  T(C): 36.8 (04-07-25 @ 19:43), Max: 36.8 (04-07-25 @ 19:43)  T(F): 98.2 (04-07-25 @ 19:43), Max: 98.2 (04-07-25 @ 19:43)  HR: --  BP: --  BP(mean): --  RR: --  SpO2: --    Orthostatic VS  04-07-25 @ 19:43  Lying BP: --/-- HR: --  Sitting BP: 132/82 HR: 107  Standing BP: 138/82 HR: 110  Site: --  Mode: --  Orthostatic VS  04-07-25 @ 07:52  Lying BP: --/-- HR: --  Sitting BP: 120/81 HR: 98  Standing BP: 111/77 HR: 101  Site: --  Mode: --  Orthostatic VS  04-06-25 @ 20:00  Lying BP: --/-- HR: --  Sitting BP: 125/85 HR: 89  Standing BP: 128/90 HR: 92  Site: --  Mode: --   Vital Signs Last 24 Hrs  T(C): 36.5 (04-11-25 @ 07:58), Max: 36.9 (04-10-25 @ 19:14)  T(F): 97.7 (04-11-25 @ 07:58), Max: 98.4 (04-10-25 @ 19:14)  HR: --  BP: --  BP(mean): --  RR: --  SpO2: --    Orthostatic VS  04-11-25 @ 07:58  Lying BP: --/-- HR: --  Sitting BP: 111/89 HR: 111  Standing BP: 113/80 HR: 114  Site: --  Mode: --  Orthostatic VS  04-10-25 @ 19:14  Lying BP: --/-- HR: --  Sitting BP: 116/81 HR: 105  Standing BP: 110/83 HR: 108  Site: --  Mode: --  Orthostatic VS  04-10-25 @ 08:34  Lying BP: --/-- HR: --  Sitting BP: 115/81 HR: 103  Standing BP: 121/73 HR: 108  Site: --  Mode: --  Orthostatic VS  04-09-25 @ 20:21  Lying BP: --/-- HR: --  Sitting BP: 131/94 HR: 113  Standing BP: 128/88 HR: 113  Site: upper left arm  Mode: --

## 2025-04-07 NOTE — BH INPATIENT PSYCHIATRY PROGRESS NOTE - NSBHASSESSSUMMFT_PSY_ALL_CORE
Pt is a 44 yo M, single, non-caregiver, domiciled at [facility], psych hx of  Schizophrenia vs Schizoaffective Disorder, multiple prior admissions including state (last inpt  [facility] August 26-Sept 24, 2024, and an admission to [facility] in April of 2024,  [facility] 2009 and [id number]), per chart cannabis use disorder (residence staff denies recent use), no known hx suicide attempt or violence, follows at [facility] on [place] grounds, no known legal issues, PMH HTN (not on meds) and anemia per psyckes on a previous admission, brought in by EMS called by residence staff after pt was found walking around naked and cheeking his meds and not taking the Clozapine 100mg, Seroquel 200mg, or Haldol 20mg, Depakote 1000mg at bedtime as scheduled. He is also on Aristada monthly.     Pt remains psychotic with persistent Auditory Hallucinations which he says he can better cope with now. He is Isolative and fixated on sports stats. Will titrate Clozapine up, to 300mg, and Continue Haldol 5mg BID and Depakote 500mg BID    >Legal: 9.27  >Obs: Routine, no need for CO, patient not expected to pose risk to self or others in controlled inpatient setting  >Psychiatric Meds: titrate Clozaril to 250mg PO at bedtime (psychosis), Haldol 5mg PO BID (psychosis) and Depakote 500mg PO BID (mood)   >Labs: Admission labs reviewed, no acute findings.   >Medical:  No acute concerns. No consultations needed at this time.  >Social: milieu/structured therapy  >Treatment Interventions: Groups and Individual Therapy/CBT   >Dispo: pending remission of sx

## 2025-04-08 PROCEDURE — 99232 SBSQ HOSP IP/OBS MODERATE 35: CPT

## 2025-04-08 RX ADMIN — Medication 300 MILLIGRAM(S): at 20:28

## 2025-04-08 RX ADMIN — HALOPERIDOL 5 MILLIGRAM(S): 10 TABLET ORAL at 20:28

## 2025-04-08 RX ADMIN — Medication 500 MILLIGRAM(S): at 08:12

## 2025-04-08 RX ADMIN — HALOPERIDOL 5 MILLIGRAM(S): 10 TABLET ORAL at 08:12

## 2025-04-08 RX ADMIN — Medication 1000 MILLIGRAM(S): at 20:28

## 2025-04-08 NOTE — BH INPATIENT PSYCHIATRY PROGRESS NOTE - NSBHFUPINTERVALHXFT_PSY_A_CORE
44 yo M seen for Schizophrenia. Chart Reviewed and discussed with Team did not reveal any overnight events.   Pt remains psychotic with persistent Auditory Hallucinations which he says he can cope with now. He is Isolative, appears less internally pre-occupied, but fixated on sports stats. He reports eating and sleeping well with no known side effects to his meds. He is comfortable with his new roommate.   46 yo M seen and evaluated for Schizophrenia. Chart Reviewed and discussed with treatment team did not reveal any overnight events.   Pt remains psychotic with persistent Auditory Hallucinations which he says he can cope with now. He is Isolative, appears less internally pre-occupied. He reports eating and sleeping well with no known side effects to his meds. He has no somatic complaints, a bit more visible and no agitation noted.  Poor ADLs and unkempt on exam.

## 2025-04-08 NOTE — BH INPATIENT PSYCHIATRY PROGRESS NOTE - NSBHMETABOLIC_PSY_ALL_CORE_FT
BMI: BMI (kg/m2): 29.7 (03-18-25 @ 15:05)  HbA1c: A1C with Estimated Average Glucose Result: 5.2 % (03-19-25 @ 10:24)    Glucose:   BP: --Vital Signs Last 24 Hrs  T(C): 36.8 (04-07-25 @ 19:43), Max: 36.8 (04-07-25 @ 19:43)  T(F): 98.2 (04-07-25 @ 19:43), Max: 98.2 (04-07-25 @ 19:43)  HR: --  BP: --  BP(mean): --  RR: --  SpO2: --    Orthostatic VS  04-07-25 @ 19:43  Lying BP: --/-- HR: --  Sitting BP: 132/82 HR: 107  Standing BP: 138/82 HR: 110  Site: --  Mode: --  Orthostatic VS  04-07-25 @ 07:52  Lying BP: --/-- HR: --  Sitting BP: 120/81 HR: 98  Standing BP: 111/77 HR: 101  Site: --  Mode: --  Orthostatic VS  04-06-25 @ 20:00  Lying BP: --/-- HR: --  Sitting BP: 125/85 HR: 89  Standing BP: 128/90 HR: 92  Site: --  Mode: --    Lipid Panel: Date/Time: 03-19-25 @ 10:24  Cholesterol, Serum: 167  LDL Cholesterol Calculated: 101  HDL Cholesterol, Serum: 45  Total Cholesterol/HDL Ration Measurement: --  Triglycerides, Serum: 115   BMI: BMI (kg/m2): 29.7 (03-18-25 @ 15:05)  HbA1c: A1C with Estimated Average Glucose Result: 5.2 % (03-19-25 @ 10:24)    Glucose:   BP: --Vital Signs Last 24 Hrs  T(C): 36.5 (04-11-25 @ 07:58), Max: 36.9 (04-10-25 @ 19:14)  T(F): 97.7 (04-11-25 @ 07:58), Max: 98.4 (04-10-25 @ 19:14)  HR: --  BP: --  BP(mean): --  RR: --  SpO2: --    Orthostatic VS  04-11-25 @ 07:58  Lying BP: --/-- HR: --  Sitting BP: 111/89 HR: 111  Standing BP: 113/80 HR: 114  Site: --  Mode: --  Orthostatic VS  04-10-25 @ 19:14  Lying BP: --/-- HR: --  Sitting BP: 116/81 HR: 105  Standing BP: 110/83 HR: 108  Site: --  Mode: --  Orthostatic VS  04-10-25 @ 08:34  Lying BP: --/-- HR: --  Sitting BP: 115/81 HR: 103  Standing BP: 121/73 HR: 108  Site: --  Mode: --  Orthostatic VS  04-09-25 @ 20:21  Lying BP: --/-- HR: --  Sitting BP: 131/94 HR: 113  Standing BP: 128/88 HR: 113  Site: upper left arm  Mode: --    Lipid Panel: Date/Time: 03-19-25 @ 10:24  Cholesterol, Serum: 167  LDL Cholesterol Calculated: 101  HDL Cholesterol, Serum: 45  Total Cholesterol/HDL Ration Measurement: --  Triglycerides, Serum: 115

## 2025-04-08 NOTE — BH INPATIENT PSYCHIATRY PROGRESS NOTE - NSBHASSESSSUMMFT_PSY_ALL_CORE
Pt is a 46 yo M, single, non-caregiver, domiciled at [facility], psych hx of  Schizophrenia vs Schizoaffective Disorder, multiple prior admissions including state (last inpt  [facility] August 26-Sept 24, 2024, and an admission to [facility] in April of 2024,  [facility] 2009 and [id number]), per chart cannabis use disorder (residence staff denies recent use), no known hx suicide attempt or violence, follows at [facility] [place], no known legal issues, PMH HTN (not on meds) and anemia per psyckes on a previous admission, brought in by EMS called by residence staff after pt was found walking around naked and cheeking his meds and not taking the Clozapine 100mg, Seroquel 200mg, or Haldol 20mg, Depakote 1000mg at bedtime as scheduled. He is also on Aristada monthly.     Pt remains psychotic with persistent Auditory Hallucinations which he says he can better cope with now. He is Isolative and fixated on sports stats. Will titrate Clozapine up, to 300mg, and Continue Haldol 5mg BID and Depakote 500mg BID    >Legal: 9.27  >Obs: Routine, no need for CO, patient not expected to pose risk to self or others in controlled inpatient setting  >Psychiatric Meds: titrate Clozaril to 250mg PO at bedtime (psychosis), Haldol 5mg PO BID (psychosis) and Depakote 500mg PO BID (mood)   >Labs: Admission labs reviewed, no acute findings.   >Medical:  No acute concerns. No consultations needed at this time.  >Social: milieu/structured therapy  >Treatment Interventions: Groups and Individual Therapy/CBT   >Dispo: pending remission of sx Pt is a 46 yo M, single, non-caregiver, domiciled at [facility], psych hx of  Schizophrenia vs Schizoaffective Disorder, multiple prior admissions including state (last inpt  [facility] August 26-Sept 24, 2024, and an admission to [facility] in April of 2024,  [facility] 2009 and [id number]), per chart cannabis use disorder (residence staff denies recent use), no known hx suicide attempt or violence, follows at [facility] on [place] grounds, no known legal issues, PMH HTN (not on meds) and anemia per psyckes on a previous admission, brought in by EMS called by residence staff after pt was found walking around naked and cheeking his meds and not taking the Clozapine 100mg, Seroquel 200mg, or Haldol 20mg, Depakote 1000mg at bedtime as scheduled. He is also on Aristada monthly.     Pt remains psychotic with persistent Auditory Hallucinations which he says he can better cope with now. He is Isolative and fixated on sports stats. Will titrate Clozapine up, to 300mg, and Continue Haldol 5mg BID and Depakote 1500mg daily.  Labs ordered for tomorrow.    >Legal: 9.27  >Obs: Routine, no need for CO, patient not expected to pose risk to self or others in controlled inpatient setting  >Psychiatric Meds: titrate Clozaril to 250mg PO at bedtime (psychosis), Haldol 5mg PO BID (psychosis) and Depakote 500mg PO BID (mood)   >Labs: Admission labs reviewed, no acute findings.   >Medical:  No acute concerns. No consultations needed at this time.  >Social: milieu/structured therapy  >Treatment Interventions: Groups and Individual Therapy/CBT   >Dispo: pending remission of sx

## 2025-04-09 RX ORDER — LORAZEPAM 4 MG/ML
2 VIAL (ML) INJECTION ONCE
Refills: 0 | Status: DISCONTINUED | OUTPATIENT
Start: 2025-04-09 | End: 2025-04-16

## 2025-04-09 RX ORDER — LORAZEPAM 4 MG/ML
2 VIAL (ML) INJECTION EVERY 6 HOURS
Refills: 0 | Status: DISCONTINUED | OUTPATIENT
Start: 2025-04-09 | End: 2025-04-16

## 2025-04-09 RX ADMIN — Medication 300 MILLIGRAM(S): at 20:03

## 2025-04-09 RX ADMIN — HALOPERIDOL 5 MILLIGRAM(S): 10 TABLET ORAL at 09:07

## 2025-04-09 RX ADMIN — HALOPERIDOL 5 MILLIGRAM(S): 10 TABLET ORAL at 20:03

## 2025-04-09 RX ADMIN — Medication 500 MILLIGRAM(S): at 09:07

## 2025-04-09 RX ADMIN — Medication 1000 MILLIGRAM(S): at 20:03

## 2025-04-09 NOTE — BH INPATIENT PSYCHIATRY PROGRESS NOTE - NSBHFUPINTERVALHXFT_PSY_A_CORE
44 yo M seen for Schizophrenia. Chart Reviewed and discussed with Team did not reveal any overnight events.   Pt remains psychotic with persistent Auditory Hallucinations which he says he can cope with now. He is Isolative, appears less internally pre-occupied, but fixated on discharge. He reports eating and sleeping well with no known side effects to his meds. He is showing improvement overall but limited insight remains.  Refused labwork this AM, will reorder for tomorrow.

## 2025-04-09 NOTE — BH INPATIENT PSYCHIATRY PROGRESS NOTE - NSBHMETABOLIC_PSY_ALL_CORE_FT
BMI: BMI (kg/m2): 29.7 (03-18-25 @ 15:05)  HbA1c: A1C with Estimated Average Glucose Result: 5.2 % (03-19-25 @ 10:24)    Glucose:   BP: --Vital Signs Last 24 Hrs  T(C): 36.9 (04-10-25 @ 08:34), Max: 36.9 (04-10-25 @ 08:34)  T(F): 98.4 (04-10-25 @ 08:34), Max: 98.4 (04-10-25 @ 08:34)  HR: --  BP: --  BP(mean): --  RR: --  SpO2: 100% (04-10-25 @ 08:34) (100% - 100%)    Orthostatic VS  04-10-25 @ 08:34  Lying BP: --/-- HR: --  Sitting BP: 115/81 HR: 103  Standing BP: 121/73 HR: 108  Site: --  Mode: --  Orthostatic VS  04-09-25 @ 20:21  Lying BP: --/-- HR: --  Sitting BP: 131/94 HR: 113  Standing BP: 128/88 HR: 113  Site: upper left arm  Mode: --  Orthostatic VS  04-09-25 @ 07:25  Lying BP: --/-- HR: --  Sitting BP: 116/80 HR: 100  Standing BP: 124/79 HR: 99  Site: --  Mode: --  Orthostatic VS  04-08-25 @ 19:59  Lying BP: --/-- HR: --  Sitting BP: 129/82 HR: 112  Standing BP: 124/84 HR: 113  Site: upper left arm  Mode: --    Lipid Panel: Date/Time: 03-19-25 @ 10:24  Cholesterol, Serum: 167  LDL Cholesterol Calculated: 101  HDL Cholesterol, Serum: 45  Total Cholesterol/HDL Ration Measurement: --  Triglycerides, Serum: 115

## 2025-04-09 NOTE — BH INPATIENT PSYCHIATRY PROGRESS NOTE - NSBHASSESSSUMMFT_PSY_ALL_CORE
Pt is a 44 yo M, single, non-caregiver, domiciled at [facility], psych hx of  Schizophrenia vs Schizoaffective Disorder, multiple prior admissions including state (last inpt  [facility] August 26-Sept 24, 2024, and an admission to [facility] in April of 2024,  [facility] 2009 and [id number]), per chart cannabis use disorder (residence staff denies recent use), no known hx suicide attempt or violence, follows at [facility] [place] [facility], no known legal issues, PMH HTN (not on meds) and anemia per psyckes on a previous admission, brought in by EMS called by residence staff after pt was found walking around naked and cheeking his meds and not taking the Clozapine 100mg, Seroquel 200mg, or Haldol 20mg, Depakote 1000mg at bedtime as scheduled. He is also on Aristada monthly.     Pt remains psychotic with persistent Auditory Hallucinations which he says he can better cope with now. He is Isolative and fixated on sports stats. Will titrate Clozapine up, to 300mg, and Continue Haldol 5mg BID and Depakote 500mg/1000mg daily.  Reorder labwork for tomorrow, CBC, CMP, VPA level, CLozapine.    >Legal: 9.27  >Obs: Routine, no need for CO, patient not expected to pose risk to self or others in controlled inpatient setting  >Psychiatric Meds: titrate Clozaril, now 300mg PO at bedtime (psychosis), Haldol 5mg PO BID (psychosis) and Depakote 500mg PO qAM, 1000mg qHS (mood)   >Labs: Admission labs reviewed, no acute findings.   >Medical:  No acute concerns. No consultations needed at this time.  >Social: milieu/structured therapy  >Treatment Interventions: Groups and Individual Therapy/CBT   >Dispo: pending remission of sx

## 2025-04-09 NOTE — BH INPATIENT PSYCHIATRY PROGRESS NOTE - NSBHCHARTREVIEWVS_PSY_A_CORE FT
Vital Signs Last 24 Hrs  T(C): 36.9 (04-10-25 @ 08:34), Max: 36.9 (04-10-25 @ 08:34)  T(F): 98.4 (04-10-25 @ 08:34), Max: 98.4 (04-10-25 @ 08:34)  HR: --  BP: --  BP(mean): --  RR: --  SpO2: 100% (04-10-25 @ 08:34) (100% - 100%)    Orthostatic VS  04-10-25 @ 08:34  Lying BP: --/-- HR: --  Sitting BP: 115/81 HR: 103  Standing BP: 121/73 HR: 108  Site: --  Mode: --  Orthostatic VS  04-09-25 @ 20:21  Lying BP: --/-- HR: --  Sitting BP: 131/94 HR: 113  Standing BP: 128/88 HR: 113  Site: upper left arm  Mode: --  Orthostatic VS  04-09-25 @ 07:25  Lying BP: --/-- HR: --  Sitting BP: 116/80 HR: 100  Standing BP: 124/79 HR: 99  Site: --  Mode: --  Orthostatic VS  04-08-25 @ 19:59  Lying BP: --/-- HR: --  Sitting BP: 129/82 HR: 112  Standing BP: 124/84 HR: 113  Site: upper left arm  Mode: --

## 2025-04-10 LAB
ALBUMIN SERPL ELPH-MCNC: 3.7 G/DL — SIGNIFICANT CHANGE UP (ref 3.3–5)
ALP SERPL-CCNC: 53 U/L — SIGNIFICANT CHANGE UP (ref 40–120)
ALT FLD-CCNC: 12 U/L — SIGNIFICANT CHANGE UP (ref 4–41)
ANION GAP SERPL CALC-SCNC: 12 MMOL/L — SIGNIFICANT CHANGE UP (ref 7–14)
AST SERPL-CCNC: 19 U/L — SIGNIFICANT CHANGE UP (ref 4–40)
BASOPHILS # BLD AUTO: 0.03 K/UL — SIGNIFICANT CHANGE UP (ref 0–0.2)
BASOPHILS NFR BLD AUTO: 0.5 % — SIGNIFICANT CHANGE UP (ref 0–2)
BILIRUB SERPL-MCNC: 0.2 MG/DL — SIGNIFICANT CHANGE UP (ref 0.2–1.2)
BUN SERPL-MCNC: 13 MG/DL — SIGNIFICANT CHANGE UP (ref 7–23)
CALCIUM SERPL-MCNC: 9.2 MG/DL — SIGNIFICANT CHANGE UP (ref 8.4–10.5)
CHLORIDE SERPL-SCNC: 100 MMOL/L — SIGNIFICANT CHANGE UP (ref 98–107)
CLOZAPINE SERPL-MCNC: 399 NG/ML — SIGNIFICANT CHANGE UP (ref 350–600)
CO2 SERPL-SCNC: 27 MMOL/L — SIGNIFICANT CHANGE UP (ref 22–31)
CREAT SERPL-MCNC: 1.1 MG/DL — SIGNIFICANT CHANGE UP (ref 0.5–1.3)
CRP SERPL-MCNC: 11.3 MG/L — HIGH
EGFR: 85 ML/MIN/1.73M2 — SIGNIFICANT CHANGE UP
EGFR: 85 ML/MIN/1.73M2 — SIGNIFICANT CHANGE UP
EOSINOPHIL # BLD AUTO: 0.24 K/UL — SIGNIFICANT CHANGE UP (ref 0–0.5)
EOSINOPHIL NFR BLD AUTO: 4 % — SIGNIFICANT CHANGE UP (ref 0–6)
GLUCOSE SERPL-MCNC: 114 MG/DL — HIGH (ref 70–99)
HCT VFR BLD CALC: 44.4 % — SIGNIFICANT CHANGE UP (ref 39–50)
HGB BLD-MCNC: 14.6 G/DL — SIGNIFICANT CHANGE UP (ref 13–17)
IANC: 3.42 K/UL — SIGNIFICANT CHANGE UP (ref 1.8–7.4)
IMM GRANULOCYTES NFR BLD AUTO: 0.2 % — SIGNIFICANT CHANGE UP (ref 0–0.9)
LYMPHOCYTES # BLD AUTO: 2.15 K/UL — SIGNIFICANT CHANGE UP (ref 1–3.3)
LYMPHOCYTES # BLD AUTO: 35.5 % — SIGNIFICANT CHANGE UP (ref 13–44)
MCHC RBC-ENTMCNC: 29 PG — SIGNIFICANT CHANGE UP (ref 27–34)
MCHC RBC-ENTMCNC: 32.9 G/DL — SIGNIFICANT CHANGE UP (ref 32–36)
MCV RBC AUTO: 88.1 FL — SIGNIFICANT CHANGE UP (ref 80–100)
MONOCYTES # BLD AUTO: 0.21 K/UL — SIGNIFICANT CHANGE UP (ref 0–0.9)
MONOCYTES NFR BLD AUTO: 3.5 % — SIGNIFICANT CHANGE UP (ref 2–14)
NEUTROPHILS # BLD AUTO: 3.42 K/UL — SIGNIFICANT CHANGE UP (ref 1.8–7.4)
NEUTROPHILS NFR BLD AUTO: 56.3 % — SIGNIFICANT CHANGE UP (ref 43–77)
NRBC # BLD AUTO: 0 K/UL — SIGNIFICANT CHANGE UP (ref 0–0)
NRBC # FLD: 0 K/UL — SIGNIFICANT CHANGE UP (ref 0–0)
NRBC BLD AUTO-RTO: 0 /100 WBCS — SIGNIFICANT CHANGE UP (ref 0–0)
PLATELET # BLD AUTO: 319 K/UL — SIGNIFICANT CHANGE UP (ref 150–400)
POTASSIUM SERPL-MCNC: 4 MMOL/L — SIGNIFICANT CHANGE UP (ref 3.5–5.3)
POTASSIUM SERPL-SCNC: 4 MMOL/L — SIGNIFICANT CHANGE UP (ref 3.5–5.3)
PROT SERPL-MCNC: 7.2 G/DL — SIGNIFICANT CHANGE UP (ref 6–8.3)
RBC # BLD: 5.04 M/UL — SIGNIFICANT CHANGE UP (ref 4.2–5.8)
RBC # FLD: 13.5 % — SIGNIFICANT CHANGE UP (ref 10.3–14.5)
SODIUM SERPL-SCNC: 139 MMOL/L — SIGNIFICANT CHANGE UP (ref 135–145)
TROPONIN T, HIGH SENSITIVITY RESULT: 9 NG/L — SIGNIFICANT CHANGE UP
VALPROATE SERPL-MCNC: 62.9 UG/ML — SIGNIFICANT CHANGE UP (ref 50–100)
WBC # BLD: 6.06 K/UL — SIGNIFICANT CHANGE UP (ref 3.8–10.5)
WBC # FLD AUTO: 6.06 K/UL — SIGNIFICANT CHANGE UP (ref 3.8–10.5)

## 2025-04-10 PROCEDURE — 99232 SBSQ HOSP IP/OBS MODERATE 35: CPT

## 2025-04-10 RX ADMIN — Medication 300 MILLIGRAM(S): at 20:06

## 2025-04-10 RX ADMIN — HALOPERIDOL 5 MILLIGRAM(S): 10 TABLET ORAL at 20:06

## 2025-04-10 RX ADMIN — Medication 1000 MILLIGRAM(S): at 20:06

## 2025-04-10 RX ADMIN — Medication 2 MILLIGRAM(S): at 22:21

## 2025-04-10 RX ADMIN — Medication 500 MILLIGRAM(S): at 08:43

## 2025-04-10 RX ADMIN — HALOPERIDOL 5 MILLIGRAM(S): 10 TABLET ORAL at 08:43

## 2025-04-10 NOTE — BH INPATIENT PSYCHIATRY PROGRESS NOTE - NSBHASSESSSUMMFT_PSY_ALL_CORE
Pt is a 46 yo M, single, non-caregiver, domiciled at [facility], psych hx of  Schizophrenia vs Schizoaffective Disorder, multiple prior admissions including state (last inpt  [facility] August 26-Sept 24, 2024, and an admission to [facility] in April of 2024,  [facility] 2009 and [id number]), per chart cannabis use disorder (residence staff denies recent use), no known hx suicide attempt or violence, follows at [facility] on [place] grounds, no known legal issues, PMH HTN (not on meds) and anemia per psyckes on a previous admission, brought in by EMS called by residence staff after pt was found walking around naked and cheeking his meds and not taking the Clozapine 100mg, Seroquel 200mg, or Haldol 20mg, Depakote 1000mg at bedtime as scheduled. He is also on Aristada monthly.     Pt remains psychotic with persistent Auditory Hallucinations which he says he can better cope with now. He is showing some improvement and labwork performed this AM. Will continue Clozapine 300mg, and Continue Haldol 5mg BID and Depakote 500mg in AM and 1000mg QHS.  Awaiting Clozapine level and pt requires continued inpatient psychiatric hospitalization at safety and stabilization.    >Legal: 9.27  >Obs: Routine, no need for CO, patient not expected to pose risk to self or others in controlled inpatient setting  >Psychiatric Meds: c/w Clozaril 300mg PO at bedtime (psychosis), Haldol 5mg PO BID (psychosis) and Depakote 1500mg total daily (500mg qAM and 1000mg qHS) (mood)   >Labs: Admission labs reviewed, no acute findings.   >Medical:  No acute concerns. No consultations needed at this time.  >Social: milieu/structured therapy  >Treatment Interventions: Groups and Individual Therapy/CBT   >Dispo: pending remission of sx

## 2025-04-10 NOTE — BH INPATIENT PSYCHIATRY PROGRESS NOTE - NSBHFUPINTERVALHXFT_PSY_A_CORE
46 yo M seen for Schizophrenia. Chart Reviewed and discussed with treatment team did not reveal any overnight events.   Pt showing some improvement, less psychotic with persistent Auditory Hallucinations which he says he can cope with now. He is more visible in milieu but remains largely self isolative, appears less internally pre-occupied. He reports eating and sleeping well with no known side effects to his meds. He did comply with labwork after refusing yesterday, tolerating Clozapine at 300mg with Depakote level therapeutic.  No GI complains, no constipation.  No somatic complaints.  Poor ADLs.

## 2025-04-11 PROCEDURE — 99232 SBSQ HOSP IP/OBS MODERATE 35: CPT

## 2025-04-11 RX ADMIN — HALOPERIDOL 5 MILLIGRAM(S): 10 TABLET ORAL at 08:36

## 2025-04-11 RX ADMIN — HALOPERIDOL 5 MILLIGRAM(S): 10 TABLET ORAL at 20:33

## 2025-04-11 RX ADMIN — Medication 500 MILLIGRAM(S): at 08:37

## 2025-04-11 RX ADMIN — Medication 1000 MILLIGRAM(S): at 20:32

## 2025-04-11 RX ADMIN — Medication 300 MILLIGRAM(S): at 20:32

## 2025-04-11 NOTE — BH INPATIENT PSYCHIATRY PROGRESS NOTE - NSBHFUPINTERVALHXFT_PSY_A_CORE
Pt compliant with medication and tolerating it well.  Chart reviewed and case discussed with treatment team.  No events reported overnight.  Pt in bed late this AM due to his roomate disrupting his sleep last night.  Pt denies SI/HI/I/P or AH/VH or paranoia.  Pt eating well.

## 2025-04-11 NOTE — BH INPATIENT PSYCHIATRY PROGRESS NOTE - NSBHASSESSSUMMFT_PSY_ALL_CORE
Pt is a 44 yo M, single, non-caregiver, domiciled at [facility], psych hx of  Schizophrenia vs Schizoaffective Disorder, multiple prior admissions including state (last inpt  [facility] August 26-Sept 24, 2024, and an admission to [facility] in April of 2024,  [facility] 2009 and [phone]), per chart cannabis use disorder (residence staff denies recent use), no known hx suicide attempt or violence, follows at [facility] on [place] grounds, no known legal issues, PMH HTN (not on meds) and anemia per psyckes on a previous admission, brought in by EMS called by residence staff after pt was found walking around naked and cheeking his meds and not taking the Clozapine 100mg, Seroquel 200mg, or Haldol 20mg, Depakote 1000mg at bedtime as scheduled. He is also on Aristada monthly.     Pt remains psychotic with persistent Auditory Hallucinations which he says he can better cope with now. He is showing some improvement and labwork performed this AM. Will continue Clozapine 300mg, and Continue Haldol 5mg BID and Depakote 500mg in AM and 1000mg QHS.  Awaiting Clozapine level and pt requires continued inpatient psychiatric hospitalization at safety and stabilization.    >Legal: 9.27  >Obs: Routine, no need for CO, patient not expected to pose risk to self or others in controlled inpatient setting  >Psychiatric Meds: c/w Clozaril 300mg PO at bedtime (psychosis), Haldol 5mg PO BID (psychosis) and Depakote 1500mg total daily (500mg qAM and 1000mg qHS) (mood)   >Labs: Admission labs reviewed, no acute findings.   >Medical:  No acute concerns. No consultations needed at this time.  >Social: milieu/structured therapy  >Treatment Interventions: Groups and Individual Therapy/CBT   >Dispo: pending remission of sx

## 2025-04-11 NOTE — BH INPATIENT PSYCHIATRY PROGRESS NOTE - NSBHMETABOLIC_PSY_ALL_CORE_FT
BMI: BMI (kg/m2): 29.7 (03-18-25 @ 15:05)  HbA1c: A1C with Estimated Average Glucose Result: 5.2 % (03-19-25 @ 10:24)    Glucose:   BP: --Vital Signs Last 24 Hrs  T(C): 36.5 (04-11-25 @ 07:58), Max: 36.9 (04-10-25 @ 19:14)  T(F): 97.7 (04-11-25 @ 07:58), Max: 98.4 (04-10-25 @ 19:14)  HR: --  BP: --  BP(mean): --  RR: --  SpO2: --    Orthostatic VS  04-11-25 @ 07:58  Lying BP: --/-- HR: --  Sitting BP: 111/89 HR: 111  Standing BP: 113/80 HR: 114  Site: --  Mode: --  Orthostatic VS  04-10-25 @ 19:14  Lying BP: --/-- HR: --  Sitting BP: 116/81 HR: 105  Standing BP: 110/83 HR: 108  Site: --  Mode: --  Orthostatic VS  04-10-25 @ 08:34  Lying BP: --/-- HR: --  Sitting BP: 115/81 HR: 103  Standing BP: 121/73 HR: 108  Site: --  Mode: --  Orthostatic VS  04-09-25 @ 20:21  Lying BP: --/-- HR: --  Sitting BP: 131/94 HR: 113  Standing BP: 128/88 HR: 113  Site: upper left arm  Mode: --    Lipid Panel: Date/Time: 03-19-25 @ 10:24  Cholesterol, Serum: 167  LDL Cholesterol Calculated: 101  HDL Cholesterol, Serum: 45  Total Cholesterol/HDL Ration Measurement: --  Triglycerides, Serum: 115

## 2025-04-11 NOTE — BH INPATIENT PSYCHIATRY PROGRESS NOTE - NSBHMSESPABN_PSY_A_CORE
Soft volume/Slowed rate/Decreased productivity/Increased latency
Soft volume
Soft volume/Slowed rate/Decreased productivity/Increased latency
Soft volume
Increased latency
Soft volume
Soft volume
Increased latency
Decreased productivity/Increased latency
Increased latency
Soft volume

## 2025-04-11 NOTE — BH INPATIENT PSYCHIATRY PROGRESS NOTE - NSBHATTESTBILLONSITE_PSY_A_CORE
CHAN to bill

## 2025-04-12 RX ADMIN — HALOPERIDOL 5 MILLIGRAM(S): 10 TABLET ORAL at 08:30

## 2025-04-12 RX ADMIN — HALOPERIDOL 5 MILLIGRAM(S): 10 TABLET ORAL at 20:19

## 2025-04-12 RX ADMIN — Medication 300 MILLIGRAM(S): at 20:18

## 2025-04-12 RX ADMIN — Medication 500 MILLIGRAM(S): at 08:30

## 2025-04-12 RX ADMIN — Medication 1000 MILLIGRAM(S): at 20:18

## 2025-04-13 RX ADMIN — Medication 500 MILLIGRAM(S): at 08:36

## 2025-04-13 RX ADMIN — HALOPERIDOL 5 MILLIGRAM(S): 10 TABLET ORAL at 08:36

## 2025-04-13 RX ADMIN — Medication 300 MILLIGRAM(S): at 20:22

## 2025-04-13 RX ADMIN — HALOPERIDOL 5 MILLIGRAM(S): 10 TABLET ORAL at 20:22

## 2025-04-13 RX ADMIN — Medication 1000 MILLIGRAM(S): at 20:22

## 2025-04-14 PROCEDURE — 99232 SBSQ HOSP IP/OBS MODERATE 35: CPT

## 2025-04-14 RX ORDER — CLOZAPINE 100 MG
1 TABLET ORAL
Qty: 0 | Refills: 0 | DISCHARGE
Start: 2025-04-14

## 2025-04-14 RX ORDER — HALOPERIDOL 10 MG/1
1 TABLET ORAL
Qty: 0 | Refills: 0 | DISCHARGE
Start: 2025-04-14

## 2025-04-14 RX ADMIN — HALOPERIDOL 5 MILLIGRAM(S): 10 TABLET ORAL at 20:10

## 2025-04-14 RX ADMIN — Medication 1000 MILLIGRAM(S): at 20:10

## 2025-04-14 RX ADMIN — HALOPERIDOL 5 MILLIGRAM(S): 10 TABLET ORAL at 08:15

## 2025-04-14 RX ADMIN — Medication 500 MILLIGRAM(S): at 08:15

## 2025-04-14 RX ADMIN — Medication 300 MILLIGRAM(S): at 20:10

## 2025-04-14 NOTE — BH INPATIENT PSYCHIATRY PROGRESS NOTE - NSBHFUPINTERVALHXFT_PSY_A_CORE
Pt compliant with medication and tolerating it well.  Chart reviewed and case discussed with treatment team.  No events reported overnight.  Pt denies SI/HI/I/P or VH or paranoia.  Pt reports he has AH at times, but he is able to deal with them and they are not distressing.  Pt denies CAH.  Pt eating and sleeping well.  Pt feels ready to be discharged this week.  Pt declining Haldol ELIZONDO, but reports he will be compliant with his pills.

## 2025-04-14 NOTE — BH INPATIENT PSYCHIATRY PROGRESS NOTE - NSBHASSESSSUMMFT_PSY_ALL_CORE
Pt is a 46 yo M, single, non-caregiver, domiciled at [facility], psych hx of  Schizophrenia vs Schizoaffective Disorder, multiple prior admissions including state (last inpt  [facility] August 26-Sept 24, 2024, and an admission to [facility] in April of 2024,  [facility] 2009 and [phone]), per chart cannabis use disorder (residence staff denies recent use), no known hx suicide attempt or violence, follows at [facility] on [place] grounds, no known legal issues, PMH HTN (not on meds) and anemia per psyckes on a previous admission, brought in by EMS called by residence staff after pt was found walking around naked and cheeking his meds and not taking the Clozapine 100mg, Seroquel 200mg, or Haldol 20mg, Depakote 1000mg at bedtime as scheduled. He is also on Aristada monthly.     Pt remains psychotic with persistent Auditory Hallucinations which he says he can better cope with now. He is showing some improvement and labwork performed this AM. Will continue Clozapine 300mg, and Continue Haldol 5mg BID and Depakote 500mg in AM and 1000mg QHS.  Awaiting Clozapine level and pt requires continued inpatient psychiatric hospitalization at safety and stabilization.    >Legal: 9.27  >Obs: Routine, no need for CO, patient not expected to pose risk to self or others in controlled inpatient setting  >Psychiatric Meds: c/w Clozaril 300mg PO at bedtime (psychosis), Haldol 5mg PO BID (psychosis) and Depakote 1500mg total daily (500mg qAM and 1000mg qHS) (mood)   >Labs: Admission labs reviewed, no acute findings.   >Medical:  No acute concerns. No consultations needed at this time.  >Social: milieu/structured therapy  >Treatment Interventions: Groups and Individual Therapy/CBT   >Dispo: pending coordination of care

## 2025-04-14 NOTE — BH INPATIENT PSYCHIATRY PROGRESS NOTE - NSBHCHARTREVIEWVS_PSY_A_CORE FT
Vital Signs Last 24 Hrs  T(C): 36.5 (04-14-25 @ 06:59), Max: 36.7 (04-13-25 @ 20:23)  T(F): 97.7 (04-14-25 @ 06:59), Max: 98.1 (04-13-25 @ 20:23)  HR: --  BP: --  BP(mean): --  RR: --  SpO2: --    Orthostatic VS  04-14-25 @ 06:59  Lying BP: 117/78 HR: 97  Sitting BP: 108/76 HR: 103  Standing BP: --/-- HR: --  Site: --  Mode: --  Orthostatic VS  04-13-25 @ 20:23  Lying BP: --/-- HR: --  Sitting BP: 112/76 HR: 108  Standing BP: 115/79 HR: 100  Site: --  Mode: --  Orthostatic VS  04-13-25 @ 07:37  Lying BP: --/-- HR: --  Sitting BP: 130/90 HR: 108  Standing BP: 133/84 HR: 109  Site: --  Mode: --  Orthostatic VS  04-12-25 @ 20:48  Lying BP: --/-- HR: --  Sitting BP: 130/90 HR: 108  Standing BP: 133/84 HR: 109  Site: --  Mode: --

## 2025-04-14 NOTE — BH INPATIENT PSYCHIATRY PROGRESS NOTE - NSBHMETABOLIC_PSY_ALL_CORE_FT
BMI: BMI (kg/m2): 29.7 (03-18-25 @ 15:05)  HbA1c: A1C with Estimated Average Glucose Result: 5.2 % (03-19-25 @ 10:24)    Glucose:   BP: --Vital Signs Last 24 Hrs  T(C): 36.5 (04-14-25 @ 06:59), Max: 36.7 (04-13-25 @ 20:23)  T(F): 97.7 (04-14-25 @ 06:59), Max: 98.1 (04-13-25 @ 20:23)  HR: --  BP: --  BP(mean): --  RR: --  SpO2: --    Orthostatic VS  04-14-25 @ 06:59  Lying BP: 117/78 HR: 97  Sitting BP: 108/76 HR: 103  Standing BP: --/-- HR: --  Site: --  Mode: --  Orthostatic VS  04-13-25 @ 20:23  Lying BP: --/-- HR: --  Sitting BP: 112/76 HR: 108  Standing BP: 115/79 HR: 100  Site: --  Mode: --  Orthostatic VS  04-13-25 @ 07:37  Lying BP: --/-- HR: --  Sitting BP: 130/90 HR: 108  Standing BP: 133/84 HR: 109  Site: --  Mode: --  Orthostatic VS  04-12-25 @ 20:48  Lying BP: --/-- HR: --  Sitting BP: 130/90 HR: 108  Standing BP: 133/84 HR: 109  Site: --  Mode: --    Lipid Panel: Date/Time: 03-19-25 @ 10:24  Cholesterol, Serum: 167  LDL Cholesterol Calculated: 101  HDL Cholesterol, Serum: 45  Total Cholesterol/HDL Ration Measurement: --  Triglycerides, Serum: 115

## 2025-04-15 LAB
BASOPHILS # BLD AUTO: 0.05 K/UL — SIGNIFICANT CHANGE UP (ref 0–0.2)
BASOPHILS NFR BLD AUTO: 0.9 % — SIGNIFICANT CHANGE UP (ref 0–2)
EOSINOPHIL # BLD AUTO: 0.26 K/UL — SIGNIFICANT CHANGE UP (ref 0–0.5)
EOSINOPHIL NFR BLD AUTO: 4.4 % — SIGNIFICANT CHANGE UP (ref 0–6)
HCT VFR BLD CALC: 41.9 % — SIGNIFICANT CHANGE UP (ref 39–50)
HGB BLD-MCNC: 13.9 G/DL — SIGNIFICANT CHANGE UP (ref 13–17)
IANC: 2.11 K/UL — SIGNIFICANT CHANGE UP (ref 1.8–7.4)
IMM GRANULOCYTES NFR BLD AUTO: 0.2 % — SIGNIFICANT CHANGE UP (ref 0–0.9)
LYMPHOCYTES # BLD AUTO: 3.23 K/UL — SIGNIFICANT CHANGE UP (ref 1–3.3)
LYMPHOCYTES # BLD AUTO: 55.2 % — HIGH (ref 13–44)
MCHC RBC-ENTMCNC: 29.1 PG — SIGNIFICANT CHANGE UP (ref 27–34)
MCHC RBC-ENTMCNC: 33.2 G/DL — SIGNIFICANT CHANGE UP (ref 32–36)
MCV RBC AUTO: 87.7 FL — SIGNIFICANT CHANGE UP (ref 80–100)
MONOCYTES # BLD AUTO: 0.19 K/UL — SIGNIFICANT CHANGE UP (ref 0–0.9)
MONOCYTES NFR BLD AUTO: 3.2 % — SIGNIFICANT CHANGE UP (ref 2–14)
NEUTROPHILS # BLD AUTO: 2.11 K/UL — SIGNIFICANT CHANGE UP (ref 1.8–7.4)
NEUTROPHILS NFR BLD AUTO: 36.1 % — LOW (ref 43–77)
NRBC # BLD AUTO: 0 K/UL — SIGNIFICANT CHANGE UP (ref 0–0)
NRBC # FLD: 0 K/UL — SIGNIFICANT CHANGE UP (ref 0–0)
NRBC BLD AUTO-RTO: 0 /100 WBCS — SIGNIFICANT CHANGE UP (ref 0–0)
PLATELET # BLD AUTO: 308 K/UL — SIGNIFICANT CHANGE UP (ref 150–400)
RBC # BLD: 4.78 M/UL — SIGNIFICANT CHANGE UP (ref 4.2–5.8)
RBC # FLD: 13.4 % — SIGNIFICANT CHANGE UP (ref 10.3–14.5)
WBC # BLD: 5.85 K/UL — SIGNIFICANT CHANGE UP (ref 3.8–10.5)
WBC # FLD AUTO: 5.85 K/UL — SIGNIFICANT CHANGE UP (ref 3.8–10.5)

## 2025-04-15 PROCEDURE — 99232 SBSQ HOSP IP/OBS MODERATE 35: CPT

## 2025-04-15 RX ADMIN — HALOPERIDOL 5 MILLIGRAM(S): 10 TABLET ORAL at 20:21

## 2025-04-15 RX ADMIN — Medication 300 MILLIGRAM(S): at 20:21

## 2025-04-15 RX ADMIN — HALOPERIDOL 5 MILLIGRAM(S): 10 TABLET ORAL at 08:31

## 2025-04-15 RX ADMIN — Medication 500 MILLIGRAM(S): at 08:31

## 2025-04-15 RX ADMIN — Medication 1000 MILLIGRAM(S): at 20:21

## 2025-04-15 NOTE — BH INPATIENT PSYCHIATRY PROGRESS NOTE - NSBHFUPINTERVALHXFT_PSY_A_CORE
Pt compliant with medication and tolerating it well.  Chart reviewed and case discussed with treatment team.  No events reported overnight.  Pt denies SI/HI/I/P or AH/VH or paranoia.  Pt eating and sleeping well.

## 2025-04-15 NOTE — BH INPATIENT PSYCHIATRY PROGRESS NOTE - NSBHCHARTREVIEWVS_PSY_A_CORE FT
Vital Signs Last 24 Hrs  T(C): 36.9 (04-14-25 @ 20:31), Max: 36.9 (04-14-25 @ 20:31)  T(F): 98.4 (04-14-25 @ 20:31), Max: 98.4 (04-14-25 @ 20:31)  HR: --  BP: --  BP(mean): --  RR: --  SpO2: --    Orthostatic VS  04-14-25 @ 20:31  Lying BP: --/-- HR: --  Sitting BP: 127/83 HR: 100  Standing BP: 120/89 HR: 99  Site: --  Mode: --  Orthostatic VS  04-14-25 @ 06:59  Lying BP: 117/78 HR: 97  Sitting BP: 108/76 HR: 103  Standing BP: --/-- HR: --  Site: --  Mode: --  Orthostatic VS  04-13-25 @ 20:23  Lying BP: --/-- HR: --  Sitting BP: 112/76 HR: 108  Standing BP: 115/79 HR: 100  Site: --  Mode: --

## 2025-04-15 NOTE — BH INPATIENT PSYCHIATRY PROGRESS NOTE - NSBHASSESSSUMMFT_PSY_ALL_CORE
Pt is a 44 yo M, single, non-caregiver, domiciled at [facility], psych hx of  Schizophrenia vs Schizoaffective Disorder, multiple prior admissions including state (last inpt  [facility] August 26-Sept 24, 2024, and an admission to [facility] in April of 2024,  [facility] 2009 and [id number]), per chart cannabis use disorder (residence staff denies recent use), no known hx suicide attempt or violence, follows at [facility] on [place] grounds, no known legal issues, PMH HTN (not on meds) and anemia per psyckes on a previous admission, brought in by EMS called by residence staff after pt was found walking around naked and cheeking his meds and not taking the Clozapine 100mg, Seroquel 200mg, or Haldol 20mg, Depakote 1000mg at bedtime as scheduled. He is also on Aristada monthly.     Pt remains psychotic with persistent Auditory Hallucinations which he says he can better cope with now. He is showing some improvement and labwork performed this AM. Will continue Clozapine 300mg, and Continue Haldol 5mg BID and Depakote 500mg in AM and 1000mg QHS.  Awaiting Clozapine level and pt requires continued inpatient psychiatric hospitalization at safety and stabilization.    >Legal: 9.27  >Obs: Routine, no need for CO, patient not expected to pose risk to self or others in controlled inpatient setting  >Psychiatric Meds: c/w Clozaril 300mg PO at bedtime (psychosis), Haldol 5mg PO BID (psychosis) and Depakote 1500mg total daily (500mg qAM and 1000mg qHS) (mood)   >Labs: Admission labs reviewed, no acute findings.   >Medical:  No acute concerns. No consultations needed at this time.  >Social: milieu/structured therapy  >Treatment Interventions: Groups and Individual Therapy/CBT   >Dispo: pending coordination of care

## 2025-04-15 NOTE — BH DISCHARGE NOTE NURSING/SOCIAL WORK/PSYCH REHAB - DISCHARGE INSTRUCTIONS AFTERCARE APPOINTMENTS
In order to check the location, date, or time of your aftercare appointment, please refer to your Discharge Instructions Document given to you upon leaving the hospital.  If you have lost the instructions please call [phone]

## 2025-04-15 NOTE — BH DISCHARGE NOTE NURSING/SOCIAL WORK/PSYCH REHAB - FINANCIAL ASSISTANCE
[facility] provides services at a reduced cost to those who are determined to be eligible through [facility]’s financial assistance program. Information regarding [facility]’s financial assistance program can be found by going to [email or web address] or by calling [phone].

## 2025-04-15 NOTE — BH DISCHARGE NOTE NURSING/SOCIAL WORK/PSYCH REHAB - NSCDUDCCRISIS_PSY_A_CORE
[facility]  [phone]) [facility]-WELL ([phone])  Text "WELL" to 71092  Website: www.Shut Down.[employer]/.National Suicide Prevention Lifeline 9 (289) 188-8898/.  [facility]  [phone] [place] [address] [phone]   Hours:  Monday through Friday from 9 AM to 3 PM

## 2025-04-15 NOTE — BH INPATIENT PSYCHIATRY PROGRESS NOTE - NSBHMETABOLIC_PSY_ALL_CORE_FT
BMI: BMI (kg/m2): 29.7 (03-18-25 @ 15:05)  HbA1c: A1C with Estimated Average Glucose Result: 5.2 % (03-19-25 @ 10:24)    Glucose:   BP: --Vital Signs Last 24 Hrs  T(C): 36.9 (04-14-25 @ 20:31), Max: 36.9 (04-14-25 @ 20:31)  T(F): 98.4 (04-14-25 @ 20:31), Max: 98.4 (04-14-25 @ 20:31)  HR: --  BP: --  BP(mean): --  RR: --  SpO2: --    Orthostatic VS  04-14-25 @ 20:31  Lying BP: --/-- HR: --  Sitting BP: 127/83 HR: 100  Standing BP: 120/89 HR: 99  Site: --  Mode: --  Orthostatic VS  04-14-25 @ 06:59  Lying BP: 117/78 HR: 97  Sitting BP: 108/76 HR: 103  Standing BP: --/-- HR: --  Site: --  Mode: --  Orthostatic VS  04-13-25 @ 20:23  Lying BP: --/-- HR: --  Sitting BP: 112/76 HR: 108  Standing BP: 115/79 HR: 100  Site: --  Mode: --    Lipid Panel: Date/Time: 03-19-25 @ 10:24  Cholesterol, Serum: 167  LDL Cholesterol Calculated: 101  HDL Cholesterol, Serum: 45  Total Cholesterol/HDL Ration Measurement: --  Triglycerides, Serum: 115

## 2025-04-15 NOTE — BH DISCHARGE NOTE NURSING/SOCIAL WORK/PSYCH REHAB - NSDCPRGOAL_PSY_ALL_CORE
Writer met with patient to discuss their overall progress during their hospital stay. During the meeting patient was slightly unkempt, calm and attentive. Patient reported he is doing better, feeling brighter, not hearing voices as much and ready for discharge. Patient also reported writing in his journal has help limited his auditory hallucinations. Throughout the week patient has shown fair progress towards their goal, by engaging in a 15-minute conversation without irrational content. On the unit patient was isolative and avoids social contact. During patients stay, he attended a minimal number of groups. Overall writer has seen improvements in patient. Patient completed a safety plan before discharge.

## 2025-04-15 NOTE — BH DISCHARGE NOTE NURSING/SOCIAL WORK/PSYCH REHAB - PATIENT PORTAL LINK FT
You can access the FollowMyHealth Patient Portal offered by [facility] by registering at the following website: [email or web address]. By joining [employer]’s FollowMyHealth portal, you will also be able to view your health information using other applications (apps) compatible with our system.

## 2025-04-16 PROCEDURE — 99232 SBSQ HOSP IP/OBS MODERATE 35: CPT

## 2025-04-16 RX ORDER — CLOZAPINE 100 MG
1 TABLET ORAL
Qty: 14 | Refills: 0
Start: 2025-04-16 | End: 2025-04-29

## 2025-04-16 RX ORDER — CLOZAPINE 100 MG
1 TABLET ORAL
Qty: 0 | Refills: 0 | DISCHARGE

## 2025-04-16 RX ORDER — HALOPERIDOL 10 MG/1
1 TABLET ORAL
Qty: 28 | Refills: 0
Start: 2025-04-16 | End: 2025-04-29

## 2025-04-16 RX ORDER — CLOZAPINE 100 MG
3 TABLET ORAL
Qty: 14 | Refills: 0 | DISCHARGE
Start: 2025-04-16 | End: 2025-04-29

## 2025-04-16 RX ADMIN — Medication 500 MILLIGRAM(S): at 08:26

## 2025-04-16 RX ADMIN — Medication 1000 MILLIGRAM(S): at 20:12

## 2025-04-16 RX ADMIN — Medication 300 MILLIGRAM(S): at 20:12

## 2025-04-16 RX ADMIN — HALOPERIDOL 5 MILLIGRAM(S): 10 TABLET ORAL at 20:12

## 2025-04-16 RX ADMIN — HALOPERIDOL 5 MILLIGRAM(S): 10 TABLET ORAL at 08:26

## 2025-04-16 NOTE — BH INPATIENT PSYCHIATRY PROGRESS NOTE - NSTXPROBHYPER_PSY_ALL_CORE
HYPERTENSION

## 2025-04-16 NOTE — BH INPATIENT PSYCHIATRY PROGRESS NOTE - NSBHMSEBEHAV_PSY_A_CORE
Cooperative
Cooperative
Cooperative/Other
Cooperative
Cooperative/Other
Cooperative
Other
Cooperative
Cooperative/Other
Cooperative
Other
Cooperative
Uncooperative
Cooperative

## 2025-04-16 NOTE — BH INPATIENT PSYCHIATRY PROGRESS NOTE - NSTXMEDICGOAL_PSY_ALL_CORE
Take all medications as prescribed
Be able to describe the benefit of medication/treatment
Be able to describe the benefit of medication/treatment
Take all medications as prescribed
Be able to describe the benefit of medication/treatment
Take all medications as prescribed
Take all medications as prescribed
Be able to describe the benefit of medication/treatment
Take all medications as prescribed
Be able to describe the benefit of medication/treatment
Take all medications as prescribed
Take all medications as prescribed
Be able to describe the benefit of medication/treatment

## 2025-04-16 NOTE — BH INPATIENT PSYCHIATRY PROGRESS NOTE - NSBHMSEMOOD_PSY_A_CORE
Normal
Unable to assess
scared/Depressed
Normal
scared/Other
Normal
scared/Depressed
Normal
scared/Depressed
Normal
Normal
scared/Unable to assess
Normal
scared/Unable to assess
Normal
Normal
scared/Other
Normal
Depressed
Normal
Normal

## 2025-04-16 NOTE — BH INPATIENT PSYCHIATRY PROGRESS NOTE - PRN MEDS
MEDICATIONS  (PRN):  haloperidol     Tablet 5 milliGRAM(s) Oral every 6 hours PRN agitation  haloperidol    Injectable 5 milliGRAM(s) IntraMuscular once PRN agitation  LORazepam     Tablet 2 milliGRAM(s) Oral every 6 hours PRN agitation  LORazepam   Injectable 2 milliGRAM(s) IntraMuscular once PRN agitation  polyethylene glycol 3350 17 Gram(s) Oral daily PRN constipation  
MEDICATIONS  (PRN):  haloperidol     Tablet 5 milliGRAM(s) Oral every 6 hours PRN agitation  haloperidol    Injectable 5 milliGRAM(s) IntraMuscular once PRN agitation  LORazepam     Tablet 2 milliGRAM(s) Oral every 6 hours PRN agitation  LORazepam   Injectable 2 milliGRAM(s) IntraMuscular once PRN agitation  
MEDICATIONS  (PRN):  haloperidol     Tablet 5 milliGRAM(s) Oral every 6 hours PRN agitation  haloperidol    Injectable 5 milliGRAM(s) IntraMuscular once PRN agitation  LORazepam     Tablet 2 milliGRAM(s) Oral every 6 hours PRN agitation  LORazepam   Injectable 2 milliGRAM(s) IntraMuscular once PRN agitation  
MEDICATIONS  (PRN):  haloperidol     Tablet 5 milliGRAM(s) Oral every 6 hours PRN agitation  haloperidol    Injectable 5 milliGRAM(s) IntraMuscular once PRN agitation  LORazepam     Tablet 2 milliGRAM(s) Oral every 6 hours PRN agitation  LORazepam   Injectable 2 milliGRAM(s) IntraMuscular once PRN agitation  polyethylene glycol 3350 17 Gram(s) Oral daily PRN constipation  
MEDICATIONS  (PRN):  haloperidol     Tablet 5 milliGRAM(s) Oral every 6 hours PRN agitation  haloperidol    Injectable 5 milliGRAM(s) IntraMuscular once PRN agitation  LORazepam     Tablet 2 milliGRAM(s) Oral every 6 hours PRN agitation  LORazepam   Injectable 2 milliGRAM(s) IntraMuscular once PRN agitation  
MEDICATIONS  (PRN):  haloperidol     Tablet 5 milliGRAM(s) Oral every 6 hours PRN agitation  haloperidol    Injectable 5 milliGRAM(s) IntraMuscular once PRN agitation  LORazepam     Tablet 2 milliGRAM(s) Oral every 6 hours PRN agitation  LORazepam   Injectable 2 milliGRAM(s) IntraMuscular once PRN agitation  polyethylene glycol 3350 17 Gram(s) Oral daily PRN constipation  
MEDICATIONS  (PRN):  haloperidol     Tablet 5 milliGRAM(s) Oral every 6 hours PRN agitation  haloperidol    Injectable 5 milliGRAM(s) IntraMuscular once PRN agitation  LORazepam     Tablet 2 milliGRAM(s) Oral every 6 hours PRN agitation  LORazepam   Injectable 2 milliGRAM(s) IntraMuscular once PRN agitation  
MEDICATIONS  (PRN):  haloperidol     Tablet 5 milliGRAM(s) Oral every 6 hours PRN agitation  haloperidol    Injectable 5 milliGRAM(s) IntraMuscular once PRN agitation  LORazepam     Tablet 2 milliGRAM(s) Oral every 6 hours PRN agitation  LORazepam   Injectable 2 milliGRAM(s) IntraMuscular once PRN agitation  polyethylene glycol 3350 17 Gram(s) Oral daily PRN constipation  
MEDICATIONS  (PRN):  haloperidol     Tablet 5 milliGRAM(s) Oral every 6 hours PRN agitation  haloperidol    Injectable 5 milliGRAM(s) IntraMuscular once PRN agitation  polyethylene glycol 3350 17 Gram(s) Oral daily PRN constipation  
MEDICATIONS  (PRN):  haloperidol     Tablet 5 milliGRAM(s) Oral every 6 hours PRN agitation  haloperidol    Injectable 5 milliGRAM(s) IntraMuscular once PRN agitation  LORazepam     Tablet 2 milliGRAM(s) Oral every 6 hours PRN agitation  LORazepam   Injectable 2 milliGRAM(s) IntraMuscular once PRN agitation  polyethylene glycol 3350 17 Gram(s) Oral daily PRN constipation  
MEDICATIONS  (PRN):  haloperidol     Tablet 5 milliGRAM(s) Oral every 6 hours PRN agitation  haloperidol    Injectable 5 milliGRAM(s) IntraMuscular once PRN agitation  LORazepam     Tablet 2 milliGRAM(s) Oral every 6 hours PRN agitation  LORazepam   Injectable 2 milliGRAM(s) IntraMuscular once PRN agitation  
MEDICATIONS  (PRN):  haloperidol     Tablet 5 milliGRAM(s) Oral every 6 hours PRN agitation  haloperidol    Injectable 5 milliGRAM(s) IntraMuscular once PRN agitation  LORazepam     Tablet 2 milliGRAM(s) Oral every 6 hours PRN agitation  LORazepam   Injectable 2 milliGRAM(s) IntraMuscular once PRN agitation  
MEDICATIONS  (PRN):  haloperidol     Tablet 5 milliGRAM(s) Oral every 6 hours PRN agitation  haloperidol    Injectable 5 milliGRAM(s) IntraMuscular once PRN agitation  LORazepam     Tablet 2 milliGRAM(s) Oral every 6 hours PRN agitation  LORazepam   Injectable 2 milliGRAM(s) IntraMuscular once PRN agitation  polyethylene glycol 3350 17 Gram(s) Oral daily PRN constipation  

## 2025-04-16 NOTE — BH INPATIENT PSYCHIATRY PROGRESS NOTE - NSBHCONSULTIPREASON_PSY_A_CORE
other reason

## 2025-04-16 NOTE — BH INPATIENT PSYCHIATRY PROGRESS NOTE - NSBHMSEGROOM_PSY_A_CORE
Poor
Fair
Fair
Poor
Fair
Poor
Poor
Fair
Poor
Fair
Poor

## 2025-04-16 NOTE — BH INPATIENT PSYCHIATRY PROGRESS NOTE - LEVEL OF CONSCIOUSNESS
Alert
Lethargic, arousable to verbal stimulus
Lethargic, arousable to verbal stimulus
Alert
Lethargic, arousable to verbal stimulus
Alert
Alert
Lethargic, arousable to verbal stimulus
Lethargic, arousable to verbal stimulus

## 2025-04-16 NOTE — BH INPATIENT PSYCHIATRY PROGRESS NOTE - NSBHMSEAFFQUAL_PSY_A_CORE
Euthymic
Depressed/Other
Euthymic
Depressed/Other
Depressed/Other
Euthymic
Depressed
Euthymic
Euthymic/Elevated
Irritable
Euthymic
Depressed/Other
Euthymic/Elevated/Other
Euthymic
Euthymic

## 2025-04-16 NOTE — BH INPATIENT PSYCHIATRY PROGRESS NOTE - NSBHMSERECMEM_PSY_A_CORE
Unable to assess
Normal
Unable to assess
Normal
Unable to assess
Normal
Unable to assess
Normal
Unable to assess
Normal
Unable to assess

## 2025-04-16 NOTE — BH INPATIENT PSYCHIATRY PROGRESS NOTE - NSBHATTESTTYPEVISIT_PSY_A_CORE
CHAN without on-site Attending supervision
On-site Attending supervising CHAN (99XXX codes)
CHAN without on-site Attending supervision
On-site Attending supervising CHAN (99XXX codes)
CHAN without on-site Attending supervision
CHAN without on-site Attending supervision
On-site Attending supervising CHAN (99XXX codes)
On-site Attending supervising CHAN (99XXX codes)
Attending Only
On-site Attending supervising CHAN (99XXX codes)
On-site Attending supervising CHAN (99XXX codes)
CHAN without on-site Attending supervision
Attending Only
CHAN without on-site Attending supervision
Attending Only
On-site Attending supervising CHAN (99XXX codes)
Attending Only
Attending Only
CHAN without on-site Attending supervision
Attending Only
Attending with Resident/Fellow/Student

## 2025-04-16 NOTE — BH INPATIENT PSYCHIATRY DISCHARGE NOTE - ATTENDING DISCHARGE PHYSICAL EXAMINATION:
Pt is seen and evaluated, chart is reviewed and case is discussed with treatment team.  Agree with assessment and plan as stated.  Pt denies SI/HI, no intent or plan, no acute risk to self or others at the time of discharge.

## 2025-04-16 NOTE — BH INPATIENT PSYCHIATRY PROGRESS NOTE - NSBHMSETHTPROC_PSY_A_CORE
concrete/Disorganized/Thought blocking/Illogical/Impaired reasoning
Illogical/Impaired reasoning/Other
concrete/Disorganized/Thought blocking/Illogical/Impaired reasoning
Disorganized/Illogical/Impaired reasoning/Other
Illogical/Impaired reasoning/Other
Other
Illogical/Impaired reasoning/Other
concrete/Disorganized/Thought blocking/Illogical/Impaired reasoning
Illogical/Impaired reasoning/Other
Illogical/Impaired reasoning/Other
Other
Disorganized/Illogical/Impaired reasoning/Other
Other
Illogical/Impaired reasoning/Other
concrete/Disorganized/Thought blocking/Illogical/Impaired reasoning
Illogical/Impaired reasoning/Other
concrete/Disorganized/Thought blocking/Illogical/Impaired reasoning
Other
Illogical/Impaired reasoning/Other

## 2025-04-16 NOTE — BH INPATIENT PSYCHIATRY PROGRESS NOTE - NSDCCRITERIA_PSY_ALL_CORE
remission of sx

## 2025-04-16 NOTE — BH INPATIENT PSYCHIATRY DISCHARGE NOTE - OTHER PAST PSYCHIATRIC HISTORY (INCLUDE DETAILS REGARDING ONSET, COURSE OF ILLNESS, INPATIENT/OUTPATIENT TREATMENT)
Pt is a 46 yo M, single, non-caregiver, domiciled at [facility], psych hx of  Schizophrenia vs Schizoaffective Disorder, multiple prior admissions including state (last inpt  [facility] August 26-Sept 24, 2024, and an admission to [facility] in April of 2024,  [facility] 2009 and [id number]), per chart cannabis use disorder (residence staff denies recent use), no known hx suicide attempt or violence, follows at [facility] on [place] [facility], no known legal issues, PMH HTN (not on meds) and anemia per psyckes on a previous admission, brought in by EMS called by residence staff after pt was found walking around naked and cheeking his meds and not taking the Clozapine 100mg, Seroquel 200mg, or Haldol 20mg, Depakote 1000mg at bedtime as scheduled. He is also on Aristada monthly.      Upon assessment, pt was observed to be actively responding to internal stimuli. Patient states he cannot answer questions because he is hearing voices telling him that he is being investigated .  He states that he is hearing voices telling him "demonic and perverted" things, and upset because he does not want to be "perverted."  Pt admits he has a history of suicidal ideation and has felt that life was not worth living.  He is too distracted by the AHs and is smiling and pointing to the door as if listening.  He is unable to  confirm that he is not suicidal.     As per ED SW, [facility] ([phone]), to gather collateral information. A staff member named Radha at the  provided the following details:     The patient is a 45-year-old male resident with a history of schizophrenia, hypertension, and obesity. He was brought to the facility by EMS after being found screaming, seemingly at no one, "Get away from me!" This behavior continued at the facility; the patient approached the , pointed at another resident, and repeated the phrase. After the other resident left, the patient kicked a door.     According to Radha, the patient appears paranoid, believing he is being followed. He can be observed around the residence talking to himself, saying things like, “I paid you and you need to stop following me around.” Radha suspects auditory visual hallucinations and has observed the patient screaming at nothing. This behavior has worsened over the past month, escalating to include kicking doors, writing on the walls, and increased yelling and self-directed speech. While generally compliant with his medications (he requires encouragement to take them), the patient has also become more verbally aggressive with staff. No suicidal or homicidal ideation was reported. These behavioral changes have resulted in multiple psychiatric emergency department visits. The destructive behavior (kicking doors, writing on walls) is recent. The writings on the walls were described as inappropriate.           In addition to his psychiatric history, the patient's medical problems include hypertension and obesity. His appetite appears decreased, his hygiene is poor, and his sleep patterns are uncertain. A medication list was provided, and the outpatient provider is listed on the face sheet. No drug or alcohol use is reported.     Radha described the patient's baseline presentation as compliant, quiet, preferring to listen to music, and generally pleasant. She noted a history of manageable auditory visual hallucinations, which have recently escalated. Radha believes the patient would benefit from admission.      The patient's baseline presentation is described as compliant, keeping to himself, capable of self-advocacy, and free from auditory hallucinations. The writer agreed to keep the staff updated.     Per provider,KARISHMA Dorman patient is cleared and is able to return to their previous residence, [address] [facility] [address]  [facility]   has spoken to counselor luis ([phone]))  ,  confirmed that patients mode of transportation is ambulance and that patient travels w/ supervision. Clinical provider is in agreement with ambulance to group home. Verbal huddle regarding coordination of care completed with interdisciplinary team.      The writer then contacted the on-call CM, Reyna ([phone]), who provided additional information. The patient is a 45-year-old male residing at the facility, with a history of schizophrenia, hypertension, and obesity. BIB EMS. Reyna reported that the patient requested transport to the ED due to auditory hallucinations, though he didn't elaborate on their content. He typically requests an ambulance when the hallucinations worsen. Reyna observed him talking to himself, engaging in what appeared to be a full conversation, during medication administration today. She considers these auditory hallucinations a warning sign of potential decompensation. No suicidal or homicidal ideation was reported. The patient's sleeping, eating, and showering habits remain at baseline. He is compliant with his medications and recently received an intramuscular injection, though Reyna was unaware of the specifics. She stated that the patient is not currently engaging in any dangerous behaviors. No drug or alcohol use is reported.      The patient's baseline presentation is described as compliant, keeping to himself, capable of self-advocacy, and free from auditory hallucinations. The writer agreed to keep the staff updated.     On ML6, SW and NP met with the patient.  He was given a PRN for agitation prior and presented incoherent and sleepy during the interview. Pt was not able to engage in meaningful conversation therefore, interview was terminated.

## 2025-04-16 NOTE — BH INPATIENT PSYCHIATRY PROGRESS NOTE - NSTXDISORGDATETRGT_PSY_ALL_CORE
16-Apr-2025
27-Mar-2025
27-Mar-2025
08-Apr-2025
27-Mar-2025
27-Mar-2025
16-Apr-2025
16-Apr-2025
27-Mar-2025
08-Apr-2025
27-Mar-2025
08-Apr-2025
08-Apr-2025
27-Mar-2025
08-Apr-2025
16-Apr-2025

## 2025-04-16 NOTE — BH INPATIENT PSYCHIATRY PROGRESS NOTE - NSTXSKINGOAL_PSY_ALL_CORE
Will demonstrate proper wound care

## 2025-04-16 NOTE — BH INPATIENT PSYCHIATRY PROGRESS NOTE - NSTXDISORGDATEEST_PSY_ALL_CORE
20-Mar-2025
09-Apr-2025
20-Mar-2025
20-Mar-2025
09-Apr-2025
20-Mar-2025
09-Apr-2025
09-Apr-2025
20-Mar-2025
09-Apr-2025
09-Apr-2025
20-Mar-2025

## 2025-04-16 NOTE — BH INPATIENT PSYCHIATRY PROGRESS NOTE - NSTXHYPERGOAL_PSY_ALL_CORE
Will identify 1 modifiable risk factor and a strategy to improve this
Will identify 1 modifiable risk factor and a strategy to improve this
Be able to self-monitor blood pressure using a BP machine with demonstration of the technique to the RN
Will identify 1 modifiable risk factor and a strategy to improve this
Be able to self-monitor blood pressure using a BP machine with demonstration of the technique to the RN
Will identify 1 modifiable risk factor and a strategy to improve this

## 2025-04-16 NOTE — BH INPATIENT PSYCHIATRY PROGRESS NOTE - NSTXDCOPNODATETRGT_PSY_ALL_CORE
26-Mar-2025
16-Apr-2025
09-Apr-2025
09-Apr-2025
26-Mar-2025
09-Apr-2025
26-Mar-2025
02-Apr-2025
26-Mar-2025
16-Apr-2025
02-Apr-2025
02-Apr-2025
16-Apr-2025
02-Apr-2025
26-Mar-2025
09-Apr-2025
23-Apr-2025
26-Mar-2025
16-Apr-2025
26-Mar-2025
16-Apr-2025
09-Apr-2025
02-Apr-2025

## 2025-04-16 NOTE — BH INPATIENT PSYCHIATRY PROGRESS NOTE - NSTXCONDUCDATETRGT_PSY_ALL_CORE
27-Mar-2025
08-Apr-2025
16-Apr-2025
27-Mar-2025
27-Mar-2025
16-Apr-2025
16-Apr-2025
27-Mar-2025
27-Mar-2025
08-Apr-2025
27-Mar-2025
16-Apr-2025
27-Mar-2025
16-Apr-2025
16-Apr-2025
08-Apr-2025
08-Apr-2025
27-Mar-2025
08-Apr-2025

## 2025-04-16 NOTE — BH INPATIENT PSYCHIATRY PROGRESS NOTE - NSBHMSEATTEN_PSY_A_CORE
Impaired

## 2025-04-16 NOTE — BH INPATIENT PSYCHIATRY PROGRESS NOTE - NSBHCHARTREVIEWVS_PSY_A_CORE FT
Vital Signs Last 24 Hrs  T(C): 36.4 (04-16-25 @ 07:40), Max: 36.4 (04-16-25 @ 07:40)  T(F): 97.6 (04-16-25 @ 07:40), Max: 97.6 (04-16-25 @ 07:40)  HR: --  BP: --  BP(mean): --  RR: --  SpO2: --    Orthostatic VS  04-16-25 @ 07:40  Lying BP: --/-- HR: --  Sitting BP: 112/75 HR: 106  Standing BP: --/-- HR: --  Site: --  Mode: --  Orthostatic VS  04-15-25 @ 20:32  Lying BP: --/-- HR: --  Sitting BP: 115/80 HR: 105  Standing BP: 124/73 HR: 105  Site: --  Mode: --  Orthostatic VS  04-14-25 @ 20:31  Lying BP: --/-- HR: --  Sitting BP: 127/83 HR: 100  Standing BP: 120/89 HR: 99  Site: --  Mode: --

## 2025-04-16 NOTE — BH INPATIENT PSYCHIATRY PROGRESS NOTE - NSBHMSEREMMEM_PSY_A_CORE
Unable to assess
Normal
Unable to assess
Normal
Unable to assess
Normal
Unable to assess

## 2025-04-16 NOTE — BH INPATIENT PSYCHIATRY PROGRESS NOTE - NSTXMEDICDATETRGT_PSY_ALL_CORE
27-Mar-2025
16-Apr-2025
08-Apr-2025
08-Apr-2025
27-Mar-2025
16-Apr-2025
27-Mar-2025
16-Apr-2025
27-Mar-2025
08-Apr-2025
27-Mar-2025
08-Apr-2025
16-Apr-2025
16-Apr-2025
08-Apr-2025
16-Apr-2025
27-Mar-2025

## 2025-04-16 NOTE — BH INPATIENT PSYCHIATRY PROGRESS NOTE - NSTXCOPEDATETRGT_PSY_ALL_CORE
08-Apr-2025
16-Apr-2025
27-Mar-2025
16-Apr-2025
27-Mar-2025
16-Apr-2025
27-Mar-2025
08-Apr-2025
27-Mar-2025
08-Apr-2025
16-Apr-2025
27-Mar-2025
27-Mar-2025
16-Apr-2025
16-Apr-2025
27-Mar-2025
08-Apr-2025

## 2025-04-16 NOTE — BH INPATIENT PSYCHIATRY PROGRESS NOTE - NSTXDISORGGOAL_PSY_ALL_CORE
Will make at least 3 goal and reality oriented statements during therapy
Will demonstrate related thoughts for 5 min in conversation
Will demonstrate related thoughts for 5 min in conversation
Will make at least 3 goal and reality oriented statements during therapy
Will demonstrate related thoughts for 5 min in conversation
Will make at least 3 goal and reality oriented statements during therapy
Will demonstrate related thoughts for 5 min in conversation
Will make at least 3 goal and reality oriented statements during therapy
Will demonstrate related thoughts for 5 min in conversation
Will make at least 3 goal and reality oriented statements during therapy
Will demonstrate related thoughts for 5 min in conversation
Will make at least 3 goal and reality oriented statements during therapy

## 2025-04-16 NOTE — BH INPATIENT PSYCHIATRY PROGRESS NOTE - NSTXHYPERDATEEST_PSY_ALL_CORE
09-Apr-2025
20-Mar-2025
09-Apr-2025
20-Mar-2025
09-Apr-2025
09-Apr-2025
20-Mar-2025
20-Mar-2025
09-Apr-2025
09-Apr-2025
20-Mar-2025

## 2025-04-16 NOTE — BH INPATIENT PSYCHIATRY PROGRESS NOTE - NSTXPSYCHODATETRGT_PSY_ALL_CORE
08-Apr-2025
01-Apr-2025
27-Mar-2025
27-Mar-2025
15-Apr-2025
22-Apr-2025
01-Apr-2025
01-Apr-2025
08-Apr-2025
01-Apr-2025
01-Apr-2025
16-Apr-2025
27-Mar-2025
08-Apr-2025
16-Apr-2025
27-Mar-2025
16-Apr-2025
22-Apr-2025
16-Apr-2025
27-Mar-2025
08-Apr-2025
15-Apr-2025

## 2025-04-16 NOTE — BH INPATIENT PSYCHIATRY PROGRESS NOTE - NSBHMETABOLIC_PSY_ALL_CORE_FT
BMI: BMI (kg/m2): 29.7 (03-18-25 @ 15:05)  HbA1c: A1C with Estimated Average Glucose Result: 5.2 % (03-19-25 @ 10:24)    Glucose:   BP: --Vital Signs Last 24 Hrs  T(C): 36.4 (04-16-25 @ 07:40), Max: 36.4 (04-16-25 @ 07:40)  T(F): 97.6 (04-16-25 @ 07:40), Max: 97.6 (04-16-25 @ 07:40)  HR: --  BP: --  BP(mean): --  RR: --  SpO2: --    Orthostatic VS  04-16-25 @ 07:40  Lying BP: --/-- HR: --  Sitting BP: 112/75 HR: 106  Standing BP: --/-- HR: --  Site: --  Mode: --  Orthostatic VS  04-15-25 @ 20:32  Lying BP: --/-- HR: --  Sitting BP: 115/80 HR: 105  Standing BP: 124/73 HR: 105  Site: --  Mode: --  Orthostatic VS  04-14-25 @ 20:31  Lying BP: --/-- HR: --  Sitting BP: 127/83 HR: 100  Standing BP: 120/89 HR: 99  Site: --  Mode: --    Lipid Panel: Date/Time: 03-19-25 @ 10:24  Cholesterol, Serum: 167  LDL Cholesterol Calculated: 101  HDL Cholesterol, Serum: 45  Total Cholesterol/HDL Ration Measurement: --  Triglycerides, Serum: 115

## 2025-04-16 NOTE — BH INPATIENT PSYCHIATRY PROGRESS NOTE - NSTXHYPERDATETRGT_PSY_ALL_CORE
08-Apr-2025
16-Apr-2025
08-Apr-2025
27-Mar-2025
08-Apr-2025
27-Mar-2025
16-Apr-2025
27-Mar-2025
08-Apr-2025
08-Apr-2025
27-Mar-2025
16-Apr-2025
16-Apr-2025
27-Mar-2025
27-Mar-2025
16-Apr-2025
16-Apr-2025

## 2025-04-16 NOTE — BH INPATIENT PSYCHIATRY PROGRESS NOTE - NSBHFUPINTERVALCCFT_PSY_A_CORE
follow up for psychosis. 
Pt seen follow-up for psychosis
Patient seen for follow up for psychosis. 
Pt seen follow-up for psychosis
Pt seen f/u for psychosis
Pt seen follow-up for psychosis
follow up for psychosis. 
Patient seen, chart reviewed, case discussed with team.  Pt seen f/u for psychosis
Pt seen follow-up for psychosis
Patient seen for follow up for psychosis. 
Pt seen follow-up for psychosis
Pt seen f/u for psychosis
Pt seen follow-up for psychosis
Pt seen follow-up for psychosis

## 2025-04-16 NOTE — BH INPATIENT PSYCHIATRY PROGRESS NOTE - NSTXMEDICDATEEST_PSY_ALL_CORE
09-Apr-2025
20-Mar-2025
09-Apr-2025
20-Mar-2025
20-Mar-2025
09-Apr-2025
20-Mar-2025
20-Mar-2025
09-Apr-2025
20-Mar-2025
09-Apr-2025
20-Mar-2025
09-Apr-2025
20-Mar-2025
20-Mar-2025

## 2025-04-16 NOTE — BH INPATIENT PSYCHIATRY PROGRESS NOTE - NSBHMSEAFFCONG_PSY_A_CORE
Congruent
Congruent
Unable to assess
Congruent
Unable to assess
Congruent
Unable to assess
Congruent
Unable to assess
Congruent

## 2025-04-16 NOTE — BH INPATIENT PSYCHIATRY PROGRESS NOTE - NSBHMSETHTCONTENT_PSY_A_CORE
Delusions/Preoccupations
Unremarkable/Delusions/Preoccupations/Other
Delusions/Other
Delusions
Delusions/Preoccupations
Delusions/Preoccupations
Delusions/Other
Delusions/Hopelessness/Suicidality/Other
Delusions/Preoccupations
Delusions/Other
Delusions/Preoccupations
Delusions/Preoccupations
Unremarkable
Delusions/Preoccupations
Unremarkable
Delusions/Preoccupations
Delusions/Preoccupations
Delusions/Preoccupations/Unable to assess
Delusions/Other
Unremarkable

## 2025-04-16 NOTE — BH INPATIENT PSYCHIATRY PROGRESS NOTE - NSTXPROBDISORG_PSY_ALL_CORE
DISORGANIZATION OF THOUGHT/BEHAVIOR

## 2025-04-16 NOTE — BH INPATIENT PSYCHIATRY DISCHARGE NOTE - NSDCMRMEDTOKEN_GEN_ALL_CORE_FT
cloZAPine 100 mg oral tablet: 1 tab(s) orally once a day (at bedtime) --ANC on 4/15/25 was 2110  cloZAPine 200 mg oral tablet: 1 tab(s) orally once a day (at bedtime) --ANC on 4/15/25 was 2110  divalproex sodium 500 mg oral delayed release tablet: 1 tab(s) orally once a day and 2 tab(s) at bedtime  haloperidol 5 mg oral tablet: 1 tab(s) orally 2 times a day

## 2025-04-16 NOTE — BH INPATIENT PSYCHIATRY PROGRESS NOTE - NSBHMSEEYE_PSY_A_CORE
Fair
Poor
Fair
Poor
Fair
Poor
Fair
Poor
Poor
Fair
Poor
Fair

## 2025-04-16 NOTE — BH INPATIENT PSYCHIATRY PROGRESS NOTE - NSBHMSERELATED_PSY_A_CORE
Other
Fair
Other
Other
Poor
Other
Fair
Other
Fair
Poor
Fair
Other

## 2025-04-16 NOTE — BH INPATIENT PSYCHIATRY PROGRESS NOTE - NSBHATTESTBILLING_PSY_A_CORE
78553-Hauzmislxm OBS or IP - moderate complexity OR 35-49 mins
59607-Acxsfbtfsp OBS or IP - moderate complexity OR 35-49 mins
17047-Yetkqyoioy OBS or IP - moderate complexity OR 35-49 mins
40197-Csavxlxucq OBS or IP - moderate complexity OR 35-49 mins
04569-Bgpstbenrl OBS or IP - moderate complexity OR 35-49 mins
30988-Kuxcixypcq OBS or IP - moderate complexity OR 35-49 mins
31572-Idiuxcpqvd OBS or IP - moderate complexity OR 35-49 mins
58849-Cppzvsunpj OBS or IP - moderate complexity OR 35-49 mins
10052-Qjmpigdram OBS or IP - moderate complexity OR 35-49 mins
92917-Kymtjwudaz OBS or IP - moderate complexity OR 35-49 mins
12334-Htocrnskhd OBS or IP - moderate complexity OR 35-49 mins
41262-Oxayvadiuc OBS or IP - moderate complexity OR 35-49 mins
46326-Crwivvrpfv OBS or IP - moderate complexity OR 35-49 mins
15914-Pnvydabxba OBS or IP - moderate complexity OR 35-49 mins
23643-Llzjeexcer OBS or IP - moderate complexity OR 35-49 mins
71274-Rajvjcsuco OBS or IP - moderate complexity OR 35-49 mins
37195-Dcajhkyjze OBS or IP - moderate complexity OR 35-49 mins
42803-Qjzdkntnen OBS or IP - moderate complexity OR 35-49 mins
64304-Sujawfghng OBS or IP - moderate complexity OR 35-49 mins
49410-Pmvqmrwvvt OBS or IP - moderate complexity OR 35-49 mins
89906-Smgxkzyikn OBS or IP - moderate complexity OR 35-49 mins
25692-Mpvlaapsxv OBS or IP - moderate complexity OR 35-49 mins
14181-Wfdrjdatlv OBS or IP - moderate complexity OR 35-49 mins

## 2025-04-16 NOTE — BH INPATIENT PSYCHIATRY PROGRESS NOTE - NSBHMSEHYG_PSY_A_CORE
Fair
Fair
Poor
Fair
Fair
Poor
Fair
Poor
Fair
Fair
Poor
Fair

## 2025-04-16 NOTE — BH INPATIENT PSYCHIATRY PROGRESS NOTE - NSBHMSEMUSCLE_PSY_A_CORE
Normal muscle tone/strength
Unable to assess
Normal muscle tone/strength
Normal muscle tone/strength
Unable to assess
Normal muscle tone/strength
Unable to assess
Unable to assess
Normal muscle tone/strength
Unable to assess
Normal muscle tone/strength
Unable to assess
Normal muscle tone/strength

## 2025-04-16 NOTE — BH INPATIENT PSYCHIATRY PROGRESS NOTE - NSTXCOPEPROGRES_PSY_ALL_CORE
No Change
Improving
No Change
Improving
No Change

## 2025-04-16 NOTE — BH INPATIENT PSYCHIATRY PROGRESS NOTE - NSTXPSYCHODATEEST_PSY_ALL_CORE
19-Mar-2025
09-Apr-2025
19-Mar-2025
19-Mar-2025
09-Apr-2025
19-Mar-2025
09-Apr-2025
19-Mar-2025
09-Apr-2025
19-Mar-2025

## 2025-04-16 NOTE — BH INPATIENT PSYCHIATRY PROGRESS NOTE - NSBHMSESPEECH_PSY_A_CORE
Abnormal as indicated, otherwise normal...
Normal volume, rate, productivity, spontaneity and articulation
Abnormal as indicated, otherwise normal...
Abnormal as indicated, otherwise normal...
Normal volume, rate, productivity, spontaneity and articulation
Abnormal as indicated, otherwise normal...
Normal volume, rate, productivity, spontaneity and articulation
Abnormal as indicated, otherwise normal...

## 2025-04-16 NOTE — BH INPATIENT PSYCHIATRY PROGRESS NOTE - NSTXCOPEDATEEST_PSY_ALL_CORE
20-Mar-2025
09-Apr-2025
09-Apr-2025
20-Mar-2025
20-Mar-2025
09-Apr-2025
20-Mar-2025
09-Apr-2025
20-Mar-2025
09-Apr-2025
20-Mar-2025

## 2025-04-16 NOTE — BH INPATIENT PSYCHIATRY PROGRESS NOTE - NSTXPROBCONDUC_PSY_ALL_CORE
CONDUCT PROBLEM

## 2025-04-16 NOTE — BH INPATIENT PSYCHIATRY DISCHARGE NOTE - HPI (INCLUDE ILLNESS QUALITY, SEVERITY, DURATION, TIMING, CONTEXT, MODIFYING FACTORS, ASSOCIATED SIGNS AND SYMPTOMS)
Per [facility] ED assessment, "Pt is a 44 yo M, single, non-caregiver, domiciled at [facility], psych hx of  Schizophrenia vs Schizoaffective Disorder, multiple prior admissions including state (last inpt  [facility] August 26-Sept 24, 2024, and an admission to [facility] in April of 2024,  [facility] 2009 and [id number]), per chart cannabis use disorder (residence staff denies recent use), no known hx suicide attempt or violence, follows at [facility] on [place] grounds, no known legal issues, PMH HTN (not on meds) and anemia per psyckes on a previous admission, brought in by EMS called by residence staff after pt was found walking around naked and cheeking his meds and not taking the Clozapine 100mg, Seroquel 200mg, or Haldol 20mg, Depakote 1000mg at bedtime as scheduled. He is also on Aristada monthly.   Upon assessment, pt was observed to be actively responding to internal stimuli. Patient states he cannot answer questions because he is hearing voices telling him that he is being investigated .  He states that he is hearing voices telling him "demonic and perverted" things, and upset because he does not want to be "perverted."  Pt admits he has a history of suicidal ideation and has felt that life was not worth living.  He is too distracted by the AHs and is smiling and pointing to the door as if listening.  He is unable to  confirm that he is not suicidal."    On the unit, patient presents as disorganized and internally preoccupied.  Pt perseverative about having only one pair of clothing.  Pt reports he threw away his other clothing over the years because they were too old.  Pt reports inconsistent compliance with medication.  Pt reports hearing "[id number] million" voices over 15 months.  Pt reports they tell him eating vegetables is bad.  Pt denies CAH.  Pt reports VH of satan.  Pt reports he wants to save only special people.  Pt reports paranoia towards a lot of people.  Pt unable to confirm SI or HI, but reports he is depressed when people mock him, imposter him or go into his living space.  Pt denies substance use.

## 2025-04-16 NOTE — BH INPATIENT PSYCHIATRY PROGRESS NOTE - NSTXCONDUCDATEEST_PSY_ALL_CORE
09-Apr-2025
20-Mar-2025
09-Apr-2025
20-Mar-2025
09-Apr-2025
20-Mar-2025
09-Apr-2025
20-Mar-2025
20-Mar-2025

## 2025-04-16 NOTE — BH INPATIENT PSYCHIATRY PROGRESS NOTE - NSBHMSEKNOW_PSY_A_CORE
Unable to assess
Unable to assess
Normal
Unable to assess
Normal
Unable to assess
Normal
Normal
Unable to assess
Normal
Unable to assess

## 2025-04-16 NOTE — BH INPATIENT PSYCHIATRY DISCHARGE NOTE - NSBHFUPINTERVALHXFT_PSY_A_CORE
Pt compliant with medication and tolerating it well.  Chart reviewed and case discussed with treatment team.  No events reported overnight.  Pt denies SI/HI/I/P or AH/VH or paranoia.  Pt eating and sleeping well.  Pt endorses motivation to continue medication as prescribed and engage in outpatient treatment.

## 2025-04-16 NOTE — BH INPATIENT PSYCHIATRY PROGRESS NOTE - NSTXPSYCHOGOAL_PSY_ALL_CORE
Will identify 1 trigger/stressor that exacerbates thoughts
Will engage in a 15 minute conversation with no irrational content
Will engage in a 15 minute conversation with no irrational content
Will identify 1 trigger/stressor that exacerbates thoughts
Will engage in a 15 minute conversation with no irrational content
Will identify 1 trigger/stressor that exacerbates thoughts
Will engage in a 15 minute conversation with no irrational content

## 2025-04-16 NOTE — BH INPATIENT PSYCHIATRY PROGRESS NOTE - NSTXCONDUCPROGRES_PSY_ALL_CORE
No Change
No Change
Improving
Improving
Met - goal discontinued
No Change
Improving
No Change
Improving
Improving
No Change
Improving
No Change
No Change
Met - goal discontinued

## 2025-04-16 NOTE — BH INPATIENT PSYCHIATRY PROGRESS NOTE - NSTXDCOPNOPROGRES_PSY_ALL_CORE
No Change
Met - goal discontinued
No Change
Met - goal discontinued
No Change
Met - goal discontinued
No Change

## 2025-04-16 NOTE — BH INPATIENT PSYCHIATRY DISCHARGE NOTE - HOSPITAL COURSE
On the unit, patient was unable to meaningfully engage in interview due to disorganization.  Pt reported AH with CAH at times, VH and paranoia.  He received PO PRN medications for agitation.  Pt was restarted on Depakote titrated to 500mg PO daily and 1000mg PO at bedtime.  Clozaril was restarted and titrated to 300mg PO at bedtime and Haldol was restarted at 5mg PO BID.  On the combination of medication, patient showed much improvement in psychotic sx.  He reported minimal AH that were not command in nature or distressing by the time of discharge.  Pt declined a Haldol ELIZONDO during his hospitalization, but he agreed to be compliant with PO medications.  		  Although patient was admitted due to risk factors for violence/suicide including psychotic sx, the patient’s risk for suicide/violence was mitigated during his hospitalization and his protective factors outweigh his risk factors at this time.  Pt is currently at low acute risk for suicide/violence.  Patient’s protective factors include:  no current SI/HI/I/P, willingness to engage in treatment, social support, no legal hx, no hx of suicide attempts, no hx of aggression, residential stability, no current substance use, and no current acute depressive, psychotic or manic sx.  Although the patient is at chronic risk for violence/suicide given his hx of psychiatric illness, hx of psychiatric hospitalizations and hx of substance use, this risk cannot be ameliorated by continued inpatient treatment.  The patient no longer met the criteria for psychiatric hospitalization at discharge.

## 2025-04-16 NOTE — BH INPATIENT PSYCHIATRY PROGRESS NOTE - NSBHMSEPERCEPT_PSY_A_CORE
Auditory hallucinations
Auditory hallucinations/Other
Auditory hallucinations/Visual hallucinations
Auditory hallucinations/Other
Auditory hallucinations
Auditory hallucinations/Visual hallucinations
Other/Unable to assess
Auditory hallucinations
Auditory hallucinations/Other
No abnormalities/Other
Auditory hallucinations/Other
Auditory hallucinations
Auditory hallucinations/Visual hallucinations
Auditory hallucinations/Visual hallucinations
Auditory hallucinations
No abnormalities/Other

## 2025-04-16 NOTE — BH INPATIENT PSYCHIATRY PROGRESS NOTE - NSBHMSEGAIT_PSY_A_CORE
Normal gait / station
Normal gait / station
Unable to assess
Normal gait / station
Unable to assess
Normal gait / station
Unable to assess
Normal gait / station
Unable to assess
Normal gait / station
Normal gait / station
Unable to assess
Normal gait / station

## 2025-04-16 NOTE — BH INPATIENT PSYCHIATRY PROGRESS NOTE - NSTXDCOPNODATEEST_PSY_ALL_CORE
09-Apr-2025
16-Apr-2025
26-Mar-2025
02-Apr-2025
26-Mar-2025
26-Mar-2025
19-Mar-2025
09-Apr-2025
19-Mar-2025
09-Apr-2025
26-Mar-2025
09-Apr-2025
02-Apr-2025
02-Apr-2025
26-Mar-2025
19-Mar-2025
02-Apr-2025
09-Apr-2025
19-Mar-2025
02-Apr-2025
19-Mar-2025

## 2025-04-16 NOTE — BH INPATIENT PSYCHIATRY PROGRESS NOTE - NSTXDISORGPROGRES_PSY_ALL_CORE
No Change
Met - goal discontinued
No Change
Met - goal discontinued
No Change
Met - goal discontinued
No Change
Met - goal discontinued
No Change

## 2025-04-16 NOTE — BH INPATIENT PSYCHIATRY PROGRESS NOTE - NSBHMSETHTASSOC_PSY_A_CORE
Normal
Unable to assess
Loose
Normal
Loose
Unable to assess
Loose
Normal
Unable to assess
Loose
Normal
Loose
Normal
Loose
Normal
Normal
Loose
Normal

## 2025-04-16 NOTE — BH INPATIENT PSYCHIATRY PROGRESS NOTE - NSTXHYPERPROGRES_PSY_ALL_CORE
Improving
No Change
No Change
Improving
No Change
Improving
No Change

## 2025-04-16 NOTE — BH INPATIENT PSYCHIATRY DISCHARGE NOTE - NSBHASSESSSUMMFT_PSY_ALL_CORE
Pt is a 46 yo M, single, non-caregiver, domiciled at [facility], psych hx of  Schizophrenia vs Schizoaffective Disorder, multiple prior admissions including state (last inpt  [facility] August 26-Sept 24, 2024, and an admission to [facility] in April of 2024,  [facility] 2009 and [id number]), per chart cannabis use disorder (residence staff denies recent use), no known hx suicide attempt or violence, follows at [facility] on [place] grounds, no known legal issues, PMH HTN (not on meds) and anemia per psyckes on a previous admission, brought in by EMS called by [facility] staff after pt was found walking around naked and cheeking his meds and not taking the Clozapine 100mg, Seroquel 200mg, or Haldol 20mg, Depakote 1000mg at bedtime as scheduled. He is also on Aristada monthly.     Plan:  >Psychiatric Meds: c/w Clozaril 300mg PO at bedtime (psychosis), Haldol 5mg PO BID (psychosis) and Depakote 500mg qAM and 1000mg qHS (mood)     >Dispo: pt safe to discharge home with outpatient care

## 2025-04-16 NOTE — BH INPATIENT PSYCHIATRY PROGRESS NOTE - NSBHMSEAFFRANGE_PSY_A_CORE
Constricted
Blunted
Constricted
Blunted
Constricted
Blunted
Constricted

## 2025-04-16 NOTE — BH INPATIENT PSYCHIATRY PROGRESS NOTE - NSBHFUPINTERVALHXFT_PSY_A_CORE
Pt compliant with medication and tolerating it well.  Chart reviewed and case discussed with treatment team.  No events reported overnight.  Pt denies SI/HI/I/P or VH or paranoia.  Pt eating and sleeping well.  Pt reports he is hearing less voices and they are not command in nature or distressing.  Pt reports when he leaves he is going to take his medications and relax.

## 2025-04-16 NOTE — BH INPATIENT PSYCHIATRY PROGRESS NOTE - NSBHASSESSSUMMFT_PSY_ALL_CORE
Pt is a 44 yo M, single, non-caregiver, domiciled at [facility], psych hx of  Schizophrenia vs Schizoaffective Disorder, multiple prior admissions including state (last inpt  [facility] August 26-Sept 24, 2024, and an admission to [facility] in April of 2024,  [facility] 2009 and [id number]), per chart cannabis use disorder (residence staff denies recent use), no known hx suicide attempt or violence, follows at [facility] on [place] grounds, no known legal issues, PMH HTN (not on meds) and anemia per psyckes on a previous admission, brought in by EMS called by residence staff after pt was found walking around naked and cheeking his meds and not taking the Clozapine 100mg, Seroquel 200mg, or Haldol 20mg, Depakote 1000mg at bedtime as scheduled. He is also on Aristada monthly.     Pt remains psychotic with persistent Auditory Hallucinations which he says he can better cope with now. He is showing some improvement and labwork performed this AM. Will continue Clozapine 300mg, and Continue Haldol 5mg BID and Depakote 500mg in AM and 1000mg QHS.  Awaiting Clozapine level and pt requires continued inpatient psychiatric hospitalization at [facility] stabilization.    >Legal: 9.27  >Obs: Routine, no need for CO, patient not expected to pose risk to self or others in controlled inpatient setting  >Psychiatric Meds: c/w Clozaril 300mg PO at bedtime (psychosis), Haldol 5mg PO BID (psychosis) and Depakote 1500mg total daily (500mg qAM and 1000mg qHS) (mood)   >Labs: Admission labs reviewed, no acute findings.   >Medical:  No acute concerns. No consultations needed at this time.  >Social: milieu/structured therapy  >Treatment Interventions: Groups and Individual Therapy/CBT   >Dispo: pending coordination of care

## 2025-04-16 NOTE — BH INPATIENT PSYCHIATRY PROGRESS NOTE - CURRENT MEDICATION
----- Message from Latha Viera RN sent at 8/3/2022  9:10 AM CDT -----  Regardin/30 EKG Order  The EKG performed on 22 is missing an order.  Please place an order so that the EKG can be read.    Thank You,  Latha Viera RN  [facility]  [phone]     
MEDICATIONS  (STANDING):  cloZAPine 300 milliGRAM(s) Oral at bedtime  divalproex  milliGRAM(s) Oral daily  divalproex DR 1000 milliGRAM(s) Oral at bedtime  haloperidol     Tablet 5 milliGRAM(s) Oral two times a day    MEDICATIONS  (PRN):  haloperidol     Tablet 5 milliGRAM(s) Oral every 6 hours PRN agitation  haloperidol    Injectable 5 milliGRAM(s) IntraMuscular once PRN agitation  LORazepam     Tablet 2 milliGRAM(s) Oral every 6 hours PRN agitation  LORazepam   Injectable 2 milliGRAM(s) IntraMuscular once PRN agitation  polyethylene glycol 3350 17 Gram(s) Oral daily PRN constipation  
MEDICATIONS  (STANDING):  cloZAPine 250 milliGRAM(s) Oral at bedtime  divalproex  milliGRAM(s) Oral daily  divalproex DR 1000 milliGRAM(s) Oral at bedtime  haloperidol     Tablet 5 milliGRAM(s) Oral two times a day    MEDICATIONS  (PRN):  haloperidol     Tablet 5 milliGRAM(s) Oral every 6 hours PRN agitation  haloperidol    Injectable 5 milliGRAM(s) IntraMuscular once PRN agitation  LORazepam     Tablet 2 milliGRAM(s) Oral every 6 hours PRN agitation  LORazepam   Injectable 2 milliGRAM(s) IntraMuscular once PRN agitation  polyethylene glycol 3350 17 Gram(s) Oral daily PRN constipation  
MEDICATIONS  (STANDING):  cloZAPine 300 milliGRAM(s) Oral at bedtime  divalproex  milliGRAM(s) Oral daily  divalproex DR 1000 milliGRAM(s) Oral at bedtime  haloperidol     Tablet 5 milliGRAM(s) Oral two times a day    MEDICATIONS  (PRN):  haloperidol     Tablet 5 milliGRAM(s) Oral every 6 hours PRN agitation  haloperidol    Injectable 5 milliGRAM(s) IntraMuscular once PRN agitation  LORazepam     Tablet 2 milliGRAM(s) Oral every 6 hours PRN agitation  LORazepam   Injectable 2 milliGRAM(s) IntraMuscular once PRN agitation  polyethylene glycol 3350 17 Gram(s) Oral daily PRN constipation  
MEDICATIONS  (STANDING):  cloZAPine 300 milliGRAM(s) Oral at bedtime  divalproex  milliGRAM(s) Oral daily  divalproex DR 1000 milliGRAM(s) Oral at bedtime  haloperidol     Tablet 5 milliGRAM(s) Oral two times a day    MEDICATIONS  (PRN):  haloperidol     Tablet 5 milliGRAM(s) Oral every 6 hours PRN agitation  haloperidol    Injectable 5 milliGRAM(s) IntraMuscular once PRN agitation  polyethylene glycol 3350 17 Gram(s) Oral daily PRN constipation  
MEDICATIONS  (STANDING):  cloZAPine 300 milliGRAM(s) Oral at bedtime  divalproex  milliGRAM(s) Oral daily  divalproex DR 1000 milliGRAM(s) Oral at bedtime  haloperidol     Tablet 5 milliGRAM(s) Oral two times a day    MEDICATIONS  (PRN):  haloperidol     Tablet 5 milliGRAM(s) Oral every 6 hours PRN agitation  haloperidol    Injectable 5 milliGRAM(s) IntraMuscular once PRN agitation  LORazepam     Tablet 2 milliGRAM(s) Oral every 6 hours PRN agitation  LORazepam   Injectable 2 milliGRAM(s) IntraMuscular once PRN agitation  polyethylene glycol 3350 17 Gram(s) Oral daily PRN constipation  
MEDICATIONS  (STANDING):  cloZAPine 300 milliGRAM(s) Oral at bedtime  divalproex  milliGRAM(s) Oral daily  divalproex DR 1000 milliGRAM(s) Oral at bedtime  haloperidol     Tablet 5 milliGRAM(s) Oral two times a day    MEDICATIONS  (PRN):  haloperidol     Tablet 5 milliGRAM(s) Oral every 6 hours PRN agitation  haloperidol    Injectable 5 milliGRAM(s) IntraMuscular once PRN agitation  LORazepam     Tablet 2 milliGRAM(s) Oral every 6 hours PRN agitation  LORazepam   Injectable 2 milliGRAM(s) IntraMuscular once PRN agitation  polyethylene glycol 3350 17 Gram(s) Oral daily PRN constipation  
MEDICATIONS  (STANDING):  cloZAPine 25 milliGRAM(s) Oral at bedtime  divalproex  milliGRAM(s) Oral two times a day  haloperidol     Tablet 5 milliGRAM(s) Oral two times a day    MEDICATIONS  (PRN):  haloperidol     Tablet 5 milliGRAM(s) Oral every 6 hours PRN agitation  haloperidol    Injectable 5 milliGRAM(s) IntraMuscular once PRN agitation  LORazepam     Tablet 2 milliGRAM(s) Oral every 6 hours PRN agitation  LORazepam   Injectable 2 milliGRAM(s) IntraMuscular once PRN agitation  
MEDICATIONS  (STANDING):  cloZAPine 300 milliGRAM(s) Oral at bedtime  divalproex  milliGRAM(s) Oral daily  divalproex DR 1000 milliGRAM(s) Oral at bedtime  haloperidol     Tablet 5 milliGRAM(s) Oral two times a day    MEDICATIONS  (PRN):  haloperidol     Tablet 5 milliGRAM(s) Oral every 6 hours PRN agitation  haloperidol    Injectable 5 milliGRAM(s) IntraMuscular once PRN agitation  LORazepam     Tablet 2 milliGRAM(s) Oral every 6 hours PRN agitation  LORazepam   Injectable 2 milliGRAM(s) IntraMuscular once PRN agitation  polyethylene glycol 3350 17 Gram(s) Oral daily PRN constipation  
MEDICATIONS  (STANDING):  cloZAPine 50 milliGRAM(s) Oral at bedtime  divalproex  milliGRAM(s) Oral two times a day  haloperidol     Tablet 5 milliGRAM(s) Oral two times a day    MEDICATIONS  (PRN):  haloperidol     Tablet 5 milliGRAM(s) Oral every 6 hours PRN agitation  haloperidol    Injectable 5 milliGRAM(s) IntraMuscular once PRN agitation  LORazepam     Tablet 2 milliGRAM(s) Oral every 6 hours PRN agitation  LORazepam   Injectable 2 milliGRAM(s) IntraMuscular once PRN agitation  
MEDICATIONS  (STANDING):  cloZAPine 300 milliGRAM(s) Oral at bedtime  divalproex  milliGRAM(s) Oral daily  divalproex DR 1000 milliGRAM(s) Oral at bedtime  haloperidol     Tablet 5 milliGRAM(s) Oral two times a day    MEDICATIONS  (PRN):  haloperidol     Tablet 5 milliGRAM(s) Oral every 6 hours PRN agitation  haloperidol    Injectable 5 milliGRAM(s) IntraMuscular once PRN agitation  LORazepam     Tablet 2 milliGRAM(s) Oral every 6 hours PRN agitation  LORazepam   Injectable 2 milliGRAM(s) IntraMuscular once PRN agitation  polyethylene glycol 3350 17 Gram(s) Oral daily PRN constipation  
MEDICATIONS  (STANDING):  cloZAPine 250 milliGRAM(s) Oral at bedtime  divalproex  milliGRAM(s) Oral daily  divalproex DR 1000 milliGRAM(s) Oral at bedtime  haloperidol     Tablet 5 milliGRAM(s) Oral two times a day    MEDICATIONS  (PRN):  haloperidol     Tablet 5 milliGRAM(s) Oral every 6 hours PRN agitation  haloperidol    Injectable 5 milliGRAM(s) IntraMuscular once PRN agitation  LORazepam     Tablet 2 milliGRAM(s) Oral every 6 hours PRN agitation  LORazepam   Injectable 2 milliGRAM(s) IntraMuscular once PRN agitation  polyethylene glycol 3350 17 Gram(s) Oral daily PRN constipation  
MEDICATIONS  (STANDING):  cloZAPine 300 milliGRAM(s) Oral at bedtime  divalproex  milliGRAM(s) Oral daily  divalproex DR 1000 milliGRAM(s) Oral at bedtime  haloperidol     Tablet 5 milliGRAM(s) Oral two times a day    MEDICATIONS  (PRN):  haloperidol     Tablet 5 milliGRAM(s) Oral every 6 hours PRN agitation  haloperidol    Injectable 5 milliGRAM(s) IntraMuscular once PRN agitation  LORazepam     Tablet 2 milliGRAM(s) Oral every 6 hours PRN agitation  LORazepam   Injectable 2 milliGRAM(s) IntraMuscular once PRN agitation  polyethylene glycol 3350 17 Gram(s) Oral daily PRN constipation  
MEDICATIONS  (STANDING):  cloZAPine 200 milliGRAM(s) Oral at bedtime  divalproex  milliGRAM(s) Oral daily  divalproex DR 1000 milliGRAM(s) Oral at bedtime  haloperidol     Tablet 5 milliGRAM(s) Oral two times a day    MEDICATIONS  (PRN):  haloperidol     Tablet 5 milliGRAM(s) Oral every 6 hours PRN agitation  haloperidol    Injectable 5 milliGRAM(s) IntraMuscular once PRN agitation  LORazepam     Tablet 2 milliGRAM(s) Oral every 6 hours PRN agitation  LORazepam   Injectable 2 milliGRAM(s) IntraMuscular once PRN agitation  polyethylene glycol 3350 17 Gram(s) Oral daily PRN constipation  
MEDICATIONS  (STANDING):  cloZAPine 200 milliGRAM(s) Oral at bedtime  divalproex  milliGRAM(s) Oral daily  divalproex DR 1000 milliGRAM(s) Oral at bedtime  haloperidol     Tablet 5 milliGRAM(s) Oral two times a day    MEDICATIONS  (PRN):  haloperidol     Tablet 5 milliGRAM(s) Oral every 6 hours PRN agitation  haloperidol    Injectable 5 milliGRAM(s) IntraMuscular once PRN agitation  LORazepam     Tablet 2 milliGRAM(s) Oral every 6 hours PRN agitation  LORazepam   Injectable 2 milliGRAM(s) IntraMuscular once PRN agitation  polyethylene glycol 3350 17 Gram(s) Oral daily PRN constipation  
MEDICATIONS  (STANDING):  cloZAPine 25 milliGRAM(s) Oral at bedtime  divalproex  milliGRAM(s) Oral two times a day  haloperidol     Tablet 5 milliGRAM(s) Oral two times a day    MEDICATIONS  (PRN):  haloperidol     Tablet 5 milliGRAM(s) Oral every 6 hours PRN agitation  haloperidol    Injectable 5 milliGRAM(s) IntraMuscular once PRN agitation  LORazepam     Tablet 2 milliGRAM(s) Oral every 6 hours PRN agitation  LORazepam   Injectable 2 milliGRAM(s) IntraMuscular once PRN agitation  
MEDICATIONS  (STANDING):  cloZAPine 75 milliGRAM(s) Oral at bedtime  divalproex  milliGRAM(s) Oral two times a day  haloperidol     Tablet 5 milliGRAM(s) Oral two times a day    MEDICATIONS  (PRN):  haloperidol     Tablet 5 milliGRAM(s) Oral every 6 hours PRN agitation  haloperidol    Injectable 5 milliGRAM(s) IntraMuscular once PRN agitation  LORazepam     Tablet 2 milliGRAM(s) Oral every 6 hours PRN agitation  LORazepam   Injectable 2 milliGRAM(s) IntraMuscular once PRN agitation  polyethylene glycol 3350 17 Gram(s) Oral daily PRN constipation  
MEDICATIONS  (STANDING):  cloZAPine 25 milliGRAM(s) Oral at bedtime  divalproex  milliGRAM(s) Oral two times a day  haloperidol     Tablet 5 milliGRAM(s) Oral two times a day    MEDICATIONS  (PRN):  haloperidol     Tablet 5 milliGRAM(s) Oral every 6 hours PRN agitation  haloperidol    Injectable 5 milliGRAM(s) IntraMuscular once PRN agitation  LORazepam     Tablet 2 milliGRAM(s) Oral every 6 hours PRN agitation  LORazepam   Injectable 2 milliGRAM(s) IntraMuscular once PRN agitation  
MEDICATIONS  (STANDING):  cloZAPine 50 milliGRAM(s) Oral at bedtime  divalproex  milliGRAM(s) Oral two times a day  haloperidol     Tablet 5 milliGRAM(s) Oral two times a day    MEDICATIONS  (PRN):  haloperidol     Tablet 5 milliGRAM(s) Oral every 6 hours PRN agitation  haloperidol    Injectable 5 milliGRAM(s) IntraMuscular once PRN agitation  LORazepam     Tablet 2 milliGRAM(s) Oral every 6 hours PRN agitation  LORazepam   Injectable 2 milliGRAM(s) IntraMuscular once PRN agitation  
MEDICATIONS  (STANDING):  cloZAPine 50 milliGRAM(s) Oral at bedtime  divalproex  milliGRAM(s) Oral two times a day  haloperidol     Tablet 5 milliGRAM(s) Oral two times a day    MEDICATIONS  (PRN):  haloperidol     Tablet 5 milliGRAM(s) Oral every 6 hours PRN agitation  haloperidol    Injectable 5 milliGRAM(s) IntraMuscular once PRN agitation  LORazepam     Tablet 2 milliGRAM(s) Oral every 6 hours PRN agitation  LORazepam   Injectable 2 milliGRAM(s) IntraMuscular once PRN agitation  
MEDICATIONS  (STANDING):  cloZAPine 300 milliGRAM(s) Oral at bedtime  divalproex  milliGRAM(s) Oral daily  divalproex DR 1000 milliGRAM(s) Oral at bedtime  haloperidol     Tablet 5 milliGRAM(s) Oral two times a day    MEDICATIONS  (PRN):  haloperidol     Tablet 5 milliGRAM(s) Oral every 6 hours PRN agitation  haloperidol    Injectable 5 milliGRAM(s) IntraMuscular once PRN agitation  LORazepam     Tablet 2 milliGRAM(s) Oral every 6 hours PRN agitation  LORazepam   Injectable 2 milliGRAM(s) IntraMuscular once PRN agitation  polyethylene glycol 3350 17 Gram(s) Oral daily PRN constipation  
MEDICATIONS  (STANDING):  cloZAPine 150 milliGRAM(s) Oral at bedtime  divalproex  milliGRAM(s) Oral daily  divalproex DR 1000 milliGRAM(s) Oral at bedtime  haloperidol     Tablet 5 milliGRAM(s) Oral two times a day    MEDICATIONS  (PRN):  haloperidol     Tablet 5 milliGRAM(s) Oral every 6 hours PRN agitation  haloperidol    Injectable 5 milliGRAM(s) IntraMuscular once PRN agitation  LORazepam     Tablet 2 milliGRAM(s) Oral every 6 hours PRN agitation  LORazepam   Injectable 2 milliGRAM(s) IntraMuscular once PRN agitation  polyethylene glycol 3350 17 Gram(s) Oral daily PRN constipation

## 2025-04-17 VITALS — TEMPERATURE: 98 F

## 2025-04-17 RX ADMIN — HALOPERIDOL 5 MILLIGRAM(S): 10 TABLET ORAL at 08:31

## 2025-04-17 RX ADMIN — Medication 500 MILLIGRAM(S): at 08:31

## 2025-05-29 ENCOUNTER — EMERGENCY (EMERGENCY)
Facility: HOSPITAL | Age: 45
LOS: 1 days | End: 2025-05-29
Attending: STUDENT IN AN ORGANIZED HEALTH CARE EDUCATION/TRAINING PROGRAM | Admitting: STUDENT IN AN ORGANIZED HEALTH CARE EDUCATION/TRAINING PROGRAM
Payer: MEDICAID

## 2025-05-29 VITALS
HEART RATE: 116 BPM | DIASTOLIC BLOOD PRESSURE: 78 MMHG | WEIGHT: 238.1 LBS | SYSTOLIC BLOOD PRESSURE: 111 MMHG | OXYGEN SATURATION: 96 % | TEMPERATURE: 97 F | RESPIRATION RATE: 18 BRPM

## 2025-05-29 VITALS — TEMPERATURE: 98 F | HEART RATE: 90 BPM | SYSTOLIC BLOOD PRESSURE: 118 MMHG | DIASTOLIC BLOOD PRESSURE: 74 MMHG

## 2025-05-29 PROCEDURE — 93010 ELECTROCARDIOGRAM REPORT: CPT

## 2025-05-29 PROCEDURE — 99284 EMERGENCY DEPT VISIT MOD MDM: CPT

## 2025-05-29 PROCEDURE — 72100 X-RAY EXAM L-S SPINE 2/3 VWS: CPT | Mod: 26

## 2025-05-29 RX ORDER — LIDOCAINE HYDROCHLORIDE 20 MG/ML
1 JELLY TOPICAL ONCE
Refills: 0 | Status: COMPLETED | OUTPATIENT
Start: 2025-05-29 | End: 2025-05-29

## 2025-05-29 RX ORDER — KETOROLAC TROMETHAMINE 30 MG/ML
30 INJECTION, SOLUTION INTRAMUSCULAR; INTRAVENOUS ONCE
Refills: 0 | Status: DISCONTINUED | OUTPATIENT
Start: 2025-05-29 | End: 2025-05-29

## 2025-05-29 RX ORDER — CYCLOBENZAPRINE HYDROCHLORIDE 15 MG/1
10 CAPSULE, EXTENDED RELEASE ORAL ONCE
Refills: 0 | Status: COMPLETED | OUTPATIENT
Start: 2025-05-29 | End: 2025-05-29

## 2025-05-29 RX ORDER — ACETAMINOPHEN 500 MG/5ML
975 LIQUID (ML) ORAL ONCE
Refills: 0 | Status: COMPLETED | OUTPATIENT
Start: 2025-05-29 | End: 2025-05-29

## 2025-05-29 RX ADMIN — CYCLOBENZAPRINE HYDROCHLORIDE 10 MILLIGRAM(S): 15 CAPSULE, EXTENDED RELEASE ORAL at 11:55

## 2025-05-29 RX ADMIN — LIDOCAINE HYDROCHLORIDE 1 PATCH: 20 JELLY TOPICAL at 11:55

## 2025-05-29 RX ADMIN — Medication 975 MILLIGRAM(S): at 11:55

## 2025-05-29 RX ADMIN — KETOROLAC TROMETHAMINE 30 MILLIGRAM(S): 30 INJECTION, SOLUTION INTRAMUSCULAR; INTRAVENOUS at 11:55

## 2025-06-28 NOTE — BH INPATIENT PSYCHIATRY PROGRESS NOTE - NSTXPROBSKIN_PSY_ALL_CORE
[facility] NEUROLOGY         Date of Visit: 2025    Name: Joanna Ferris    :  1939    PCP: Ekaterina Santamaria MD    Visit Type: follow-up  Chief Complaint   Patient presents with    Parkinson's Disease     Increased meds last visit, reports upset stomach in the middle day, but doing really well otherwise,              Subjective     Patient ID: Joanna is a 85 y.o. female.         History of Present Illness  The pleasure of seeing your patient today.  As you may know she is an 85-year-old female here today for i follow-up for Parkinson's.  She was referred by her primary care physician.    History:    Patient does have history of hypertension, hyperlipidemia, hypothyroid, vitamin D deficiency, chronic kidney disease stage III, depression, anxiety, osteoporosis.    Patient states that she is here today for evaluation for tremor that she has.  She states that she has had issues with her hands for several years been told that this was mostly arthritic.  However she was seeing her doctor for her arthritis who noticed a tremor and recommended that she be evaluated for possible tremor disorder.    Patient states that her symptoms have been going on for several years however slowly progressed and worsen.  Symptoms started primarily in the right upper extremity and have progressed to the left upper extremity with occasional symptoms in the lower extremities.    Patient states that she does have a family history of both father and his grandfather who both had Parkinson's disease.  No other known tremor history.  She has not had any recent head injuries.  She has not started any new medications.  She has never had chemotherapy or radiation.      Patient have an MRI of his brain that came back mostly within normal limits.    Current:    At last visit we did end up increasing her carbidopa to 2 tablets 3 times daily she states that this has been very helpful for Parkinson symptoms.  She has had some 
mild nausea during the daytime since increasing dosage otherwise is not having any side effect complaints.  This is happening daily and she is having to eat crackers to keep it at bay however overall feels it is a tolerable side effect as she is doing much better overall in regard to her Parkinson symptoms.  She denies any other new or worsening neurologic symptoms at today's visit.        The following portions of the patient's history were reviewed and updated as appropriate: allergies, current medications, past family history, past medical history, past social history, past surgical history, and problem list.                 Review of Systems   Constitutional:  Negative for activity change, appetite change and unexpected weight change.   HENT:  Negative for dental problem, drooling, hearing loss, tinnitus, trouble swallowing and voice change.    Eyes:  Negative for visual disturbance.   Gastrointestinal:  Positive for nausea. Negative for constipation and diarrhea.   Genitourinary:  Negative for difficulty urinating, frequency and urgency.   Musculoskeletal:  Positive for back pain. Negative for gait problem.   Neurological:  Negative for dizziness, tremors, seizures, facial asymmetry, speech difficulty and light-headedness.   Psychiatric/Behavioral:  Negative for agitation, behavioral problems, confusion, hallucinations and sleep disturbance.             Current Medications:    Current Outpatient Medications   Medication Instructions    atorvastatin (LIPITOR) 80 mg, Oral, Nightly    azelastine (ASTELIN) 0.1 % nasal spray 2 sprays, Nasal, 2 Times Daily, Use in each nostril as directed    carbidopa-levodopa (SINEMET)  MG per tablet 2 tablets, Oral, 3 Times Daily    cycloSPORINE (RESTASIS) 0.05 % ophthalmic emulsion PLACE 1 DROP IN BOTH EYES TWICE DAILY    diclofenac (VOLTAREN) 0.1 % ophthalmic solution INSTILL 1 DROP IN LEFT EYE TWICE DAILY    DULoxetine (CYMBALTA) 60 mg, Oral, 2 Times Daily    fish oil 
"1,200 mg, Daily    fluticasone (FLONASE) 50 MCG/ACT nasal spray 2 sprays, Each Nare, Daily    levothyroxine (SYNTHROID, LEVOTHROID) 50 mcg, Oral, Daily    lisinopril (PRINIVIL,ZESTRIL) 10 mg, Oral, Daily    montelukast (SINGULAIR) 10 mg, Oral, Nightly    omeprazole (PRILOSEC) 20 mg, Oral, Daily          /74   Pulse 72   Ht 165.1 cm (65\")   Wt 66.2 kg (146 lb)   SpO2 95%   BMI 24.30 kg/m²                Objective     Neurological Exam  Mental Status  Awake, alert and oriented to person, place and time. Speech is normal. Language is fluent with no aphasia.    Cranial Nerves  CN II: Visual fields full to confrontation.  CN III, IV, VI: Extraocular movements intact bilaterally. Normal lids and orbits bilaterally. Pupils equal round and reactive to light bilaterally.  CN V: Facial sensation is normal.  CN VII: Full and symmetric facial movement.  CN IX, X: Palate elevates symmetrically  CN XI: Shoulder shrug strength is normal.  CN XII: Tongue midline without atrophy or fasciculations.    Motor  Normal muscle bulk throughout. Normal muscle tone. No significant cogwheeling or rigidity. The following abnormal movements were seen: No resting tremor noted..   Strength is 5/5 throughout all four extremities.    Reflexes  Deep tendon reflexes are 2+ and symmetric except as noted.                                            Right                      Left  Brachioradialis                    3+                          Biceps                                 3+                          Patellar                                3+                          Achilles                                3+                            Coordination  Right: Finger-to-nose normal. Rapid alternating movement normal.Left: Finger-to-nose normal. Rapid alternating movement normal.    Gait  Casual gait is normal including stance, stride, and arm swing.      Physical Exam  Constitutional:       Appearance: Normal appearance. She is normal "
weight.   Eyes:      General: Lids are normal.      Extraocular Movements: Extraocular movements intact.      Pupils: Pupils are equal, round, and reactive to light.   Pulmonary:      Effort: Pulmonary effort is normal.   Skin:     General: Skin is warm.   Neurological:      Motor: Motor strength is normal.     Deep Tendon Reflexes:      Reflex Scores:       Bicep reflexes are 3+ on the right side.       Brachioradialis reflexes are 3+ on the right side.       Patellar reflexes are 3+ on the right side.       Achilles reflexes are 3+ on the right side.  Psychiatric:         Mood and Affect: Mood normal.         Speech: Speech normal.         Behavior: Behavior normal.                     Assessment & Plan     Diagnoses and all orders for this visit:    1. Gastroesophageal reflux disease without esophagitis (Primary)  -     omeprazole (priLOSEC) 20 MG capsule; Take 1 capsule by mouth Daily for 180 days.  Dispense: 30 capsule; Refill: 5    2. Parkinson's disease without dyskinesia, unspecified whether manifestations fluctuate  -     carbidopa-levodopa (SINEMET)  MG per tablet; Take 2 tablets by mouth 3 (Three) Times a Day for 90 days.  Dispense: 180 tablet; Refill: 2  -     Discontinue: rotigotine (NEUPRO) 2 MG/24HR patch; Place 1 patch on the skin as directed by provider Daily for 180 days.           At this time I would like to go ahead and continue patient on current dose of carbidopa levodopa.    We will also go ahead and start her on some omeprazole to see if she is having some gastric irritation and reflux likely related to the carbidopa.  She will do 20 mg daily.    Follow-up in 6 months or sooner if needed.            Valerie ROSAS    Neurology    [facility] [place]    Phone: [phone]    6/27/2025 , 21:22 EDT   
SKIN INTEGRITY, IMPAIRED/RISK FOR IMPAIRMENT

## 2025-07-28 NOTE — BH INPATIENT PSYCHIATRY DISCHARGE NOTE - NSBHMSERELATED_PSY_A_CORE
Patient says she left a message and sent a mycJobyourlifet message to Dr Ugalde. She went to the [facility] clinic and received an antibiotic that gave her a yeast infection. She now needs diflucan to be sent to her pharmacy in [place]. Please let her know when the rx has been sent. Thanks.   
Fair

## 2025-08-19 ENCOUNTER — EMERGENCY (EMERGENCY)
Facility: HOSPITAL | Age: 45
LOS: 1 days | End: 2025-08-19
Attending: EMERGENCY MEDICINE | Admitting: EMERGENCY MEDICINE
Payer: MEDICAID

## 2025-08-19 VITALS
DIASTOLIC BLOOD PRESSURE: 79 MMHG | OXYGEN SATURATION: 98 % | SYSTOLIC BLOOD PRESSURE: 117 MMHG | TEMPERATURE: 98 F | RESPIRATION RATE: 18 BRPM | HEART RATE: 77 BPM

## 2025-08-19 PROCEDURE — 99284 EMERGENCY DEPT VISIT MOD MDM: CPT

## 2025-08-23 ENCOUNTER — INPATIENT (INPATIENT)
Facility: HOSPITAL | Age: 45
LOS: 9 days | Discharge: PSYCHIATRIC OR SUBSTANCE USE FACILITY | End: 2025-09-02
Attending: STUDENT IN AN ORGANIZED HEALTH CARE EDUCATION/TRAINING PROGRAM | Admitting: STUDENT IN AN ORGANIZED HEALTH CARE EDUCATION/TRAINING PROGRAM
Payer: MEDICAID

## 2025-08-23 VITALS
HEART RATE: 122 BPM | SYSTOLIC BLOOD PRESSURE: 89 MMHG | DIASTOLIC BLOOD PRESSURE: 63 MMHG | RESPIRATION RATE: 18 BRPM | OXYGEN SATURATION: 99 %

## 2025-08-23 DIAGNOSIS — F29 UNSPECIFIED PSYCHOSIS NOT DUE TO A SUBSTANCE OR KNOWN PHYSIOLOGICAL CONDITION: ICD-10-CM

## 2025-08-23 DIAGNOSIS — Z29.9 ENCOUNTER FOR PROPHYLACTIC MEASURES, UNSPECIFIED: ICD-10-CM

## 2025-08-23 DIAGNOSIS — F20.9 SCHIZOPHRENIA, UNSPECIFIED: ICD-10-CM

## 2025-08-23 DIAGNOSIS — M62.82 RHABDOMYOLYSIS: ICD-10-CM

## 2025-08-23 DIAGNOSIS — R74.01 ELEVATION OF LEVELS OF LIVER TRANSAMINASE LEVELS: ICD-10-CM

## 2025-08-23 LAB
ADD ON TEST-SPECIMEN IN LAB: SIGNIFICANT CHANGE UP
ALBUMIN SERPL ELPH-MCNC: 3.7 G/DL — SIGNIFICANT CHANGE UP (ref 3.3–5)
ALBUMIN SERPL ELPH-MCNC: 3.8 G/DL — SIGNIFICANT CHANGE UP (ref 3.3–5)
ALP SERPL-CCNC: 44 U/L — SIGNIFICANT CHANGE UP (ref 40–120)
ALP SERPL-CCNC: 53 U/L — SIGNIFICANT CHANGE UP (ref 40–120)
ALT FLD-CCNC: 58 U/L — HIGH (ref 4–41)
ALT FLD-CCNC: 62 U/L — HIGH (ref 4–41)
AMPHET UR-MCNC: NEGATIVE — SIGNIFICANT CHANGE UP
ANION GAP SERPL CALC-SCNC: 13 MMOL/L — SIGNIFICANT CHANGE UP (ref 7–14)
ANION GAP SERPL CALC-SCNC: 14 MMOL/L — SIGNIFICANT CHANGE UP (ref 7–14)
APAP SERPL-MCNC: <10 UG/ML — LOW (ref 15–25)
APPEARANCE UR: ABNORMAL
AST SERPL-CCNC: 203 U/L — HIGH (ref 4–40)
AST SERPL-CCNC: 229 U/L — HIGH (ref 4–40)
BACTERIA # UR AUTO: NEGATIVE /HPF — SIGNIFICANT CHANGE UP
BARBITURATES UR SCN-MCNC: NEGATIVE — SIGNIFICANT CHANGE UP
BASOPHILS # BLD AUTO: 0.03 K/UL — SIGNIFICANT CHANGE UP (ref 0–0.2)
BASOPHILS NFR BLD AUTO: 0.3 % — SIGNIFICANT CHANGE UP (ref 0–2)
BENZODIAZ UR-MCNC: NEGATIVE — SIGNIFICANT CHANGE UP
BILIRUB SERPL-MCNC: 0.2 MG/DL — SIGNIFICANT CHANGE UP (ref 0.2–1.2)
BILIRUB SERPL-MCNC: 0.3 MG/DL — SIGNIFICANT CHANGE UP (ref 0.2–1.2)
BILIRUB UR-MCNC: ABNORMAL
BUN SERPL-MCNC: 11 MG/DL — SIGNIFICANT CHANGE UP (ref 7–23)
BUN SERPL-MCNC: 12 MG/DL — SIGNIFICANT CHANGE UP (ref 7–23)
CALCIUM SERPL-MCNC: 9.2 MG/DL — SIGNIFICANT CHANGE UP (ref 8.4–10.5)
CALCIUM SERPL-MCNC: 9.4 MG/DL — SIGNIFICANT CHANGE UP (ref 8.4–10.5)
CAST: 54 /LPF — HIGH (ref 0–4)
CHLORIDE SERPL-SCNC: 103 MMOL/L — SIGNIFICANT CHANGE UP (ref 98–107)
CHLORIDE SERPL-SCNC: 106 MMOL/L — SIGNIFICANT CHANGE UP (ref 98–107)
CO2 SERPL-SCNC: 21 MMOL/L — LOW (ref 22–31)
CO2 SERPL-SCNC: 24 MMOL/L — SIGNIFICANT CHANGE UP (ref 22–31)
COCAINE METAB.OTHER UR-MCNC: NEGATIVE — SIGNIFICANT CHANGE UP
COLOR SPEC: SIGNIFICANT CHANGE UP
CREAT SERPL-MCNC: 1.2 MG/DL — SIGNIFICANT CHANGE UP (ref 0.5–1.3)
CREAT SERPL-MCNC: 1.39 MG/DL — HIGH (ref 0.5–1.3)
CREATININE URINE RESULT, DAU: 478 MG/DL — SIGNIFICANT CHANGE UP
DIFF PNL FLD: ABNORMAL
EGFR: 65 ML/MIN/1.73M2 — SIGNIFICANT CHANGE UP
EGFR: 65 ML/MIN/1.73M2 — SIGNIFICANT CHANGE UP
EGFR: 77 ML/MIN/1.73M2 — SIGNIFICANT CHANGE UP
EGFR: 77 ML/MIN/1.73M2 — SIGNIFICANT CHANGE UP
EOSINOPHIL # BLD AUTO: 0.03 K/UL — SIGNIFICANT CHANGE UP (ref 0–0.5)
EOSINOPHIL NFR BLD AUTO: 0.3 % — SIGNIFICANT CHANGE UP (ref 0–6)
ETHANOL SERPL-MCNC: <10 MG/DL — SIGNIFICANT CHANGE UP
FENTANYL UR QL SCN: NEGATIVE — SIGNIFICANT CHANGE UP
FINE GRAN CASTS #/AREA URNS AUTO: PRESENT
FLUAV AG NPH QL: SIGNIFICANT CHANGE UP
FLUBV AG NPH QL: SIGNIFICANT CHANGE UP
GLUCOSE SERPL-MCNC: 101 MG/DL — HIGH (ref 70–99)
GLUCOSE SERPL-MCNC: 96 MG/DL — SIGNIFICANT CHANGE UP (ref 70–99)
GLUCOSE UR QL: NEGATIVE MG/DL — SIGNIFICANT CHANGE UP
HCT VFR BLD CALC: 37.5 % — LOW (ref 39–50)
HGB BLD-MCNC: 12.5 G/DL — LOW (ref 13–17)
HYALINE CASTS # UR AUTO: PRESENT
IMM GRANULOCYTES # BLD AUTO: 0.04 K/UL — SIGNIFICANT CHANGE UP (ref 0–0.07)
IMM GRANULOCYTES NFR BLD AUTO: 0.4 % — SIGNIFICANT CHANGE UP (ref 0–0.9)
KETONES UR QL: 15 MG/DL
LEUKOCYTE ESTERASE UR-ACNC: NEGATIVE — SIGNIFICANT CHANGE UP
LYMPHOCYTES # BLD AUTO: 2.29 K/UL — SIGNIFICANT CHANGE UP (ref 1–3.3)
LYMPHOCYTES NFR BLD AUTO: 20.1 % — SIGNIFICANT CHANGE UP (ref 13–44)
MCHC RBC-ENTMCNC: 29.1 PG — SIGNIFICANT CHANGE UP (ref 27–34)
MCHC RBC-ENTMCNC: 33.3 G/DL — SIGNIFICANT CHANGE UP (ref 32–36)
MCV RBC AUTO: 87.2 FL — SIGNIFICANT CHANGE UP (ref 80–100)
METHADONE UR-MCNC: NEGATIVE — SIGNIFICANT CHANGE UP
MONOCYTES # BLD AUTO: 1.27 K/UL — HIGH (ref 0–0.9)
MONOCYTES NFR BLD AUTO: 11.1 % — SIGNIFICANT CHANGE UP (ref 2–14)
NEUTROPHILS # BLD AUTO: 7.76 K/UL — HIGH (ref 1.8–7.4)
NEUTROPHILS NFR BLD AUTO: 67.8 % — SIGNIFICANT CHANGE UP (ref 43–77)
NITRITE UR-MCNC: NEGATIVE — SIGNIFICANT CHANGE UP
NRBC # BLD AUTO: 0 K/UL — SIGNIFICANT CHANGE UP (ref 0–0)
NRBC # FLD: 0 K/UL — SIGNIFICANT CHANGE UP (ref 0–0)
NRBC BLD AUTO-RTO: 0 /100 WBCS — SIGNIFICANT CHANGE UP (ref 0–0)
OPIATES UR-MCNC: NEGATIVE — SIGNIFICANT CHANGE UP
OXYCODONE UR-MCNC: NEGATIVE — SIGNIFICANT CHANGE UP
PCP SPEC-MCNC: SIGNIFICANT CHANGE UP
PCP UR-MCNC: NEGATIVE — SIGNIFICANT CHANGE UP
PH UR: 5.5 — SIGNIFICANT CHANGE UP (ref 5–8)
PLATELET # BLD AUTO: 209 K/UL — SIGNIFICANT CHANGE UP (ref 150–400)
PMV BLD: 11.3 FL — SIGNIFICANT CHANGE UP (ref 7–13)
POTASSIUM SERPL-MCNC: 3.7 MMOL/L — SIGNIFICANT CHANGE UP (ref 3.5–5.3)
POTASSIUM SERPL-MCNC: 5.2 MMOL/L — SIGNIFICANT CHANGE UP (ref 3.5–5.3)
POTASSIUM SERPL-SCNC: 3.7 MMOL/L — SIGNIFICANT CHANGE UP (ref 3.5–5.3)
POTASSIUM SERPL-SCNC: 5.2 MMOL/L — SIGNIFICANT CHANGE UP (ref 3.5–5.3)
PROT SERPL-MCNC: 7.1 G/DL — SIGNIFICANT CHANGE UP (ref 6–8.3)
PROT SERPL-MCNC: 7.1 G/DL — SIGNIFICANT CHANGE UP (ref 6–8.3)
PROT UR-MCNC: 100 MG/DL
RBC # BLD: 4.3 M/UL — SIGNIFICANT CHANGE UP (ref 4.2–5.8)
RBC # FLD: 14.4 % — SIGNIFICANT CHANGE UP (ref 10.3–14.5)
RBC CASTS # UR COMP ASSIST: 6 /HPF — HIGH (ref 0–4)
REVIEW: SIGNIFICANT CHANGE UP
RSV RNA NPH QL NAA+NON-PROBE: SIGNIFICANT CHANGE UP
SALICYLATES SERPL-MCNC: <0.3 MG/DL — LOW (ref 15–30)
SARS-COV-2 RNA SPEC QL NAA+PROBE: SIGNIFICANT CHANGE UP
SODIUM SERPL-SCNC: 140 MMOL/L — SIGNIFICANT CHANGE UP (ref 135–145)
SODIUM SERPL-SCNC: 141 MMOL/L — SIGNIFICANT CHANGE UP (ref 135–145)
SOURCE RESPIRATORY: SIGNIFICANT CHANGE UP
SP GR SPEC: 1.03 — SIGNIFICANT CHANGE UP (ref 1–1.03)
SQUAMOUS # UR AUTO: 18 /HPF — HIGH (ref 0–5)
THC UR QL: NEGATIVE — SIGNIFICANT CHANGE UP
TSH SERPL-MCNC: 1.55 UIU/ML — SIGNIFICANT CHANGE UP (ref 0.27–4.2)
UROBILINOGEN FLD QL: 1 MG/DL — SIGNIFICANT CHANGE UP (ref 0.2–1)
WBC # BLD: 11.42 K/UL — HIGH (ref 3.8–10.5)
WBC # FLD AUTO: 11.42 K/UL — HIGH (ref 3.8–10.5)
WBC UR QL: 8 /HPF — HIGH (ref 0–5)

## 2025-08-23 PROCEDURE — 71045 X-RAY EXAM CHEST 1 VIEW: CPT | Mod: 26

## 2025-08-23 PROCEDURE — 99285 EMERGENCY DEPT VISIT HI MDM: CPT | Mod: 25

## 2025-08-23 PROCEDURE — 99223 1ST HOSP IP/OBS HIGH 75: CPT

## 2025-08-23 RX ORDER — ONDANSETRON HCL/PF 4 MG/2 ML
4 VIAL (ML) INJECTION EVERY 8 HOURS
Refills: 0 | Status: DISCONTINUED | OUTPATIENT
Start: 2025-08-23 | End: 2025-09-02

## 2025-08-23 RX ORDER — DIAZEPAM 5 MG/1
5 TABLET ORAL ONCE
Refills: 0 | Status: DISCONTINUED | OUTPATIENT
Start: 2025-08-23 | End: 2025-08-23

## 2025-08-23 RX ORDER — MIDAZOLAM IN 0.9 % SOD.CHLORID 1 MG/ML
5 PLASTIC BAG, INJECTION (ML) INTRAVENOUS ONCE
Refills: 0 | Status: DISCONTINUED | OUTPATIENT
Start: 2025-08-23 | End: 2025-08-23

## 2025-08-23 RX ORDER — ACETAMINOPHEN 500 MG/5ML
1000 LIQUID (ML) ORAL ONCE
Refills: 0 | Status: COMPLETED | OUTPATIENT
Start: 2025-08-23 | End: 2025-08-23

## 2025-08-23 RX ORDER — LORAZEPAM 4 MG/ML
1 VIAL (ML) INJECTION THREE TIMES A DAY
Refills: 0 | Status: DISCONTINUED | OUTPATIENT
Start: 2025-08-23 | End: 2025-08-24

## 2025-08-23 RX ORDER — SODIUM CHLORIDE 9 G/1000ML
1000 INJECTION, SOLUTION INTRAVENOUS
Refills: 0 | Status: DISCONTINUED | OUTPATIENT
Start: 2025-08-23 | End: 2025-08-23

## 2025-08-23 RX ORDER — DIAZEPAM 5 MG/1
5 TABLET ORAL EVERY 6 HOURS
Refills: 0 | Status: DISCONTINUED | OUTPATIENT
Start: 2025-08-23 | End: 2025-08-25

## 2025-08-23 RX ORDER — HALOPERIDOL 10 MG/1
5 TABLET ORAL ONCE
Refills: 0 | Status: COMPLETED | OUTPATIENT
Start: 2025-08-23 | End: 2025-08-23

## 2025-08-23 RX ORDER — MELATONIN 5 MG
3 TABLET ORAL AT BEDTIME
Refills: 0 | Status: DISCONTINUED | OUTPATIENT
Start: 2025-08-23 | End: 2025-09-02

## 2025-08-23 RX ORDER — DIAZEPAM 5 MG/1
5 TABLET ORAL EVERY 6 HOURS
Refills: 0 | Status: DISCONTINUED | OUTPATIENT
Start: 2025-08-23 | End: 2025-08-29

## 2025-08-23 RX ORDER — ACETAMINOPHEN 500 MG/5ML
650 LIQUID (ML) ORAL EVERY 6 HOURS
Refills: 0 | Status: DISCONTINUED | OUTPATIENT
Start: 2025-08-23 | End: 2025-09-02

## 2025-08-23 RX ADMIN — Medication 1000 MILLILITER(S): at 08:57

## 2025-08-23 RX ADMIN — Medication 1000 MILLILITER(S): at 11:14

## 2025-08-23 RX ADMIN — Medication 300 MILLILITER(S): at 18:54

## 2025-08-23 RX ADMIN — Medication 1 MILLIGRAM(S): at 21:22

## 2025-08-23 RX ADMIN — HALOPERIDOL 5 MILLIGRAM(S): 10 TABLET ORAL at 08:21

## 2025-08-23 RX ADMIN — SODIUM CHLORIDE 100 MILLILITER(S): 9 INJECTION, SOLUTION INTRAVENOUS at 15:36

## 2025-08-23 RX ADMIN — Medication 1000 MILLIGRAM(S): at 14:19

## 2025-08-23 RX ADMIN — DIAZEPAM 5 MILLIGRAM(S): 5 TABLET ORAL at 20:11

## 2025-08-23 RX ADMIN — Medication 1 MILLIGRAM(S): at 18:42

## 2025-08-23 RX ADMIN — Medication 5 MILLIGRAM(S): at 08:20

## 2025-08-23 RX ADMIN — Medication 400 MILLIGRAM(S): at 11:14

## 2025-08-24 LAB
ALBUMIN SERPL ELPH-MCNC: 3.8 G/DL — SIGNIFICANT CHANGE UP (ref 3.3–5)
ALP SERPL-CCNC: 53 U/L — SIGNIFICANT CHANGE UP (ref 40–120)
ALT FLD-CCNC: 83 U/L — HIGH (ref 4–41)
ANION GAP SERPL CALC-SCNC: 12 MMOL/L — SIGNIFICANT CHANGE UP (ref 7–14)
APTT BLD: 26.5 SEC — SIGNIFICANT CHANGE UP (ref 26.1–36.8)
AST SERPL-CCNC: 238 U/L — HIGH (ref 4–40)
BILIRUB SERPL-MCNC: 0.3 MG/DL — SIGNIFICANT CHANGE UP (ref 0.2–1.2)
BUN SERPL-MCNC: 6 MG/DL — LOW (ref 7–23)
CALCIUM SERPL-MCNC: 9.3 MG/DL — SIGNIFICANT CHANGE UP (ref 8.4–10.5)
CHLORIDE SERPL-SCNC: 106 MMOL/L — SIGNIFICANT CHANGE UP (ref 98–107)
CK SERPL-CCNC: 3867 U/L — HIGH (ref 30–200)
CK SERPL-CCNC: HIGH U/L (ref 30–200)
CO2 SERPL-SCNC: 24 MMOL/L — SIGNIFICANT CHANGE UP (ref 22–31)
CREAT SERPL-MCNC: 0.94 MG/DL — SIGNIFICANT CHANGE UP (ref 0.5–1.3)
EGFR: 103 ML/MIN/1.73M2 — SIGNIFICANT CHANGE UP
EGFR: 103 ML/MIN/1.73M2 — SIGNIFICANT CHANGE UP
GLUCOSE SERPL-MCNC: 88 MG/DL — SIGNIFICANT CHANGE UP (ref 70–99)
HAV IGM SER-ACNC: SIGNIFICANT CHANGE UP
HBV CORE IGM SER-ACNC: SIGNIFICANT CHANGE UP
HBV SURFACE AG SER-ACNC: SIGNIFICANT CHANGE UP
HCT VFR BLD CALC: 38.5 % — LOW (ref 39–50)
HCV AB S/CO SERPL IA: 0.1 S/CO — SIGNIFICANT CHANGE UP (ref 0–0.79)
HCV AB SERPL-IMP: SIGNIFICANT CHANGE UP
HGB BLD-MCNC: 12.6 G/DL — LOW (ref 13–17)
INR BLD: 0.96 RATIO — SIGNIFICANT CHANGE UP (ref 0.85–1.16)
MCHC RBC-ENTMCNC: 29.8 PG — SIGNIFICANT CHANGE UP (ref 27–34)
MCHC RBC-ENTMCNC: 32.7 G/DL — SIGNIFICANT CHANGE UP (ref 32–36)
MCV RBC AUTO: 91 FL — SIGNIFICANT CHANGE UP (ref 80–100)
NRBC # BLD AUTO: 0 K/UL — SIGNIFICANT CHANGE UP (ref 0–0)
NRBC # FLD: 0 K/UL — SIGNIFICANT CHANGE UP (ref 0–0)
NRBC BLD AUTO-RTO: 0 /100 WBCS — SIGNIFICANT CHANGE UP (ref 0–0)
PLATELET # BLD AUTO: 188 K/UL — SIGNIFICANT CHANGE UP (ref 150–400)
PMV BLD: 11.9 FL — SIGNIFICANT CHANGE UP (ref 7–13)
POTASSIUM SERPL-MCNC: 3.9 MMOL/L — SIGNIFICANT CHANGE UP (ref 3.5–5.3)
POTASSIUM SERPL-SCNC: 3.9 MMOL/L — SIGNIFICANT CHANGE UP (ref 3.5–5.3)
PROT SERPL-MCNC: 7.1 G/DL — SIGNIFICANT CHANGE UP (ref 6–8.3)
PROTHROM AB SERPL-ACNC: 11.1 SEC — SIGNIFICANT CHANGE UP (ref 9.9–13.4)
RBC # BLD: 4.23 M/UL — SIGNIFICANT CHANGE UP (ref 4.2–5.8)
RBC # FLD: 14.9 % — HIGH (ref 10.3–14.5)
SODIUM SERPL-SCNC: 142 MMOL/L — SIGNIFICANT CHANGE UP (ref 135–145)
WBC # BLD: 8.27 K/UL — SIGNIFICANT CHANGE UP (ref 3.8–10.5)
WBC # FLD AUTO: 8.27 K/UL — SIGNIFICANT CHANGE UP (ref 3.8–10.5)

## 2025-08-24 PROCEDURE — 76705 ECHO EXAM OF ABDOMEN: CPT | Mod: 26

## 2025-08-24 PROCEDURE — 99232 SBSQ HOSP IP/OBS MODERATE 35: CPT

## 2025-08-24 RX ORDER — ENOXAPARIN SODIUM 100 MG/ML
40 INJECTION SUBCUTANEOUS EVERY 24 HOURS
Refills: 0 | Status: DISCONTINUED | OUTPATIENT
Start: 2025-08-24 | End: 2025-09-02

## 2025-08-24 RX ORDER — LORAZEPAM 4 MG/ML
1.5 VIAL (ML) INJECTION THREE TIMES A DAY
Refills: 0 | Status: DISCONTINUED | OUTPATIENT
Start: 2025-08-24 | End: 2025-08-25

## 2025-08-24 RX ADMIN — Medication 3 MILLIGRAM(S): at 22:08

## 2025-08-24 RX ADMIN — DIAZEPAM 5 MILLIGRAM(S): 5 TABLET ORAL at 18:34

## 2025-08-24 RX ADMIN — Medication 1.5 MILLIGRAM(S): at 13:37

## 2025-08-24 RX ADMIN — DIAZEPAM 5 MILLIGRAM(S): 5 TABLET ORAL at 00:07

## 2025-08-24 RX ADMIN — Medication 1.5 MILLIGRAM(S): at 22:08

## 2025-08-24 RX ADMIN — DIAZEPAM 5 MILLIGRAM(S): 5 TABLET ORAL at 05:22

## 2025-08-24 RX ADMIN — DIAZEPAM 5 MILLIGRAM(S): 5 TABLET ORAL at 12:25

## 2025-08-25 LAB
ALBUMIN SERPL ELPH-MCNC: 3.6 G/DL — SIGNIFICANT CHANGE UP (ref 3.3–5)
ALP SERPL-CCNC: 48 U/L — SIGNIFICANT CHANGE UP (ref 40–120)
ALT FLD-CCNC: 50 U/L — HIGH (ref 4–41)
ANION GAP SERPL CALC-SCNC: 12 MMOL/L — SIGNIFICANT CHANGE UP (ref 7–14)
AST SERPL-CCNC: 72 U/L — HIGH (ref 4–40)
BILIRUB SERPL-MCNC: 0.2 MG/DL — SIGNIFICANT CHANGE UP (ref 0.2–1.2)
BUN SERPL-MCNC: 6 MG/DL — LOW (ref 7–23)
CALCIUM SERPL-MCNC: 9.3 MG/DL — SIGNIFICANT CHANGE UP (ref 8.4–10.5)
CHLORIDE SERPL-SCNC: 105 MMOL/L — SIGNIFICANT CHANGE UP (ref 98–107)
CK SERPL-CCNC: 3129 U/L — HIGH (ref 30–200)
CO2 SERPL-SCNC: 25 MMOL/L — SIGNIFICANT CHANGE UP (ref 22–31)
CREAT SERPL-MCNC: 1.01 MG/DL — SIGNIFICANT CHANGE UP (ref 0.5–1.3)
EGFR: 95 ML/MIN/1.73M2 — SIGNIFICANT CHANGE UP
EGFR: 95 ML/MIN/1.73M2 — SIGNIFICANT CHANGE UP
GLUCOSE SERPL-MCNC: 100 MG/DL — HIGH (ref 70–99)
HCT VFR BLD CALC: 34.2 % — LOW (ref 39–50)
HGB BLD-MCNC: 11.4 G/DL — LOW (ref 13–17)
MAGNESIUM SERPL-MCNC: 1.9 MG/DL — SIGNIFICANT CHANGE UP (ref 1.6–2.6)
MCHC RBC-ENTMCNC: 29.9 PG — SIGNIFICANT CHANGE UP (ref 27–34)
MCHC RBC-ENTMCNC: 33.3 G/DL — SIGNIFICANT CHANGE UP (ref 32–36)
MCV RBC AUTO: 89.8 FL — SIGNIFICANT CHANGE UP (ref 80–100)
NRBC # BLD AUTO: 0 K/UL — SIGNIFICANT CHANGE UP (ref 0–0)
NRBC # FLD: 0 K/UL — SIGNIFICANT CHANGE UP (ref 0–0)
NRBC BLD AUTO-RTO: 0 /100 WBCS — SIGNIFICANT CHANGE UP (ref 0–0)
PHOSPHATE SERPL-MCNC: 2.8 MG/DL — SIGNIFICANT CHANGE UP (ref 2.5–4.5)
PLATELET # BLD AUTO: 190 K/UL — SIGNIFICANT CHANGE UP (ref 150–400)
PMV BLD: 11.3 FL — SIGNIFICANT CHANGE UP (ref 7–13)
POTASSIUM SERPL-MCNC: 3.9 MMOL/L — SIGNIFICANT CHANGE UP (ref 3.5–5.3)
POTASSIUM SERPL-SCNC: 3.9 MMOL/L — SIGNIFICANT CHANGE UP (ref 3.5–5.3)
PROT SERPL-MCNC: 6.6 G/DL — SIGNIFICANT CHANGE UP (ref 6–8.3)
RBC # BLD: 3.81 M/UL — LOW (ref 4.2–5.8)
RBC # FLD: 14.6 % — HIGH (ref 10.3–14.5)
SODIUM SERPL-SCNC: 142 MMOL/L — SIGNIFICANT CHANGE UP (ref 135–145)
WBC # BLD: 7.81 K/UL — SIGNIFICANT CHANGE UP (ref 3.8–10.5)
WBC # FLD AUTO: 7.81 K/UL — SIGNIFICANT CHANGE UP (ref 3.8–10.5)

## 2025-08-25 PROCEDURE — 99232 SBSQ HOSP IP/OBS MODERATE 35: CPT

## 2025-08-25 RX ORDER — LORAZEPAM 4 MG/ML
1 VIAL (ML) INJECTION THREE TIMES A DAY
Refills: 0 | Status: DISCONTINUED | OUTPATIENT
Start: 2025-08-25 | End: 2025-08-26

## 2025-08-25 RX ORDER — HALOPERIDOL 10 MG/1
5 TABLET ORAL EVERY 12 HOURS
Refills: 0 | Status: DISCONTINUED | OUTPATIENT
Start: 2025-08-25 | End: 2025-09-02

## 2025-08-25 RX ADMIN — Medication 100 MILLILITER(S): at 01:47

## 2025-08-25 RX ADMIN — HALOPERIDOL 5 MILLIGRAM(S): 10 TABLET ORAL at 17:01

## 2025-08-25 RX ADMIN — Medication 3 MILLIGRAM(S): at 22:19

## 2025-08-25 RX ADMIN — Medication 1.5 MILLIGRAM(S): at 06:30

## 2025-08-25 RX ADMIN — Medication 100 MILLILITER(S): at 21:53

## 2025-08-25 RX ADMIN — Medication 1 MILLIGRAM(S): at 22:19

## 2025-08-26 LAB
ALBUMIN SERPL ELPH-MCNC: 3.8 G/DL — SIGNIFICANT CHANGE UP (ref 3.3–5)
ALP SERPL-CCNC: 47 U/L — SIGNIFICANT CHANGE UP (ref 40–120)
ALT FLD-CCNC: 45 U/L — HIGH (ref 4–41)
ANION GAP SERPL CALC-SCNC: 11 MMOL/L — SIGNIFICANT CHANGE UP (ref 7–14)
AST SERPL-CCNC: 53 U/L — HIGH (ref 4–40)
BILIRUB SERPL-MCNC: 0.3 MG/DL — SIGNIFICANT CHANGE UP (ref 0.2–1.2)
BUN SERPL-MCNC: 8 MG/DL — SIGNIFICANT CHANGE UP (ref 7–23)
CALCIUM SERPL-MCNC: 9.5 MG/DL — SIGNIFICANT CHANGE UP (ref 8.4–10.5)
CHLORIDE SERPL-SCNC: 103 MMOL/L — SIGNIFICANT CHANGE UP (ref 98–107)
CK SERPL-CCNC: 1878 U/L — HIGH (ref 30–200)
CO2 SERPL-SCNC: 28 MMOL/L — SIGNIFICANT CHANGE UP (ref 22–31)
CREAT SERPL-MCNC: 0.98 MG/DL — SIGNIFICANT CHANGE UP (ref 0.5–1.3)
EGFR: 98 ML/MIN/1.73M2 — SIGNIFICANT CHANGE UP
EGFR: 98 ML/MIN/1.73M2 — SIGNIFICANT CHANGE UP
GLUCOSE SERPL-MCNC: 88 MG/DL — SIGNIFICANT CHANGE UP (ref 70–99)
HCT VFR BLD CALC: 32 % — LOW (ref 39–50)
HCT VFR BLD CALC: 37 % — LOW (ref 39–50)
HGB BLD-MCNC: 10.8 G/DL — LOW (ref 13–17)
HGB BLD-MCNC: 12 G/DL — LOW (ref 13–17)
LACTATE SERPL-SCNC: 1.2 MMOL/L — SIGNIFICANT CHANGE UP (ref 0.5–2)
MAGNESIUM SERPL-MCNC: 2 MG/DL — SIGNIFICANT CHANGE UP (ref 1.6–2.6)
MCHC RBC-ENTMCNC: 28.9 PG — SIGNIFICANT CHANGE UP (ref 27–34)
MCHC RBC-ENTMCNC: 29.8 PG — SIGNIFICANT CHANGE UP (ref 27–34)
MCHC RBC-ENTMCNC: 32.4 G/DL — SIGNIFICANT CHANGE UP (ref 32–36)
MCHC RBC-ENTMCNC: 33.8 G/DL — SIGNIFICANT CHANGE UP (ref 32–36)
MCV RBC AUTO: 88.2 FL — SIGNIFICANT CHANGE UP (ref 80–100)
MCV RBC AUTO: 89.2 FL — SIGNIFICANT CHANGE UP (ref 80–100)
NRBC # BLD AUTO: 0 K/UL — SIGNIFICANT CHANGE UP (ref 0–0)
NRBC # BLD AUTO: 0 K/UL — SIGNIFICANT CHANGE UP (ref 0–0)
NRBC # FLD: 0 K/UL — SIGNIFICANT CHANGE UP (ref 0–0)
NRBC # FLD: 0 K/UL — SIGNIFICANT CHANGE UP (ref 0–0)
NRBC BLD AUTO-RTO: 0 /100 WBCS — SIGNIFICANT CHANGE UP (ref 0–0)
NRBC BLD AUTO-RTO: 0 /100 WBCS — SIGNIFICANT CHANGE UP (ref 0–0)
PHOSPHATE SERPL-MCNC: 3 MG/DL — SIGNIFICANT CHANGE UP (ref 2.5–4.5)
PLATELET # BLD AUTO: 199 K/UL — SIGNIFICANT CHANGE UP (ref 150–400)
PLATELET # BLD AUTO: 208 K/UL — SIGNIFICANT CHANGE UP (ref 150–400)
PMV BLD: 10.8 FL — SIGNIFICANT CHANGE UP (ref 7–13)
PMV BLD: 10.9 FL — SIGNIFICANT CHANGE UP (ref 7–13)
POTASSIUM SERPL-MCNC: 3.9 MMOL/L — SIGNIFICANT CHANGE UP (ref 3.5–5.3)
POTASSIUM SERPL-SCNC: 3.9 MMOL/L — SIGNIFICANT CHANGE UP (ref 3.5–5.3)
PROT SERPL-MCNC: 6.9 G/DL — SIGNIFICANT CHANGE UP (ref 6–8.3)
RBC # BLD: 3.63 M/UL — LOW (ref 4.2–5.8)
RBC # BLD: 4.15 M/UL — LOW (ref 4.2–5.8)
RBC # FLD: 13.8 % — SIGNIFICANT CHANGE UP (ref 10.3–14.5)
RBC # FLD: 14.1 % — SIGNIFICANT CHANGE UP (ref 10.3–14.5)
SODIUM SERPL-SCNC: 142 MMOL/L — SIGNIFICANT CHANGE UP (ref 135–145)
WBC # BLD: 6.88 K/UL — SIGNIFICANT CHANGE UP (ref 3.8–10.5)
WBC # BLD: 7.7 K/UL — SIGNIFICANT CHANGE UP (ref 3.8–10.5)
WBC # FLD AUTO: 6.88 K/UL — SIGNIFICANT CHANGE UP (ref 3.8–10.5)
WBC # FLD AUTO: 7.7 K/UL — SIGNIFICANT CHANGE UP (ref 3.8–10.5)

## 2025-08-26 PROCEDURE — 99232 SBSQ HOSP IP/OBS MODERATE 35: CPT

## 2025-08-26 RX ORDER — SODIUM CHLORIDE 9 G/1000ML
1000 INJECTION, SOLUTION INTRAVENOUS
Refills: 0 | Status: DISCONTINUED | OUTPATIENT
Start: 2025-08-26 | End: 2025-08-29

## 2025-08-26 RX ORDER — LORAZEPAM 4 MG/ML
1 VIAL (ML) INJECTION
Refills: 0 | Status: DISCONTINUED | OUTPATIENT
Start: 2025-08-26 | End: 2025-09-02

## 2025-08-26 RX ADMIN — Medication 1 MILLIGRAM(S): at 22:28

## 2025-08-26 RX ADMIN — HALOPERIDOL 5 MILLIGRAM(S): 10 TABLET ORAL at 17:49

## 2025-08-26 RX ADMIN — Medication 650 MILLIGRAM(S): at 22:28

## 2025-08-26 RX ADMIN — Medication 1 MILLIGRAM(S): at 14:56

## 2025-08-26 RX ADMIN — HALOPERIDOL 5 MILLIGRAM(S): 10 TABLET ORAL at 05:57

## 2025-08-26 RX ADMIN — Medication 1 MILLIGRAM(S): at 05:36

## 2025-08-26 RX ADMIN — Medication 650 MILLIGRAM(S): at 22:58

## 2025-08-26 RX ADMIN — ENOXAPARIN SODIUM 40 MILLIGRAM(S): 100 INJECTION SUBCUTANEOUS at 17:49

## 2025-08-26 RX ADMIN — Medication 3 MILLIGRAM(S): at 22:28

## 2025-08-27 LAB
ALBUMIN SERPL ELPH-MCNC: 3.5 G/DL — SIGNIFICANT CHANGE UP (ref 3.3–5)
ALP SERPL-CCNC: 49 U/L — SIGNIFICANT CHANGE UP (ref 40–120)
ALT FLD-CCNC: 38 U/L — SIGNIFICANT CHANGE UP (ref 4–41)
ANION GAP SERPL CALC-SCNC: 11 MMOL/L — SIGNIFICANT CHANGE UP (ref 7–14)
ANION GAP SERPL CALC-SCNC: 13 MMOL/L — SIGNIFICANT CHANGE UP (ref 7–14)
APPEARANCE UR: CLEAR — SIGNIFICANT CHANGE UP
AST SERPL-CCNC: 38 U/L — SIGNIFICANT CHANGE UP (ref 4–40)
BILIRUB SERPL-MCNC: 0.2 MG/DL — SIGNIFICANT CHANGE UP (ref 0.2–1.2)
BILIRUB UR-MCNC: NEGATIVE — SIGNIFICANT CHANGE UP
BUN SERPL-MCNC: 7 MG/DL — SIGNIFICANT CHANGE UP (ref 7–23)
BUN SERPL-MCNC: 8 MG/DL — SIGNIFICANT CHANGE UP (ref 7–23)
CALCIUM SERPL-MCNC: 9 MG/DL — SIGNIFICANT CHANGE UP (ref 8.4–10.5)
CALCIUM SERPL-MCNC: 9.3 MG/DL — SIGNIFICANT CHANGE UP (ref 8.4–10.5)
CHLORIDE SERPL-SCNC: 101 MMOL/L — SIGNIFICANT CHANGE UP (ref 98–107)
CHLORIDE SERPL-SCNC: 102 MMOL/L — SIGNIFICANT CHANGE UP (ref 98–107)
CK SERPL-CCNC: 1300 U/L — HIGH (ref 30–200)
CO2 SERPL-SCNC: 25 MMOL/L — SIGNIFICANT CHANGE UP (ref 22–31)
CO2 SERPL-SCNC: 27 MMOL/L — SIGNIFICANT CHANGE UP (ref 22–31)
COLOR SPEC: YELLOW — SIGNIFICANT CHANGE UP
CREAT SERPL-MCNC: 1.02 MG/DL — SIGNIFICANT CHANGE UP (ref 0.5–1.3)
CREAT SERPL-MCNC: 1.02 MG/DL — SIGNIFICANT CHANGE UP (ref 0.5–1.3)
DIFF PNL FLD: NEGATIVE — SIGNIFICANT CHANGE UP
EGFR: 94 ML/MIN/1.73M2 — SIGNIFICANT CHANGE UP
FLUAV AG NPH QL: SIGNIFICANT CHANGE UP
FLUBV AG NPH QL: SIGNIFICANT CHANGE UP
GLUCOSE SERPL-MCNC: 105 MG/DL — HIGH (ref 70–99)
GLUCOSE SERPL-MCNC: 93 MG/DL — SIGNIFICANT CHANGE UP (ref 70–99)
GLUCOSE UR QL: NEGATIVE MG/DL — SIGNIFICANT CHANGE UP
HCT VFR BLD CALC: 36.6 % — LOW (ref 39–50)
HGB BLD-MCNC: 12 G/DL — LOW (ref 13–17)
KETONES UR QL: NEGATIVE MG/DL — SIGNIFICANT CHANGE UP
LEUKOCYTE ESTERASE UR-ACNC: NEGATIVE — SIGNIFICANT CHANGE UP
MAGNESIUM SERPL-MCNC: 1.9 MG/DL — SIGNIFICANT CHANGE UP (ref 1.6–2.6)
MCHC RBC-ENTMCNC: 29.4 PG — SIGNIFICANT CHANGE UP (ref 27–34)
MCHC RBC-ENTMCNC: 32.8 G/DL — SIGNIFICANT CHANGE UP (ref 32–36)
MCV RBC AUTO: 89.7 FL — SIGNIFICANT CHANGE UP (ref 80–100)
NITRITE UR-MCNC: NEGATIVE — SIGNIFICANT CHANGE UP
NRBC # BLD AUTO: 0 K/UL — SIGNIFICANT CHANGE UP (ref 0–0)
NRBC # FLD: 0 K/UL — SIGNIFICANT CHANGE UP (ref 0–0)
NRBC BLD AUTO-RTO: 0 /100 WBCS — SIGNIFICANT CHANGE UP (ref 0–0)
PH UR: 6.5 — SIGNIFICANT CHANGE UP (ref 5–8)
PHOSPHATE SERPL-MCNC: 3.2 MG/DL — SIGNIFICANT CHANGE UP (ref 2.5–4.5)
PLATELET # BLD AUTO: 215 K/UL — SIGNIFICANT CHANGE UP (ref 150–400)
PMV BLD: 11 FL — SIGNIFICANT CHANGE UP (ref 7–13)
POTASSIUM SERPL-MCNC: 3.8 MMOL/L — SIGNIFICANT CHANGE UP (ref 3.5–5.3)
POTASSIUM SERPL-MCNC: 4.1 MMOL/L — SIGNIFICANT CHANGE UP (ref 3.5–5.3)
POTASSIUM SERPL-SCNC: 3.8 MMOL/L — SIGNIFICANT CHANGE UP (ref 3.5–5.3)
POTASSIUM SERPL-SCNC: 4.1 MMOL/L — SIGNIFICANT CHANGE UP (ref 3.5–5.3)
PROCALCITONIN SERPL-MCNC: 0.1 NG/ML — SIGNIFICANT CHANGE UP (ref 0.02–0.1)
PROT SERPL-MCNC: 6.9 G/DL — SIGNIFICANT CHANGE UP (ref 6–8.3)
PROT UR-MCNC: NEGATIVE MG/DL — SIGNIFICANT CHANGE UP
RBC # BLD: 4.08 M/UL — LOW (ref 4.2–5.8)
RBC # FLD: 14 % — SIGNIFICANT CHANGE UP (ref 10.3–14.5)
RSV RNA NPH QL NAA+NON-PROBE: SIGNIFICANT CHANGE UP
SARS-COV-2 RNA SPEC QL NAA+PROBE: SIGNIFICANT CHANGE UP
SODIUM SERPL-SCNC: 139 MMOL/L — SIGNIFICANT CHANGE UP (ref 135–145)
SODIUM SERPL-SCNC: 140 MMOL/L — SIGNIFICANT CHANGE UP (ref 135–145)
SOURCE RESPIRATORY: SIGNIFICANT CHANGE UP
SP GR SPEC: 1.01 — SIGNIFICANT CHANGE UP (ref 1–1.03)
UROBILINOGEN FLD QL: 0.2 MG/DL — SIGNIFICANT CHANGE UP (ref 0.2–1)
WBC # BLD: 8.02 K/UL — SIGNIFICANT CHANGE UP (ref 3.8–10.5)
WBC # FLD AUTO: 8.02 K/UL — SIGNIFICANT CHANGE UP (ref 3.8–10.5)

## 2025-08-27 PROCEDURE — 99232 SBSQ HOSP IP/OBS MODERATE 35: CPT

## 2025-08-27 PROCEDURE — 71045 X-RAY EXAM CHEST 1 VIEW: CPT | Mod: 26

## 2025-08-27 RX ORDER — ACETAMINOPHEN 500 MG/5ML
1000 LIQUID (ML) ORAL ONCE
Refills: 0 | Status: COMPLETED | OUTPATIENT
Start: 2025-08-27 | End: 2025-08-27

## 2025-08-27 RX ADMIN — SODIUM CHLORIDE 115 MILLILITER(S): 9 INJECTION, SOLUTION INTRAVENOUS at 05:54

## 2025-08-27 RX ADMIN — ENOXAPARIN SODIUM 40 MILLIGRAM(S): 100 INJECTION SUBCUTANEOUS at 20:26

## 2025-08-27 RX ADMIN — HALOPERIDOL 5 MILLIGRAM(S): 10 TABLET ORAL at 17:57

## 2025-08-27 RX ADMIN — Medication 1 MILLIGRAM(S): at 05:54

## 2025-08-27 RX ADMIN — Medication 1000 MILLIGRAM(S): at 00:58

## 2025-08-27 RX ADMIN — Medication 400 MILLIGRAM(S): at 00:28

## 2025-08-27 RX ADMIN — HALOPERIDOL 5 MILLIGRAM(S): 10 TABLET ORAL at 05:54

## 2025-08-27 RX ADMIN — Medication 1 MILLIGRAM(S): at 17:58

## 2025-08-28 PROCEDURE — 99232 SBSQ HOSP IP/OBS MODERATE 35: CPT

## 2025-08-28 PROCEDURE — 99233 SBSQ HOSP IP/OBS HIGH 50: CPT

## 2025-08-28 RX ADMIN — Medication 1 MILLIGRAM(S): at 05:16

## 2025-08-28 RX ADMIN — ENOXAPARIN SODIUM 40 MILLIGRAM(S): 100 INJECTION SUBCUTANEOUS at 21:24

## 2025-08-28 RX ADMIN — HALOPERIDOL 5 MILLIGRAM(S): 10 TABLET ORAL at 17:00

## 2025-08-28 RX ADMIN — Medication 1 MILLIGRAM(S): at 17:00

## 2025-08-28 RX ADMIN — HALOPERIDOL 5 MILLIGRAM(S): 10 TABLET ORAL at 05:16

## 2025-08-29 LAB
ANION GAP SERPL CALC-SCNC: 11 MMOL/L — SIGNIFICANT CHANGE UP (ref 7–14)
BUN SERPL-MCNC: 9 MG/DL — SIGNIFICANT CHANGE UP (ref 7–23)
CALCIUM SERPL-MCNC: 9.7 MG/DL — SIGNIFICANT CHANGE UP (ref 8.4–10.5)
CHLORIDE SERPL-SCNC: 100 MMOL/L — SIGNIFICANT CHANGE UP (ref 98–107)
CK SERPL-CCNC: 415 U/L — HIGH (ref 30–200)
CO2 SERPL-SCNC: 26 MMOL/L — SIGNIFICANT CHANGE UP (ref 22–31)
CREAT SERPL-MCNC: 0.92 MG/DL — SIGNIFICANT CHANGE UP (ref 0.5–1.3)
EGFR: 106 ML/MIN/1.73M2 — SIGNIFICANT CHANGE UP
EGFR: 106 ML/MIN/1.73M2 — SIGNIFICANT CHANGE UP
GLUCOSE SERPL-MCNC: 95 MG/DL — SIGNIFICANT CHANGE UP (ref 70–99)
HCT VFR BLD CALC: 38.1 % — LOW (ref 39–50)
HGB BLD-MCNC: 12.5 G/DL — LOW (ref 13–17)
MCHC RBC-ENTMCNC: 29.3 PG — SIGNIFICANT CHANGE UP (ref 27–34)
MCHC RBC-ENTMCNC: 32.8 G/DL — SIGNIFICANT CHANGE UP (ref 32–36)
MCV RBC AUTO: 89.4 FL — SIGNIFICANT CHANGE UP (ref 80–100)
NRBC # BLD AUTO: 0 K/UL — SIGNIFICANT CHANGE UP (ref 0–0)
NRBC # FLD: 0 K/UL — SIGNIFICANT CHANGE UP (ref 0–0)
NRBC BLD AUTO-RTO: 0 /100 WBCS — SIGNIFICANT CHANGE UP (ref 0–0)
PLATELET # BLD AUTO: 299 K/UL — SIGNIFICANT CHANGE UP (ref 150–400)
PMV BLD: 10.3 FL — SIGNIFICANT CHANGE UP (ref 7–13)
POTASSIUM SERPL-MCNC: 4.3 MMOL/L — SIGNIFICANT CHANGE UP (ref 3.5–5.3)
POTASSIUM SERPL-SCNC: 4.3 MMOL/L — SIGNIFICANT CHANGE UP (ref 3.5–5.3)
RBC # BLD: 4.26 M/UL — SIGNIFICANT CHANGE UP (ref 4.2–5.8)
RBC # FLD: 13.9 % — SIGNIFICANT CHANGE UP (ref 10.3–14.5)
SODIUM SERPL-SCNC: 137 MMOL/L — SIGNIFICANT CHANGE UP (ref 135–145)
WBC # BLD: 6.7 K/UL — SIGNIFICANT CHANGE UP (ref 3.8–10.5)
WBC # FLD AUTO: 6.7 K/UL — SIGNIFICANT CHANGE UP (ref 3.8–10.5)

## 2025-08-29 PROCEDURE — 99233 SBSQ HOSP IP/OBS HIGH 50: CPT

## 2025-08-29 PROCEDURE — 99232 SBSQ HOSP IP/OBS MODERATE 35: CPT

## 2025-08-29 RX ORDER — SODIUM CHLORIDE 9 G/1000ML
1000 INJECTION, SOLUTION INTRAVENOUS
Refills: 0 | Status: DISCONTINUED | OUTPATIENT
Start: 2025-08-29 | End: 2025-09-02

## 2025-08-29 RX ORDER — DIAZEPAM 5 MG/1
5 TABLET ORAL EVERY 6 HOURS
Refills: 0 | Status: DISCONTINUED | OUTPATIENT
Start: 2025-08-29 | End: 2025-09-02

## 2025-08-29 RX ADMIN — HALOPERIDOL 5 MILLIGRAM(S): 10 TABLET ORAL at 05:11

## 2025-08-29 RX ADMIN — ENOXAPARIN SODIUM 40 MILLIGRAM(S): 100 INJECTION SUBCUTANEOUS at 20:46

## 2025-08-29 RX ADMIN — Medication 1 MILLIGRAM(S): at 05:12

## 2025-08-29 RX ADMIN — HALOPERIDOL 5 MILLIGRAM(S): 10 TABLET ORAL at 17:30

## 2025-08-29 RX ADMIN — SODIUM CHLORIDE 115 MILLILITER(S): 9 INJECTION, SOLUTION INTRAVENOUS at 12:44

## 2025-08-29 RX ADMIN — Medication 1 MILLIGRAM(S): at 17:30

## 2025-08-30 LAB
ANION GAP SERPL CALC-SCNC: 11 MMOL/L — SIGNIFICANT CHANGE UP (ref 7–14)
BASOPHILS # BLD AUTO: 0.06 K/UL — SIGNIFICANT CHANGE UP (ref 0–0.2)
BASOPHILS NFR BLD AUTO: 0.7 % — SIGNIFICANT CHANGE UP (ref 0–2)
BUN SERPL-MCNC: 15 MG/DL — SIGNIFICANT CHANGE UP (ref 7–23)
CALCIUM SERPL-MCNC: 9.2 MG/DL — SIGNIFICANT CHANGE UP (ref 8.4–10.5)
CHLORIDE SERPL-SCNC: 102 MMOL/L — SIGNIFICANT CHANGE UP (ref 98–107)
CK SERPL-CCNC: 283 U/L — HIGH (ref 30–200)
CO2 SERPL-SCNC: 25 MMOL/L — SIGNIFICANT CHANGE UP (ref 22–31)
CREAT SERPL-MCNC: 1.16 MG/DL — SIGNIFICANT CHANGE UP (ref 0.5–1.3)
EGFR: 80 ML/MIN/1.73M2 — SIGNIFICANT CHANGE UP
EGFR: 80 ML/MIN/1.73M2 — SIGNIFICANT CHANGE UP
EOSINOPHIL # BLD AUTO: 0.62 K/UL — HIGH (ref 0–0.5)
EOSINOPHIL NFR BLD AUTO: 7.1 % — HIGH (ref 0–6)
GLUCOSE SERPL-MCNC: 81 MG/DL — SIGNIFICANT CHANGE UP (ref 70–99)
HCT VFR BLD CALC: 37.5 % — LOW (ref 39–50)
HGB BLD-MCNC: 12.3 G/DL — LOW (ref 13–17)
IMM GRANULOCYTES # BLD AUTO: 0.02 K/UL — SIGNIFICANT CHANGE UP (ref 0–0.07)
IMM GRANULOCYTES NFR BLD AUTO: 0.2 % — SIGNIFICANT CHANGE UP (ref 0–0.9)
LYMPHOCYTES # BLD AUTO: 3.92 K/UL — HIGH (ref 1–3.3)
LYMPHOCYTES NFR BLD AUTO: 44.7 % — HIGH (ref 13–44)
MAGNESIUM SERPL-MCNC: 2 MG/DL — SIGNIFICANT CHANGE UP (ref 1.6–2.6)
MCHC RBC-ENTMCNC: 29.6 PG — SIGNIFICANT CHANGE UP (ref 27–34)
MCHC RBC-ENTMCNC: 32.8 G/DL — SIGNIFICANT CHANGE UP (ref 32–36)
MCV RBC AUTO: 90.1 FL — SIGNIFICANT CHANGE UP (ref 80–100)
MONOCYTES # BLD AUTO: 0.85 K/UL — SIGNIFICANT CHANGE UP (ref 0–0.9)
MONOCYTES NFR BLD AUTO: 9.7 % — SIGNIFICANT CHANGE UP (ref 2–14)
NEUTROPHILS # BLD AUTO: 3.3 K/UL — SIGNIFICANT CHANGE UP (ref 1.8–7.4)
NEUTROPHILS NFR BLD AUTO: 37.6 % — LOW (ref 43–77)
NRBC # BLD AUTO: 0 K/UL — SIGNIFICANT CHANGE UP (ref 0–0)
NRBC # FLD: 0 K/UL — SIGNIFICANT CHANGE UP (ref 0–0)
NRBC BLD AUTO-RTO: 0 /100 WBCS — SIGNIFICANT CHANGE UP (ref 0–0)
PHOSPHATE SERPL-MCNC: 3.5 MG/DL — SIGNIFICANT CHANGE UP (ref 2.5–4.5)
PLATELET # BLD AUTO: 311 K/UL — SIGNIFICANT CHANGE UP (ref 150–400)
PMV BLD: 10.4 FL — SIGNIFICANT CHANGE UP (ref 7–13)
POTASSIUM SERPL-MCNC: 4.2 MMOL/L — SIGNIFICANT CHANGE UP (ref 3.5–5.3)
POTASSIUM SERPL-SCNC: 4.2 MMOL/L — SIGNIFICANT CHANGE UP (ref 3.5–5.3)
RBC # BLD: 4.16 M/UL — LOW (ref 4.2–5.8)
RBC # FLD: 13.7 % — SIGNIFICANT CHANGE UP (ref 10.3–14.5)
SODIUM SERPL-SCNC: 138 MMOL/L — SIGNIFICANT CHANGE UP (ref 135–145)
WBC # BLD: 8.77 K/UL — SIGNIFICANT CHANGE UP (ref 3.8–10.5)
WBC # FLD AUTO: 8.77 K/UL — SIGNIFICANT CHANGE UP (ref 3.8–10.5)

## 2025-08-30 PROCEDURE — 99232 SBSQ HOSP IP/OBS MODERATE 35: CPT

## 2025-08-30 RX ADMIN — Medication 650 MILLIGRAM(S): at 22:27

## 2025-08-30 RX ADMIN — HALOPERIDOL 5 MILLIGRAM(S): 10 TABLET ORAL at 17:26

## 2025-08-30 RX ADMIN — HALOPERIDOL 5 MILLIGRAM(S): 10 TABLET ORAL at 05:06

## 2025-08-30 RX ADMIN — Medication 1 MILLIGRAM(S): at 05:07

## 2025-08-30 RX ADMIN — Medication 1 MILLIGRAM(S): at 17:26

## 2025-08-30 RX ADMIN — Medication 650 MILLIGRAM(S): at 21:27

## 2025-08-31 PROCEDURE — 99232 SBSQ HOSP IP/OBS MODERATE 35: CPT

## 2025-08-31 RX ADMIN — Medication 1 MILLIGRAM(S): at 05:30

## 2025-08-31 RX ADMIN — HALOPERIDOL 5 MILLIGRAM(S): 10 TABLET ORAL at 05:30

## 2025-08-31 RX ADMIN — HALOPERIDOL 5 MILLIGRAM(S): 10 TABLET ORAL at 18:02

## 2025-08-31 RX ADMIN — Medication 1 MILLIGRAM(S): at 18:01

## 2025-09-01 LAB
ALBUMIN SERPL ELPH-MCNC: 3.6 G/DL — SIGNIFICANT CHANGE UP (ref 3.3–5)
ALP SERPL-CCNC: 47 U/L — SIGNIFICANT CHANGE UP (ref 40–120)
ALT FLD-CCNC: 25 U/L — SIGNIFICANT CHANGE UP (ref 4–41)
ANION GAP SERPL CALC-SCNC: 11 MMOL/L — SIGNIFICANT CHANGE UP (ref 7–14)
AST SERPL-CCNC: 23 U/L — SIGNIFICANT CHANGE UP (ref 4–40)
BASOPHILS # BLD AUTO: 0.07 K/UL — SIGNIFICANT CHANGE UP (ref 0–0.2)
BASOPHILS NFR BLD AUTO: 0.9 % — SIGNIFICANT CHANGE UP (ref 0–2)
BILIRUB SERPL-MCNC: 0.2 MG/DL — SIGNIFICANT CHANGE UP (ref 0.2–1.2)
BUN SERPL-MCNC: 12 MG/DL — SIGNIFICANT CHANGE UP (ref 7–23)
CALCIUM SERPL-MCNC: 9.4 MG/DL — SIGNIFICANT CHANGE UP (ref 8.4–10.5)
CHLORIDE SERPL-SCNC: 101 MMOL/L — SIGNIFICANT CHANGE UP (ref 98–107)
CK SERPL-CCNC: 190 U/L — SIGNIFICANT CHANGE UP (ref 30–200)
CO2 SERPL-SCNC: 25 MMOL/L — SIGNIFICANT CHANGE UP (ref 22–31)
CREAT SERPL-MCNC: 0.89 MG/DL — SIGNIFICANT CHANGE UP (ref 0.5–1.3)
CULTURE RESULTS: SIGNIFICANT CHANGE UP
EGFR: 109 ML/MIN/1.73M2 — SIGNIFICANT CHANGE UP
EGFR: 109 ML/MIN/1.73M2 — SIGNIFICANT CHANGE UP
EOSINOPHIL # BLD AUTO: 0.47 K/UL — SIGNIFICANT CHANGE UP (ref 0–0.5)
EOSINOPHIL NFR BLD AUTO: 6 % — SIGNIFICANT CHANGE UP (ref 0–6)
GLUCOSE SERPL-MCNC: 80 MG/DL — SIGNIFICANT CHANGE UP (ref 70–99)
HCT VFR BLD CALC: 37.5 % — LOW (ref 39–50)
HGB BLD-MCNC: 12.5 G/DL — LOW (ref 13–17)
IMM GRANULOCYTES # BLD AUTO: 0.03 K/UL — SIGNIFICANT CHANGE UP (ref 0–0.07)
IMM GRANULOCYTES NFR BLD AUTO: 0.4 % — SIGNIFICANT CHANGE UP (ref 0–0.9)
LYMPHOCYTES # BLD AUTO: 3.78 K/UL — HIGH (ref 1–3.3)
LYMPHOCYTES NFR BLD AUTO: 48 % — HIGH (ref 13–44)
MAGNESIUM SERPL-MCNC: 2.1 MG/DL — SIGNIFICANT CHANGE UP (ref 1.6–2.6)
MCHC RBC-ENTMCNC: 29.8 PG — SIGNIFICANT CHANGE UP (ref 27–34)
MCHC RBC-ENTMCNC: 33.3 G/DL — SIGNIFICANT CHANGE UP (ref 32–36)
MCV RBC AUTO: 89.3 FL — SIGNIFICANT CHANGE UP (ref 80–100)
MONOCYTES # BLD AUTO: 0.62 K/UL — SIGNIFICANT CHANGE UP (ref 0–0.9)
MONOCYTES NFR BLD AUTO: 7.9 % — SIGNIFICANT CHANGE UP (ref 2–14)
NEUTROPHILS # BLD AUTO: 2.9 K/UL — SIGNIFICANT CHANGE UP (ref 1.8–7.4)
NEUTROPHILS NFR BLD AUTO: 36.8 % — LOW (ref 43–77)
NRBC # BLD AUTO: 0 K/UL — SIGNIFICANT CHANGE UP (ref 0–0)
NRBC # FLD: 0 K/UL — SIGNIFICANT CHANGE UP (ref 0–0)
NRBC BLD AUTO-RTO: 0 /100 WBCS — SIGNIFICANT CHANGE UP (ref 0–0)
PHOSPHATE SERPL-MCNC: 3.9 MG/DL — SIGNIFICANT CHANGE UP (ref 2.5–4.5)
PLATELET # BLD AUTO: 362 K/UL — SIGNIFICANT CHANGE UP (ref 150–400)
PMV BLD: 10 FL — SIGNIFICANT CHANGE UP (ref 7–13)
POTASSIUM SERPL-MCNC: 4.2 MMOL/L — SIGNIFICANT CHANGE UP (ref 3.5–5.3)
POTASSIUM SERPL-SCNC: 4.2 MMOL/L — SIGNIFICANT CHANGE UP (ref 3.5–5.3)
PROT SERPL-MCNC: 6.7 G/DL — SIGNIFICANT CHANGE UP (ref 6–8.3)
RBC # BLD: 4.2 M/UL — SIGNIFICANT CHANGE UP (ref 4.2–5.8)
RBC # FLD: 13.8 % — SIGNIFICANT CHANGE UP (ref 10.3–14.5)
SODIUM SERPL-SCNC: 137 MMOL/L — SIGNIFICANT CHANGE UP (ref 135–145)
SPECIMEN SOURCE: SIGNIFICANT CHANGE UP
WBC # BLD: 7.87 K/UL — SIGNIFICANT CHANGE UP (ref 3.8–10.5)
WBC # FLD AUTO: 7.87 K/UL — SIGNIFICANT CHANGE UP (ref 3.8–10.5)

## 2025-09-01 PROCEDURE — 99232 SBSQ HOSP IP/OBS MODERATE 35: CPT

## 2025-09-01 RX ADMIN — Medication 1 MILLIGRAM(S): at 05:43

## 2025-09-01 RX ADMIN — HALOPERIDOL 5 MILLIGRAM(S): 10 TABLET ORAL at 18:03

## 2025-09-01 RX ADMIN — Medication 1 MILLIGRAM(S): at 18:03

## 2025-09-01 RX ADMIN — HALOPERIDOL 5 MILLIGRAM(S): 10 TABLET ORAL at 05:43

## 2025-09-02 VITALS
RESPIRATION RATE: 18 BRPM | HEART RATE: 93 BPM | SYSTOLIC BLOOD PRESSURE: 118 MMHG | TEMPERATURE: 98 F | DIASTOLIC BLOOD PRESSURE: 72 MMHG | OXYGEN SATURATION: 99 %

## 2025-09-02 LAB
ALBUMIN SERPL ELPH-MCNC: 3.7 G/DL — SIGNIFICANT CHANGE UP (ref 3.3–5)
ALP SERPL-CCNC: 50 U/L — SIGNIFICANT CHANGE UP (ref 40–120)
ALT FLD-CCNC: 24 U/L — SIGNIFICANT CHANGE UP (ref 4–41)
ANION GAP SERPL CALC-SCNC: 10 MMOL/L — SIGNIFICANT CHANGE UP (ref 7–14)
AST SERPL-CCNC: 19 U/L — SIGNIFICANT CHANGE UP (ref 4–40)
BASOPHILS # BLD AUTO: 0.06 K/UL — SIGNIFICANT CHANGE UP (ref 0–0.2)
BASOPHILS NFR BLD AUTO: 0.7 % — SIGNIFICANT CHANGE UP (ref 0–2)
BILIRUB SERPL-MCNC: 0.2 MG/DL — SIGNIFICANT CHANGE UP (ref 0.2–1.2)
BUN SERPL-MCNC: 12 MG/DL — SIGNIFICANT CHANGE UP (ref 7–23)
CALCIUM SERPL-MCNC: 9.6 MG/DL — SIGNIFICANT CHANGE UP (ref 8.4–10.5)
CHLORIDE SERPL-SCNC: 101 MMOL/L — SIGNIFICANT CHANGE UP (ref 98–107)
CK SERPL-CCNC: 201 U/L — HIGH (ref 30–200)
CO2 SERPL-SCNC: 26 MMOL/L — SIGNIFICANT CHANGE UP (ref 22–31)
CREAT SERPL-MCNC: 0.93 MG/DL — SIGNIFICANT CHANGE UP (ref 0.5–1.3)
EGFR: 104 ML/MIN/1.73M2 — SIGNIFICANT CHANGE UP
EGFR: 104 ML/MIN/1.73M2 — SIGNIFICANT CHANGE UP
EOSINOPHIL # BLD AUTO: 0.43 K/UL — SIGNIFICANT CHANGE UP (ref 0–0.5)
EOSINOPHIL NFR BLD AUTO: 5.2 % — SIGNIFICANT CHANGE UP (ref 0–6)
GLUCOSE SERPL-MCNC: 82 MG/DL — SIGNIFICANT CHANGE UP (ref 70–99)
HCT VFR BLD CALC: 38.7 % — LOW (ref 39–50)
HGB BLD-MCNC: 12.8 G/DL — LOW (ref 13–17)
IMM GRANULOCYTES # BLD AUTO: 0.03 K/UL — SIGNIFICANT CHANGE UP (ref 0–0.07)
IMM GRANULOCYTES NFR BLD AUTO: 0.4 % — SIGNIFICANT CHANGE UP (ref 0–0.9)
LYMPHOCYTES # BLD AUTO: 3.56 K/UL — HIGH (ref 1–3.3)
LYMPHOCYTES NFR BLD AUTO: 43.1 % — SIGNIFICANT CHANGE UP (ref 13–44)
MAGNESIUM SERPL-MCNC: 2.2 MG/DL — SIGNIFICANT CHANGE UP (ref 1.6–2.6)
MCHC RBC-ENTMCNC: 29.6 PG — SIGNIFICANT CHANGE UP (ref 27–34)
MCHC RBC-ENTMCNC: 33.1 G/DL — SIGNIFICANT CHANGE UP (ref 32–36)
MCV RBC AUTO: 89.4 FL — SIGNIFICANT CHANGE UP (ref 80–100)
MONOCYTES # BLD AUTO: 0.67 K/UL — SIGNIFICANT CHANGE UP (ref 0–0.9)
MONOCYTES NFR BLD AUTO: 8.1 % — SIGNIFICANT CHANGE UP (ref 2–14)
NEUTROPHILS # BLD AUTO: 3.51 K/UL — SIGNIFICANT CHANGE UP (ref 1.8–7.4)
NEUTROPHILS NFR BLD AUTO: 42.5 % — LOW (ref 43–77)
NRBC # BLD AUTO: 0 K/UL — SIGNIFICANT CHANGE UP (ref 0–0)
NRBC # FLD: 0 K/UL — SIGNIFICANT CHANGE UP (ref 0–0)
NRBC BLD AUTO-RTO: 0 /100 WBCS — SIGNIFICANT CHANGE UP (ref 0–0)
PHOSPHATE SERPL-MCNC: 4 MG/DL — SIGNIFICANT CHANGE UP (ref 2.5–4.5)
PLATELET # BLD AUTO: 361 K/UL — SIGNIFICANT CHANGE UP (ref 150–400)
PMV BLD: 10 FL — SIGNIFICANT CHANGE UP (ref 7–13)
POTASSIUM SERPL-MCNC: 4.1 MMOL/L — SIGNIFICANT CHANGE UP (ref 3.5–5.3)
POTASSIUM SERPL-SCNC: 4.1 MMOL/L — SIGNIFICANT CHANGE UP (ref 3.5–5.3)
PROT SERPL-MCNC: 7 G/DL — SIGNIFICANT CHANGE UP (ref 6–8.3)
RBC # BLD: 4.33 M/UL — SIGNIFICANT CHANGE UP (ref 4.2–5.8)
RBC # FLD: 13.6 % — SIGNIFICANT CHANGE UP (ref 10.3–14.5)
SODIUM SERPL-SCNC: 137 MMOL/L — SIGNIFICANT CHANGE UP (ref 135–145)
WBC # BLD: 8.26 K/UL — SIGNIFICANT CHANGE UP (ref 3.8–10.5)
WBC # FLD AUTO: 8.26 K/UL — SIGNIFICANT CHANGE UP (ref 3.8–10.5)

## 2025-09-02 PROCEDURE — 99239 HOSP IP/OBS DSCHRG MGMT >30: CPT

## 2025-09-02 RX ORDER — LORAZEPAM 4 MG/ML
1 VIAL (ML) INJECTION
Refills: 0 | Status: DISCONTINUED | OUTPATIENT
Start: 2025-09-02 | End: 2025-09-02

## 2025-09-02 RX ADMIN — Medication 1 MILLIGRAM(S): at 17:16

## 2025-09-02 RX ADMIN — HALOPERIDOL 5 MILLIGRAM(S): 10 TABLET ORAL at 17:16

## 2025-09-02 RX ADMIN — HALOPERIDOL 5 MILLIGRAM(S): 10 TABLET ORAL at 06:06

## 2025-09-02 RX ADMIN — Medication 1 MILLIGRAM(S): at 06:06
